# Patient Record
Sex: FEMALE | Race: WHITE | NOT HISPANIC OR LATINO | Employment: OTHER | URBAN - METROPOLITAN AREA
[De-identification: names, ages, dates, MRNs, and addresses within clinical notes are randomized per-mention and may not be internally consistent; named-entity substitution may affect disease eponyms.]

---

## 2017-01-05 ENCOUNTER — LAB VISIT (OUTPATIENT)
Dept: LAB | Facility: HOSPITAL | Age: 52
End: 2017-01-05
Attending: FAMILY MEDICINE
Payer: COMMERCIAL

## 2017-01-05 DIAGNOSIS — D50.9 IRON DEFICIENCY ANEMIA, UNSPECIFIED IRON DEFICIENCY ANEMIA TYPE: ICD-10-CM

## 2017-01-05 DIAGNOSIS — E11.59 HYPERTENSION ASSOCIATED WITH DIABETES: ICD-10-CM

## 2017-01-05 DIAGNOSIS — E78.5 HYPERLIPIDEMIA ASSOCIATED WITH TYPE 2 DIABETES MELLITUS: ICD-10-CM

## 2017-01-05 DIAGNOSIS — E11.69 HYPERLIPIDEMIA ASSOCIATED WITH TYPE 2 DIABETES MELLITUS: ICD-10-CM

## 2017-01-05 DIAGNOSIS — E11.42 TYPE 2 DIABETES MELLITUS WITH DIABETIC POLYNEUROPATHY, WITHOUT LONG-TERM CURRENT USE OF INSULIN: ICD-10-CM

## 2017-01-05 DIAGNOSIS — E55.9 VITAMIN D INSUFFICIENCY: ICD-10-CM

## 2017-01-05 DIAGNOSIS — I15.2 HYPERTENSION ASSOCIATED WITH DIABETES: ICD-10-CM

## 2017-01-05 DIAGNOSIS — Z00.00 ROUTINE GENERAL MEDICAL EXAMINATION AT A HEALTH CARE FACILITY: ICD-10-CM

## 2017-01-05 LAB
25(OH)D3+25(OH)D2 SERPL-MCNC: 16 NG/ML
ALBUMIN SERPL BCP-MCNC: 3.6 G/DL
ALP SERPL-CCNC: 62 U/L
ALT SERPL W/O P-5'-P-CCNC: 12 U/L
ANION GAP SERPL CALC-SCNC: 11 MMOL/L
AST SERPL-CCNC: 15 U/L
BASOPHILS # BLD AUTO: 0.02 K/UL
BASOPHILS NFR BLD: 0.3 %
BILIRUB SERPL-MCNC: 0.3 MG/DL
BUN SERPL-MCNC: 14 MG/DL
CALCIUM SERPL-MCNC: 9.4 MG/DL
CHLORIDE SERPL-SCNC: 107 MMOL/L
CHOLEST/HDLC SERPL: 4.6 {RATIO}
CO2 SERPL-SCNC: 22 MMOL/L
CREAT SERPL-MCNC: 1 MG/DL
DIFFERENTIAL METHOD: ABNORMAL
EOSINOPHIL # BLD AUTO: 0.6 K/UL
EOSINOPHIL NFR BLD: 9.5 %
ERYTHROCYTE [DISTWIDTH] IN BLOOD BY AUTOMATED COUNT: 12.8 %
EST. GFR  (AFRICAN AMERICAN): >60 ML/MIN/1.73 M^2
EST. GFR  (NON AFRICAN AMERICAN): >60 ML/MIN/1.73 M^2
GLUCOSE SERPL-MCNC: 133 MG/DL
HCT VFR BLD AUTO: 34.8 %
HDL/CHOLESTEROL RATIO: 21.8 %
HDLC SERPL-MCNC: 179 MG/DL
HDLC SERPL-MCNC: 39 MG/DL
HGB BLD-MCNC: 11.4 G/DL
LDLC SERPL CALC-MCNC: 107 MG/DL
LYMPHOCYTES # BLD AUTO: 2.5 K/UL
LYMPHOCYTES NFR BLD: 36.6 %
MCH RBC QN AUTO: 27.3 PG
MCHC RBC AUTO-ENTMCNC: 32.8 %
MCV RBC AUTO: 84 FL
MONOCYTES # BLD AUTO: 0.3 K/UL
MONOCYTES NFR BLD: 4.8 %
NEUTROPHILS # BLD AUTO: 3.3 K/UL
NEUTROPHILS NFR BLD: 48.5 %
NONHDLC SERPL-MCNC: 140 MG/DL
PLATELET # BLD AUTO: 270 K/UL
PMV BLD AUTO: 10.6 FL
POTASSIUM SERPL-SCNC: 4 MMOL/L
PROT SERPL-MCNC: 6.9 G/DL
RBC # BLD AUTO: 4.17 M/UL
SODIUM SERPL-SCNC: 140 MMOL/L
TRIGL SERPL-MCNC: 165 MG/DL
TSH SERPL DL<=0.005 MIU/L-ACNC: 2.52 UIU/ML
WBC # BLD AUTO: 6.73 K/UL

## 2017-01-05 PROCEDURE — 80061 LIPID PANEL: CPT

## 2017-01-05 PROCEDURE — 82306 VITAMIN D 25 HYDROXY: CPT

## 2017-01-05 PROCEDURE — 36415 COLL VENOUS BLD VENIPUNCTURE: CPT | Mod: PO

## 2017-01-05 PROCEDURE — 85025 COMPLETE CBC W/AUTO DIFF WBC: CPT

## 2017-01-05 PROCEDURE — 80053 COMPREHEN METABOLIC PANEL: CPT

## 2017-01-05 PROCEDURE — 83036 HEMOGLOBIN GLYCOSYLATED A1C: CPT

## 2017-01-05 PROCEDURE — 84443 ASSAY THYROID STIM HORMONE: CPT

## 2017-01-06 ENCOUNTER — TELEPHONE (OUTPATIENT)
Dept: INTERNAL MEDICINE | Facility: CLINIC | Age: 52
End: 2017-01-06

## 2017-01-06 DIAGNOSIS — E55.9 VITAMIN D DEFICIENCY: ICD-10-CM

## 2017-01-06 LAB
ESTIMATED AVG GLUCOSE: 140 MG/DL
HBA1C MFR BLD HPLC: 6.5 %

## 2017-01-06 RX ORDER — ERGOCALCIFEROL 1.25 MG/1
50000 CAPSULE ORAL
Qty: 10 CAPSULE | Refills: 0 | Status: SHIPPED | OUTPATIENT
Start: 2017-01-06 | End: 2017-04-18

## 2017-01-06 NOTE — TELEPHONE ENCOUNTER
Labs showing significantly low vitamin D levels, refill given on vitamin D by prescription.  Urine showing few white blood cells but negative for infection, drink adequate fluids.  Noted improvement in cholesterol and blood glucose levels, continue current medications and strict lifestyle changes with diet and exercise, limit carbohydrates

## 2017-01-11 ENCOUNTER — OFFICE VISIT (OUTPATIENT)
Dept: OPHTHALMOLOGY | Facility: CLINIC | Age: 52
End: 2017-01-11
Payer: COMMERCIAL

## 2017-01-11 DIAGNOSIS — Z98.890 POST-OPERATIVE STATE: Primary | ICD-10-CM

## 2017-01-11 DIAGNOSIS — H04.123 DRY EYE SYNDROME, BILATERAL: ICD-10-CM

## 2017-01-11 DIAGNOSIS — H40.1133 PRIMARY OPEN ANGLE GLAUCOMA OF BOTH EYES, SEVERE STAGE: ICD-10-CM

## 2017-01-11 PROCEDURE — 99024 POSTOP FOLLOW-UP VISIT: CPT | Mod: S$GLB,,, | Performed by: OPHTHALMOLOGY

## 2017-01-11 PROCEDURE — 99999 PR PBB SHADOW E&M-EST. PATIENT-LVL II: CPT | Mod: PBBFAC,,, | Performed by: OPHTHALMOLOGY

## 2017-01-11 RX ORDER — ATROPINE SULFATE 10 MG/ML
1 SOLUTION/ DROPS OPHTHALMIC 2 TIMES DAILY
Qty: 15 ML | Refills: 0 | Status: SHIPPED | OUTPATIENT
Start: 2017-01-11 | End: 2017-02-15

## 2017-01-11 NOTE — PROGRESS NOTES
HPI     Post-op Evaluation    Additional comments: s/p goniotomy os. states eye is doing well.            Comments   1. COAG (followed by Dr. Scott)  CP OS 8/27/15 - low to moderate flow, TDW 1100, aggressive dilation with   GV  Gonio Puncture OS 9/25/15   SLT OS 6/23/16, 6/23/15, 6-2-14  Goniotomy OS 11-17-16  2. DM  3. RP / VIT A PALMITATE 1500 (discontinued)      Latanoprost QHS OS, Cosopt BID OS, Alphagan BID OS,   inflamase bid os, ilevro once daily os    Per [facility]: prednisolone acetate od qd       Last edited by Kaylie Chery MA on 1/11/2017  9:59 AM. (History)            Assessment /Plan     For exam results, see Encounter Report.      ICD-10-CM ICD-9-CM    1. Post-operative state Z98.890 V45.89 S/P Goniotomy OS  Doing Well   2. Primary open angle glaucoma of both eyes, severe stage H40.1133 365.11 Will Begin Atropine BID OD,due to Mild Pain,cont Pred A. Daily OD     365.73    3. Dry eye syndrome, bilateral H04.123 375.15      Latanoprost QHS OS, Cosopt BID OS, Alphagan BID OS,   inflamase bid os, ilevro once daily os    OD Begin Atropine BID ,cont Pred Acetate Daily    Return to clinic in 1 month

## 2017-01-18 DIAGNOSIS — K21.9 GASTROESOPHAGEAL REFLUX DISEASE WITHOUT ESOPHAGITIS: ICD-10-CM

## 2017-01-18 RX ORDER — LANSOPRAZOLE 30 MG/1
30 CAPSULE, DELAYED RELEASE ORAL 2 TIMES DAILY
Qty: 180 CAPSULE | Refills: 1 | Status: SHIPPED | OUTPATIENT
Start: 2017-01-18 | End: 2017-02-24 | Stop reason: CLARIF

## 2017-02-13 ENCOUNTER — LAB VISIT (OUTPATIENT)
Dept: LAB | Facility: HOSPITAL | Age: 52
End: 2017-02-13
Attending: FAMILY MEDICINE
Payer: COMMERCIAL

## 2017-02-13 ENCOUNTER — OFFICE VISIT (OUTPATIENT)
Dept: INTERNAL MEDICINE | Facility: CLINIC | Age: 52
End: 2017-02-13
Payer: COMMERCIAL

## 2017-02-13 VITALS
OXYGEN SATURATION: 99 % | BODY MASS INDEX: 32.59 KG/M2 | HEART RATE: 94 BPM | TEMPERATURE: 99 F | SYSTOLIC BLOOD PRESSURE: 114 MMHG | WEIGHT: 166 LBS | DIASTOLIC BLOOD PRESSURE: 76 MMHG | HEIGHT: 60 IN

## 2017-02-13 DIAGNOSIS — R30.0 DYSURIA: Primary | ICD-10-CM

## 2017-02-13 DIAGNOSIS — R30.0 DYSURIA: ICD-10-CM

## 2017-02-13 DIAGNOSIS — R11.0 NAUSEA: ICD-10-CM

## 2017-02-13 DIAGNOSIS — M79.672 FOOT PAIN, BILATERAL: ICD-10-CM

## 2017-02-13 DIAGNOSIS — M79.671 FOOT PAIN, BILATERAL: ICD-10-CM

## 2017-02-13 DIAGNOSIS — J01.90 ACUTE SINUSITIS, RECURRENCE NOT SPECIFIED, UNSPECIFIED LOCATION: ICD-10-CM

## 2017-02-13 LAB
BILIRUB UR QL STRIP: NEGATIVE
CLARITY UR: CLEAR
COLOR UR: NORMAL
FLUAV AG SPEC QL IA: NEGATIVE
FLUBV AG SPEC QL IA: NEGATIVE
GLUCOSE UR QL STRIP: NEGATIVE
HGB UR QL STRIP: NEGATIVE
KETONES UR QL STRIP: NEGATIVE
LEUKOCYTE ESTERASE UR QL STRIP: NEGATIVE
NITRITE UR QL STRIP: NEGATIVE
PH UR STRIP: 5 [PH] (ref 5–8)
PROT UR QL STRIP: NEGATIVE
SP GR UR STRIP: 1.02 (ref 1–1.03)
SPECIMEN SOURCE: NORMAL
URN SPEC COLLECT METH UR: NORMAL

## 2017-02-13 PROCEDURE — 87400 INFLUENZA A/B EACH AG IA: CPT | Mod: PO

## 2017-02-13 PROCEDURE — 99213 OFFICE O/P EST LOW 20 MIN: CPT | Mod: S$GLB,,, | Performed by: PHYSICIAN ASSISTANT

## 2017-02-13 PROCEDURE — 81003 URINALYSIS AUTO W/O SCOPE: CPT | Mod: PO

## 2017-02-13 PROCEDURE — 87077 CULTURE AEROBIC IDENTIFY: CPT

## 2017-02-13 PROCEDURE — 87088 URINE BACTERIA CULTURE: CPT

## 2017-02-13 PROCEDURE — 3078F DIAST BP <80 MM HG: CPT | Mod: S$GLB,,, | Performed by: PHYSICIAN ASSISTANT

## 2017-02-13 PROCEDURE — 87086 URINE CULTURE/COLONY COUNT: CPT

## 2017-02-13 PROCEDURE — 87186 SC STD MICRODIL/AGAR DIL: CPT

## 2017-02-13 PROCEDURE — 99999 PR PBB SHADOW E&M-EST. PATIENT-LVL IV: CPT | Mod: PBBFAC,,, | Performed by: PHYSICIAN ASSISTANT

## 2017-02-13 PROCEDURE — 3074F SYST BP LT 130 MM HG: CPT | Mod: S$GLB,,, | Performed by: PHYSICIAN ASSISTANT

## 2017-02-13 RX ORDER — AMOXICILLIN AND CLAVULANATE POTASSIUM 875; 125 MG/1; MG/1
1 TABLET, FILM COATED ORAL 2 TIMES DAILY
Qty: 14 TABLET | Refills: 0 | Status: SHIPPED | OUTPATIENT
Start: 2017-02-13 | End: 2017-02-15 | Stop reason: SINTOL

## 2017-02-13 RX ORDER — ONDANSETRON 8 MG/1
8 TABLET, ORALLY DISINTEGRATING ORAL
Qty: 10 TABLET | Refills: 0 | Status: SHIPPED | OUTPATIENT
Start: 2017-02-13 | End: 2017-02-15 | Stop reason: SINTOL

## 2017-02-13 NOTE — PATIENT INSTRUCTIONS
Understanding Your Cholesterol Numbers  The higher your blood cholesterol, the greater your risk for heart attack (acute myocardial infarction), cardiovascular disease, or stroke. High cholesterol can cause any artery in your body to become narrow. Thats why you need to know your cholesterol level. If its high, you can take steps to bring it down. Eating the right foods and getting enough exercise can help. Some people also need medicine to control their cholesterol. Your healthcare provider can help you get started on a plan to control your cholesterol.        Blood flows easily when arteries are clear.      Less blood flows when cholesterol builds up in artery walls.   Checking your cholesterol  Your cholesterol is checked with a simple blood test. The results tell you how much cholesterol you have in your blood. Get checked as often as your healthcare provider suggests. If you have diabetes or high cholesterol, you may need your blood tested as often as every 3 months. As you work to lower your cholesterol, your numbers will change slowly. But they will change. Be patient and stay on track. Discuss your numbers with your healthcare provider.   Your total cholesterol number  A blood test will give you a number for the total amount of cholesterol in your blood. The higher this number, the more likely it is that cholesterol will build up in your blood vessels. Even if your cholesterol is just slightly high, you are at increased risk for health problems.  My total cholesterol is: ________________  Your lipid numbers  Total cholesterol is just one part of the story. Cholesterol is made up of different kinds of fats (lipids). If your total cholesterol is high, knowing your lipid profile is important. The 2 most important lipids are HDL and LDL. Lipids are checked after you have fasted. This means you dont eat for a certain amount of time before the test is done. Along with cholesterol, triglyceride can also lead  to blocked arteries. Triglycerides are another type of fat. Knowing your HDL, LDL, and triglyceride numbers, as well as your total cholesterol gives you a more complete picture of your cholesterol level:  · HDL is called the good cholesterol. It moves out of the bloodstream and does not block your blood vessels. HDL levels are affected by how much you exercise and what you eat.My HDL cholesterol is: ________________  · LDL is called the bad cholesterol. This is because it can stick to your artery walls and block blood flow. LDL levels are most affected by what you eat and by your genes.My LDL cholesterol is: ________________  · Triglyceride is a type of fat the body uses to store energy. Too much triglyceride can increase your risk for heart disease. Triglyceride levels should be under 150.My triglyceride is:  ________________  Based on your other health issues and risk factors, your healthcare provider may have specific goals for treating your cholesterol. You may need to use different kinds of medicines that work on the different types of cholesterol. Work with your provider to know what your goals of treatment are. Stick with your treatment plan to reach the goals you set.  High-risk groups  Some people may need to take medicines called statins to control their cholesterol. This is in addition to eating a healthy diet and getting regular exercise.  Statins can help you stay healthy. They can also help prevent a heart attack or stroke. You may need to take a statin if you are in one of these groups:  · Adults who have had a heart attack or stroke. Or adults who have had peripheral vascular disease, a ministroke (transient ischemic attack), or stable or unstable angina. This group also includes people who have had a procedure to restore blood flow through a blocked artery. These procedures include percutaneous coronary intervention, angioplasty, stent, and open-heart bypass surgery.  · Adults who have diabetes.  "Or adults who are at higher risk of having a heart attack or stroke and have an LDL cholesterol level of 70 to 189 mg/dL  · Adults who are 21 years old or older and have an LDL cholesterol level of 190 mg/dL or higher.  If you are in a high-risk group, talk with your healthcare provider about your treatment goals. Make sure you understand why these goals are important, based on your own health history and your family history of heart disease or high cholesterol.  Make a plan to have regular cholesterol checks. You may need to fast before getting this test. Also ask your provider about any side effects your medicines may cause. Let your provider know about any side effects you have. You may need to take more than one medicine to reach the cholesterol goals that you and your provider decide on.  Date Last Reviewed: 10/1/2016  © 0022-4335 [employer]. [address] [place] [address]. All rights reserved. This information is not intended as a substitute for professional medical care. Always follow your healthcare professional's instructions.        Triglycerides  Does this test have other names?  Lipid panel, fasting lipoprotein panel  What is this test?  This test measures the amount of triglycerides in your blood.  Triglycerides are one of several types of fats in your blood. Other kinds are LDL ("bad") cholesterol and HDL ("good") cholesterol.  Knowing your triglyceride level is important, especially if you have diabetes, are overweight or a smoker, or are mostly inactive. High triglyceride levels may put you at greater risk for a heart attack or stroke.    This test is part of a group of cholesterol and blood fat tests called a fasting lipoprotein panel, or lipid panel. This panel is recommended for all adults at least once every 5 years, or as recommended by your healthcare provider.  Knowing your triglyceride level helps your healthcare provider suggest healthy changes to your diet or " lifestyle. If you have triglycerides that are high to very high, your provider is more likely to prescribe medicines to lower your triglycerides or your LDL cholesterol.  Why do I need this test?  You may have this test as part of a routine checkup. You may also need this test if you're overweight, drink too much alcohol, rarely exercise, or have other conditions like high blood pressure or diabetes.  If you are on cholesterol-lowering medicines, you may have this test to see how well your treatment is working.  What other tests might I have along with this test?  Your healthcare provider will order screening tests for LDL, HDL, and total cholesterol.  What do my test results mean?  Many things may affect your lab test results. These include the method each lab uses to do the test. Even if your test results are different from the normal value, you may not have a problem. To learn what the results mean for you, talk with your healthcare provider.  Results are given in milligrams per deciliter (mg/dL). Normal levels of triglycerides are less than 150 mg/dL.  Here are how higher numbers are classified:  · 150 to 199 mg/dL: Borderline high  · 200 to 499 mg/dL: High  · 500 mg/dL and above: Very high  If you have a high triglyceride level, you have a greater risk for heart attack and stroke.  A triglyceride level above 150 mg/dL also means that you may have an increased risk for metabolic syndrome. This is a cluster of symptoms including high blood pressure, high blood sugar, and high body fat around the waist. These symptoms have been linked to increased risk for diabetes, heart disease, and stroke.  High triglycerides levels can also be caused by certain diseases or inherited conditions.  If your triglyceride level is above 200 mg/dL, your healthcare provider may recommend that you:  · Lose weight  · Limit high-fat foods containing saturated fats. These are animal fats found in meat, butter, and whole milk.  · Limit  trans fats, which are found in many processed foods like chips and store-bought cookies  · Cut back on drinks with added sugars, such as soda  · Limit your alcohol intake  · Stop smoking  · Control your blood pressure  · Exercise for at least 30 minutes a day, 5 days a week  · Limit the calories from fat in your diet to 25% to 35% of your total intake  If your triglycerides are extremely high--above 500 mg/dL--you may have an added risk for problems with your pancreas. You will likely need medicine to lower your levels along with recommended changes in diet and lifestyle.  How is this test done?  The test requires a blood sample, which is drawn through a needle from a vein in your arm.  Does this test pose any risks?  Taking a blood sample with a needle carries risks that include bleeding, infection, bruising, or feeling dizzy. When the needle pricks your arm, you may feel a slight stinging sensation or pain. Afterward, the site may be slightly sore.  What might affect my test results?  Not fasting for the required length of time before the test can affect your results. Certain medicines can affect your results, as can drinking alcohol.  How do I prepare for the test?  If you have this test as part of a cholesterol screening, you will need to not eat or drink anything but water for 9 to 14 hours before the test. In addition, be sure your healthcare provider knows about all medicines, herbs, vitamins, and supplements you are taking. This includes medicines that don't need a prescription and any illicit drugs you may use.     Date Last Reviewed: 10/9/2015  © 0904-3485 [employer]. [address] [place] [address], [place] [address]. All rights reserved. This information is not intended as a substitute for professional medical care. Always follow your healthcare professional's instructions.        Adult Self-Care for Colds  Colds are caused by viruses. They cant be cured with antibiotics. However, you can relieve  symptoms and support your bodys efforts to heal itself. No matter which symptoms you have, be sure to drink plenty of fluids (water or clear soup); stop smoking and drinking alcohol; and get plenty of rest.   Understand a fever  · Take your temperature several times a day. If your fever is 100.4°F (38.0°C) for more than a day, call your doctor.  · Relax, lie down. Go to bed if you want. Just get off your feet and rest. Also, drink plenty of fluids to avoid dehydration.  · Take acetaminophen or a nonsteroidal anti-inflammatory agent (NSAID), such as ibuprofen.  Treat a troubled nose kindly  · Breathe steam or heated humidified air to open blocked nasal passages.  a hot shower or use a vaporizer. Be careful not to get burned by the steam.  · Saline nasal sprays and decongestant tablets help open a stuffy nose. Antihistamines can also help, but they can cause side effects such as drowsiness and drying of the eyes, nose, and mouth.  Soothe a sore throat and cough  · Gargle every 2 hours with 1/4 teaspoon of salt dissolved in 1/2 cup of warm water. Suck on throat lozenges and cough drops to moisten your throat.  · Cough medicines are available but it is unclear how effective they actually are.  · Take acetaminophen or an NSAID, such as ibuprofen to ease throat pain  Ease digestive problems  · Put fluid back into your body. Take frequent sips of clear liquids such as water or broth. Do not drink beverages with a lot of sugar in them, such as juices and sodas. These can make diarrhea worse. Older children and adults can drink sports drinks.  · As your appetite returns, you can resume your normal diet. Ask your doctor whether there are any foods you should avoid.     When to seek medical care  When you first notice symptoms, ask your health care provider if antiviral medications are appropriate. Antibiotics should not be taken for colds or flu. Also, call your doctor if you have any of the following symptoms or if  you arent feeling better after 7 days:  · Shortness of breath  · Pain or pressure in the chest or abdomen  · Worsening symptoms, especially after a period of improvement  · Fever of 100.4°F  (38.0°C) or higher, or fever that doesnt go down with medication  · Sudden dizziness or confusion  · Severe or continued vomiting  · Signs of dehydration, including extreme thirst, dark urine, infrequent urination, dry mouth  · Spotted, red, or very sore throat   Date Last Reviewed: 6/19/2014  © 8066-7683 [employer]. [address] [place] [id number]. All rights reserved. This information is not intended as a substitute for professional medical care. Always follow your healthcare professional's instructions.        Wearing Proper Shoes                    You walk on your feet every day, forcing them to support the weight of your body. Repeated stress on your feet can cause damage over time. The right shoes can help protect your feet. The wrong shoes can cause more foot problems. Read the information below to help you find a shoe that fits your foot needs.     A good shoe fit will cover your foot outline.       A shoe that doesnt cover the outline is a bad fit.      Whats your foot shape?  To get a good fit, you need to know the shape of your foot. Do this simple test: While standing, place your foot on a piece of paper and trace around it. Is your foot straight or curved? Do you have a foot problem, such as a bunion, that causes your foot outline to show a bulge on the side of your big toe?  Finding your fit  Bring your foot outline to the shoe store to help you find the right shoe. Place a shoe you like on top of the outline to see if it matches the shape. The shoe should cover the outline. (If you have a bunion, the shoe may not cover the bulge on the outline. Look for soft leather shoes to stretch over the bunion.) Once youve found a pair of proper shoes, put them on. Walk around. Be sure the shoes  dont rub or pinch. If the shoes feel good, youve found your fit!  The right shoe for you  A good shoe has features that provide comfort and support. It must also be the right size and shape for your feet. Look for a shoe made of breathable fabric and lining, such as leather or canvas. Be sure that shoes have enough tread to prevent slipping. Go to a good shoe store for help finding the right shoe.  Good shoe features  An ideal shoe has the following:  · Laces for support. If tying laces is a problem for you, try shoes with Velcro fasteners or rolando.  · A front of the shoe (toe box) with ½ inch space in front of your longest toes.  · An arch shape that supports your foot.  · No more than 1½ inches of heel.  · A stiff, snug back of the shoe to keep your foot from sliding around.  · A smooth lining with no rough seams.  Shoe shopping tips  Below are some dos and donts for when you go to the shoe store.  Do:  · Select the shoes that feel right. Wear them around the house. Then bring them to your foot healthcare provider to check for fit. If they dont fit well, return them.  · Shop late in the day, when your feet will be slightly bigger.  · Each time you buy shoes, have both your feet measured while you are standing. Foot size changes with time.  · Pick shoes to suit their purpose. High heels are OK for an occasional night on the town. But for everyday wear, choose a more sensible shoe.  · Try on shoes while wearing any inserts specially made for your feet (orthoses).  · Try on both the right and left shoes. If your feet are different sizes, pick a pair that fits the larger foot.  Dont:  · Dont buy shoes based on shoe size alone. Always try on shoes, as sizes differ from brand to brand and within brands.  · Dont expect shoes to break in. If they dont fit at the store, dont buy them.  · Dont buy a shoe that doesnt match your foot shape.  What about socks?  Always wear socks with shoes. Socks help absorb  sweat and reduce friction and blistering. When shopping for shoes, choose soft, padded socks with seams that dont irritate your feet.  If you have foot problems  Some foot problems cause deformities. This can make it hard to find a good fit. Look for shoes made of soft leather to stretch over the deformity. If you have bunions, buy shoes with a wider toe box. To fit hammertoes, look for shoes with a tall toe box. If you have arch problems, you may need inserts. In some cases, youll need to have custom footwear or orthoses made for your feet.  Suggested footwear  Ask your healthcare provider what kind of footwear you need. He or she may recommend a certain brand or shoe store.  Date Last Reviewed: 8/1/2016 © 2000-2016 [employer]. [address] [place] [address], [place] [address]. All rights reserved. This information is not intended as a substitute for professional medical care. Always follow your healthcare professional's instructions.

## 2017-02-13 NOTE — MR AVS SNAPSHOT
[facility]  [address] [facility] [place] [address]  Phone: [phone]  Fax: [phone]                  Richelle Zaldivar   2017 2:40 PM   Office Visit    Description:  Female : 1965   Provider:  Kacey Robb PA-C   Department:  [facility] - Internal Medicine           Reason for Visit     Foot Pain     Urinary Tract Infection           Diagnoses this Visit        Comments    Dysuria    -  Primary     [place]         Foot pain, bilateral         Nausea                To Do List           Future Appointments        Provider Department Dept Phone    2017 3:40 PM SPECIMEN, [facility] [phone]    2017 2:20 PM Oneyda Bergman MD [facility] Internal Medicine [phone]    2/15/2017 9:15 AM Robert Scott MD [facility] Ophthalmology [phone]    2017 10:40 AM Paty Mclean DPM [facility] Podiatry [phone]    3/7/2017 2:15 PM Aster Marina MD [facility]/ -[phone]      Goals (5 Years of Data)     None      Follow-Up and Disposition     Return if symptoms worsen or fail to improve.       These Medications        Disp Refills Start End    ondansetron (ZOFRAN-ODT) 8 MG TbDL 10 tablet 0 2017     Take 1 tablet (8 mg total) by mouth every 24 hours as needed (nausea). - Oral    Pharmacy: [facility] [address] [place], [place] [address], [place] - [address] [facility] Ph #: [phone]         [facility]     [facility] -  Assistance  Registered nurses in the [facility] provide clinical advisement, health education, appointment booking, and other advisory services.  Call for this free service at [phone].             Medications           Message regarding Medications     Verify the changes and/or additions to your medication regime listed below are the same as discussed with your clinician today.  If any of these changes or additions are incorrect, please notify your healthcare provider.         START taking these NEW medications        Refills    ondansetron (ZOFRAN-ODT) 8 MG TbDL 0    Sig: Take 1 tablet (8 mg total) by mouth every 24 hours as needed (nausea).    Class: Normal    Route: Oral      STOP taking these medications     vitamin A palmitate 15,000 unit Tab Take 1 capsule by mouth once daily.    prednisoLONE sodium phosphate (INFLAMASE FORTE) 1 % Drop Place 1 drop into the left eye 4 (four) times daily.    nepafenac (ILEVRO) 0.3 % DrpS Apply to eye once daily.    omega 3-dha-epa-fish oil 1,000 (120-180) mg Cap Take 1 tablet by mouth 2 (two) times daily.    prednisoLONE acetate (PRED FORTE) 1 % DrpS Place 1 drop into the right eye once daily.           Verify that the below list of medications is an accurate representation of the medications you are currently taking.  If none reported, the list may be blank. If incorrect, please contact your healthcare provider. Carry this list with you in case of emergency.           Current Medications     ACCU-CHEK FASTCLIX Misc 1 Stick by Misc.(Non-Drug; Combo Route) route as directed. Use to check BG 2x daily. Accuchek Fastclix lancets. Medically necessary.    atorvastatin (LIPITOR) 20 MG tablet Take 1 tablet (20 mg total) by mouth once daily.    atropine 1% (ISOPTO ATROPINE) 1 % Drop Place 1 drop into the right eye 2 (two) times daily.    brimonidine 0.15 % OPTH DROP (ALPHAGAN) 0.15 % ophthalmic solution Place 1 drop into the left eye 2 (two) times daily. 15 ML BOTTLE    dorzolamide-timolol 2-0.5% (COSOPT) 22.3-6.8 mg/mL ophthalmic solution Place 1 drop into both eyes every 12 (twelve) hours.    ergocalciferol (VITAMIN D2) 50,000 unit Cap Take 1 capsule (50,000 Units total) by mouth every 7 days.    gabapentin (NEURONTIN) 300 MG capsule Take 1 capsule (300 mg total) by mouth every evening.    glimepiride (AMARYL) 2 MG tablet Take 1 tablet (2 mg total) by mouth 3 (three) times daily.    lansoprazole (PREVACID) 30 MG capsule Take 1 capsule (30 mg total) by  mouth 2 (two) times daily.    latanoprost 0.005 % ophthalmic solution Place 1 drop into both eyes every evening.    lisinopril-hydrochlorothiazide (PRINZIDE,ZESTORETIC) 10-12.5 mg per tablet Take 1 tablet by mouth once daily.    metformin (GLUCOPHAGE) 1000 MG tablet Take 1 tablet (1,000 mg total) by mouth 2 (two) times daily with meals.    ondansetron (ZOFRAN-ODT) 8 MG TbDL Take 1 tablet (8 mg total) by mouth every 24 hours as needed (nausea).    sodium,potassium,mag sulfates (SUPREP BOWEL PREP KIT) 17.5-3.13-1.6 gram SolR Take it as directed           Clinical Reference Information           Your Vitals Were     BP Pulse Temp Height    114/76 (BP Location: Right arm, Patient Position: Sitting, BP Method: Manual) 94 98.8 °F (37.1 °C) (Axillary) 5' (1.524 m)    Weight SpO2 BMI    75.3 kg (166 lb 0.1 oz) 99% 32.42 kg/m2      Blood Pressure          Most Recent Value    BP  114/76      Allergies as of 2/13/2017     No Known Allergies      Immunizations Administered on Date of Encounter - 2/13/2017     None      Orders Placed During Today's Visit      Normal Orders This Visit    Ambulatory referral to Podiatry     Influenza antigen Nasopharyngeal Swab     Future Labs/Procedures Expected by Expires    Urinalysis  2/13/2017 4/14/2018    Urine culture  As directed 2/13/2017      [facility] Sign-Up     Activating your [facility] account is as easy as 1-2-3!     1) Visit [email or web address], select Sign Up Now, enter this activation code and your date of birth, then select Next.  Activation code not generated  Current Patient Portal Status: Account disabled      2) Create a username and password to use when you visit [facility] in the future and select a security question in case you lose your password and select Next.    3) Enter your e-mail address and click Sign Up!    Additional Information  If you have questions, please e-mail [email or web address] or call [phone] to talk to our [facility] staff. Remember, [facility] is NOT  to be used for urgent needs. For medical emergencies, dial 911.         Instructions      Understanding Your Cholesterol Numbers  The higher your blood cholesterol, the greater your risk for heart attack (acute myocardial infarction), cardiovascular disease, or stroke. High cholesterol can cause any artery in your body to become narrow. Thats why you need to know your cholesterol level. If its high, you can take steps to bring it down. Eating the right foods and getting enough exercise can help. Some people also need medicine to control their cholesterol. Your healthcare provider can help you get started on a plan to control your cholesterol.        Blood flows easily when arteries are clear.      Less blood flows when cholesterol builds up in artery walls.   Checking your cholesterol  Your cholesterol is checked with a simple blood test. The results tell you how much cholesterol you have in your blood. Get checked as often as your healthcare provider suggests. If you have diabetes or high cholesterol, you may need your blood tested as often as every 3 months. As you work to lower your cholesterol, your numbers will change slowly. But they will change. Be patient and stay on track. Discuss your numbers with your healthcare provider.   Your total cholesterol number  A blood test will give you a number for the total amount of cholesterol in your blood. The higher this number, the more likely it is that cholesterol will build up in your blood vessels. Even if your cholesterol is just slightly high, you are at increased risk for health problems.  My total cholesterol is: ________________  Your lipid numbers  Total cholesterol is just one part of the story. Cholesterol is made up of different kinds of fats (lipids). If your total cholesterol is high, knowing your lipid profile is important. The 2 most important lipids are HDL and LDL. Lipids are checked after you have fasted. This means you dont eat for a certain  amount of time before the test is done. Along with cholesterol, triglyceride can also lead to blocked arteries. Triglycerides are another type of fat. Knowing your HDL, LDL, and triglyceride numbers, as well as your total cholesterol gives you a more complete picture of your cholesterol level:  · HDL is called the good cholesterol. It moves out of the bloodstream and does not block your blood vessels. HDL levels are affected by how much you exercise and what you eat.My HDL cholesterol is: ________________  · LDL is called the bad cholesterol. This is because it can stick to your artery walls and block blood flow. LDL levels are most affected by what you eat and by your genes.My LDL cholesterol is: ________________  · Triglyceride is a type of fat the body uses to store energy. Too much triglyceride can increase your risk for heart disease. Triglyceride levels should be under 150.My triglyceride is:  ________________  Based on your other health issues and risk factors, your healthcare provider may have specific goals for treating your cholesterol. You may need to use different kinds of medicines that work on the different types of cholesterol. Work with your provider to know what your goals of treatment are. Stick with your treatment plan to reach the goals you set.  High-risk groups  Some people may need to take medicines called statins to control their cholesterol. This is in addition to eating a healthy diet and getting regular exercise.  Statins can help you stay healthy. They can also help prevent a heart attack or stroke. You may need to take a statin if you are in one of these groups:  · Adults who have had a heart attack or stroke. Or adults who have had peripheral vascular disease, a ministroke (transient ischemic attack), or stable or unstable angina. This group also includes people who have had a procedure to restore blood flow through a blocked artery. These procedures include percutaneous coronary  "intervention, angioplasty, stent, and open-heart bypass surgery.  · Adults who have diabetes. Or adults who are at higher risk of having a heart attack or stroke and have an LDL cholesterol level of 70 to 189 mg/dL  · Adults who are 21 years old or older and have an LDL cholesterol level of 190 mg/dL or higher.  If you are in a high-risk group, talk with your healthcare provider about your treatment goals. Make sure you understand why these goals are important, based on your own health history and your family history of heart disease or high cholesterol.  Make a plan to have regular cholesterol checks. You may need to fast before getting this test. Also ask your provider about any side effects your medicines may cause. Let your provider know about any side effects you have. You may need to take more than one medicine to reach the cholesterol goals that you and your provider decide on.  Date Last Reviewed: 10/1/2016  © 3885-4941 [employer]. [address] [place] [address]. All rights reserved. This information is not intended as a substitute for professional medical care. Always follow your healthcare professional's instructions.        Triglycerides  Does this test have other names?  Lipid panel, fasting lipoprotein panel  What is this test?  This test measures the amount of triglycerides in your blood.  Triglycerides are one of several types of fats in your blood. Other kinds are LDL ("bad") cholesterol and HDL ("good") cholesterol.  Knowing your triglyceride level is important, especially if you have diabetes, are overweight or a smoker, or are mostly inactive. High triglyceride levels may put you at greater risk for a heart attack or stroke.    This test is part of a group of cholesterol and blood fat tests called a fasting lipoprotein panel, or lipid panel. This panel is recommended for all adults at least once every 5 years, or as recommended by your healthcare provider.  Knowing your " triglyceride level helps your healthcare provider suggest healthy changes to your diet or lifestyle. If you have triglycerides that are high to very high, your provider is more likely to prescribe medicines to lower your triglycerides or your LDL cholesterol.  Why do I need this test?  You may have this test as part of a routine checkup. You may also need this test if you're overweight, drink too much alcohol, rarely exercise, or have other conditions like high blood pressure or diabetes.  If you are on cholesterol-lowering medicines, you may have this test to see how well your treatment is working.  What other tests might I have along with this test?  Your healthcare provider will order screening tests for LDL, HDL, and total cholesterol.  What do my test results mean?  Many things may affect your lab test results. These include the method each lab uses to do the test. Even if your test results are different from the normal value, you may not have a problem. To learn what the results mean for you, talk with your healthcare provider.  Results are given in milligrams per deciliter (mg/dL). Normal levels of triglycerides are less than 150 mg/dL.  Here are how higher numbers are classified:  · 150 to 199 mg/dL: Borderline high  · 200 to 499 mg/dL: High  · 500 mg/dL and above: Very high  If you have a high triglyceride level, you have a greater risk for heart attack and stroke.  A triglyceride level above 150 mg/dL also means that you may have an increased risk for metabolic syndrome. This is a cluster of symptoms including high blood pressure, high blood sugar, and high body fat around the waist. These symptoms have been linked to increased risk for diabetes, heart disease, and stroke.  High triglycerides levels can also be caused by certain diseases or inherited conditions.  If your triglyceride level is above 200 mg/dL, your healthcare provider may recommend that you:  · Lose weight  · Limit high-fat foods containing  saturated fats. These are animal fats found in meat, butter, and whole milk.  · Limit trans fats, which are found in many processed foods like chips and store-bought cookies  · Cut back on drinks with added sugars, such as soda  · Limit your alcohol intake  · Stop smoking  · Control your blood pressure  · Exercise for at least 30 minutes a day, 5 days a week  · Limit the calories from fat in your diet to 25% to 35% of your total intake  If your triglycerides are extremely high--above 500 mg/dL--you may have an added risk for problems with your pancreas. You will likely need medicine to lower your levels along with recommended changes in diet and lifestyle.  How is this test done?  The test requires a blood sample, which is drawn through a needle from a vein in your arm.  Does this test pose any risks?  Taking a blood sample with a needle carries risks that include bleeding, infection, bruising, or feeling dizzy. When the needle pricks your arm, you may feel a slight stinging sensation or pain. Afterward, the site may be slightly sore.  What might affect my test results?  Not fasting for the required length of time before the test can affect your results. Certain medicines can affect your results, as can drinking alcohol.  How do I prepare for the test?  If you have this test as part of a cholesterol screening, you will need to not eat or drink anything but water for 9 to 14 hours before the test. In addition, be sure your healthcare provider knows about all medicines, herbs, vitamins, and supplements you are taking. This includes medicines that don't need a prescription and any illicit drugs you may use.     Date Last Reviewed: 10/9/2015  © 5779-5579 [employer]. [address] [place] [address], [place] [address]. All rights reserved. This information is not intended as a substitute for professional medical care. Always follow your healthcare professional's instructions.        Adult Self-Care for Colds  Colds  are caused by viruses. They cant be cured with antibiotics. However, you can relieve symptoms and support your bodys efforts to heal itself. No matter which symptoms you have, be sure to drink plenty of fluids (water or clear soup); stop smoking and drinking alcohol; and get plenty of rest.   Understand a fever  · Take your temperature several times a day. If your fever is 100.4°F (38.0°C) for more than a day, call your doctor.  · Relax, lie down. Go to bed if you want. Just get off your feet and rest. Also, drink plenty of fluids to avoid dehydration.  · Take acetaminophen or a nonsteroidal anti-inflammatory agent (NSAID), such as ibuprofen.  Treat a troubled nose kindly  · Breathe steam or heated humidified air to open blocked nasal passages.  a hot shower or use a vaporizer. Be careful not to get burned by the steam.  · Saline nasal sprays and decongestant tablets help open a stuffy nose. Antihistamines can also help, but they can cause side effects such as drowsiness and drying of the eyes, nose, and mouth.  Soothe a sore throat and cough  · Gargle every 2 hours with 1/4 teaspoon of salt dissolved in 1/2 cup of warm water. Suck on throat lozenges and cough drops to moisten your throat.  · Cough medicines are available but it is unclear how effective they actually are.  · Take acetaminophen or an NSAID, such as ibuprofen to ease throat pain  Ease digestive problems  · Put fluid back into your body. Take frequent sips of clear liquids such as water or broth. Do not drink beverages with a lot of sugar in them, such as juices and sodas. These can make diarrhea worse. Older children and adults can drink sports drinks.  · As your appetite returns, you can resume your normal diet. Ask your doctor whether there are any foods you should avoid.     When to seek medical care  When you first notice symptoms, ask your health care provider if antiviral medications are appropriate. Antibiotics should not be taken for  colds or flu. Also, call your doctor if you have any of the following symptoms or if you arent feeling better after 7 days:  · Shortness of breath  · Pain or pressure in the chest or abdomen  · Worsening symptoms, especially after a period of improvement  · Fever of 100.4°F  (38.0°C) or higher, or fever that doesnt go down with medication  · Sudden dizziness or confusion  · Severe or continued vomiting  · Signs of dehydration, including extreme thirst, dark urine, infrequent urination, dry mouth  · Spotted, red, or very sore throat   Date Last Reviewed: 6/19/2014  © 2825-6680 [employer]. [address] [place] [address], [place] [id number]. All rights reserved. This information is not intended as a substitute for professional medical care. Always follow your healthcare professional's instructions.        Wearing Proper Shoes                    You walk on your feet every day, forcing them to support the weight of your body. Repeated stress on your feet can cause damage over time. The right shoes can help protect your feet. The wrong shoes can cause more foot problems. Read the information below to help you find a shoe that fits your foot needs.     A good shoe fit will cover your foot outline.       A shoe that doesnt cover the outline is a bad fit.      Whats your foot shape?  To get a good fit, you need to know the shape of your foot. Do this simple test: While standing, place your foot on a piece of paper and trace around it. Is your foot straight or curved? Do you have a foot problem, such as a bunion, that causes your foot outline to show a bulge on the side of your big toe?  Finding your fit  Bring your foot outline to the shoe store to help you find the right shoe. Place a shoe you like on top of the outline to see if it matches the shape. The shoe should cover the outline. (If you have a bunion, the shoe may not cover the bulge on the outline. Look for soft leather shoes to stretch over the bunion.)  Once youve found a pair of proper shoes, put them on. Walk around. Be sure the shoes dont rub or pinch. If the shoes feel good, youve found your fit!  The right shoe for you  A good shoe has features that provide comfort and support. It must also be the right size and shape for your feet. Look for a shoe made of breathable fabric and lining, such as leather or canvas. Be sure that shoes have enough tread to prevent slipping. Go to a good shoe store for help finding the right shoe.  Good shoe features  An ideal shoe has the following:  · Laces for support. If tying laces is a problem for you, try shoes with Velcro fasteners or rolando.  · A front of the shoe (toe box) with ½ inch space in front of your longest toes.  · An arch shape that supports your foot.  · No more than 1½ inches of heel.  · A stiff, snug back of the shoe to keep your foot from sliding around.  · A smooth lining with no rough seams.  Shoe shopping tips  Below are some dos and donts for when you go to the shoe store.  Do:  · Select the shoes that feel right. Wear them around the house. Then bring them to your foot healthcare provider to check for fit. If they dont fit well, return them.  · Shop late in the day, when your feet will be slightly bigger.  · Each time you buy shoes, have both your feet measured while you are standing. Foot size changes with time.  · Pick shoes to suit their purpose. High heels are OK for an occasional night on the town. But for everyday wear, choose a more sensible shoe.  · Try on shoes while wearing any inserts specially made for your feet (orthoses).  · Try on both the right and left shoes. If your feet are different sizes, pick a pair that fits the larger foot.  Dont:  · Dont buy shoes based on shoe size alone. Always try on shoes, as sizes differ from brand to brand and within brands.  · Dont expect shoes to break in. If they dont fit at the store, dont buy them.  · Dont buy a shoe that doesnt match  your foot shape.  What about socks?  Always wear socks with shoes. Socks help absorb sweat and reduce friction and blistering. When shopping for shoes, choose soft, padded socks with seams that dont irritate your feet.  If you have foot problems  Some foot problems cause deformities. This can make it hard to find a good fit. Look for shoes made of soft leather to stretch over the deformity. If you have bunions, buy shoes with a wider toe box. To fit hammertoes, look for shoes with a tall toe box. If you have arch problems, you may need inserts. In some cases, youll need to have custom footwear or orthoses made for your feet.  Suggested footwear  Ask your healthcare provider what kind of footwear you need. He or she may recommend a certain brand or shoe store.  Date Last Reviewed: 8/1/2016 © 2000-2016 [employer]. [address] [place] [address]. All rights reserved. This information is not intended as a substitute for professional medical care. Always follow your healthcare professional's instructions.             Language Assistance Services     ATTENTION: Language assistance services are available, free of charge. Please call [phone].      ATENCIÓN: Si mirna gtz, tiene a anderson disposición servicios gratuitos de asistencia lingüística. Llame al [phone].     [facility] Ý: N?u b?n nói Ti?ng Vi?t, có các d?ch v? h? tr? ngôn ng? mi?n phí dành cho b?n. G?i s? [phone].         [facility] complies with applicable Federal civil rights laws and does not discriminate on the basis of race, color, national origin, age, disability, or sex.

## 2017-02-13 NOTE — PROGRESS NOTES
Subjective:      Patient ID: Richelle Zaldivar is a 51 y.o. female.    Chief Complaint: Foot Pain (burning sensation (bilateral) ) and Urinary Tract Infection (recently prescribed cipro-not taking d/t dry mouth and nausea)    Dysuria    This is a new problem. The current episode started in the past 7 days. The problem occurs every urination. The problem has been gradually worsening. The quality of the pain is described as aching. Maximum temperature: subjective fever. Associated symptoms include chills, frequency, nausea and sweats. Pertinent negatives include no behavior changes, discharge, flank pain, hematuria, hesitancy, possible pregnancy, urgency, vomiting, weight loss, bubble bath use, constipation, rash or withholding. Treatments tried: cipro. Improvement on treatment: stopped treatment due to nausea. Her past medical history is significant for diabetes mellitus, hypertension and recurrent UTIs. There is no history of catheterization, diabetes insipidus, genitourinary reflux, kidney stones, a single kidney, STD, urinary stasis or a urological procedure.   Fever    This is a new problem. Her temperature was unmeasured prior to arrival. Associated symptoms include congestion, coughing, headaches, muscle aches, nausea and urinary pain. Pertinent negatives include no abdominal pain, chest pain, diarrhea, ear pain, rash, sleepiness, sore throat, vomiting or wheezing. She has tried nothing for the symptoms.   Also reports having burning at the bottom of her feet for 10 days. Did not take any medications for this.     Review of Systems   Constitutional: Positive for chills, fatigue and fever. Negative for activity change, appetite change and weight loss.   HENT: Positive for congestion, postnasal drip, rhinorrhea, sinus pressure and sneezing. Negative for ear pain and sore throat.    Eyes: Negative for visual disturbance.   Respiratory: Positive for cough and chest tightness. Negative for shortness of breath and  wheezing.    Cardiovascular: Negative for chest pain, palpitations and leg swelling.   Gastrointestinal: Positive for nausea. Negative for abdominal distention, abdominal pain, constipation, diarrhea and vomiting.   Genitourinary: Positive for dysuria, frequency and pelvic pain. Negative for flank pain, hematuria, hesitancy and urgency.   Musculoskeletal: Negative for arthralgias, gait problem, myalgias and neck stiffness.   Skin: Negative for color change and rash.   Neurological: Positive for headaches. Negative for dizziness, facial asymmetry, weakness, light-headedness and numbness.   Hematological: Negative for adenopathy. Does not bruise/bleed easily.   Psychiatric/Behavioral: Negative for agitation and confusion.     Objective:     Visit Vitals    /76 (BP Location: Right arm, Patient Position: Sitting, BP Method: Manual)    Pulse 94    Temp 98.8 °F (37.1 °C) (Axillary)    Ht 5' (1.524 m)    Wt 75.3 kg (166 lb 0.1 oz)    SpO2 99%    BMI 32.42 kg/m2       Physical Exam   Constitutional: She is oriented to person, place, and time. She appears well-developed and well-nourished. No distress.   HENT:   Head: Normocephalic and atraumatic.   Right Ear: Hearing, tympanic membrane, external ear and ear canal normal. No tenderness.   Left Ear: Hearing, tympanic membrane, external ear and ear canal normal. No tenderness.   Nose: Mucosal edema and rhinorrhea present. No sinus tenderness. Right sinus exhibits maxillary sinus tenderness and frontal sinus tenderness. Left sinus exhibits maxillary sinus tenderness and frontal sinus tenderness.   Mouth/Throat: Uvula is midline and mucous membranes are normal. No oral lesions. Normal dentition. No dental abscesses, uvula swelling or dental caries. Posterior oropharyngeal erythema present. No oropharyngeal exudate, posterior oropharyngeal edema or tonsillar abscesses. No tonsillar exudate.   Eyes: Conjunctivae and EOM are normal. Pupils are equal, round, and  reactive to light. Right eye exhibits no discharge. Left eye exhibits no discharge.   Neck: Normal range of motion. Neck supple.   Cardiovascular: Normal rate, regular rhythm and normal heart sounds.  Exam reveals no gallop and no friction rub.    No murmur heard.  Pulses:       Dorsalis pedis pulses are 2+ on the right side, and 2+ on the left side.        Posterior tibial pulses are 2+ on the right side, and 2+ on the left side.   Pulmonary/Chest: Effort normal and breath sounds normal. No respiratory distress. She has no wheezes. She has no rales.   Musculoskeletal:        Right foot: There is normal range of motion and no deformity.        Left foot: There is normal range of motion and no deformity.   Feet:   Right Foot:   Skin Integrity: Negative for ulcer, blister, skin breakdown, erythema, warmth, callus or dry skin.   Left Foot:   Skin Integrity: Negative for ulcer, blister, skin breakdown, erythema, warmth, callus or dry skin.   Lymphadenopathy:     She has no cervical adenopathy.   Neurological: She is alert and oriented to person, place, and time.   Skin: Skin is warm. No rash noted. She is not diaphoretic. No erythema. No pallor.   Psychiatric: She has a normal mood and affect. Her behavior is normal. Judgment and thought content normal.   Nursing note and vitals reviewed.    Results for orders placed or performed in visit on 02/13/17   Influenza antigen Nasopharyngeal Swab   Result Value Ref Range    Influenza A Ag, EIA Negative Negative    Influenza B Ag, EIA Negative Negative    Flu A & B Source Nasopharyngeal Swab        Assessment:     1. Dysuria    2. Acute sinusitis, recurrence not specified, unspecified location    3. Foot pain, bilateral    4. Nausea      Plan:   Dysuria  -     Urinalysis; Future; Expected date: 2/13/17  -     Urine culture; Future    Acute sinusitis, recurrence not specified, unspecified location  -     Influenza antigen Nasopharyngeal Swab  -     amoxicillin-clavulanate  875-125mg (AUGMENTIN) 875-125 mg per tablet; Take 1 tablet by mouth 2 (two) times daily.  Dispense: 14 tablet; Refill: 0    Foot pain, bilateral  -     Ambulatory referral to Podiatry  -supportive shoes, aleve as needed for pain    Nausea  -     ondansetron (ZOFRAN-ODT) 8 MG TbDL; Take 1 tablet (8 mg total) by mouth every 24 hours as needed (nausea).  Dispense: 10 tablet; Refill: 0    -take if intolerant to antibiotic      Return if symptoms worsen or fail to improve.

## 2017-02-15 ENCOUNTER — TELEPHONE (OUTPATIENT)
Dept: OPHTHALMOLOGY | Facility: CLINIC | Age: 52
End: 2017-02-15

## 2017-02-15 ENCOUNTER — LAB VISIT (OUTPATIENT)
Dept: LAB | Facility: HOSPITAL | Age: 52
End: 2017-02-15
Attending: INTERNAL MEDICINE
Payer: COMMERCIAL

## 2017-02-15 ENCOUNTER — OFFICE VISIT (OUTPATIENT)
Dept: INTERNAL MEDICINE | Facility: CLINIC | Age: 52
End: 2017-02-15
Payer: COMMERCIAL

## 2017-02-15 ENCOUNTER — TELEPHONE (OUTPATIENT)
Dept: INTERNAL MEDICINE | Facility: CLINIC | Age: 52
End: 2017-02-15

## 2017-02-15 ENCOUNTER — OFFICE VISIT (OUTPATIENT)
Dept: OPHTHALMOLOGY | Facility: CLINIC | Age: 52
End: 2017-02-15
Payer: COMMERCIAL

## 2017-02-15 VITALS
WEIGHT: 164.44 LBS | DIASTOLIC BLOOD PRESSURE: 74 MMHG | TEMPERATURE: 98 F | OXYGEN SATURATION: 99 % | BODY MASS INDEX: 32.28 KG/M2 | HEART RATE: 86 BPM | HEIGHT: 60 IN | SYSTOLIC BLOOD PRESSURE: 126 MMHG

## 2017-02-15 DIAGNOSIS — R11.0 NAUSEA: ICD-10-CM

## 2017-02-15 DIAGNOSIS — N39.0 URINARY TRACT INFECTION WITHOUT HEMATURIA, SITE UNSPECIFIED: Primary | ICD-10-CM

## 2017-02-15 DIAGNOSIS — E11.42 TYPE 2 DIABETES MELLITUS WITH DIABETIC POLYNEUROPATHY, WITHOUT LONG-TERM CURRENT USE OF INSULIN: ICD-10-CM

## 2017-02-15 DIAGNOSIS — N39.0 URINARY TRACT INFECTION WITHOUT HEMATURIA, SITE UNSPECIFIED: ICD-10-CM

## 2017-02-15 DIAGNOSIS — H35.52 RETINITIS PIGMENTOSA: ICD-10-CM

## 2017-02-15 DIAGNOSIS — H40.1133 PRIMARY OPEN ANGLE GLAUCOMA OF BOTH EYES, SEVERE STAGE: Primary | ICD-10-CM

## 2017-02-15 DIAGNOSIS — H20.022 IRITIS, RECURRENT, LEFT: ICD-10-CM

## 2017-02-15 LAB
ALBUMIN SERPL BCP-MCNC: 3.8 G/DL
ALP SERPL-CCNC: 73 U/L
ALT SERPL W/O P-5'-P-CCNC: 13 U/L
ANION GAP SERPL CALC-SCNC: 13 MMOL/L
AST SERPL-CCNC: 13 U/L
BASOPHILS # BLD AUTO: 0.02 K/UL
BASOPHILS NFR BLD: 0.3 %
BILIRUB SERPL-MCNC: 0.3 MG/DL
BUN SERPL-MCNC: 19 MG/DL
CALCIUM SERPL-MCNC: 10 MG/DL
CHLORIDE SERPL-SCNC: 105 MMOL/L
CO2 SERPL-SCNC: 23 MMOL/L
CREAT SERPL-MCNC: 1.4 MG/DL
DIFFERENTIAL METHOD: ABNORMAL
EOSINOPHIL # BLD AUTO: 0.6 K/UL
EOSINOPHIL NFR BLD: 7.1 %
ERYTHROCYTE [DISTWIDTH] IN BLOOD BY AUTOMATED COUNT: 13 %
EST. GFR  (AFRICAN AMERICAN): 49.5 ML/MIN/1.73 M^2
EST. GFR  (NON AFRICAN AMERICAN): 42.9 ML/MIN/1.73 M^2
GLUCOSE SERPL-MCNC: 109 MG/DL
HCT VFR BLD AUTO: 35.9 %
HGB BLD-MCNC: 11.3 G/DL
LYMPHOCYTES # BLD AUTO: 2.4 K/UL
LYMPHOCYTES NFR BLD: 31 %
MCH RBC QN AUTO: 26.5 PG
MCHC RBC AUTO-ENTMCNC: 31.5 %
MCV RBC AUTO: 84 FL
MONOCYTES # BLD AUTO: 0.6 K/UL
MONOCYTES NFR BLD: 7.8 %
NEUTROPHILS # BLD AUTO: 4.2 K/UL
NEUTROPHILS NFR BLD: 53.5 %
PLATELET # BLD AUTO: 359 K/UL
PMV BLD AUTO: 9.9 FL
POTASSIUM SERPL-SCNC: 4.1 MMOL/L
PROT SERPL-MCNC: 8.2 G/DL
RBC # BLD AUTO: 4.26 M/UL
SODIUM SERPL-SCNC: 141 MMOL/L
WBC # BLD AUTO: 7.78 K/UL

## 2017-02-15 PROCEDURE — 99024 POSTOP FOLLOW-UP VISIT: CPT | Mod: S$GLB,,, | Performed by: OPHTHALMOLOGY

## 2017-02-15 PROCEDURE — 3078F DIAST BP <80 MM HG: CPT | Mod: S$GLB,,, | Performed by: OPHTHALMOLOGY

## 2017-02-15 PROCEDURE — 3074F SYST BP LT 130 MM HG: CPT | Mod: S$GLB,,, | Performed by: OPHTHALMOLOGY

## 2017-02-15 PROCEDURE — 85025 COMPLETE CBC W/AUTO DIFF WBC: CPT

## 2017-02-15 PROCEDURE — 3078F DIAST BP <80 MM HG: CPT | Mod: S$GLB,,, | Performed by: INTERNAL MEDICINE

## 2017-02-15 PROCEDURE — 99213 OFFICE O/P EST LOW 20 MIN: CPT | Mod: S$GLB,,, | Performed by: INTERNAL MEDICINE

## 2017-02-15 PROCEDURE — 3074F SYST BP LT 130 MM HG: CPT | Mod: S$GLB,,, | Performed by: INTERNAL MEDICINE

## 2017-02-15 PROCEDURE — 80053 COMPREHEN METABOLIC PANEL: CPT

## 2017-02-15 PROCEDURE — 99999 PR PBB SHADOW E&M-EST. PATIENT-LVL II: CPT | Mod: PBBFAC,,, | Performed by: OPHTHALMOLOGY

## 2017-02-15 PROCEDURE — 36415 COLL VENOUS BLD VENIPUNCTURE: CPT | Mod: PO

## 2017-02-15 PROCEDURE — 99999 PR PBB SHADOW E&M-EST. PATIENT-LVL III: CPT | Mod: PBBFAC,,, | Performed by: INTERNAL MEDICINE

## 2017-02-15 RX ORDER — SULFAMETHOXAZOLE AND TRIMETHOPRIM 800; 160 MG/1; MG/1
1 TABLET ORAL 2 TIMES DAILY
Qty: 6 TABLET | Refills: 0 | Status: SHIPPED | OUTPATIENT
Start: 2017-02-15 | End: 2017-02-15 | Stop reason: SDUPTHER

## 2017-02-15 RX ORDER — LOTEPREDNOL ETABONATE 5 MG/G
1 GEL OPHTHALMIC DAILY
Qty: 5 G | Refills: 1 | Status: SHIPPED | OUTPATIENT
Start: 2017-02-15 | End: 2017-04-18 | Stop reason: SDUPTHER

## 2017-02-15 RX ORDER — SULFAMETHOXAZOLE AND TRIMETHOPRIM 800; 160 MG/1; MG/1
1 TABLET ORAL 2 TIMES DAILY
Qty: 20 TABLET | Refills: 0 | Status: SHIPPED | OUTPATIENT
Start: 2017-02-15 | End: 2017-02-15 | Stop reason: CLARIF

## 2017-02-15 RX ORDER — ONDANSETRON HYDROCHLORIDE 8 MG/1
8 TABLET, FILM COATED ORAL EVERY 8 HOURS PRN
Qty: 15 TABLET | Refills: 0 | Status: SHIPPED | OUTPATIENT
Start: 2017-02-15 | End: 2017-02-24

## 2017-02-15 RX ORDER — SULFAMETHOXAZOLE AND TRIMETHOPRIM 800; 160 MG/1; MG/1
1 TABLET ORAL 2 TIMES DAILY
Qty: 6 TABLET | Refills: 0 | Status: SHIPPED | OUTPATIENT
Start: 2017-02-15 | End: 2017-02-15 | Stop reason: CLARIF

## 2017-02-15 RX ORDER — GLIMEPIRIDE 2 MG/1
TABLET ORAL
Qty: 90 TABLET | Refills: 1 | Status: SHIPPED | OUTPATIENT
Start: 2017-02-15 | End: 2017-06-01 | Stop reason: SDUPTHER

## 2017-02-15 RX ORDER — SULFAMETHOXAZOLE AND TRIMETHOPRIM 800; 160 MG/1; MG/1
1 TABLET ORAL 2 TIMES DAILY
Qty: 10 TABLET | Refills: 0 | Status: SHIPPED | OUTPATIENT
Start: 2017-02-15 | End: 2017-02-20

## 2017-02-15 RX ORDER — DIFLUPREDNATE OPHTHALMIC 0.5 MG/ML
1 EMULSION OPHTHALMIC 2 TIMES DAILY
Qty: 1 BOTTLE | Refills: 1 | Status: SHIPPED | OUTPATIENT
Start: 2017-02-15 | End: 2017-03-17

## 2017-02-15 RX ORDER — SULFAMETHOXAZOLE AND TRIMETHOPRIM 800; 160 MG/1; MG/1
1 TABLET ORAL 2 TIMES DAILY
Qty: 10 TABLET | Refills: 0 | Status: SHIPPED | OUTPATIENT
Start: 2017-02-15 | End: 2017-02-15 | Stop reason: SDUPTHER

## 2017-02-15 NOTE — PROGRESS NOTES
HPI     Glaucoma    Additional comments: Latanoprost QHS OS, Cosopt BID OS, Alphagan BID OS,   inflamase bid os, ilevro once daily os,           Comments       PCP: Dr. Miranda Ackerman    1. COAG (followed by Dr. Scott)  CP OS 8/27/15 - low to moderate flow, TDW 1100, aggressive dilation with   GV  Gonio Puncture OS 9/25/15   SLT OS 6/23/16, 6/23/15, 6-2-14  Goniotomy OS 11-17-16  2. DM  3. RP / VIT A PALMITATE 1500 (discontinued)    OD:Atropine BID OD  OSLatanoprost QHS OS, Cosopt BID OS, Alphagan BID OS,   inflamase bid os, ilevro once daily os,    Per [facility]: prednisolone acetate od qd       Last edited by Calixto Pascual, Patient Care Assistant on 2/15/2017  9:42   AM. (History)            Assessment /Plan     For exam results, see Encounter Report.      ICD-10-CM ICD-9-CM    1. Primary open angle glaucoma of both eyes, severe stage H40.1133 365.11      365.73    2. Iritis, recurrent, left H20.022 364.02    3. Retinitis pigmentosa H35.52 362.74            Stop Pred Acetate   Start Lotemax Gel Once daily Left   Durezol BID OD       OD:Atropine BID OD  OSLatanoprost QHS OS, Cosopt BID OS, Alphagan BID OS,    ilevro once daily os until out     RETURN TO CLINIC 1 months

## 2017-02-15 NOTE — TELEPHONE ENCOUNTER
----- Message from Majo Borjas sent at 2/15/2017  3:14 PM CST -----  Contact: darien  Calling to speak with staff before filling lotemax [facility] call [phone]

## 2017-02-15 NOTE — MR AVS SNAPSHOT
[facility] Internal Medicine  [address] [place] [address]  Phone: [phone]  Fax: [phone]                  Richelle Zaldivar   2/15/2017 10:00 AM   Office Visit    Description:  Female : 1965   Provider:  Blade Lopez MD   Department:  [facility] - Internal Medicine           Reason for Visit     Burning in feet           Diagnoses this Visit        Comments    Urinary tract infection without hematuria, site unspecified    -  Primary            To Do List           Future Appointments        Provider Department Dept Phone    2/15/2017 3:15 PM LAB, SAME DAY [facility] [phone]    2017 10:40 AM Paty Mclean DPM [facility] Podiatry [phone]    3/7/2017 2:15 PM MD Tadeo Davidson - OB/ -[id number]    3/7/2017 2:45 PM MD Tadeo Davidson  OB/ -[phone]    3/15/2017 10:00 AM Robert Scott MD [facility] Ophthalmology [phone]      Goals (5 Years of Data)     None      Follow-Up and Disposition     Return if symptoms worsen or fail to improve.       These Medications        Disp Refills Start End    ondansetron (ZOFRAN) 8 MG tablet 15 tablet 0 2/15/2017     Take 1 tablet (8 mg total) by mouth every 8 (eight) hours as needed for Nausea. - Oral    Pharmacy: [facility] [address] [place] [address] [address] [facility] Ph #: [phone]       sulfamethoxazole-trimethoprim 800-160mg (BACTRIM DS) 800-160 mg Tab 10 tablet 0 2/15/2017 2017    Take 1 tablet by mouth 2 (two) times daily. - Oral    Pharmacy: [facility] [address] [place] [address] [address] [facility] Ph #: [phone]         [facility] On Call     [facility] On Call Nurse Care Line -  Assistance  Registered nurses in the [facility] Center provide clinical advisement, health education, appointment booking, and other advisory services.  Call for this free service at [phone].             Medications           Message regarding Medications      Verify the changes and/or additions to your medication regime listed below are the same as discussed with your clinician today.  If any of these changes or additions are incorrect, please notify your healthcare provider.        START taking these NEW medications        Refills    ondansetron (ZOFRAN) 8 MG tablet 0    Sig: Take 1 tablet (8 mg total) by mouth every 8 (eight) hours as needed for Nausea.    Class: Print    Route: Oral    sulfamethoxazole-trimethoprim 800-160mg (BACTRIM DS) 800-160 mg Tab 0    Sig: Take 1 tablet by mouth 2 (two) times daily.    Class: Print    Route: Oral      STOP taking these medications     atropine 1% (ISOPTO ATROPINE) 1 % Drop Place 1 drop into the right eye 2 (two) times daily.    sodium,potassium,mag sulfates (SUPREP BOWEL PREP KIT) 17.5-3.13-1.6 gram SolR Take it as directed    ondansetron (ZOFRAN-ODT) 8 MG TbDL Take 1 tablet (8 mg total) by mouth every 24 hours as needed (nausea).    amoxicillin-clavulanate 875-125mg (AUGMENTIN) 875-125 mg per tablet Take 1 tablet by mouth 2 (two) times daily.           Verify that the below list of medications is an accurate representation of the medications you are currently taking.  If none reported, the list may be blank. If incorrect, please contact your healthcare provider. Carry this list with you in case of emergency.           Current Medications     ACCU-CHEK FASTCLIX Misc 1 Stick by Misc.(Non-Drug; Combo Route) route as directed. Use to check BG 2x daily. Accuchek Fastclix lancets. Medically necessary.    brimonidine 0.15 % OPTH DROP (ALPHAGAN) 0.15 % ophthalmic solution Place 1 drop into the left eye 2 (two) times daily. 15 ML BOTTLE    dorzolamide-timolol 2-0.5% (COSOPT) 22.3-6.8 mg/mL ophthalmic solution Place 1 drop into both eyes every 12 (twelve) hours.    ergocalciferol (VITAMIN D2) 50,000 unit Cap Take 1 capsule (50,000 Units total) by mouth every 7 days.    gabapentin (NEURONTIN) 300 MG capsule Take 1 capsule (300 mg total)  by mouth every evening.    glimepiride (AMARYL) 2 MG tablet Take 1 tablet (2 mg total) by mouth 3 (three) times daily.    lansoprazole (PREVACID) 30 MG capsule Take 1 capsule (30 mg total) by mouth 2 (two) times daily.    latanoprost 0.005 % ophthalmic solution Place 1 drop into both eyes every evening.    lisinopril-hydrochlorothiazide (PRINZIDE,ZESTORETIC) 10-12.5 mg per tablet Take 1 tablet by mouth once daily.    metformin (GLUCOPHAGE) 1000 MG tablet Take 1 tablet (1,000 mg total) by mouth 2 (two) times daily with meals.    atorvastatin (LIPITOR) 20 MG tablet Take 1 tablet (20 mg total) by mouth once daily.    difluprednate (DUREZOL) 0.05 % Drop ophthalmic solution Place 1 drop into the right eye 2 (two) times daily.    loteprednol etabonate 0.5 % DrpG Place 1 application into the left eye once daily.    ondansetron (ZOFRAN) 8 MG tablet Take 1 tablet (8 mg total) by mouth every 8 (eight) hours as needed for Nausea.    sulfamethoxazole-trimethoprim 800-160mg (BACTRIM DS) 800-160 mg Tab Take 1 tablet by mouth 2 (two) times daily.           Clinical Reference Information           Your Vitals Were     BP Pulse Temp Height Weight SpO2    126/74 (BP Location: Right arm, Patient Position: Sitting) 86 97.8 °F (36.6 °C) (Tympanic) 5' (1.524 m) 74.6 kg (164 lb 7.4 oz) 99%    BMI                32.12 kg/m2          Blood Pressure          Most Recent Value    BP  126/74      Allergies as of 2/15/2017     No Known Allergies      Immunizations Administered on Date of Encounter - 2/15/2017     None      Orders Placed During Today's Visit     Future Labs/Procedures Expected by Expires    CBC auto differential  2/15/2017 4/16/2018    Comprehensive metabolic panel  2/15/2017 4/16/2018      Language Assistance Services     ATTENTION: Language assistance services are available, free of charge. Please call [phone].      ATENCIÓN: Si habla español, tiene a anderson disposición servicios gratuitos de asistencia lingüística. Gerry  al [phone].     LUIS CARLOS Ý: N?u b?n nói Ti?ng Vi?t, có các d?ch v? h? tr? ngôn ng? mi?n phí dành cho b?n. G?i s? [phone].         [facility] complies with applicable Federal civil rights laws and does not discriminate on the basis of race, color, national origin, age, disability, or sex.

## 2017-02-15 NOTE — PROGRESS NOTES
Subjective:      Patient ID: Richelle Zaldivar is a 51 y.o. female.    Chief Complaint: Burning in feet (bottom of both feet, x 2 weeks)    HPI Comments: 50 yo with Patient Active Problem List:     Hypertension associated with diabetes     Diabetes mellitus type 2, uncontrolled, without complications     Obesity (BMI 30-39.9)     Hyperlipidemia associated with type 2 diabetes mellitus     Iron deficiency anemia     Vitamin D insufficiency     GERD (gastroesophageal reflux disease)     Retinitis pigmentosa     Recurrent iritis of left eye     Open-angle glaucoma, severe stage    Here today for f/u on uti. Burning with urination has greatly improved over the past 24 hours. Pt reports nausea after starting cipro after dx of uti. cipro changed to augmentin 2 days ago. Continues with nausea and began lower abdominal cramping after starting augmentin.    Review of Systems   Constitutional: Negative for chills and fever.   Respiratory: Negative for cough.    Cardiovascular: Negative for chest pain and palpitations.   Gastrointestinal: Positive for abdominal pain, constipation (chronic intermittent) and nausea. Negative for abdominal distention, anal bleeding, diarrhea and vomiting.   Genitourinary: Negative for dysuria, flank pain, frequency and hematuria.   Skin: Negative for rash.     Objective:     Visit Vitals    /74 (BP Location: Right arm, Patient Position: Sitting)    Pulse 86    Temp 97.8 °F (36.6 °C) (Tympanic)    Ht 5' (1.524 m)    Wt 74.6 kg (164 lb 7.4 oz)    SpO2 99%    BMI 32.12 kg/m2       Physical Exam   Constitutional: She appears well-developed and well-nourished. No distress.   Cardiovascular: Normal rate.    Pulmonary/Chest: Effort normal and breath sounds normal.   Abdominal: Bowel sounds are normal. She exhibits no distension. There is tenderness (nickolas lower quad). There is no rebound and no guarding.   Neurological: She is alert.   Skin: Skin is warm and dry.   Psychiatric: She has a normal  mood and affect. Her behavior is normal.     Gram neg rods on urine culture    Assessment:     1. Urinary tract infection without hematuria, site unspecified    2. Nausea      Plan:   Urinary tract infection without hematuria, site unspecified  Improving  Poss SEs from abx  Stop augmentin  Try bactrim  otc probiotic  -     CBC auto differential; Future; Expected date: 2/15/17  -     Comprehensive metabolic panel; Future; Expected date: 2/15/17  -     ondansetron (ZOFRAN) 8 MG tablet; Take 1 tablet (8 mg total) by mouth every 8 (eight) hours as needed for Nausea.  Dispense: 15 tablet; Refill: 0  -     sulfamethoxazole-trimethoprim 800-160mg (BACTRIM DS) 800-160 mg Tab; Take 1 tablet by mouth 2 (two) times daily.  Dispense: 10 tablet; Refill: 0    Nausea  Requests change zofran from odt to tablet      Lab Frequency Next Occurrence   CBC auto differential Once 2/15/2017   Comprehensive metabolic panel Once 2/15/2017   Comprehensive metabolic panel     Lipid panel     Hemoglobin A1c           Return if symptoms worsen or fail to improve.

## 2017-02-15 NOTE — TELEPHONE ENCOUNTER
Spoke to pt. Pt states she wants to get scheduled with a dr today regarding her burning feet. Pt declined to see any PA or NP. Pt was scheduled with dr manning for 2/15/17 at 2:00. Pt verbalized understanding

## 2017-02-15 NOTE — TELEPHONE ENCOUNTER
----- Message from Rafiq Lucia sent at 2/15/2017  8:18 AM CST -----  Contact: pt  She's calling in regards to being worked into the dr's schedule today, please advise, [phone] (home)

## 2017-02-16 ENCOUNTER — OFFICE VISIT (OUTPATIENT)
Dept: PODIATRY | Facility: CLINIC | Age: 52
End: 2017-02-16
Payer: COMMERCIAL

## 2017-02-16 ENCOUNTER — TELEPHONE (OUTPATIENT)
Dept: OPHTHALMOLOGY | Facility: CLINIC | Age: 52
End: 2017-02-16

## 2017-02-16 VITALS
DIASTOLIC BLOOD PRESSURE: 72 MMHG | BODY MASS INDEX: 32.28 KG/M2 | HEIGHT: 60 IN | WEIGHT: 164.44 LBS | SYSTOLIC BLOOD PRESSURE: 100 MMHG

## 2017-02-16 DIAGNOSIS — M21.41 PES PLANUS OF BOTH FEET: ICD-10-CM

## 2017-02-16 DIAGNOSIS — M21.42 PES PLANUS OF BOTH FEET: ICD-10-CM

## 2017-02-16 DIAGNOSIS — E11.49 TYPE II DIABETES MELLITUS WITH NEUROLOGICAL MANIFESTATIONS: Primary | ICD-10-CM

## 2017-02-16 LAB — BACTERIA UR CULT: NORMAL

## 2017-02-16 PROCEDURE — 3074F SYST BP LT 130 MM HG: CPT | Mod: S$GLB,,, | Performed by: PODIATRIST

## 2017-02-16 PROCEDURE — 99999 PR PBB SHADOW E&M-EST. PATIENT-LVL III: CPT | Mod: PBBFAC,,, | Performed by: PODIATRIST

## 2017-02-16 PROCEDURE — 3078F DIAST BP <80 MM HG: CPT | Mod: S$GLB,,, | Performed by: PODIATRIST

## 2017-02-16 PROCEDURE — 99204 OFFICE O/P NEW MOD 45 MIN: CPT | Mod: S$GLB,,, | Performed by: PODIATRIST

## 2017-02-16 PROCEDURE — 1160F RVW MEDS BY RX/DR IN RCRD: CPT | Mod: S$GLB,,, | Performed by: PODIATRIST

## 2017-02-16 PROCEDURE — 4010F ACE/ARB THERAPY RXD/TAKEN: CPT | Mod: S$GLB,,, | Performed by: PODIATRIST

## 2017-02-16 PROCEDURE — 3044F HG A1C LEVEL LT 7.0%: CPT | Mod: S$GLB,,, | Performed by: PODIATRIST

## 2017-02-16 NOTE — LETTER
February 27, 2017      Oneyda Bergman MD  [address] [place] [address]           [facility] - Podiatry  [address] [place] [address]  Phone: [phone]  Fax: [phone]          Patient: Richelle Zaldivar   MR Number: [id number]   YOB: 1965   Date of Visit: 2/16/2017       Dear Dr. Oneyda Bergman:    Thank you for referring Richelle Zaldivar to me for evaluation. Attached you will find relevant portions of my assessment and plan of care.    If you have questions, please do not hesitate to call me. I look forward to following Richelle Zaldivar along with you.    Sincerely,    Paty Mclean DPM    Enclosure  CC:  No Recipients    If you would like to receive this communication electronically, please contact [email or web address] or [phone] to request more information on [employer] Link access.    For providers and/or their staff who would like to refer a patient to [facility], please contact us through our one-stop-shop provider referral line, [facility], at [phone].    If you feel you have received this communication in error or would no longer like to receive these types of communications, please e-mail [email or web address]

## 2017-02-16 NOTE — MR AVS SNAPSHOT
[facility] - Podiatry  [address] [facility] [place] [address]  Phone: [phone]  Fax: [phone]                  Richelle Zaldivar   2017 10:40 AM   Office Visit    Description:  Female : 1965   Provider:  Paty Mclean DPM   Department:  [facility] - Podiatry           Reason for Visit     Diabetic Foot Exam     Foot Pain           Diagnoses this Visit        Comments    Type II diabetes mellitus with neurological manifestations    -  Primary     Pes planus of both feet                To Do List           Future Appointments        Provider Department Dept Phone    3/7/2017 2:15 PM MD Tadeo Davidson - OB/ -[phone]    3/7/2017 2:45 PM MD Tadeo Davidson  OB/ -[phone]    3/15/2017 10:00 AM Robert Scott MD [facility] - Ophthalmology [phone]    2017 8:20 AM Oneyda Bergman MD [facility] - Internal Medicine [phone]      Goals (5 Years of Data)     None      [facility]     [facility] -  Assistance  Registered nurses in the [facility] provide clinical advisement, health education, appointment booking, and other advisory services.  Call for this free service at [phone].             Medications           Message regarding Medications     Verify the changes and/or additions to your medication regime listed below are the same as discussed with your clinician today.  If any of these changes or additions are incorrect, please notify your healthcare provider.             Verify that the below list of medications is an accurate representation of the medications you are currently taking.  If none reported, the list may be blank. If incorrect, please contact your healthcare provider. Carry this list with you in case of emergency.           Current Medications     ACCU-CHEK FASTCLIX Misc 1 Stick by Misc.(Non-Drug; Combo Route) route as directed. Use to check BG 2x daily. Accuchek Fastclix lancets. Medically necessary.     atorvastatin (LIPITOR) 20 MG tablet Take 1 tablet (20 mg total) by mouth once daily.    brimonidine 0.15 % OPTH DROP (ALPHAGAN) 0.15 % ophthalmic solution Place 1 drop into the left eye 2 (two) times daily. 15 ML BOTTLE    difluprednate (DUREZOL) 0.05 % Drop ophthalmic solution Place 1 drop into the right eye 2 (two) times daily.    dorzolamide-timolol 2-0.5% (COSOPT) 22.3-6.8 mg/mL ophthalmic solution Place 1 drop into both eyes every 12 (twelve) hours.    ergocalciferol (VITAMIN D2) 50,000 unit Cap Take 1 capsule (50,000 Units total) by mouth every 7 days.    gabapentin (NEURONTIN) 300 MG capsule Take 1 capsule (300 mg total) by mouth every evening.    glimepiride (AMARYL) 2 MG tablet TAKE ONE TABLET BY MOUTH THREE TIMES DAILY    lansoprazole (PREVACID) 30 MG capsule Take 1 capsule (30 mg total) by mouth 2 (two) times daily.    latanoprost 0.005 % ophthalmic solution Place 1 drop into both eyes every evening.    lisinopril-hydrochlorothiazide (PRINZIDE,ZESTORETIC) 10-12.5 mg per tablet Take 1 tablet by mouth once daily.    loteprednol etabonate 0.5 % DrpG Place 1 application into the left eye once daily.    metformin (GLUCOPHAGE) 1000 MG tablet Take 1 tablet (1,000 mg total) by mouth 2 (two) times daily with meals.    ondansetron (ZOFRAN) 8 MG tablet Take 1 tablet (8 mg total) by mouth every 8 (eight) hours as needed for Nausea.    sulfamethoxazole-trimethoprim 800-160mg (BACTRIM DS) 800-160 mg Tab Take 1 tablet by mouth 2 (two) times daily.           Clinical Reference Information           Your Vitals Were     BP Height Weight BMI       100/72 (Patient Position: Sitting, BP Method: Manual) 5' (1.524 m) 74.6 kg (164 lb 7.4 oz) 32.12 kg/m2       Blood Pressure          Most Recent Value    BP  100/72      Allergies as of 2/16/2017     No Known Allergies      Immunizations Administered on Date of Encounter - 2/16/2017     None      Orders Placed During Today's Visit      Normal Orders This Visit    DIABETIC SHOES  FOR HOME USE       Instructions    [facility]    [address] [place] [address]  Phone: [phone]  Fax: [phone]          [facility]         Language Assistance Services     ATTENTION: Language assistance services are available, free of charge. Please call [phone].      ATENCIÓN: Si habla español, tiene a anderson disposición servicios gratuitos de asistencia lingüística. Llame al [phone].     CHÚ Ý: N?u b?n nói Ti?ng Vi?t, có các d?ch v? h? tr? ngôn ng? mi?n phí dành cho b?n. G?i s? [phone].         [facility] - [facility]iat[facility] complies with applicable Federal civil rights laws and does not discriminate on the basis of race, color, national origin, age, disability, or sex.

## 2017-02-16 NOTE — PATIENT INSTRUCTIONS
[facility]    [address] [place] [address]  Phone: [phone]  Fax: [phone]          [facility]

## 2017-02-16 NOTE — TELEPHONE ENCOUNTER
Spoke to patients daughter and will have someone call her tomorrow to get insurance information updated before we can get the Prior Auth. For her drops

## 2017-02-16 NOTE — TELEPHONE ENCOUNTER
----- Message from Majo Borjas sent at 2/16/2017  2:59 PM CST -----  Contact: pt  Would like to speak with nurse staff about getting prior auth for ALPHAGAN) and Lotemax pls call pt back today at [phone] pls call [facility]   at [phone] or fax to [phone] [facility].pls contact pt today

## 2017-02-17 ENCOUNTER — TELEPHONE (OUTPATIENT)
Dept: OPHTHALMOLOGY | Facility: CLINIC | Age: 52
End: 2017-02-17

## 2017-02-17 NOTE — TELEPHONE ENCOUNTER
Spoke to Katie at [facility] [place] [phone] has been submitted for Alphagan and Lotemax has been approved [id number]  Notified patient of above

## 2017-02-23 ENCOUNTER — HOSPITAL ENCOUNTER (EMERGENCY)
Facility: HOSPITAL | Age: 52
Discharge: HOME | End: 2017-02-23
Attending: EMERGENCY MEDICINE
Payer: COMMERCIAL

## 2017-02-23 ENCOUNTER — TELEPHONE (OUTPATIENT)
Dept: INTERNAL MEDICINE | Facility: CLINIC | Age: 52
End: 2017-02-23

## 2017-02-23 VITALS
RESPIRATION RATE: 20 BRPM | WEIGHT: 164 LBS | SYSTOLIC BLOOD PRESSURE: 112 MMHG | HEIGHT: 59 IN | HEART RATE: 73 BPM | BODY MASS INDEX: 33.06 KG/M2 | TEMPERATURE: 98 F | DIASTOLIC BLOOD PRESSURE: 84 MMHG | OXYGEN SATURATION: 100 %

## 2017-02-23 DIAGNOSIS — R52 PAIN: ICD-10-CM

## 2017-02-23 DIAGNOSIS — R10.84 GENERALIZED ABDOMINAL PAIN: Primary | ICD-10-CM

## 2017-02-23 LAB
ALBUMIN SERPL BCP-MCNC: 3.7 G/DL
ALP SERPL-CCNC: 69 U/L
ALT SERPL W/O P-5'-P-CCNC: 15 U/L
ANION GAP SERPL CALC-SCNC: 10 MMOL/L
AST SERPL-CCNC: 14 U/L
BASOPHILS # BLD AUTO: 0.02 K/UL
BASOPHILS NFR BLD: 0.3 %
BILIRUB SERPL-MCNC: 0.2 MG/DL
BILIRUB UR QL STRIP: NEGATIVE
BUN SERPL-MCNC: 19 MG/DL
CALCIUM SERPL-MCNC: 10 MG/DL
CHLORIDE SERPL-SCNC: 107 MMOL/L
CLARITY UR: CLEAR
CO2 SERPL-SCNC: 22 MMOL/L
COLOR UR: YELLOW
CREAT SERPL-MCNC: 1.3 MG/DL
DIFFERENTIAL METHOD: ABNORMAL
EOSINOPHIL # BLD AUTO: 0.6 K/UL
EOSINOPHIL NFR BLD: 7.3 %
ERYTHROCYTE [DISTWIDTH] IN BLOOD BY AUTOMATED COUNT: 13.1 %
EST. GFR  (AFRICAN AMERICAN): 54 ML/MIN/1.73 M^2
EST. GFR  (NON AFRICAN AMERICAN): 47 ML/MIN/1.73 M^2
GLUCOSE SERPL-MCNC: 165 MG/DL
GLUCOSE UR QL STRIP: NEGATIVE
HCT VFR BLD AUTO: 35.1 %
HGB BLD-MCNC: 11.4 G/DL
HGB UR QL STRIP: NEGATIVE
KETONES UR QL STRIP: NEGATIVE
LEUKOCYTE ESTERASE UR QL STRIP: NEGATIVE
LIPASE SERPL-CCNC: 57 U/L
LYMPHOCYTES # BLD AUTO: 2.6 K/UL
LYMPHOCYTES NFR BLD: 34.3 %
MCH RBC QN AUTO: 26.7 PG
MCHC RBC AUTO-ENTMCNC: 32.5 %
MCV RBC AUTO: 82 FL
MONOCYTES # BLD AUTO: 0.6 K/UL
MONOCYTES NFR BLD: 7.7 %
NEUTROPHILS # BLD AUTO: 3.9 K/UL
NEUTROPHILS NFR BLD: 50.4 %
NITRITE UR QL STRIP: NEGATIVE
PH UR STRIP: 6 [PH] (ref 5–8)
PLATELET # BLD AUTO: 306 K/UL
PMV BLD AUTO: 9.8 FL
POTASSIUM SERPL-SCNC: 4.3 MMOL/L
PROT SERPL-MCNC: 7.9 G/DL
PROT UR QL STRIP: NEGATIVE
RBC # BLD AUTO: 4.27 M/UL
SODIUM SERPL-SCNC: 139 MMOL/L
SP GR UR STRIP: 1.01 (ref 1–1.03)
URN SPEC COLLECT METH UR: NORMAL
UROBILINOGEN UR STRIP-ACNC: NEGATIVE EU/DL
WBC # BLD AUTO: 7.66 K/UL

## 2017-02-23 PROCEDURE — 80053 COMPREHEN METABOLIC PANEL: CPT

## 2017-02-23 PROCEDURE — 81003 URINALYSIS AUTO W/O SCOPE: CPT

## 2017-02-23 PROCEDURE — 85025 COMPLETE CBC W/AUTO DIFF WBC: CPT

## 2017-02-23 PROCEDURE — 99284 EMERGENCY DEPT VISIT MOD MDM: CPT

## 2017-02-23 PROCEDURE — 83690 ASSAY OF LIPASE: CPT

## 2017-02-23 NOTE — TELEPHONE ENCOUNTER
Pt daughter states that pt is having burning of the her feet. Pt daughter was advised that we don't have any available spots today but we she could keep her appointment for tomorrow morning.

## 2017-02-23 NOTE — DISCHARGE INSTRUCTIONS
Abdominal Pain    Abdominal pain is pain in the stomach or belly area. Everyone has this pain from time to time. In many cases it goes away on its own. But abdominal pain can sometimes be due to a serious problem, such as appendicitis. So its important to know when to seek help.  Causes of abdominal pain  There are many possible causes of abdominal pain. Common causes in adults include:  · Constipation, diarrhea, or gas  · Stomach acid flowing back up into the esophagus (acid reflux or heartburn)  · Severe acid reflux, called GERD (gastroesophageal reflux disease)  · A sore in the lining of the stomach or small intestine (peptic ulcer)  · Inflammation of the gallbladder, liver, or pancreas  · Gallstones or kidney stones  · Appendicitis   · Intestinal blockage   · An internal organ pushing through a muscle or other tissue (hernia)  · Urinary tract infections  · In women, menstrual cramps, fibroids, or endometriosis  · Inflammation or infection of the intestines  Diagnosing the cause of abdominal pain  Your healthcare provider will do a physical exam help find the cause of your pain. If needed, tests will be ordered. Belly pain has many possible causes. So it can be hard to find the reason for your pain. Giving details about your pain can help. Tell your provider where and when you feel the pain, and what makes it better or worse. Also let your provider know if you have other symptoms such as:  · Fever  · Tiredness  · Upset stomach (nausea)  · Vomiting  · Changes in bathroom habits  Treating abdominal pain  Some causes of pain need emergency medical treatment right away. These include appendicitis or a bowel blockage. Other problems can be treated with rest, fluids, or medicines. Your healthcare provider can give you specific instructions for treatment or self-care based on what is causing your pain.  If you have vomiting or diarrhea, sip water or other clear fluids. When you are ready to eat solid foods again,  start with small amounts of easy-to-digest, low-fat foods. These include apple sauce, toast, or crackers.   When to seek medical care  Call 911 or go to the hospital right away if you:  · Cant pass stool and are vomiting  · Are vomiting blood or have bloody diarrhea or black, tarry diarrhea  · Have chest, neck, or shoulder pain  · Feel like you might pass out  · Have pain in your shoulder blades with nausea  · Have sudden, severe belly pain  · Have new, severe pain unlike any you have felt before  · Have a belly that is rigid, hard, and tender to touch  Call your healthcare provider if you have:  · Pain for more than 5 days  · Bloating for more than 2 days  · Diarrhea for more than 5 days  · A fever of 100.4°F (38.0°C) or higher, or as directed by your provider  · Pain that gets worse  · Weight loss for no reason  · Continued lack of appetite  · Blood in your stool  How to prevent abdominal pain  Here are some tips to help prevent abdominal pain:  · Eat smaller amounts of food at one time.  · Avoid greasy, fried, or other high-fat foods.  · Avoid foods that give you gas.  · Exercise regularly.  · Drink plenty of fluids.  To help prevent GERD symptoms:  · Quit smoking.  · Reduce alcohol and certain foods that increase stomach acid.  · Avoid aspirin and over-the-counter pain and fever medicines (NSAIDS or nonsteroidal anti-inflammatory drugs), if possible  · Lose extra weight.  · Finish eating at least 2 hours before you go to bed or lie down.  · Raise the head of your bed.  Date Last Reviewed: 7/1/2016  © 1635-2160 [employer]. [address] [place] [address], [place] [address]. All rights reserved. This information is not intended as a substitute for professional medical care. Always follow your healthcare professional's instructions.

## 2017-02-23 NOTE — TELEPHONE ENCOUNTER
----- Message from Ally Crawford sent at 2/23/2017  8:54 AM CST -----  Contact: daughter/Josef Zaldivar   States she would like to be seen today, her feet are burning. Please call Angelica Zaldivar at [phone]. Thank you

## 2017-02-23 NOTE — ED AVS SNAPSHOT
[facility]  [address] [facility] [place] [address]               Richelle Zaldivar   2017 11:05 AM   ED    Description:  Female : 1965   Department:  [facility]           Your Care was Coordinated By:     Provider Role From To    Mario Abebe Jr., MD Attending Provider 17 1111 --      Reason for Visit     Abdominal Pain           Diagnoses this Visit        Comments    Generalized abdominal pain    -  Primary     Pain           ED Disposition     ED Disposition Condition Comment    Discharge             To Do List           Follow-up Information     Follow up with Oneyda Bergman MD. Call in 2 days.    Specialty:  Family Medicine    Contact information:    [address]  [facility] [address] [phone]        [facility]     [facility] -  Assistance  Registered nurses in the [facility] provide clinical advisement, health education, appointment booking, and other advisory services.  Call for this free service at [phone].             Medications           Message regarding Medications     Verify the changes and/or additions to your medication regime listed below are the same as discussed with your clinician today.  If any of these changes or additions are incorrect, please notify your healthcare provider.             Verify that the below list of medications is an accurate representation of the medications you are currently taking.  If none reported, the list may be blank. If incorrect, please contact your healthcare provider. Carry this list with you in case of emergency.           Current Medications     ACCU-CHEK FASTCLIX Misc 1 Stick by Misc.(Non-Drug; Combo Route) route as directed. Use to check BG 2x daily. Accuchek Fastclix lancets. Medically necessary.    atorvastatin (LIPITOR) 20 MG tablet Take 1 tablet (20 mg total) by mouth once daily.    brimonidine 0.15 % OPTH DROP  "(ALPHAGAN) 0.15 % ophthalmic solution Place 1 drop into the left eye 2 (two) times daily. 15 ML BOTTLE    difluprednate (DUREZOL) 0.05 % Drop ophthalmic solution Place 1 drop into the right eye 2 (two) times daily.    dorzolamide-timolol 2-0.5% (COSOPT) 22.3-6.8 mg/mL ophthalmic solution Place 1 drop into both eyes every 12 (twelve) hours.    ergocalciferol (VITAMIN D2) 50,000 unit Cap Take 1 capsule (50,000 Units total) by mouth every 7 days.    gabapentin (NEURONTIN) 300 MG capsule Take 1 capsule (300 mg total) by mouth every evening.    glimepiride (AMARYL) 2 MG tablet TAKE ONE TABLET BY MOUTH THREE TIMES DAILY    lansoprazole (PREVACID) 30 MG capsule Take 1 capsule (30 mg total) by mouth 2 (two) times daily.    latanoprost 0.005 % ophthalmic solution Place 1 drop into both eyes every evening.    lisinopril-hydrochlorothiazide (PRINZIDE,ZESTORETIC) 10-12.5 mg per tablet Take 1 tablet by mouth once daily.    loteprednol etabonate 0.5 % DrpG Place 1 application into the left eye once daily.    metformin (GLUCOPHAGE) 1000 MG tablet Take 1 tablet (1,000 mg total) by mouth 2 (two) times daily with meals.    ondansetron (ZOFRAN) 8 MG tablet Take 1 tablet (8 mg total) by mouth every 8 (eight) hours as needed for Nausea.           Clinical Reference Information           Your Vitals Were     BP Pulse Temp Resp Height Weight    116/81 80 97.5 °F (36.4 °C) (Oral) 20 4' 11" (1.499 m) 74.4 kg (164 lb)    SpO2 BMI             100% 33.12 kg/m2         Allergies as of 2/23/2017     No Known Allergies      Immunizations Administered on Date of Encounter - 2/23/2017     None      ED Micro, Lab, POCT     Start Ordered       Status Ordering Provider    02/23/17 1132 02/23/17 1132  CBC W/ AUTO DIFFERENTIAL  STAT      Final result     02/23/17 1132 02/23/17 1132  Comp. Metabolic Panel  STAT      Final result     02/23/17 1132 02/23/17 1132  Lipase  Once      Final result     02/23/17 1132 02/23/17 1132    STAT,   Status:  Canceled   "    Canceled     02/23/17 1128 02/23/17 1127  Urinalysis Clean Catch  STAT      Final result       ED Imaging Orders     Start Ordered       Status Ordering Provider    02/23/17 1335 02/23/17 1335  X-Ray Abdomen Flat And Erect  1 time imaging      Final result     02/23/17 1132 02/23/17 1132  X-ray chest PA and lateral  1 time imaging     Comments:  Abdominal pain    Final result     02/23/17 1132 02/23/17 1132  US Abdomen Limited  1 time imaging      Final result         Discharge Instructions         Abdominal Pain    Abdominal pain is pain in the stomach or belly area. Everyone has this pain from time to time. In many cases it goes away on its own. But abdominal pain can sometimes be due to a serious problem, such as appendicitis. So its important to know when to seek help.  Causes of abdominal pain  There are many possible causes of abdominal pain. Common causes in adults include:  · Constipation, diarrhea, or gas  · Stomach acid flowing back up into the esophagus (acid reflux or heartburn)  · Severe acid reflux, called GERD (gastroesophageal reflux disease)  · A sore in the lining of the stomach or small intestine (peptic ulcer)  · Inflammation of the gallbladder, liver, or pancreas  · Gallstones or kidney stones  · Appendicitis   · Intestinal blockage   · An internal organ pushing through a muscle or other tissue (hernia)  · Urinary tract infections  · In women, menstrual cramps, fibroids, or endometriosis  · Inflammation or infection of the intestines  Diagnosing the cause of abdominal pain  Your healthcare provider will do a physical exam help find the cause of your pain. If needed, tests will be ordered. Belly pain has many possible causes. So it can be hard to find the reason for your pain. Giving details about your pain can help. Tell your provider where and when you feel the pain, and what makes it better or worse. Also let your provider know if you have other symptoms such  as:  · Fever  · Tiredness  · Upset stomach (nausea)  · Vomiting  · Changes in bathroom habits  Treating abdominal pain  Some causes of pain need emergency medical treatment right away. These include appendicitis or a bowel blockage. Other problems can be treated with rest, fluids, or medicines. Your healthcare provider can give you specific instructions for treatment or self-care based on what is causing your pain.  If you have vomiting or diarrhea, sip water or other clear fluids. When you are ready to eat solid foods again, start with small amounts of easy-to-digest, low-fat foods. These include apple sauce, toast, or crackers.   When to seek medical care  Call 911 or go to the hospital right away if you:  · Cant pass stool and are vomiting  · Are vomiting blood or have bloody diarrhea or black, tarry diarrhea  · Have chest, neck, or shoulder pain  · Feel like you might pass out  · Have pain in your shoulder blades with nausea  · Have sudden, severe belly pain  · Have new, severe pain unlike any you have felt before  · Have a belly that is rigid, hard, and tender to touch  Call your healthcare provider if you have:  · Pain for more than 5 days  · Bloating for more than 2 days  · Diarrhea for more than 5 days  · A fever of 100.4°F (38.0°C) or higher, or as directed by your provider  · Pain that gets worse  · Weight loss for no reason  · Continued lack of appetite  · Blood in your stool  How to prevent abdominal pain  Here are some tips to help prevent abdominal pain:  · Eat smaller amounts of food at one time.  · Avoid greasy, fried, or other high-fat foods.  · Avoid foods that give you gas.  · Exercise regularly.  · Drink plenty of fluids.  To help prevent GERD symptoms:  · Quit smoking.  · Reduce alcohol and certain foods that increase stomach acid.  · Avoid aspirin and over-the-counter pain and fever medicines (NSAIDS or nonsteroidal anti-inflammatory drugs), if possible  · Lose extra weight.  · Finish eating  at least 2 hours before you go to bed or lie down.  · Raise the head of your bed.  Date Last Reviewed: 7/1/2016  © 4739-2674 [employer]. [address] [place] [address], [place] [address]. All rights reserved. This information is not intended as a substitute for professional medical care. Always follow your healthcare professional's instructions.          Your Scheduled Appointments     Feb 24, 2017  8:00 AM CST   Established Patient Visit with Oneyda Bergman MD   [facility] - Internal Medicine ([facility])    [address] [facility] [address]   [phone]            Mar 07, 2017  2:15 PM CST   New Gynecological with Aster Marina MD   [facility] ([facility])    [address] [place] [address]   [phone]            Mar 07, 2017  2:45 PM CST   New Gynecological with MD ANITA [facility] ([facility] [address]   [phone]            Mar 15, 2017 10:00 AM CDT   Established Patient Visit with Robert Scott MD   [facility] - Ophthalmology ([facility])    [address] [facility] [address]   [phone]            Apr 18, 2017  8:20 AM CDT   Established Patient Visit with Oneyda Bergman MD   [facility] - Internal Medicine ([facility])    [address] [place] [address]   [phone]               [facility]  complies with applicable Federal civil rights laws and does not discriminate on the basis of race, color, national origin, age, disability, or sex.        Language Assistance Services     ATTENTION: Language assistance services are available, free of charge. Please call [phone].      ATENCIÓN: Si felipela tresa, tiene a anderson disposición servicios gratuitos de asistencia lingüística. Llame al [phone].     CHÚ Ý: N?u b?n nói Ti?ng Vi?t, có các d?ch v? h? tr? ngôn ng? mi?n phí dành cho b?n. G?i s? [phone].

## 2017-02-23 NOTE — ED PROVIDER NOTES
"SCRIBE #1 NOTE: I, Kenrick Quinteros, am scribing for, and in the presence of, Mario Abebe Jr., MD. I have scribed the entire note.      History      Chief Complaint   Patient presents with    Abdominal Pain     pt c/o upper and lower abdominal pain with bloating and "burning" on and off x 3 wks, + nausea. pt seen at [facility] several times for same but denies improvement. pt denies chest pain or shortness of breath.       Review of patient's allergies indicates:  No Known Allergies     HPI   HPI    2/23/2017, 11:27 AM   History obtained from the daughter and patient      History of Present Illness limited due to foreign language barrier, daughter translating in room: Richelle Zaldivar is a 51 y.o. female patient with a PMHx of DM, GERD, who presents to the Emergency Department for abd pain which onset gradually 3 weeks ago. Sxs are intermittent and moderate in severity. There are no mitigating or exacerbating factors noted. Associated sxs include nausea.  Pt denies any fever, V/D, HA, SOB, CP, numbness, dysuria, hematuria, and all other sxs at this time. No further complaints or concerns at this time.         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       Past Surgical History:  Past Surgical History   Procedure Laterality Date    Partial hysterectomy      Tonsillectomy      [place] tooth extraction      Cataract extraction      Hemorrhoid surgery  4/2015     Done in [place]    Goniotomy Left 11/17/2016         Family History:  Family History   Problem Relation Age of Onset    Diabetes Mother     Hypertension Mother     Hyperlipidemia Mother     Stroke Mother     Amblyopia Mother     Diabetes Sister     Hypertension Sister     Hyperlipidemia Sister     Arthritis " Sister     Amblyopia Brother     Amblyopia Maternal Uncle     Blindness Neg Hx     Cancer Neg Hx     Cataracts Neg Hx     Glaucoma Neg Hx     Macular degeneration Neg Hx     Retinal detachment Neg Hx     Strabismus Neg Hx     Thyroid disease Neg Hx        Social History:  Social History     Social History Main Topics    Smoking status: Never Smoker    Smokeless tobacco: Unknown    Alcohol use No    Drug use: No    Sexual activity: Yes     Partners: Male       ROS   Review of Systems   Constitutional: Negative for fever.   HENT: Negative for sore throat.    Respiratory: Negative for shortness of breath.    Cardiovascular: Negative for chest pain.   Gastrointestinal: Positive for abdominal pain and nausea. Negative for blood in stool, constipation, diarrhea and vomiting.   Genitourinary: Negative for dysuria, hematuria and vaginal pain.   Musculoskeletal: Negative for back pain.   Skin: Negative for rash.   Neurological: Negative for dizziness, syncope, weakness, numbness and headaches.   Hematological: Does not bruise/bleed easily.     Physical Exam    Initial Vitals   BP Pulse Resp Temp SpO2   02/23/17 1041 02/23/17 1041 02/23/17 1041 02/23/17 1041 02/23/17 1041   108/81 84 20 97.5 °F (36.4 °C) 98 %      Physical Exam  Nursing Notes and Vital Signs Reviewed.  Constitutional: Patient is in no acute distress. Awake and alert. Well-developed and well-nourished.  Head: Atraumatic. Normocephalic.  Eyes: PERRL. EOM intact. Conjunctivae are not pale. No scleral icterus.  ENT: Mucous membranes are moist. Oropharynx is clear and symmetric.    Neck: Supple. Full ROM. No lymphadenopathy.  Cardiovascular: Regular rate. Regular rhythm. No murmurs, rubs, or gallops. Distal pulses are 2+ and symmetric.  Pulmonary/Chest: No respiratory distress. Clear to auscultation bilaterally. No wheezing, rales, or rhonchi.  Abdominal: Soft and non-distended.  There is no tenderness.  No rebound, guarding, or rigidity. Good bowel  "sounds.  Genitourinary: No CVA tenderness  Musculoskeletal: Moves all extremities. No obvious deformities. No edema. No calf tenderness.  Skin: Warm and dry.  Neurological:  Alert, awake, and appropriate.  Normal speech.  No acute focal neurological deficits are appreciated.  Psychiatric: Normal affect. Good eye contact. Appropriate in content.    ED Course    Procedures  ED Vital Signs:  Vitals:    02/23/17 1041 02/23/17 1221 02/23/17 1253 02/23/17 1323   BP: 108/81 104/80 118/78 116/81   Pulse: 84 82 80 80   Resp: 20 20 18 20   Temp: 97.5 °F (36.4 °C)      TempSrc: Oral      SpO2: 98% 100% 100% 100%   Weight: 74.4 kg (164 lb)      Height: 4' 11" (1.499 m)       02/23/17 1427   BP: 112/84   Pulse: 73   Resp: 20   Temp:    TempSrc:    SpO2: 100%   Weight:    Height:        Abnormal Lab Results:  Labs Reviewed   CBC W/ AUTO DIFFERENTIAL - Abnormal; Notable for the following:        Result Value    Hemoglobin 11.4 (*)     Hematocrit 35.1 (*)     MCH 26.7 (*)     Eos # 0.6 (*)     All other components within normal limits   COMPREHENSIVE METABOLIC PANEL - Abnormal; Notable for the following:     CO2 22 (*)     Glucose 165 (*)     eGFR if  54 (*)     eGFR if non  47 (*)     All other components within normal limits   URINALYSIS   LIPASE        All Lab Results:  Results for orders placed or performed during the hospital encounter of 02/23/17   Urinalysis Clean Catch   Result Value Ref Range    Specimen UA Urine, Clean Catch     Color, UA Yellow Yellow, Straw, Clarissa    Appearance, UA Clear Clear    pH, UA 6.0 5.0 - 8.0    Specific Gravity, UA 1.015 1.005 - 1.030    Protein, UA Negative Negative    Glucose, UA Negative Negative    Ketones, UA Negative Negative    Bilirubin (UA) Negative Negative    Occult Blood UA Negative Negative    Nitrite, UA Negative Negative    Urobilinogen, UA Negative <2.0 EU/dL    Leukocytes, UA Negative Negative   CBC W/ AUTO DIFFERENTIAL   Result Value Ref Range "    WBC 7.66 3.90 - 12.70 K/uL    RBC 4.27 4.00 - 5.40 M/uL    Hemoglobin 11.4 (L) 12.0 - 16.0 g/dL    Hematocrit 35.1 (L) 37.0 - 48.5 %    MCV 82 82 - 98 fL    MCH 26.7 (L) 27.0 - 31.0 pg    MCHC 32.5 32.0 - 36.0 %    RDW 13.1 11.5 - 14.5 %    Platelets 306 150 - 350 K/uL    MPV 9.8 9.2 - 12.9 fL    Gran # 3.9 1.8 - 7.7 K/uL    Lymph # 2.6 1.0 - 4.8 K/uL    Mono # 0.6 0.3 - 1.0 K/uL    Eos # 0.6 (H) 0.0 - 0.5 K/uL    Baso # 0.02 0.00 - 0.20 K/uL    Gran% 50.4 38.0 - 73.0 %    Lymph% 34.3 18.0 - 48.0 %    Mono% 7.7 4.0 - 15.0 %    Eosinophil% 7.3 0.0 - 8.0 %    Basophil% 0.3 0.0 - 1.9 %    Differential Method Automated    Comp. Metabolic Panel   Result Value Ref Range    Sodium 139 136 - 145 mmol/L    Potassium 4.3 3.5 - 5.1 mmol/L    Chloride 107 95 - 110 mmol/L    CO2 22 (L) 23 - 29 mmol/L    Glucose 165 (H) 70 - 110 mg/dL    BUN, Bld 19 6 - 20 mg/dL    Creatinine 1.3 0.5 - 1.4 mg/dL    Calcium 10.0 8.7 - 10.5 mg/dL    Total Protein 7.9 6.0 - 8.4 g/dL    Albumin 3.7 3.5 - 5.2 g/dL    Total Bilirubin 0.2 0.1 - 1.0 mg/dL    Alkaline Phosphatase 69 55 - 135 U/L    AST 14 10 - 40 U/L    ALT 15 10 - 44 U/L    Anion Gap 10 8 - 16 mmol/L    eGFR if African American 54 (A) >60 mL/min/1.73 m^2    eGFR if non African American 47 (A) >60 mL/min/1.73 m^2   Lipase   Result Value Ref Range    Lipase 57 4 - 60 U/L         Imaging Results:  Imaging Results         X-Ray Abdomen Flat And Erect (Final result) Result time:  02/23/17 13:56:41    Final result by Prabhakar Ruth III, MD (02/23/17 13:56:41)    Impression:     As above. No acute abnormality suggested.      Electronically signed by: PRABHAKAR RUTH MD  Date:     02/23/17  Time:    13:56     Narrative:    Abdomen series, 2 views.    Clinical indication: Abdominal pain.    Nonspecific gas pattern without mass or obstruction. No free air. Degenerative changes noted in the spine.            US Abdomen Limited (Final result) Result time:  02/23/17 12:26:17    Final  result by Prabhakar Ruth III, MD (02/23/17 12:26:17)    Impression:        1. Probable fatty change of the liver.     2. No other significant findings      Electronically signed by: PRABHAKAR RUTH MD  Date:     02/23/17  Time:    12:26     Narrative:    Ultrasound of the abdomen, limited to the right upper quadrant.    Clinical indication: Abdominal pain.    The liver is of increased echogenicity consistent with fatty change. No focal abnormality or gross enlargement. Right kidney is normal. Portal vein is patent. Doppler analysis shows hepatopedal flow.    Gallbladder and common bile duct are normal. Pancreas was partially obscured by bowel gas. Visualized segments are normal.            X-ray chest PA and lateral (Final result) Result time:  02/23/17 11:54:45    Final result by Prabhakar Ruth III, MD (02/23/17 11:54:45)    Impression:         Negative two-view chest x-ray.      Electronically signed by: PRABHAKAR RUTH MD  Date:     02/23/17  Time:    11:54     Narrative:    The Two-view chest x-ray.    Clinical indication: Abdominal pain and chest pain.    Heart size is normal. The lung fields are clear. No acute pulmonary infiltrate.                      The Emergency Provider reviewed the vital signs and test results, which are outlined above.    ED Discussion     2:08 PM: Reassessed pt. Pt states their condition has improved at this time.  Discussed with pt all pertinent ED information and results. Discussed plan of treatment with pt. Gave pt all f/u and return to the ED instructions. All questions and concerns were addressed at this time. Pt understands and agrees to plan as discussed. Pt is stable for discharge.     I discussed with patient and/or family/caretaker that evaluation in the ED does not suggest any emergent or life threatening medical conditions requiring immediate intervention beyond what was provided in the ED, and I believe patient is safe for discharge.  Regardless, an  unremarkable evaluation in the ED does not preclude the development or presence of a serious of life threatening condition. As such, patient was instructed to return immediately for any worsening or change in current symptoms.    ED Medication(s):  Medications - No data to display    Discharge Medication List as of 2/23/2017  2:10 PM          Follow-up Information     Follow up with Oneyda Bergman MD. Call in 2 days.    Specialty:  Family Medicine    Contact information:    [address] [place] [address] [phone]              Medical Decision Making    Medical Decision Making:   Clinical Tests:   Lab Tests: Reviewed and Ordered  Radiological Study: Reviewed and Ordered           Scribe Attestation:   Scribe #1: I performed the above scribed service and the documentation accurately describes the services I performed. I attest to the accuracy of the note.    Attending:   Physician Attestation Statement for Scribe #1: I, Mario Abebe Jr., MD, personally performed the services described in this documentation, as scribed by Kenrick Quinteros, in my presence, and it is both accurate and complete.          Clinical Impression       ICD-10-CM ICD-9-CM   1. Generalized abdominal pain R10.84 789.07   2. Pain R52 780.96       Disposition:   Disposition: Discharged  Condition: Stable         Mario Abebe Jr., MD  02/23/17 1602

## 2017-02-24 ENCOUNTER — LAB VISIT (OUTPATIENT)
Dept: LAB | Facility: HOSPITAL | Age: 52
End: 2017-02-24
Attending: INTERNAL MEDICINE
Payer: COMMERCIAL

## 2017-02-24 ENCOUNTER — OFFICE VISIT (OUTPATIENT)
Dept: INTERNAL MEDICINE | Facility: CLINIC | Age: 52
End: 2017-02-24
Payer: COMMERCIAL

## 2017-02-24 VITALS
HEART RATE: 76 BPM | OXYGEN SATURATION: 99 % | SYSTOLIC BLOOD PRESSURE: 122 MMHG | BODY MASS INDEX: 32.76 KG/M2 | WEIGHT: 162.5 LBS | TEMPERATURE: 98 F | DIASTOLIC BLOOD PRESSURE: 80 MMHG | HEIGHT: 59 IN

## 2017-02-24 DIAGNOSIS — I10 ESSENTIAL HYPERTENSION: ICD-10-CM

## 2017-02-24 DIAGNOSIS — E78.5 HYPERLIPIDEMIA WITH TARGET LDL LESS THAN 100: ICD-10-CM

## 2017-02-24 DIAGNOSIS — E55.9 VITAMIN D INSUFFICIENCY: ICD-10-CM

## 2017-02-24 DIAGNOSIS — K21.9 GASTROESOPHAGEAL REFLUX DISEASE, ESOPHAGITIS PRESENCE NOT SPECIFIED: ICD-10-CM

## 2017-02-24 DIAGNOSIS — R10.9 ABDOMINAL PAIN, ACUTE: ICD-10-CM

## 2017-02-24 DIAGNOSIS — I10 HYPERTENSION GOAL BP (BLOOD PRESSURE) < 130/80: ICD-10-CM

## 2017-02-24 DIAGNOSIS — R10.9 ABDOMINAL PAIN, ACUTE: Primary | ICD-10-CM

## 2017-02-24 DIAGNOSIS — E11.42 TYPE 2 DIABETES MELLITUS WITH DIABETIC POLYNEUROPATHY, WITHOUT LONG-TERM CURRENT USE OF INSULIN: ICD-10-CM

## 2017-02-24 DIAGNOSIS — E66.9 OBESITY (BMI 30-39.9): ICD-10-CM

## 2017-02-24 DIAGNOSIS — E11.69 HYPERLIPIDEMIA ASSOCIATED WITH TYPE 2 DIABETES MELLITUS: ICD-10-CM

## 2017-02-24 DIAGNOSIS — E53.8 B12 DEFICIENCY: ICD-10-CM

## 2017-02-24 DIAGNOSIS — E78.5 HYPERLIPIDEMIA ASSOCIATED WITH TYPE 2 DIABETES MELLITUS: ICD-10-CM

## 2017-02-24 DIAGNOSIS — B37.2 CUTANEOUS CANDIDIASIS: ICD-10-CM

## 2017-02-24 LAB
ALBUMIN SERPL BCP-MCNC: 3.6 G/DL
ALP SERPL-CCNC: 80 U/L
ALT SERPL W/O P-5'-P-CCNC: 15 U/L
AMYLASE SERPL-CCNC: 75 U/L
ANION GAP SERPL CALC-SCNC: 11 MMOL/L
AST SERPL-CCNC: 12 U/L
BILIRUB SERPL-MCNC: 0.3 MG/DL
BUN SERPL-MCNC: 22 MG/DL
CALCIUM SERPL-MCNC: 10 MG/DL
CHLORIDE SERPL-SCNC: 105 MMOL/L
CHOLEST/HDLC SERPL: 7.1 {RATIO}
CO2 SERPL-SCNC: 22 MMOL/L
CREAT SERPL-MCNC: 1.4 MG/DL
EST. GFR  (AFRICAN AMERICAN): 49.5 ML/MIN/1.73 M^2
EST. GFR  (NON AFRICAN AMERICAN): 42.9 ML/MIN/1.73 M^2
GLUCOSE SERPL-MCNC: 140 MG/DL
HDL/CHOLESTEROL RATIO: 14 %
HDLC SERPL-MCNC: 250 MG/DL
HDLC SERPL-MCNC: 35 MG/DL
LDLC SERPL CALC-MCNC: 168.2 MG/DL
NONHDLC SERPL-MCNC: 215 MG/DL
POTASSIUM SERPL-SCNC: 4.4 MMOL/L
PROT SERPL-MCNC: 8.1 G/DL
SODIUM SERPL-SCNC: 138 MMOL/L
TRIGL SERPL-MCNC: 234 MG/DL
VIT B12 SERPL-MCNC: 194 PG/ML

## 2017-02-24 PROCEDURE — 99999 PR PBB SHADOW E&M-EST. PATIENT-LVL III: CPT | Mod: PBBFAC,,, | Performed by: FAMILY MEDICINE

## 2017-02-24 PROCEDURE — 3074F SYST BP LT 130 MM HG: CPT | Mod: S$GLB,,, | Performed by: FAMILY MEDICINE

## 2017-02-24 PROCEDURE — 86677 HELICOBACTER PYLORI ANTIBODY: CPT

## 2017-02-24 PROCEDURE — 3079F DIAST BP 80-89 MM HG: CPT | Mod: S$GLB,,, | Performed by: FAMILY MEDICINE

## 2017-02-24 PROCEDURE — 4010F ACE/ARB THERAPY RXD/TAKEN: CPT | Mod: S$GLB,,, | Performed by: FAMILY MEDICINE

## 2017-02-24 PROCEDURE — 80061 LIPID PANEL: CPT

## 2017-02-24 PROCEDURE — 83036 HEMOGLOBIN GLYCOSYLATED A1C: CPT

## 2017-02-24 PROCEDURE — 80053 COMPREHEN METABOLIC PANEL: CPT

## 2017-02-24 PROCEDURE — 36415 COLL VENOUS BLD VENIPUNCTURE: CPT | Mod: PO

## 2017-02-24 PROCEDURE — 1160F RVW MEDS BY RX/DR IN RCRD: CPT | Mod: S$GLB,,, | Performed by: FAMILY MEDICINE

## 2017-02-24 PROCEDURE — 82150 ASSAY OF AMYLASE: CPT

## 2017-02-24 PROCEDURE — 82607 VITAMIN B-12: CPT

## 2017-02-24 PROCEDURE — 99214 OFFICE O/P EST MOD 30 MIN: CPT | Mod: S$GLB,,, | Performed by: FAMILY MEDICINE

## 2017-02-24 PROCEDURE — 3044F HG A1C LEVEL LT 7.0%: CPT | Mod: S$GLB,,, | Performed by: FAMILY MEDICINE

## 2017-02-24 RX ORDER — GABAPENTIN 300 MG/1
300 CAPSULE ORAL 2 TIMES DAILY
Qty: 60 CAPSULE | Refills: 3 | Status: SHIPPED | OUTPATIENT
Start: 2017-02-24 | End: 2017-08-04 | Stop reason: SDUPTHER

## 2017-02-24 RX ORDER — ESOMEPRAZOLE MAGNESIUM 40 MG/1
40 CAPSULE, DELAYED RELEASE ORAL
Qty: 30 CAPSULE | Refills: 0 | Status: SHIPPED | OUTPATIENT
Start: 2017-02-24 | End: 2017-03-21 | Stop reason: SDUPTHER

## 2017-02-24 RX ORDER — NYSTATIN 100000 U/G
CREAM TOPICAL 2 TIMES DAILY
Qty: 30 G | Refills: 0 | Status: SHIPPED | OUTPATIENT
Start: 2017-02-24 | End: 2017-03-13 | Stop reason: SDUPTHER

## 2017-02-24 RX ORDER — LISINOPRIL AND HYDROCHLOROTHIAZIDE 20; 25 MG/1; MG/1
1 TABLET ORAL DAILY
Qty: 90 TABLET | Refills: 3 | Status: SHIPPED | OUTPATIENT
Start: 2017-02-24 | End: 2017-06-13 | Stop reason: SDUPTHER

## 2017-02-24 NOTE — MR AVS SNAPSHOT
[facility] Internal Medicine  [address] [facility] [place] [address]  Phone: [phone]  Fax: [phone]                  Richelle Zaldivar   2017 8:00 AM   Office Visit    Description:  Female : 1965   Provider:  Oneyda Bergman MD   Department:  [facility] - Internal Medicine           Reason for Visit     Feet Burning           Diagnoses this Visit        Comments    Abdominal pain, acute    -  Primary     Type 2 diabetes mellitus with diabetic polyneuropathy, without long-term current use of insulin         Essential hypertension         Hyperlipidemia associated with type 2 diabetes mellitus         Gastroesophageal reflux disease, esophagitis presence not specified         Obesity (BMI 30-39.9)         Vitamin D insufficiency         Cutaneous candidiasis                To Do List           Future Appointments        Provider Department Dept Phone    2017 9:10 AM LABORATORY, [facility] [phone]    2017 10:20 AM Minh Manley MD [facility] Gastroenterology [phone]    3/7/2017 2:15 PM MD Margie Davidsonal - OB/ -[phone]    3/7/2017 2:45 PM MD Tadeo Davidson  OB/ -[phone]    3/15/2017 10:00 AM Robert Scott MD [facility] Ophthalmology [phone]      Goals (5 Years of Data)     None      Follow-Up and Disposition     Return in about 4 months (around 2017), or if symptoms worsen or fail to improve.       These Medications        Disp Refills Start End    gabapentin (NEURONTIN) 300 MG capsule 60 capsule 3 2017    Take 1 capsule (300 mg total) by mouth 2 (two) times daily. - Oral    Pharmacy: [facility] [address] [place], [place] [address] - [address] [facility] Ph #: [phone]       lisinopril-hydrochlorothiazide (PRINZIDE,ZESTORETIC) 20-25 mg Tab 90 tablet 3 2017    Take 1 tablet by mouth once daily. - Oral    Pharmacy: [facility] [address] [place], [place] [address] - [address] [facility] Ph #:  [phone]       esomeprazole (NEXIUM) 40 MG capsule 30 capsule 0 2/24/2017 2/24/2018    Take 1 capsule (40 mg total) by mouth before breakfast. - Oral    Pharmacy: [address] [facility] #: [phone]       nystatin (MYCOSTATIN) cream 30 g 0 2/24/2017     Apply topically 2 (two) times daily. - Topical (Top)    Pharmacy: [address] [facility] #: [phone]         [facility] On Call     [facility] On Call Nurse Care Line - 24/7 Assistance  Registered nurses in the [facility] Center provide clinical advisement, health education, appointment booking, and other advisory services.  Call for this free service at [phone].             Medications           Message regarding Medications     Verify the changes and/or additions to your medication regime listed below are the same as discussed with your clinician today.  If any of these changes or additions are incorrect, please notify your healthcare provider.        START taking these NEW medications        Refills    lisinopril-hydrochlorothiazide (PRINZIDE,ZESTORETIC) 20-25 mg Tab 3    Sig: Take 1 tablet by mouth once daily.    Class: Normal    Route: Oral    esomeprazole (NEXIUM) 40 MG capsule 0    Sig: Take 1 capsule (40 mg total) by mouth before breakfast.    Class: Normal    Route: Oral    nystatin (MYCOSTATIN) cream 0    Sig: Apply topically 2 (two) times daily.    Class: Normal    Route: Topical (Top)      CHANGE how you are taking these medications     Start Taking Instead of    gabapentin (NEURONTIN) 300 MG capsule gabapentin (NEURONTIN) 300 MG capsule    Dosage:  Take 1 capsule (300 mg total) by mouth 2 (two) times daily. Dosage:  Take 1 capsule (300 mg total) by mouth every evening.    Reason for Change:  Reorder       STOP taking these medications     ondansetron (ZOFRAN) 8 MG tablet Take 1 tablet (8 mg total) by mouth every 8 (eight) hours as needed for Nausea.     lisinopril-hydrochlorothiazide (PRINZIDE,ZESTORETIC) 10-12.5 mg per tablet Take 1 tablet by mouth once daily.    lansoprazole (PREVACID) 30 MG capsule Take 1 capsule (30 mg total) by mouth 2 (two) times daily.           Verify that the below list of medications is an accurate representation of the medications you are currently taking.  If none reported, the list may be blank. If incorrect, please contact your healthcare provider. Carry this list with you in case of emergency.           Current Medications     ACCU-CHEK FASTCLIX Misc 1 Stick by Misc.(Non-Drug; Combo Route) route as directed. Use to check BG 2x daily. Accuchek Fastclix lancets. Medically necessary.    atorvastatin (LIPITOR) 20 MG tablet Take 1 tablet (20 mg total) by mouth once daily.    brimonidine 0.15 % OPTH DROP (ALPHAGAN) 0.15 % ophthalmic solution Place 1 drop into the left eye 2 (two) times daily. 15 ML BOTTLE    difluprednate (DUREZOL) 0.05 % Drop ophthalmic solution Place 1 drop into the right eye 2 (two) times daily.    dorzolamide-timolol 2-0.5% (COSOPT) 22.3-6.8 mg/mL ophthalmic solution Place 1 drop into both eyes every 12 (twelve) hours.    ergocalciferol (VITAMIN D2) 50,000 unit Cap Take 1 capsule (50,000 Units total) by mouth every 7 days.    gabapentin (NEURONTIN) 300 MG capsule Take 1 capsule (300 mg total) by mouth 2 (two) times daily.    glimepiride (AMARYL) 2 MG tablet TAKE ONE TABLET BY MOUTH THREE TIMES DAILY    latanoprost 0.005 % ophthalmic solution Place 1 drop into both eyes every evening.    loteprednol etabonate 0.5 % DrpG Place 1 application into the left eye once daily.    metformin (GLUCOPHAGE) 1000 MG tablet Take 1 tablet (1,000 mg total) by mouth 2 (two) times daily with meals.    esomeprazole (NEXIUM) 40 MG capsule Take 1 capsule (40 mg total) by mouth before breakfast.    lisinopril-hydrochlorothiazide (PRINZIDE,ZESTORETIC) 20-25 mg Tab Take 1 tablet by mouth once daily.    nystatin (MYCOSTATIN) cream Apply  topically 2 (two) times daily.           Clinical Reference Information           Your Vitals Were     BP                   122/80           Blood Pressure          Most Recent Value    BP  122/80      Allergies as of 2/24/2017     No Known Allergies      Immunizations Administered on Date of Encounter - 2/24/2017     None      Orders Placed During Today's Visit      Normal Orders This Visit    Ambulatory referral to Gastroenterology     Future Labs/Procedures Expected by Expires    Amylase  2/24/2017 4/25/2018    H. PYLORI ANTIBODY, IGG  2/24/2017 4/25/2018    Vitamin B12  6/24/2017 4/25/2018      Language Assistance Services     ATTENTION: Language assistance services are available, free of charge. Please call [phone].      ATENCIÓN: Si habla tresa, tiene a anderson disposición servicios gratuitos de asistencia lingüística. Llame al [phone].     CHÚ Ý: N?u b?n nói Ti?ng Vi?t, có các d?ch v? h? tr? ngôn ng? mi?n phí dành cho b?n. G?i s? [phone].         [facility] - Internal Medicine complies with applicable Federal civil rights laws and does not discriminate on the basis of race, color, national origin, age, disability, or sex.

## 2017-02-24 NOTE — PROGRESS NOTES
Subjective:       Patient ID: Richelle Zaldivar is a 51 y.o. female.    Chief Complaint: Feet Burning    HPI Comments: 51-year-old  female patient accompanied by her daughter, here with Patient Active Problem List:     Hypertension associated with diabetes     Diabetes mellitus type 2, uncontrolled, without complications     Obesity (BMI 30-39.9)     Hyperlipidemia associated with type 2 diabetes mellitus     Iron deficiency anemia     Vitamin D insufficiency     GERD (gastroesophageal reflux disease)     Retinitis pigmentosa     Recurrent iritis of left eye     Open-angle glaucoma, severe stage  Complaint of abdominal pain, more in the umbilical area, and in bilateral lower quadrants off and on lately for which she has been to urgent care and emergency room.  Patient was at emergency room yesterday and had complete evaluation, which looked normal.  Reports abdominal pain as 5/10, associated with nausea and decreased appetite, patient has not been eating a lot but taking her medications, reports that her blood glucose levels has been running in the range of 200s Patient continues to have burning sensation to her feet, has been taking gabapentin 300 mg at bedtime.  Patient has been to urgent care and with Dr. Lopez and was prescribed Augmentin and Bactrim, and secondary to persistent nausea patient did not finish any of the antibiotics.   Reports nausea,  for which she has Phenergan.   Patient also complains of burning sensation, in her private area, associated with itching, denies of any dysuria or increased urinary frequency or urgency, had repeat urinalysis yesterday which was normal.       Review of Systems   Constitutional: Negative for chills, fatigue and fever.   Eyes: Negative for visual disturbance.   Respiratory: Negative for shortness of breath.    Cardiovascular: Negative for chest pain and leg swelling.   Gastrointestinal: Positive for abdominal pain and nausea. Negative for constipation and  "vomiting.   Endocrine: Negative for polydipsia, polyphagia and polyuria.   Genitourinary: Negative for dysuria, frequency, urgency and vaginal discharge.   Musculoskeletal: Negative for myalgias.   Skin: Positive for rash.   Neurological: Positive for numbness. Negative for weakness, light-headedness and headaches.   Psychiatric/Behavioral: Negative for sleep disturbance.         /80  Pulse 76  Temp 97.9 °F (36.6 °C) (Tympanic)   Ht 4' 11" (1.499 m)  Wt 73.7 kg (162 lb 7.7 oz)  SpO2 99%  BMI 32.82 kg/m2  Objective:      Physical Exam   Constitutional: She is oriented to person, place, and time. She appears well-developed and well-nourished.   HENT:   Head: Normocephalic and atraumatic.   Mouth/Throat: Oropharynx is clear and moist.   Cardiovascular: Normal rate, regular rhythm and normal heart sounds.    No murmur heard.  Pulmonary/Chest: Effort normal and breath sounds normal.   Abdominal: Soft. Bowel sounds are normal. There is no tenderness.   Musculoskeletal: She exhibits tenderness. She exhibits no edema.   Positive for generalized abdominal tenderness in the lower abdomen   Neurological: She is alert and oriented to person, place, and time. No cranial nerve deficit.   Skin: Skin is warm and dry. No rash noted.   Positive for minimal erythematous rash to the vaginal area consistent with cutaneous candidiasis   Psychiatric: She has a normal mood and affect.         Assessment:       1. Abdominal pain, acute    2. Type 2 diabetes mellitus with diabetic polyneuropathy, without long-term current use of insulin    3. Essential hypertension    4. Hyperlipidemia associated with type 2 diabetes mellitus    5. Gastroesophageal reflux disease, esophagitis presence not specified    6. Obesity (BMI 30-39.9)    7. Vitamin D insufficiency    8. Cutaneous candidiasis        Plan:   Abdominal pain, acute  -     Amylase; Future; Expected date: 2/24/17  -     H. PYLORI ANTIBODY, IGG; Future; Expected date: 2/24/17  - "     Ambulatory referral to Gastroenterology  Reviewed ER records in detail including ultrasound which looked stable.  Will check further labs and will refer to gastroenterology, likely abdominal pain could be from worsening acid reflex  Encouraged to drink adequate fluids      Type 2 diabetes mellitus with diabetic polyneuropathy, without long-term current use of insulin  -     gabapentin (NEURONTIN) 300 MG capsule; Take 1 capsule (300 mg total) by mouth 2 (two) times daily.  Dispense: 60 capsule; Refill: 3  -     Vitamin B12; Future; Expected date: 6/24/17  Currently taking metformin thousand milligrams twice daily and Amaryl 2 mg 3 times daily  Secondary to neuropathy will increase gabapentin from 300 mg once daily to twice a day.   Continue monitoring blood glucose levels and strict lifestyle changes with 1800 ADA low-fat and low-cholesterol diet    Essential hypertension  -     lisinopril-hydrochlorothiazide (PRINZIDE,ZESTORETIC) 20-25 mg Tab; Take 1 tablet by mouth once daily.  Dispense: 90 tablet; Refill: 3  Blood pressure stable today, was taking lisinopril hydrochlorothiazide 10/12.5 mg 2 tablets daily, will change the prescription to lisinopril hydrochlorothiazide 20/25 mg daily    Hyperlipidemia associated with type 2 diabetes mellitus-currently on Lipitor 20 mg daily    Gastroesophageal reflux disease, esophagitis presence not specified  -     esomeprazole (NEXIUM) 40 MG capsule; Take 1 capsule (40 mg total) by mouth before breakfast.  Dispense: 30 capsule; Refill: 0  -     H. PYLORI ANTIBODY, IGG; Future; Expected date: 2/24/17  -     Ambulatory referral to Gastroenterology  Has been taking Prevacid 30 mg twice daily, will discontinue Prevacid and will place on Nexium 40 mg daily.   Encouraged to eat small frequent meals and follow-up with gastroenterology, if ongoing symptoms may need endoscopy for further evaluation for nausea and abdominal pain     Obesity (BMI 30-39.9)- lifestyle modifications  recommended     Vitamin D insufficiency-currently taking prescription vitamin D weekly     Cutaneous candidiasis  -     nystatin (MYCOSTATIN) cream; Apply topically 2 (two) times daily.  Dispense: 30 g; Refill: 0  Nystatin cream prescribed today

## 2017-02-24 NOTE — MEDICAL/APP STUDENT
"Subjective:       Patient ID: Daxaben ARLETH Zaldivar is a 51 y.o. female.    Chief Complaint: Feet Burning    HPI   Ms Zaldivar presents to clinic for abdominal pain, dysuria, burning sensation in feet. She reports having dysuria 3 weeks ago and went to Urgent Care where she was prescribed antibiotics. Upon starting the antibiotics, she began having abdominal pain, nausea, and burning sensation in feet. Her antibiotics regiment was then changed but she reports this did not alleviate the nausea, abdominal pain and burning sensation. She stopped taking her antibiotics 2 weeks ago but still this did not improve her symptoms. Her nausea and abdominal pain worsened yesterday and went to [facility] ER where she had labs done, US, x-ray.    Review of Systems   Constitutional: Negative for diaphoresis, fatigue and fever.   Respiratory: Negative for cough, shortness of breath and wheezing.    Cardiovascular: Negative for chest pain, palpitations and leg swelling.   Gastrointestinal: Positive for abdominal pain and nausea. Negative for blood in stool, constipation, diarrhea and vomiting.   Genitourinary: Positive for dysuria. Negative for hematuria.       Objective:       Vitals:    02/24/17 0806   BP: 122/80   Pulse: 76   Temp: 97.9 °F (36.6 °C)   TempSrc: Tympanic   SpO2: 99%   Weight: 73.7 kg (162 lb 7.7 oz)   Height: 4' 11" (1.499 m)       Physical Exam   Cardiovascular: Normal rate, regular rhythm and normal heart sounds.    Pulmonary/Chest: Effort normal and breath sounds normal.   Abdominal: Bowel sounds are normal. There is tenderness in the periumbilical area and suprapubic area.       Assessment:         Ms Zaldivar is a 50 yo female who presents to clinic for abdominal pain, dysuria, burning sensation in feet.     Plan:       1. Abdominal pain  GERD vs Gastritis vs Peptic ulcer vs Hiatal hernia  - CBC, CMP have been done and within normal limits  - H. Pylori screen is ordered  - Patient is tried on esomeprazole 40 mg, previously " of lansoprazole 30 mg  - Patient will be referred to GI physician     2. Dysuria  UTI vs Candidiasis  - Urinalysis shows infection cleared  - Patient also reports pruritus vulvae. Nystatin cream is prescribed    3. Burning sensation in feet  - Diabetic neuropathy is likely diagnosis  - Prescription of gabapentin 300 mg is increased to twice a day    4. DMT2  - Patient reports poor glycemic control but is motivated to improve diet  - Pateint reports compliance of metformin 1000 mg, glimepiride 2 mg    5. HTN  - BP was 122/80 today  - lisinopril-hydrchlorothiazide 20-25 mg    6. Hyperlipidemia  - Pateint reports compliance of atorvastatin 20 mg

## 2017-02-25 LAB
ESTIMATED AVG GLUCOSE: 148 MG/DL
HBA1C MFR BLD HPLC: 6.8 %

## 2017-02-27 LAB — H PYLORI IGG SERPL QL IA: NEGATIVE

## 2017-02-27 NOTE — PROGRESS NOTES
Subjective:     Patient ID: Richelle Zaldivar is a 51 y.o. female.    Chief Complaint: Diabetic Foot Exam (PCP: HERMAN Bergman 12/16/2016, pt. is accompanied by her daughter ) and Foot Pain (bilateral, burning pain )    Richelle is a 51 y.o. female who presents to the clinic for evaluation and treatment of high risk feet. Richelle has a past medical history of Acid reflux; Cataract; Diabetes mellitus, type 2; Glaucoma; HTN (hypertension); Hyperlipidemia; and Recurrent iritis of left eye (4/13/2016). The patient's chief complaint is long, thick toenails. This patient has documented high risk feet requiring routine maintenance secondary to diabetes mellitis and those secondary complications of diabetes, as mentioned..    PCP: Oneyda Bergman MD    Date Last Seen by PCP: 12/16/16    Current shoe gear:  Affected Foot: Slip-on shoes     Unaffected Foot: Slip-on shoes    Hemoglobin A1C   Date Value Ref Range Status   02/24/2017 6.8 (H) 4.5 - 6.2 % Final     Comment:     According to ADA guidelines, hemoglobin A1C <7.0% represents  optimal control in non-pregnant diabetic patients.  Different  metrics may apply to specific populations.   Standards of Medical Care in Diabetes - 2016.  For the purpose of screening for the presence of diabetes:  <5.7%     Consistent with the absence of diabetes  5.7-6.4%  Consistent with increasing risk for diabetes   (prediabetes)  >or=6.5%  Consistent with diabetes  Currently no consensus exists for use of hemoglobin A1C  for diagnosis of diabetes for children.     01/05/2017 6.5 (H) 4.5 - 6.2 % Final     Comment:     According to ADA guidelines, hemoglobin A1C <7.0% represents  optimal control in non-pregnant diabetic patients.  Different  metrics may apply to specific populations.   Standards of Medical Care in Diabetes - 2016.  For the purpose of screening for the presence of diabetes:  <5.7%     Consistent with the absence of diabetes  5.7-6.4%  Consistent with increasing risk for diabetes    (prediabetes)  >or=6.5%  Consistent with diabetes  Currently no consensus exists for use of hemoglobin A1C  for diagnosis of diabetes for children.     06/27/2016 7.1 (H) 4.5 - 6.2 % Final       Patient Active Problem List   Diagnosis    Hypertension associated with diabetes    Diabetes mellitus type 2, uncontrolled, without complications    Obesity (BMI 30-39.9)    Hyperlipidemia associated with type 2 diabetes mellitus    Iron deficiency anemia    Vitamin D insufficiency    GERD (gastroesophageal reflux disease)    Retinitis pigmentosa    Recurrent iritis of left eye    Open-angle glaucoma, severe stage       Medication List with Changes/Refills   New Medications    ESOMEPRAZOLE (NEXIUM) 40 MG CAPSULE    Take 1 capsule (40 mg total) by mouth before breakfast.    LISINOPRIL-HYDROCHLOROTHIAZIDE (PRINZIDE,ZESTORETIC) 20-25 MG TAB    Take 1 tablet by mouth once daily.    NYSTATIN (MYCOSTATIN) CREAM    Apply topically 2 (two) times daily.   Current Medications    ACCU-CHEK FASTCLIX MISC    1 Stick by Misc.(Non-Drug; Combo Route) route as directed. Use to check BG 2x daily. Accuchek Fastclix lancets. Medically necessary.    ATORVASTATIN (LIPITOR) 20 MG TABLET    Take 1 tablet (20 mg total) by mouth once daily.    BRIMONIDINE 0.15 % OPTH DROP (ALPHAGAN) 0.15 % OPHTHALMIC SOLUTION    Place 1 drop into the left eye 2 (two) times daily. 15 ML BOTTLE    DIFLUPREDNATE (DUREZOL) 0.05 % DROP OPHTHALMIC SOLUTION    Place 1 drop into the right eye 2 (two) times daily.    DORZOLAMIDE-TIMOLOL 2-0.5% (COSOPT) 22.3-6.8 MG/ML OPHTHALMIC SOLUTION    Place 1 drop into both eyes every 12 (twelve) hours.    ERGOCALCIFEROL (VITAMIN D2) 50,000 UNIT CAP    Take 1 capsule (50,000 Units total) by mouth every 7 days.    GLIMEPIRIDE (AMARYL) 2 MG TABLET    TAKE ONE TABLET BY MOUTH THREE TIMES DAILY    LATANOPROST 0.005 % OPHTHALMIC SOLUTION    Place 1 drop into both eyes every evening.    LOTEPREDNOL ETABONATE 0.5 % DRPG    Place 1  application into the left eye once daily.    METFORMIN (GLUCOPHAGE) 1000 MG TABLET    Take 1 tablet (1,000 mg total) by mouth 2 (two) times daily with meals.   Changed and/or Refilled Medications    Modified Medication Previous Medication    GABAPENTIN (NEURONTIN) 300 MG CAPSULE gabapentin (NEURONTIN) 300 MG capsule       Take 1 capsule (300 mg total) by mouth 2 (two) times daily.    Take 1 capsule (300 mg total) by mouth every evening.   Discontinued Medications    LANSOPRAZOLE (PREVACID) 30 MG CAPSULE    Take 1 capsule (30 mg total) by mouth 2 (two) times daily.    LISINOPRIL-HYDROCHLOROTHIAZIDE (PRINZIDE,ZESTORETIC) 10-12.5 MG PER TABLET    Take 1 tablet by mouth once daily.    ONDANSETRON (ZOFRAN) 8 MG TABLET    Take 1 tablet (8 mg total) by mouth every 8 (eight) hours as needed for Nausea.       Review of patient's allergies indicates:  No Known Allergies    Past Surgical History:   Procedure Laterality Date    CATARACT EXTRACTION      GONIOTOMY Left 11/17/2016    HEMORRHOID SURGERY  4/2015    [facility]    PARTIAL HYSTERECTOMY      TONSILLECTOMY      WISDOM TOOTH EXTRACTION         Family History   Problem Relation Age of Onset    Diabetes Mother     Hypertension Mother     Hyperlipidemia Mother     Stroke Mother     Amblyopia Mother     Diabetes Sister     Hypertension Sister     Hyperlipidemia Sister     Arthritis Sister     Amblyopia Brother     Amblyopia Maternal Uncle     Blindness Neg Hx     Cancer Neg Hx     Cataracts Neg Hx     Glaucoma Neg Hx     Macular degeneration Neg Hx     Retinal detachment Neg Hx     Strabismus Neg Hx     Thyroid disease Neg Hx        Social History     Social History    Marital status:      Spouse name: N/A    Number of children: 3    Years of education: N/A     Occupational History    Not on file.     Social History Main Topics    Smoking status: Never Smoker    Smokeless tobacco: Not on file    Alcohol use No    Drug use: No     Sexual activity: Yes     Partners: Male     Other Topics Concern    Not on file     Social History Narrative    , 3 kids.       Vitals:    02/16/17 1038   BP: 100/72   Weight: 74.6 kg (164 lb 7.4 oz)   Height: 5' (1.524 m)   PainSc:   6       Hemoglobin A1C   Date Value Ref Range Status   02/24/2017 6.8 (H) 4.5 - 6.2 % Final     Comment:     According to ADA guidelines, hemoglobin A1C <7.0% represents  optimal control in non-pregnant diabetic patients.  Different  metrics may apply to specific populations.   Standards of Medical Care in Diabetes - 2016.  For the purpose of screening for the presence of diabetes:  <5.7%     Consistent with the absence of diabetes  5.7-6.4%  Consistent with increasing risk for diabetes   (prediabetes)  >or=6.5%  Consistent with diabetes  Currently no consensus exists for use of hemoglobin A1C  for diagnosis of diabetes for children.     01/05/2017 6.5 (H) 4.5 - 6.2 % Final     Comment:     According to ADA guidelines, hemoglobin A1C <7.0% represents  optimal control in non-pregnant diabetic patients.  Different  metrics may apply to specific populations.   Standards of Medical Care in Diabetes - 2016.  For the purpose of screening for the presence of diabetes:  <5.7%     Consistent with the absence of diabetes  5.7-6.4%  Consistent with increasing risk for diabetes   (prediabetes)  >or=6.5%  Consistent with diabetes  Currently no consensus exists for use of hemoglobin A1C  for diagnosis of diabetes for children.     06/27/2016 7.1 (H) 4.5 - 6.2 % Final       Review of Systems   Constitutional: Negative for chills and fever.   Respiratory: Negative for shortness of breath.    Cardiovascular: Negative for chest pain, palpitations, orthopnea, claudication and leg swelling.   Gastrointestinal: Negative for diarrhea, nausea and vomiting.   Musculoskeletal: Negative for joint pain.   Skin: Negative for rash.   Neurological: Positive for tingling and sensory change. Negative for  dizziness, focal weakness and weakness.   Psychiatric/Behavioral: Negative.          Objective:      PHYSICAL EXAM: Apperance: Alert and orient in no distress,well developed, and with good attention to grooming and body habits  Patient presents ambulating in sandals.  LOWER EXTREMITY EXAM:  VASCULAR: Dorsalis pedis pulses 1/4 bilateral and Posterior Tibial pulses 2/4 bilateral. Capillary fill time <4 seconds bilateral. Mild edema observed bilateral. Varicosities absent bilateral. Skin temperature of the lower extremities is warm to warm, proximal to distal. Hair growth dim bilateral.  DERMATOLOGICAL: No skin rashes, subcutaneous nodules, lesions, or ulcers observed bilateral. Nails 1-5 bilateral elongated. Webspaces 1-4 clean, dry and without evidence of break in skin integrity bilateral.   NEUROLOGICAL: Light touch, sharp-dull, proprioception all present and equal bilaterally.  Vibratory sensation absent at bilateral hallux and diminished at bilateral navicular. Protective sensation absent at 4/10 sites as tested with a Hoxie-Octavio 5.07 monofilament.   MUSCULOSKELETAL: Muscle strength is 5/5 for foot inverters, everters, plantarflexors, and dorsiflexors. Muscle tone is normal. Early pes planus noted bilateral.         Assessment:       Encounter Diagnoses   Name Primary?    Type II diabetes mellitus with neurological manifestations Yes    Pes planus of both feet          Plan:   Type II diabetes mellitus with neurological manifestations  -     DIABETIC SHOES FOR HOME USE    Pes planus of both feet  -     DIABETIC SHOES FOR HOME USE      I counseled the patient on her conditions, their implications and medical management.  1. The patient was counseled regarding findings of my examination, my impressions, and usual treatment plan.   2.Greater than 50% of this visit spent on counseling and coordination of care.  Greater than 20 minutes spent on education about the diabetic foot, neuropathy, and prevention of  limb loss.  3. Shoe inspection. Diabetic Foot Education. Patient reminded of the importance of good nutrition and blood sugar control to help prevent podiatric complications of diabetes. Patient instructed on proper foot hygeine. We discussed wearing proper shoe gear, daily foot inspections, never walking without protective shoe gear, never putting sharp instruments to feet.    4. Prescribed topical compound (green lable) consisting of flurbiprofen, cyclobenzaprine, gabapentin, lidocaine, and prilocaine. Patient instructed to use the cream up to 5 times daily. Discussed with patient that there may be out of pocket cost of cream pending insurance, and that they may refuse medication if cost is an issue. Patient states they understand.   5. Prescription written for Diabetic shoes and inserts.   6. Patient  will continue to monitor the areas daily, inspect feet, wear protective shoe gear when ambulatory, moisturizer to maintain skin integrity. Patient reminded of the importance of good nutrition and blood sugar control to help prevent podiatric complications of diabetes.  7. Patient to return in this office in approximately 12 months, or sooner if needed.                   Paty Mclean DPM  [facility]

## 2017-02-28 ENCOUNTER — LAB VISIT (OUTPATIENT)
Dept: LAB | Facility: HOSPITAL | Age: 52
End: 2017-02-28
Attending: FAMILY MEDICINE
Payer: COMMERCIAL

## 2017-02-28 ENCOUNTER — INITIAL CONSULT (OUTPATIENT)
Dept: GASTROENTEROLOGY | Facility: CLINIC | Age: 52
End: 2017-02-28
Payer: COMMERCIAL

## 2017-02-28 VITALS
WEIGHT: 163.38 LBS | BODY MASS INDEX: 32.08 KG/M2 | DIASTOLIC BLOOD PRESSURE: 62 MMHG | HEIGHT: 60 IN | SYSTOLIC BLOOD PRESSURE: 112 MMHG | HEART RATE: 52 BPM

## 2017-02-28 DIAGNOSIS — K21.9 GASTROESOPHAGEAL REFLUX DISEASE, ESOPHAGITIS PRESENCE NOT SPECIFIED: ICD-10-CM

## 2017-02-28 DIAGNOSIS — K76.0 FATTY LIVER: ICD-10-CM

## 2017-02-28 DIAGNOSIS — E53.8 B12 DEFICIENCY: ICD-10-CM

## 2017-02-28 DIAGNOSIS — R10.84 GENERALIZED ABDOMINAL PAIN: Primary | ICD-10-CM

## 2017-02-28 DIAGNOSIS — R11.0 NAUSEA: ICD-10-CM

## 2017-02-28 PROCEDURE — 3074F SYST BP LT 130 MM HG: CPT | Mod: S$GLB,,, | Performed by: NURSE PRACTITIONER

## 2017-02-28 PROCEDURE — 3078F DIAST BP <80 MM HG: CPT | Mod: S$GLB,,, | Performed by: NURSE PRACTITIONER

## 2017-02-28 PROCEDURE — 99244 OFF/OP CNSLTJ NEW/EST MOD 40: CPT | Mod: S$GLB,,, | Performed by: NURSE PRACTITIONER

## 2017-02-28 PROCEDURE — 36415 COLL VENOUS BLD VENIPUNCTURE: CPT | Mod: PO

## 2017-02-28 PROCEDURE — 99999 PR PBB SHADOW E&M-EST. PATIENT-LVL III: CPT | Mod: PBBFAC,,, | Performed by: NURSE PRACTITIONER

## 2017-02-28 PROCEDURE — 1160F RVW MEDS BY RX/DR IN RCRD: CPT | Mod: S$GLB,,, | Performed by: NURSE PRACTITIONER

## 2017-02-28 PROCEDURE — 86340 INTRINSIC FACTOR ANTIBODY: CPT

## 2017-02-28 PROCEDURE — 86038 ANTINUCLEAR ANTIBODIES: CPT

## 2017-02-28 RX ORDER — DICYCLOMINE HYDROCHLORIDE 20 MG/1
20 TABLET ORAL 3 TIMES DAILY PRN
Qty: 20 TABLET | Refills: 0 | Status: SHIPPED | OUTPATIENT
Start: 2017-02-28 | End: 2017-03-30

## 2017-02-28 NOTE — MR AVS SNAPSHOT
[facility]  [address] [place] [address]  Phone: [phone]  Fax: [phone]                  Richelle Zaldivar   2017 7:20 AM   Initial consult    Description:  Female : 1965   Provider:  Clare Chandler NP   Department:  [facility] - Gastroenter[facility]           Reason for Visit     Gastroesophageal Reflux     Heartburn     Abdominal Pain           Diagnoses this Visit        Comments    Generalized abdominal pain    -  Primary     Nausea                To Do List           Future Appointments        Provider Department Dept Phone    3/2/2017 11:00 AM LABORATORY, [facility] [phone]    3/7/2017 2:15 PM MD Tadeo Davidson  OB/ -[id number]    3/7/2017 2:45 PM MD Tadeo Davidson  OB/ -[id number]    3/14/2017 10:40 AM Clare Chandler NP [facility] [phone]    3/15/2017 10:00 AM Robert Scott MD [facility] [phone]      Goals (5 Years of Data)     None      Follow-Up and Disposition     Return in about 2 weeks (around 3/14/2017).       These Medications        Disp Refills Start End    dicyclomine (BENTYL) 20 mg tablet 20 tablet 0 2017 3/30/2017    Take 1 tablet (20 mg total) by mouth 3 (three) times daily as needed (colon spasms). PRN - Oral    Pharmacy: [facility] [address] [place], [place] [address], [place] - [address] [facility] Ph #: [phone]         [facility]     [facility] On [facility] Nurse Care Line -  Assistance  Registered nurses in the [facility] Center provide clinical advisement, health education, appointment booking, and other advisory services.  Call for this free service at [phone].             Medications           Message regarding Medications     Verify the changes and/or additions to your medication regime listed below are the same as discussed with your clinician today.  If any of these changes or additions are incorrect, please notify your  healthcare provider.        START taking these NEW medications        Refills    dicyclomine (BENTYL) 20 mg tablet 0    Sig: Take 1 tablet (20 mg total) by mouth 3 (three) times daily as needed (colon spasms). PRN    Class: Normal    Route: Oral           Verify that the below list of medications is an accurate representation of the medications you are currently taking.  If none reported, the list may be blank. If incorrect, please contact your healthcare provider. Carry this list with you in case of emergency.           Current Medications     ACCU-CHEK FASTCLIX Misc 1 Stick by Misc.(Non-Drug; Combo Route) route as directed. Use to check BG 2x daily. Accuchek Fastclix lancets. Medically necessary.    atorvastatin (LIPITOR) 20 MG tablet Take 1 tablet (20 mg total) by mouth once daily.    brimonidine 0.15 % OPTH DROP (ALPHAGAN) 0.15 % ophthalmic solution Place 1 drop into the left eye 2 (two) times daily. 15 ML BOTTLE    difluprednate (DUREZOL) 0.05 % Drop ophthalmic solution Place 1 drop into the right eye 2 (two) times daily.    dorzolamide-timolol 2-0.5% (COSOPT) 22.3-6.8 mg/mL ophthalmic solution Place 1 drop into both eyes every 12 (twelve) hours.    ergocalciferol (VITAMIN D2) 50,000 unit Cap Take 1 capsule (50,000 Units total) by mouth every 7 days.    esomeprazole (NEXIUM) 40 MG capsule Take 1 capsule (40 mg total) by mouth before breakfast.    gabapentin (NEURONTIN) 300 MG capsule Take 1 capsule (300 mg total) by mouth 2 (two) times daily.    glimepiride (AMARYL) 2 MG tablet TAKE ONE TABLET BY MOUTH THREE TIMES DAILY    latanoprost 0.005 % ophthalmic solution Place 1 drop into both eyes every evening.    lisinopril-hydrochlorothiazide (PRINZIDE,ZESTORETIC) 20-25 mg Tab Take 1 tablet by mouth once daily.    loteprednol etabonate 0.5 % DrpG Place 1 application into the left eye once daily.    metformin (GLUCOPHAGE) 1000 MG tablet Take 1 tablet (1,000 mg total) by mouth 2 (two) times daily with meals.     nystatin (MYCOSTATIN) cream Apply topically 2 (two) times daily.    dicyclomine (BENTYL) 20 mg tablet Take 1 tablet (20 mg total) by mouth 3 (three) times daily as needed (colon spasms). PRN           Clinical Reference Information           Your Vitals Were     BP                   112/62           Blood Pressure          Most Recent Value    BP  112/62      Allergies as of 2/28/2017     No Known Allergies      Immunizations Administered on Date of Encounter - 2/28/2017     None      Instructions    Take Culturelle as directed.       Language Assistance Services     ATTENTION: Language assistance services are available, free of charge. Please call [phone].      ATENCIÓN: Si habla tresa, tiene a anderson disposición servicios gratuitos de asistencia lingüística. Llame al [phone].     LUIS CARLOS Ý: N?u b?n nói Ti?ng Vi?t, có các d?ch v? h? tr? ngôn ng? mi?n phí dành cho b?n. G?i s? [phone].         [facility] complies with applicable Federal civil rights laws and does not discriminate on the basis of race, color, national origin, age, disability, or sex.

## 2017-02-28 NOTE — LETTER
February 28, 2017      Oneyda Bergman MD  [address] [place] [address]           [facility]  [address] [place] [address]  Phone: [phone]  Fax: [phone]          Patient: Richelle Zaldivar   MR Number: [id number]   YOB: 1965   Date of Visit: 2/28/2017       Dear Dr. Oneyda Bergman:    Thank you for referring Richelle Zaldivar to me for evaluation. Attached you will find relevant portions of my assessment and plan of care.    If you have questions, please do not hesitate to call me. I look forward to following Richelle Zaldivar along with you.    Sincerely,    Clare Chandler, NP    Enclosure  CC:  No Recipients    If you would like to receive this communication electronically, please contact [email or web address] or [phone] to request more information on [employer] access.    For providers and/or their staff who would like to refer a patient to [facility], please contact us through our one-stop-shop provider referral line, [facility], at [phone].    If you feel you have received this communication in error or would no longer like to receive these types of communications, please e-mail [email or web address]

## 2017-02-28 NOTE — PROGRESS NOTES
Clinic Consult:  [facility] Consultation Note    Reason for Consult:  The primary encounter diagnosis was Generalized abdominal pain. Diagnoses of Nausea, Gastroesophageal reflux disease, esophagitis presence not specified, and Fatty liver were also pertinent to this visit.    PCP: Oneyda Bergman   [address] [place] [address]    HPI:  This is a 51 y.o. female here for evaluation of the above.  Patient's english is limited but daughter is present in room and interpretating for her. She has a history of GERD and well controlled on Lansoprazole. 4 weeks ago, she was started on an antibiotic for a UTI. He was started on Cipro which caused her nausea so she was switched to Augmentin and developed abdominal pain a couple of days after starting it. She even went to the ER last week for increased abdominal pian. She describes the abdominal pan as intermittent, generalized, and  crampy in nature. Ultrasound and xray of the abdomen done at the ER. Both were unremarkable except for some fatty changes to the liver. She had a follow up visit with Dr. Bergman on Friday and was switched from Lansoprazole to Nexium and referred to me. She has only been off the antibiotics for 4 days now and patinet states that she has been having some mild improvement in all symptoms since then. She denies any diarrhea or change in bowel pattern. She does report occasional hard stools. She has 1-2 bowel movements per day. Denies any hematochezia, melena, weight loss, or vomiting.  She has never had a prior screening colonoscopy.    Review of Systems   Constitutional: Negative for fever, malaise/fatigue and weight loss.   HENT: Negative for sore throat.    Respiratory: Negative for cough and wheezing.    Cardiovascular: Negative for chest pain and palpitations.   Gastrointestinal: Positive for abdominal pain (Generalized), heartburn and nausea. Negative for blood in stool, constipation, diarrhea, melena and vomiting.    Musculoskeletal: Negative for back pain, joint pain, myalgias and neck pain.   Skin: Negative for itching and rash.   Neurological: Negative for dizziness, speech change, seizures, loss of consciousness and headaches.   Psychiatric/Behavioral: Negative for depression and substance abuse. The patient is not nervous/anxious.        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    Surgical History:  has a past surgical history that includes Partial hysterectomy; Tonsillectomy; [place] tooth extraction; Cataract extraction; Hemorrhoid surgery (4/2015); and Goniotomy (Left, 11/17/2016).    Family History: family history includes Amblyopia in her brother, maternal uncle, and mother; Arthritis in her sister; Diabetes in her mother and sister; Hyperlipidemia in her mother and sister; Hypertension in her mother and sister; Stroke in her mother. There is no history of Blindness, Cancer, Cataracts, Glaucoma, Macular degeneration, Retinal detachment, Strabismus, or Thyroid disease..     Social History:  reports that she has never smoked. She does not have any smokeless tobacco history on file. She reports that she does not drink alcohol or use illicit drugs.    Allergies: Reviewed    Home Medications:   Current Outpatient Prescriptions on File Prior to Visit   Medication Sig Dispense Refill    ACCU-CHEK FASTCLIX Misc 1 Stick by Misc.(Non-Drug; Combo Route) route as directed. Use to check BG 2x daily. Accuchek Fastclix lancets. Medically necessary. 100 each 11    atorvastatin (LIPITOR) 20 MG tablet Take 1 tablet (20 mg total) by mouth once daily. 90 tablet 3    brimonidine 0.15 % OPTH DROP (ALPHAGAN) 0.15 % ophthalmic solution Place 1 drop into the left eye 2 (two) times daily. 15 ML BOTTLE 15 mL 4    difluprednate (DUREZOL) 0.05 % Drop ophthalmic solution Place 1 drop into the  right eye 2 (two) times daily. 1 Bottle 1    dorzolamide-timolol 2-0.5% (COSOPT) 22.3-6.8 mg/mL ophthalmic solution Place 1 drop into both eyes every 12 (twelve) hours. 1 Bottle 6    ergocalciferol (VITAMIN D2) 50,000 unit Cap Take 1 capsule (50,000 Units total) by mouth every 7 days. 10 capsule 0    esomeprazole (NEXIUM) 40 MG capsule Take 1 capsule (40 mg total) by mouth before breakfast. 30 capsule 0    gabapentin (NEURONTIN) 300 MG capsule Take 1 capsule (300 mg total) by mouth 2 (two) times daily. 60 capsule 3    glimepiride (AMARYL) 2 MG tablet TAKE ONE TABLET BY MOUTH THREE TIMES DAILY 90 tablet 1    latanoprost 0.005 % ophthalmic solution Place 1 drop into both eyes every evening. 1 Bottle 6    lisinopril-hydrochlorothiazide (PRINZIDE,ZESTORETIC) 20-25 mg Tab Take 1 tablet by mouth once daily. 90 tablet 3    loteprednol etabonate 0.5 % DrpG Place 1 application into the left eye once daily. 5 g 1    metformin (GLUCOPHAGE) 1000 MG tablet Take 1 tablet (1,000 mg total) by mouth 2 (two) times daily with meals. 180 tablet 3    nystatin (MYCOSTATIN) cream Apply topically 2 (two) times daily. 30 g 0     No current facility-administered medications on file prior to visit.        Physical Exam:  Vital Signs:  /62  Pulse (!) 52  Ht 5' (1.524 m)  Wt 74.1 kg (163 lb 5.8 oz)  BMI 31.9 kg/m2  Body mass index is 31.9 kg/(m^2).  Physical Exam   Constitutional: She is oriented to person, place, and time and well-developed, well-nourished, and in no distress. No distress.   HENT:   Head: Normocephalic.   Eyes: Conjunctivae are normal. Pupils are equal, round, and reactive to light.   Cardiovascular: Normal rate, regular rhythm and normal heart sounds.    Pulmonary/Chest: Effort normal and breath sounds normal. No respiratory distress.   Abdominal: Soft. Bowel sounds are normal. She exhibits no mass. There is tenderness (mild generalized). There is no rebound and no guarding.   Neurological: She is alert  and oriented to person, place, and time. No cranial nerve deficit.   Skin: Skin is warm and dry. No rash noted.   Psychiatric: Mood and affect normal.       Labs: Pertinent labs reviewed.    Endoscopy: None prior    CRC Screening: None prior    Assessment:  1. Generalized abdominal pain    2. Nausea    3. Gastroesophageal reflux disease, esophagitis presence not specified    4. Fatty liver           Recommendations:  Generalized abdominal pain  Nausea  Gastroesophageal reflux disease, esophagitis presence not specified  - Due to no prior symptoms before start of antibiotics and mild improvement after antibiotics discontinued, abdominal pain likely secondary to antibiotic use.   - Continue Nexium and start Culturelle probiotics as directed.  - Take Bently as needed for colon spasms  - Will follow up with pt in 2 weeks to determine comple resolution of symptoms. If still symptomatic, may need an EGD  - She is due for her age releated colon screening. We will schedule colonoscopy at next visit.  -     dicyclomine (BENTYL) 20 mg tablet; Take 1 tablet (20 mg total) by mouth 3 (three) times daily as needed (colon spasms). PRN  Dispense: 20 tablet; Refill: 0    Fatty liver  - Fatty liver noted on ultrasound  - She has normal labs so no further testing is needed at this point.  Will  monitor with repeat abdominal ultrasound in 1 year.   - Will discuss with patient and daughter at next visit.     Return in about 2 weeks (around 3/14/2017).      Thank you so much for allowing me to participate in the care of MAMADOU Curtis

## 2017-03-01 LAB — ANA SER QL IF: NORMAL

## 2017-03-02 LAB
ANNOTATION COMMENT IMP: NORMAL
IF BLOCK AB SER QL: NEGATIVE

## 2017-03-07 ENCOUNTER — OFFICE VISIT (OUTPATIENT)
Dept: OBSTETRICS AND GYNECOLOGY | Facility: CLINIC | Age: 52
End: 2017-03-07
Payer: COMMERCIAL

## 2017-03-07 VITALS
SYSTOLIC BLOOD PRESSURE: 128 MMHG | BODY MASS INDEX: 32.11 KG/M2 | HEIGHT: 60 IN | WEIGHT: 163.56 LBS | DIASTOLIC BLOOD PRESSURE: 66 MMHG

## 2017-03-07 DIAGNOSIS — R30.0 DYSURIA: ICD-10-CM

## 2017-03-07 DIAGNOSIS — Z00.00 PREVENTATIVE HEALTH CARE: ICD-10-CM

## 2017-03-07 DIAGNOSIS — Z01.411 ENCOUNTER FOR GYNECOLOGICAL EXAMINATION WITH ABNORMAL FINDING: Primary | ICD-10-CM

## 2017-03-07 PROCEDURE — 99999 PR PBB SHADOW E&M-EST. PATIENT-LVL III: CPT | Mod: PBBFAC,,, | Performed by: NURSE PRACTITIONER

## 2017-03-07 PROCEDURE — 88175 CYTOPATH C/V AUTO FLUID REDO: CPT

## 2017-03-07 PROCEDURE — 99396 PREV VISIT EST AGE 40-64: CPT | Mod: S$GLB,,, | Performed by: NURSE PRACTITIONER

## 2017-03-07 PROCEDURE — 87086 URINE CULTURE/COLONY COUNT: CPT

## 2017-03-07 PROCEDURE — 3074F SYST BP LT 130 MM HG: CPT | Mod: S$GLB,,, | Performed by: NURSE PRACTITIONER

## 2017-03-07 PROCEDURE — 3078F DIAST BP <80 MM HG: CPT | Mod: S$GLB,,, | Performed by: NURSE PRACTITIONER

## 2017-03-07 PROCEDURE — 1160F RVW MEDS BY RX/DR IN RCRD: CPT | Mod: S$GLB,,, | Performed by: NURSE PRACTITIONER

## 2017-03-07 RX ORDER — SULFAMETHOXAZOLE AND TRIMETHOPRIM 800; 160 MG/1; MG/1
1 TABLET ORAL 2 TIMES DAILY
Qty: 10 TABLET | Refills: 0 | Status: SHIPPED | OUTPATIENT
Start: 2017-03-07 | End: 2017-03-12

## 2017-03-07 NOTE — PROGRESS NOTES
CC: Well woman exam    Richelle Zaldivar is a 51 y.o. female  presents for well woman exam.  No LMP recorded. Patient has had a hysterectomy.. Patient has a h/o uterine fibroids. Can not recall last pap exam. Last mammogram normal, 2016. C/O slight pelvic pain and dysuria. No flank pain or hematuria. Urine in clinic positive protein and leucocytes.     Past Medical History:   Diagnosis Date    Acid reflux     Cataract     Diabetes mellitus, type 2     Glaucoma     HTN (hypertension)     Hyperlipidemia     Recurrent iritis of left eye 2016     Past Surgical History:   Procedure Laterality Date    CATARACT EXTRACTION      GONIOTOMY Left 2016    HEMORRHOID SURGERY  2015    [facility]    PARTIAL HYSTERECTOMY      TONSILLECTOMY      WISDOM TOOTH EXTRACTION       Social History     Social History    Marital status:      Spouse name: N/A    Number of children: 3    Years of education: N/A     Occupational History    Not on file.     Social History Main Topics    Smoking status: Never Smoker    Smokeless tobacco: Not on file    Alcohol use No    Drug use: No    Sexual activity: Yes     Partners: Male     Birth control/ protection: None     Other Topics Concern    Not on file     Social History Narrative    , 3 kids.     Family History   Problem Relation Age of Onset    Diabetes Mother     Hypertension Mother     Hyperlipidemia Mother     Stroke Mother     Amblyopia Mother     Diabetes Sister     Hypertension Sister     Hyperlipidemia Sister     Arthritis Sister     Amblyopia Brother     Amblyopia Maternal Uncle     Blindness Neg Hx     Cancer Neg Hx     Cataracts Neg Hx     Glaucoma Neg Hx     Macular degeneration Neg Hx     Retinal detachment Neg Hx     Strabismus Neg Hx     Thyroid disease Neg Hx      OB History      Para Term  AB TAB SAB Ectopic Multiple Living    8 3 3 0 5  5   3          /66  Ht 5' (1.524 m)  Wt 74.2  kg (163 lb 9.3 oz)  BMI 31.95 kg/m2      ROS:  GENERAL: Denies weight gain or weight loss. Feeling well overall.   SKIN: Denies rash or lesions.   HEAD: Denies head injury or headache.   NODES: Denies enlarged lymph nodes.   CHEST: Denies chest pain or shortness of breath.   CARDIOVASCULAR: Denies palpitations or left sided chest pain.   ABDOMEN: No abdominal pain, constipation, diarrhea, nausea, vomiting or rectal bleeding.   URINARY: HPI  REPRODUCTIVE: See HPI.   BREASTS: The patient performs breast self-examination and denies pain, lumps, or nipple discharge.   HEMATOLOGIC: No easy bruisability or excessive bleeding.   MUSCULOSKELETAL: Denies joint pain or swelling.   NEUROLOGIC: Denies syncope or weakness.   PSYCHIATRIC: Denies depression, anxiety or mood swings.    PHYSICAL EXAM:  APPEARANCE: Obese female in no acute distress.  AFFECT: WNL, alert and oriented x 3  SKIN: No acne or hirsutism  NECK: Neck symmetric without masses or thyromegaly  NODES: No inguinal, cervical, axillary, or femoral lymph node enlargement  CHEST: Good respiratory effect  ABDOMEN: Soft.  No tenderness or masses.  No hepatosplenomegaly.  No hernias.  BREASTS: Symmetrical, no skin changes or visible lesions.  No palpable masses, nipple discharge bilaterally.  PELVIC: Normal external genitalia without lesions.  Normal hair distribution.  Adequate perineal body, normal urethral meatus.  Vagina moist and well rugated without lesions or discharge.  No significant cystocele or rectocele.      1. Encounter for gynecological examination with abnormal finding  Liquid-based pap smear, screening   2. Preventative health care  Liquid-based pap smear, screening   3. Dysuria  Urine culture    PLAN:  1. Urine sent for cx  2. Pap exam  3. Bactrim for 5 days  Patient was counseled today on A.C.S. Pap guidelines and recommendations for yearly pelvic exams, mammograms and monthly self breast exams; to see her PCP for other health maintenance.

## 2017-03-09 LAB
BACTERIA UR CULT: NORMAL
BACTERIA UR CULT: NORMAL

## 2017-03-13 DIAGNOSIS — B37.2 CUTANEOUS CANDIDIASIS: ICD-10-CM

## 2017-03-13 RX ORDER — NYSTATIN 100000 U/G
CREAM TOPICAL
Qty: 30 G | Refills: 0 | Status: SHIPPED | OUTPATIENT
Start: 2017-03-13 | End: 2017-04-18

## 2017-03-15 ENCOUNTER — OFFICE VISIT (OUTPATIENT)
Dept: OPHTHALMOLOGY | Facility: CLINIC | Age: 52
End: 2017-03-15
Payer: COMMERCIAL

## 2017-03-15 ENCOUNTER — TELEPHONE (OUTPATIENT)
Dept: OPHTHALMOLOGY | Facility: CLINIC | Age: 52
End: 2017-03-15

## 2017-03-15 DIAGNOSIS — H40.9 SEVERE STAGE GLAUCOMA: ICD-10-CM

## 2017-03-15 DIAGNOSIS — H40.1133 PRIMARY OPEN ANGLE GLAUCOMA OF BOTH EYES, SEVERE STAGE: Primary | ICD-10-CM

## 2017-03-15 PROCEDURE — 92012 INTRM OPH EXAM EST PATIENT: CPT | Mod: S$GLB,,, | Performed by: OPHTHALMOLOGY

## 2017-03-15 PROCEDURE — 99999 PR PBB SHADOW E&M-EST. PATIENT-LVL II: CPT | Mod: PBBFAC,,, | Performed by: OPHTHALMOLOGY

## 2017-03-15 RX ORDER — BRIMONIDINE TARTRATE 1 MG/ML
1 SOLUTION/ DROPS OPHTHALMIC 2 TIMES DAILY
Qty: 15 ML | Refills: 6 | OUTPATIENT
Start: 2017-03-15 | End: 2017-03-15 | Stop reason: SDUPTHER

## 2017-03-15 RX ORDER — BRIMONIDINE TARTRATE 1 MG/ML
1 SOLUTION/ DROPS OPHTHALMIC 2 TIMES DAILY
Qty: 15 ML | Refills: 6 | Status: SHIPPED | OUTPATIENT
Start: 2017-03-15 | End: 2017-04-14

## 2017-03-15 NOTE — TELEPHONE ENCOUNTER
----- Message from See Caseyes sent at 3/15/2017 12:14 PM CDT -----  Contact: [facility] Sqqdikkr-[phone]   Would like to verify Rx, Rx is different and not the same as original Rx its a little bit different.  Please call back to consult @ [phone].  Thanks-[facility]

## 2017-03-15 NOTE — PROGRESS NOTES
HPI       Patient is here for iop check, it is uncertain if patient is taking the   inflamase forte your last clinic note stated durezol bid od , patient is   using in the os, patient is using alpha tid in os as opposed to clinic   note recommending bid os       PCP: Dr. Miranda Ackerman    1. COAG (followed by Dr. Scott)  CP OS 8/27/15 - low to moderate flow, TDW 1100, aggressive dilation with   GV  Gonio Puncture OS 9/25/15   SLT OS 6/23/16, 6/23/15, 6-2-14  Goniotomy OS 11-17-16  2. DM  3. RP / VIT A PALMITATE 1500 (discontinued)    OD:Atropine BID OD  OSLatanoprost QHS OS, Cosopt BID OS, Alphagan BID OS,   inflamase bid os,? ilevro once daily os,       Last edited by JOSE Thapa on 3/15/2017 10:53 AM.         Assessment /Plan     For exam results, see Encounter Report.      ICD-10-CM ICD-9-CM    1. Primary open angle glaucoma of both eyes, severe stage H40.1133 365.11 IOP Elevated OS due to patient running out of Alphagan will Resume Med and recheck IOP     365.73        Return to clinic 1 month IOP

## 2017-03-21 DIAGNOSIS — K21.9 GASTROESOPHAGEAL REFLUX DISEASE, ESOPHAGITIS PRESENCE NOT SPECIFIED: ICD-10-CM

## 2017-03-21 RX ORDER — ESOMEPRAZOLE MAGNESIUM 40 MG/1
CAPSULE, DELAYED RELEASE ORAL
Qty: 30 CAPSULE | Refills: 0 | Status: SHIPPED | OUTPATIENT
Start: 2017-03-21 | End: 2017-04-21 | Stop reason: SDUPTHER

## 2017-03-28 ENCOUNTER — OFFICE VISIT (OUTPATIENT)
Dept: OBSTETRICS AND GYNECOLOGY | Facility: CLINIC | Age: 52
End: 2017-03-28
Payer: COMMERCIAL

## 2017-03-28 VITALS — WEIGHT: 162.06 LBS | HEIGHT: 60 IN | BODY MASS INDEX: 31.82 KG/M2

## 2017-03-28 DIAGNOSIS — N94.9 VAGINAL BURNING: ICD-10-CM

## 2017-03-28 DIAGNOSIS — N95.2 VAGINAL ATROPHY: Primary | ICD-10-CM

## 2017-03-28 PROCEDURE — 99999 PR PBB SHADOW E&M-EST. PATIENT-LVL I: CPT | Mod: PBBFAC,,, | Performed by: OBSTETRICS & GYNECOLOGY

## 2017-03-28 PROCEDURE — 99213 OFFICE O/P EST LOW 20 MIN: CPT | Mod: S$GLB,,, | Performed by: OBSTETRICS & GYNECOLOGY

## 2017-03-28 RX ORDER — ESTRADIOL 0.1 MG/G
2 CREAM VAGINAL DAILY
Qty: 42.5 G | Refills: 1 | Status: SHIPPED | OUTPATIENT
Start: 2017-03-28 | End: 2018-07-17

## 2017-03-30 NOTE — PROGRESS NOTES
Subjective:       Patient ID: Richelle Zaldivar is a 51 y.o. female.    Chief Complaint:  Vaginal burning/atrophy    History of Present Illness  HPI  presents with daughter c/o burning before & after urination. seen by Stephani 3/7/17. had UTI dx in Urgent Care 1 month ago & referred to urologist at [facility] (had normal exam). symptoms did not resolve. tx with bactrim x 5d 3/7. Pt concerned about obstetric laceration repair from 30+years ago    GYN & OB History  No LMP recorded. Patient has had a hysterectomy.   Date of Last Pap: 3/10/2017    OB History    Para Term  AB SAB TAB Ectopic Multiple Living   8 3 3 0 5 5    3      # Outcome Date GA Lbr Pascual/2nd Weight Sex Delivery Anes PTL Lv   8 Term            7 Term            6 Term            5 SAB  25w0d          4 SAB            3 SAB            2 SAB            1 SAB                   Review of Systems  Review of Systems   All other systems reviewed and are negative.          Objective:    Physical Exam:   Constitutional: She is oriented to person, place, and time. She appears well-developed and well-nourished.        Pulmonary/Chest: Effort normal.          Genitourinary:   Genitourinary Comments: Normal external genitalia/atrophic vaginal mucosa with scattered petechiae, introitus & perineum nontender           Musculoskeletal: Normal range of motion.       Neurological: She is alert and oriented to person, place, and time.    Skin: Skin is warm and dry.    Psychiatric: She has a normal mood and affect. Her behavior is normal.       Urine dip (-)     Assessment:        1. Vaginal atrophy    2. Vaginal burning      Plan:      1. Discussed vaginal atrophy as source of burning & discomfort, consider vaginal estrogen if desires

## 2017-04-18 ENCOUNTER — OFFICE VISIT (OUTPATIENT)
Dept: OPHTHALMOLOGY | Facility: CLINIC | Age: 52
End: 2017-04-18
Payer: COMMERCIAL

## 2017-04-18 ENCOUNTER — OFFICE VISIT (OUTPATIENT)
Dept: INTERNAL MEDICINE | Facility: CLINIC | Age: 52
End: 2017-04-18
Payer: COMMERCIAL

## 2017-04-18 VITALS
SYSTOLIC BLOOD PRESSURE: 102 MMHG | BODY MASS INDEX: 31.51 KG/M2 | WEIGHT: 160.5 LBS | TEMPERATURE: 97 F | OXYGEN SATURATION: 99 % | HEIGHT: 60 IN | HEART RATE: 84 BPM | DIASTOLIC BLOOD PRESSURE: 60 MMHG

## 2017-04-18 DIAGNOSIS — E11.59 HYPERTENSION ASSOCIATED WITH DIABETES: ICD-10-CM

## 2017-04-18 DIAGNOSIS — K21.9 GASTROESOPHAGEAL REFLUX DISEASE, ESOPHAGITIS PRESENCE NOT SPECIFIED: ICD-10-CM

## 2017-04-18 DIAGNOSIS — E11.9 TYPE 2 DIABETES MELLITUS WITHOUT COMPLICATION, WITHOUT LONG-TERM CURRENT USE OF INSULIN: Primary | ICD-10-CM

## 2017-04-18 DIAGNOSIS — G89.29 CHRONIC PAIN OF BOTH KNEES: ICD-10-CM

## 2017-04-18 DIAGNOSIS — E66.9 OBESITY (BMI 30-39.9): ICD-10-CM

## 2017-04-18 DIAGNOSIS — Z98.42 CATARACT EXTRACTION STATUS, LEFT: ICD-10-CM

## 2017-04-18 DIAGNOSIS — H40.1133 PRIMARY OPEN ANGLE GLAUCOMA OF BOTH EYES, SEVERE STAGE: Primary | ICD-10-CM

## 2017-04-18 DIAGNOSIS — E78.5 HYPERLIPIDEMIA ASSOCIATED WITH TYPE 2 DIABETES MELLITUS: ICD-10-CM

## 2017-04-18 DIAGNOSIS — E55.9 VITAMIN D INSUFFICIENCY: ICD-10-CM

## 2017-04-18 DIAGNOSIS — R29.898 WEAKNESS OF BOTH LEGS: ICD-10-CM

## 2017-04-18 DIAGNOSIS — H20.9 IRITIS OF LEFT EYE: ICD-10-CM

## 2017-04-18 DIAGNOSIS — D51.9 ANEMIA DUE TO VITAMIN B12 DEFICIENCY, UNSPECIFIED B12 DEFICIENCY TYPE: ICD-10-CM

## 2017-04-18 DIAGNOSIS — M25.561 CHRONIC PAIN OF BOTH KNEES: ICD-10-CM

## 2017-04-18 DIAGNOSIS — M25.562 CHRONIC PAIN OF BOTH KNEES: ICD-10-CM

## 2017-04-18 DIAGNOSIS — E11.69 HYPERLIPIDEMIA ASSOCIATED WITH TYPE 2 DIABETES MELLITUS: ICD-10-CM

## 2017-04-18 DIAGNOSIS — I15.2 HYPERTENSION ASSOCIATED WITH DIABETES: ICD-10-CM

## 2017-04-18 DIAGNOSIS — H35.52 RETINITIS PIGMENTOSA OF BOTH EYES: ICD-10-CM

## 2017-04-18 PROCEDURE — 3074F SYST BP LT 130 MM HG: CPT | Mod: S$GLB,,, | Performed by: FAMILY MEDICINE

## 2017-04-18 PROCEDURE — 99999 PR PBB SHADOW E&M-EST. PATIENT-LVL II: CPT | Mod: PBBFAC,,, | Performed by: OPHTHALMOLOGY

## 2017-04-18 PROCEDURE — 3078F DIAST BP <80 MM HG: CPT | Mod: S$GLB,,, | Performed by: FAMILY MEDICINE

## 2017-04-18 PROCEDURE — 99999 PR PBB SHADOW E&M-EST. PATIENT-LVL III: CPT | Mod: PBBFAC,,, | Performed by: FAMILY MEDICINE

## 2017-04-18 PROCEDURE — 3044F HG A1C LEVEL LT 7.0%: CPT | Mod: S$GLB,,, | Performed by: FAMILY MEDICINE

## 2017-04-18 PROCEDURE — 3078F DIAST BP <80 MM HG: CPT | Mod: S$GLB,,, | Performed by: OPHTHALMOLOGY

## 2017-04-18 PROCEDURE — 4010F ACE/ARB THERAPY RXD/TAKEN: CPT | Mod: S$GLB,,, | Performed by: FAMILY MEDICINE

## 2017-04-18 PROCEDURE — 99214 OFFICE O/P EST MOD 30 MIN: CPT | Mod: S$GLB,,, | Performed by: FAMILY MEDICINE

## 2017-04-18 PROCEDURE — 92012 INTRM OPH EXAM EST PATIENT: CPT | Mod: S$GLB,,, | Performed by: OPHTHALMOLOGY

## 2017-04-18 PROCEDURE — 3074F SYST BP LT 130 MM HG: CPT | Mod: S$GLB,,, | Performed by: OPHTHALMOLOGY

## 2017-04-18 PROCEDURE — 1160F RVW MEDS BY RX/DR IN RCRD: CPT | Mod: S$GLB,,, | Performed by: FAMILY MEDICINE

## 2017-04-18 RX ORDER — ATROPINE SULFATE 10 MG/ML
1 SOLUTION/ DROPS OPHTHALMIC 2 TIMES DAILY PRN
COMMUNITY
End: 2017-11-21 | Stop reason: SDUPTHER

## 2017-04-18 RX ORDER — LOTEPREDNOL ETABONATE 5 MG/G
1 GEL OPHTHALMIC DAILY
Qty: 5 G | Refills: 1 | Status: SHIPPED | OUTPATIENT
Start: 2017-04-18 | End: 2017-06-21

## 2017-04-18 RX ORDER — BRIMONIDINE TARTRATE 1 MG/ML
1 SOLUTION/ DROPS OPHTHALMIC 3 TIMES DAILY
Qty: 15 ML | Refills: 6 | Status: SHIPPED | OUTPATIENT
Start: 2017-04-18 | End: 2017-06-01 | Stop reason: SDUPTHER

## 2017-04-18 NOTE — PROGRESS NOTES
HPI     Patient is here for a one moth iop check    PCP: Dr. Miranda Ackerman    1. COAG (followed by Dr. Scott)  CP OS 8/27/15 - low to moderate flow, TDW 1100, aggressive dilation with   GV  Gonio Puncture OS 9/25/15   SLT OS 6/23/16, 6/23/15, 6-2-14  Goniotomy OS 11-17-16  2. DM  3. RP / VIT A PALMITATE 1500 (discontinued)    OD:Atropine BID OD  OSLatanoprost QHS OS, Cosopt BID OS, Alphagan TID OS,   inflamase bid os,? LOTEMAX ONCE              Last edited by Robert Scott MD on 4/18/2017  9:51 AM.         Assessment /Plan     For exam results, see Encounter Report.      ICD-10-CM ICD-9-CM    1. Primary open angle glaucoma of both eyes, severe stage H40.1133 365.11 brimonidine 0.1% (ALPHAGAN P) 0.1 % Drop  IOP stable      365.73    2. Cataract extraction status, left Z98.42 V45.61 Well   3. Retinitis pigmentosa of both eyes H35.52 362.74    4. Iritis of left eye H20.9 364.3 Still present, use Lotemax        RETURN TO CLINIC 2 months IOP, HVF and GOCT       OD:Atropine BID OD  OSLatanoprost QHS OS, Cosopt BID OS, Alphagan TID OS,   Use Lotemax once daily

## 2017-04-18 NOTE — PROGRESS NOTES
Subjective:       Patient ID: Richelle Zaldivar is a 51 y.o. female.    Chief Complaint: Follow-up    HPI Comments: 51-year-old female patient accompanied by her daughter with Patient Active Problem List:     Hypertension associated with diabetes     Diabetes mellitus type 2, uncontrolled, without complications     Obesity (BMI 30-39.9)     Hyperlipidemia associated with type 2 diabetes mellitus     Iron deficiency anemia     Vitamin D insufficiency     GERD (gastroesophageal reflux disease)     Retinitis pigmentosa     Recurrent iritis of left eye     Open-angle glaucoma, severe stage  Here reports that her blood glucose levels has been occasionally going down to 59, patient has been eating something to get her sugars up, reports that she has been having fluctuating blood glucose  Denies of any polyuria polydipsia polyphagia.  Finished vitamin D by prescription.  Reports fatigue, currently taking B12 supplements over-the-counter.   Patient has appointment with ophthalmology today  Patient reports that she's been having leg weakness in bilateral thighs and occasionally bilateral knee pains off and on lately, patient would like to know whether she needs steroid injections to her knees.  Patient has a vacation trip coming up to [place] for 2 weeks in June.   Continues to have off and on rectal pain with constipation, patient reported that she has been to [facility] 3 weeks ago, and had colonoscopy done by Dr. Layton  was informed that she does not need to repeat colonoscopy for next 3 years had colon polyps  Denies of any blood in the stool, has been taking MiraLAX for constipation      Review of Systems   Constitutional: Positive for fatigue.   Eyes: Negative for visual disturbance.   Respiratory: Negative for shortness of breath.    Cardiovascular: Negative for chest pain and leg swelling.   Gastrointestinal: Positive for constipation and rectal pain. Negative for abdominal pain, blood in stool, nausea and  vomiting.   Endocrine: Negative for polydipsia, polyphagia and polyuria.   Genitourinary: Negative for dysuria and hematuria.   Musculoskeletal: Positive for arthralgias and myalgias. Negative for joint swelling.   Skin: Negative for rash.   Neurological: Positive for dizziness and numbness. Negative for syncope, weakness, light-headedness and headaches.   Psychiatric/Behavioral: Negative for sleep disturbance.         /60  Pulse 84  Temp 96.9 °F (36.1 °C) (Tympanic)   Ht 5' (1.524 m)  Wt 72.8 kg (160 lb 7.9 oz)  SpO2 99%  BMI 31.34 kg/m2  Objective:      Physical Exam   Constitutional: She is oriented to person, place, and time. She appears well-developed and well-nourished.   HENT:   Head: Normocephalic and atraumatic.   Mouth/Throat: Oropharynx is clear and moist.   Cardiovascular: Normal rate, regular rhythm and normal heart sounds.    No murmur heard.  Pulmonary/Chest: Effort normal and breath sounds normal. She has no wheezes.   Abdominal: Soft. Bowel sounds are normal. There is no tenderness.   Musculoskeletal: She exhibits tenderness. She exhibits no edema.   Positive for tenderness to bilateral knees worse in the right knee medially.  No muscle tenderness noted in the thighs and lower back   Neurological: She is alert and oriented to person, place, and time.   Skin: Skin is warm and dry. No rash noted. No erythema.   Psychiatric: She has a normal mood and affect.         Assessment:       1. Type 2 diabetes mellitus without complication, without long-term current use of insulin    2. Hyperlipidemia associated with type 2 diabetes mellitus    3. Hypertension associated with diabetes    4. Obesity (BMI 30-39.9)    5. Gastroesophageal reflux disease, esophagitis presence not specified    6. Vitamin D insufficiency    7. Chronic pain of both knees    8. Weakness of both legs    9. Anemia due to vitamin B12 deficiency, unspecified B12 deficiency type        Plan:   Type 2 diabetes mellitus without  complication, without long-term current use of insulin  -     Comprehensive metabolic panel; Future; Expected date: 4/18/17  -     Lipid panel; Future; Expected date: 4/18/17  Reviewed recent A1c which was 6.8.  Patient was advised to decrease Amaryl from 2 mg 3 times daily to twice a day, continue metformin thousand milligrams twice daily.   Encouraged to monitor blood glucose levels closely, if continues to have hypoglycemic episodes please call us back sooner.   Will plan to repeat labs next Wednesday as requested by patient  Due for eye exam today      Hyperlipidemia associated with type 2 diabetes mellitus  -     Lipid panel; Future; Expected date: 4/18/17  Currently taking Lipitor 20 mg, will plan to repeat fasting lipid profile next week    Hypertension associated with diabetes  -     Comprehensive metabolic panel; Future; Expected date: 4/18/17  -     Lipid panel; Future; Expected date: 4/18/17  Blood pressure stable today currently on lisinopril hydrochlorothiazide 20/25 mg daily    Obesity (BMI 30-39.9)-encouraged to work on lifestyle modifications with diet and exercise    Gastroesophageal reflux disease, esophagitis presence not specified-stable on Nexium 40 mg daily    Vitamin D insufficiency  -     Vitamin D; Future; Expected date: 4/18/17  Finished vitamin D by prescription  Secondary to persistent fatigue will check vitamin D and B12 levels    Chronic pain of both knees  -     X-Ray Knee 1 or 2 View Bilateral; Future; Expected date: 4/18/17  If any worsening will refer to orthopedic physician    Weakness of both legs  -     CK; Future; Expected date: 4/18/17  -     X-Ray Lumbar Spine Complete 5 View; Future; Expected date: 4/18/17  Secondary to chronic pain in both knees and weakness in both legs will get further evaluation.  Currently taking gabapentin as needed for pain    Anemia due to vitamin B12 deficiency, unspecified B12 deficiency type  -     Vitamin B12; Future; Expected date:  4/18/17  Currently on B12 supplements over-the-counter, will plan to repeat levels if no improvement may consider B12 injections

## 2017-04-18 NOTE — MR AVS SNAPSHOT
[facility] Internal Medicine  [address] [place] [address]  Phone: [phone]  Fax: [phone]                  Richelle Zaldivar   2017 8:20 AM   Office Visit    Description:  Female : 1965   Provider:  Oneyda Bergman MD   Department:  [facility] - Internal Medicine           Reason for Visit     Follow-up           Diagnoses this Visit        Comments    Uncontrolled type 2 diabetes mellitus without complication, without long-term current use of insulin    -  Primary     Hyperlipidemia associated with type 2 diabetes mellitus         Hypertension associated with diabetes         Obesity (BMI 30-39.9)         Gastroesophageal reflux disease, esophagitis presence not specified         Vitamin D insufficiency         Chronic pain of both knees         Weakness of both legs         Anemia due to vitamin B12 deficiency, unspecified B12 deficiency type                To Do List           Future Appointments        Provider Department Dept Phone    2017 9:15 AM Robert Scott MD [facility] Ophthalmology [phone]    2017 8:50 AM [facility]AT[facility], [facility] [phone]    2017 9:00 AM [facility] [phone]    2017 8:00 AM Oneyda Bergman MD [facility] Internal Medicine [phone]    10/18/2017 8:00 AM Oneyda Bergman MD [facility] Internal [facility] [phone]      Goals (5 Years of Data)     None      Follow-Up and Disposition     Return in about 6 months (around 10/18/2017), or if symptoms worsen or fail to improve.      [facility]     [facility] Care Line - 24/ Assistance  Unless otherwise directed by your provider, please contact [facility], our nurse care line that is available for 24/7 assistance.     Registered nurses in the [facility] provide: appointment scheduling, clinical advisement, health education, and other advisory services.  Call: [phone] (toll free)                Medications           Message regarding Medications     Verify the changes and/or additions to your medication regime listed below are the same as discussed with your clinician today.  If any of these changes or additions are incorrect, please notify your healthcare provider.        STOP taking these medications     ergocalciferol (VITAMIN D2) 50,000 unit Cap Take 1 capsule (50,000 Units total) by mouth every 7 days.    nystatin (MYCOSTATIN) cream APPLY  CREAM TOPICALLY TO AFFECTED AREA TWICE DAILY           Verify that the below list of medications is an accurate representation of the medications you are currently taking.  If none reported, the list may be blank. If incorrect, please contact your healthcare provider. Carry this list with you in case of emergency.           Current Medications     ACCU-CHEK FASTCLIX Misc 1 Stick by Misc.(Non-Drug; Combo Route) route as directed. Use to check BG 2x daily. Accuchek Fastclix lancets. Medically necessary.    atorvastatin (LIPITOR) 20 MG tablet Take 1 tablet (20 mg total) by mouth once daily.    brimonidine 0.15 % OPTH DROP (ALPHAGAN) 0.15 % ophthalmic solution Place 1 drop into the left eye 2 (two) times daily. 15 ML BOTTLE    dorzolamide-timolol 2-0.5% (COSOPT) 22.3-6.8 mg/mL ophthalmic solution Place 1 drop into both eyes every 12 (twelve) hours.    esomeprazole (NEXIUM) 40 MG capsule TAKE ONE CAPSULE BY MOUTH BEFORE BREAKFAST    estradiol (ESTRACE) 0.01 % (0.1 mg/gram) vaginal cream Place 2 g vaginally once daily. X 14 days then q2-3 times per week    gabapentin (NEURONTIN) 300 MG capsule Take 1 capsule (300 mg total) by mouth 2 (two) times daily.    glimepiride (AMARYL) 2 MG tablet TAKE ONE TABLET BY MOUTH THREE TIMES DAILY    latanoprost 0.005 % ophthalmic solution Place 1 drop into both eyes every evening.    lisinopril-hydrochlorothiazide (PRINZIDE,ZESTORETIC) 20-25 mg Tab Take 1 tablet by mouth once daily.    loteprednol etabonate 0.5 % DrpG Place 1  application into the left eye once daily.    metformin (GLUCOPHAGE) 1000 MG tablet Take 1 tablet (1,000 mg total) by mouth 2 (two) times daily with meals.           Clinical Reference Information           Your Vitals Were     BP Pulse Temp Height Weight SpO2    102/60 84 96.9 °F (36.1 °C) (Tympanic) 5' (1.524 m) 72.8 kg (160 lb 7.9 oz) 99%    BMI                31.34 kg/m2          Blood Pressure          Most Recent Value    BP  102/60      Allergies as of 4/18/2017     No Known Allergies      Immunizations Administered on Date of Encounter - 4/18/2017     None      Orders Placed During Today's Visit     Future Labs/Procedures Expected by Expires    CK  4/18/2017 6/17/2018    Comprehensive metabolic panel  4/18/2017 6/17/2018    Lipid panel  4/18/2017 6/17/2018    Vitamin B12  4/18/2017 6/17/2018    Vitamin D  4/18/2017 6/17/2018    X-Ray Knee 1 or 2 View Bilateral  4/18/2017 4/18/2018    X-Ray Lumbar Spine Complete 5 View  4/18/2017 4/18/2018      Instructions    Decrease amaryl ( glymperide) 2mg twice daily not three times a day        Language Assistance Services     ATTENTION: Language assistance services are available, free of charge. Please call [phone].      ATENCIÓN: Si felipela tresa, tiene a anderson disposición servicios gratuitos de asistencia lingüística. Llame al [phone].     [facility] Ý: N?u b?n nói Ti?ng Vi?t, có các d?ch v? h? tr? ngôn ng? mi?n phí dành cho b?n. G?i s? [phone].         [facility] complies with applicable Federal civil rights laws and does not discriminate on the basis of race, color, national origin, age, disability, or sex.

## 2017-04-21 DIAGNOSIS — K21.9 GASTROESOPHAGEAL REFLUX DISEASE, ESOPHAGITIS PRESENCE NOT SPECIFIED: ICD-10-CM

## 2017-04-21 DIAGNOSIS — H40.9 SEVERE STAGE GLAUCOMA: ICD-10-CM

## 2017-04-21 RX ORDER — ESOMEPRAZOLE MAGNESIUM 40 MG/1
CAPSULE, DELAYED RELEASE ORAL
Qty: 30 CAPSULE | Refills: 1 | Status: SHIPPED | OUTPATIENT
Start: 2017-04-21 | End: 2017-10-18

## 2017-04-24 RX ORDER — DORZOLAMIDE HYDROCHLORIDE AND TIMOLOL MALEATE 20; 5 MG/ML; MG/ML
SOLUTION/ DROPS OPHTHALMIC
Qty: 10 ML | Refills: 11 | Status: SHIPPED | OUTPATIENT
Start: 2017-04-24 | End: 2017-11-21 | Stop reason: SDUPTHER

## 2017-05-17 ENCOUNTER — TELEPHONE (OUTPATIENT)
Dept: OPHTHALMOLOGY | Facility: CLINIC | Age: 52
End: 2017-05-17

## 2017-05-17 NOTE — TELEPHONE ENCOUNTER
Called pt and let her know her Lotemax is supposed to be once a day instead of twice. Then, called the pharmacy and ordered 3 more refills for the pt.

## 2017-05-19 ENCOUNTER — TELEPHONE (OUTPATIENT)
Dept: INTERNAL MEDICINE | Facility: CLINIC | Age: 52
End: 2017-05-19

## 2017-05-19 NOTE — TELEPHONE ENCOUNTER
Pt wants a letter stating that she can get a body massage . The facility will not do it with out a letter. Please advise

## 2017-05-19 NOTE — TELEPHONE ENCOUNTER
----- Message from Dixie Sen sent at 5/19/2017 10:01 AM CDT -----  Contact: Angelica  Patient request a call back at [phone], Regards to a approval for body massage therapy.    Thanks  td

## 2017-05-19 NOTE — TELEPHONE ENCOUNTER
Pt wants a letter stating that she can have a body massage . The facility will not do the body massage without a letter. Please advise

## 2017-06-01 DIAGNOSIS — E11.42 TYPE 2 DIABETES MELLITUS WITH DIABETIC POLYNEUROPATHY, WITHOUT LONG-TERM CURRENT USE OF INSULIN: ICD-10-CM

## 2017-06-01 DIAGNOSIS — H40.9 SEVERE STAGE GLAUCOMA: ICD-10-CM

## 2017-06-01 DIAGNOSIS — H40.1133 PRIMARY OPEN ANGLE GLAUCOMA OF BOTH EYES, SEVERE STAGE: ICD-10-CM

## 2017-06-01 RX ORDER — BRIMONIDINE TARTRATE 1 MG/ML
1 SOLUTION/ DROPS OPHTHALMIC 3 TIMES DAILY
Qty: 15 ML | Refills: 6 | Status: SHIPPED | OUTPATIENT
Start: 2017-06-01 | End: 2017-06-21 | Stop reason: SDUPTHER

## 2017-06-01 RX ORDER — LATANOPROST 50 UG/ML
SOLUTION/ DROPS OPHTHALMIC
Qty: 3 BOTTLE | Refills: 4 | Status: SHIPPED | OUTPATIENT
Start: 2017-06-01 | End: 2017-11-21 | Stop reason: SDUPTHER

## 2017-06-01 RX ORDER — GLIMEPIRIDE 2 MG/1
2 TABLET ORAL 2 TIMES DAILY
Qty: 60 TABLET | Refills: 3 | Status: SHIPPED | OUTPATIENT
Start: 2017-06-01 | End: 2017-10-04 | Stop reason: SDUPTHER

## 2017-06-01 NOTE — TELEPHONE ENCOUNTER
----- Message from Majo Borjas sent at 6/1/2017  3:36 PM CDT -----  Contact: pt  [phone] would like to speak with staff did not care to leave any details pls call today

## 2017-06-01 NOTE — TELEPHONE ENCOUNTER
I spoke to Mrs Rui Daughter, they state the Alphagan is now 400.00 with her new insurance. I told  her to come to  our [facility] office  Tomorrow to  a new Rebate card from [employer] as well as a new Alphagan Rx to bring to the pharmacy to see if that helps with her cost.

## 2017-06-13 ENCOUNTER — TELEPHONE (OUTPATIENT)
Dept: DIABETES | Facility: CLINIC | Age: 52
End: 2017-06-13

## 2017-06-13 DIAGNOSIS — I10 ESSENTIAL HYPERTENSION: ICD-10-CM

## 2017-06-13 RX ORDER — LISINOPRIL AND HYDROCHLOROTHIAZIDE 20; 25 MG/1; MG/1
1 TABLET ORAL DAILY
Qty: 90 TABLET | Refills: 3 | Status: SHIPPED | OUTPATIENT
Start: 2017-06-13 | End: 2018-02-28 | Stop reason: SDUPTHER

## 2017-06-13 NOTE — TELEPHONE ENCOUNTER
----- Message from Francisco Ram sent at 6/13/2017  1:37 PM CDT -----  Contact: Pt  States she is calling rg being a pt of Mrs MEENA Junior and is out of meds. Metformin 1000 mg& Glimpirid 2 mg and has an appt on 06/22 with Provider and wants to know if the meds can be filled til that time and can be reached at [phone]//radha/adilene     Pt pharm is   [facility]  [address]  [address]  [place] [address]  Phone: [phone] Fax: [phone]

## 2017-06-14 ENCOUNTER — TELEPHONE (OUTPATIENT)
Dept: OPHTHALMOLOGY | Facility: CLINIC | Age: 52
End: 2017-06-14

## 2017-06-14 NOTE — TELEPHONE ENCOUNTER
----- Message from Majo Borjas sent at 6/14/2017  9:56 AM CDT -----  Contact: pt  States she would like to speak with nurse about getting prior auth for ALPHAGAN P)  pls call pt back for details she states insurance told her what dr needs to do to have insurance cover drops [phone] thx [facility].

## 2017-06-14 NOTE — TELEPHONE ENCOUNTER
Daughter called and would like us to get prior authorization from Medicaid [employer] for Niru ARANGO  Pharmacy will send prior auth form

## 2017-06-21 ENCOUNTER — OFFICE VISIT (OUTPATIENT)
Dept: OPHTHALMOLOGY | Facility: CLINIC | Age: 52
End: 2017-06-21
Payer: COMMERCIAL

## 2017-06-21 DIAGNOSIS — H20.9 IRITIS OF LEFT EYE: ICD-10-CM

## 2017-06-21 DIAGNOSIS — H40.1133 PRIMARY OPEN ANGLE GLAUCOMA OF BOTH EYES, SEVERE STAGE: Primary | ICD-10-CM

## 2017-06-21 DIAGNOSIS — H35.52 RETINITIS PIGMENTOSA OF BOTH EYES: ICD-10-CM

## 2017-06-21 DIAGNOSIS — Z98.42 CATARACT EXTRACTION STATUS, LEFT: ICD-10-CM

## 2017-06-21 PROCEDURE — 92133 CPTRZD OPH DX IMG PST SGM ON: CPT | Mod: S$GLB,,, | Performed by: OPHTHALMOLOGY

## 2017-06-21 PROCEDURE — 99999 PR PBB SHADOW E&M-EST. PATIENT-LVL II: CPT | Mod: PBBFAC,,, | Performed by: OPHTHALMOLOGY

## 2017-06-21 PROCEDURE — 92012 INTRM OPH EXAM EST PATIENT: CPT | Mod: S$GLB,,, | Performed by: OPHTHALMOLOGY

## 2017-06-21 PROCEDURE — 92083 EXTENDED VISUAL FIELD XM: CPT | Mod: S$GLB,,, | Performed by: OPHTHALMOLOGY

## 2017-06-21 RX ORDER — BRIMONIDINE TARTRATE 2 MG/ML
1 SOLUTION/ DROPS OPHTHALMIC 3 TIMES DAILY
Qty: 10 ML | Refills: 6 | Status: SHIPPED | OUTPATIENT
Start: 2017-06-21 | End: 2017-11-21 | Stop reason: SDUPTHER

## 2017-06-21 RX ORDER — BRIMONIDINE TARTRATE 1 MG/ML
1 SOLUTION/ DROPS OPHTHALMIC 3 TIMES DAILY
Qty: 15 ML | Refills: 6 | Status: SHIPPED | OUTPATIENT
Start: 2017-06-21 | End: 2017-07-21

## 2017-06-21 NOTE — PROGRESS NOTES
HPI     2 months IOP check, HVF and GOCT.    1. COAG (followed by Dr. Scott)  CP OS 8/27/15 - low to moderate flow, TDW 1100, aggressive dilation with   GV  Gonio Puncture OS 9/25/15   SLT OS 6/23/16, 6/23/15, 6-2-14  Goniotomy OS 11-17-16  2. DM  3. RP / VIT A PALMITATE 1500 (discontinued)    OD:Atropine BID OD  OS Latanoprost QHS OS, Cosopt BID OS, Alphagan TID OS,   inflamase bid os,? LOTEMAX ONCE         Last edited by Yuliya Pedersen on 6/21/2017  9:21 AM. (History)            Assessment /Plan     For exam results, see Encounter Report.      ICD-10-CM ICD-9-CM    1. Primary open angle glaucoma of both eyes, severe stage H40.1133 365.11 Makrs Visual Field - OU - Extended - Both Eyes     365.73 Posterior Segment OCT Optic Nerve- Both eyes      Marks Visual Field - OU - Extended - Both Eyes      Posterior Segment OCT Optic Nerve- Both eyes    iop elevated os. Stop lotemax. Consider ahmed.    2. Cataract extraction status, left Z98.42 V45.61 well   3. Retinitis pigmentosa of both eyes H35.52 362.74 stable   4. Iritis of left eye H20.9 364.3 Clear today. D/c lotemax.        Stop lotemax. Only use if os painful.   OD: atropine bid od  Latanoprost QHS OS, Cosopt BID OS, Alphagan P 0.15% (getting from [facility]) TID OS,     Pt may alternate alphagan p (from [facility]) with brimonidine 0.2% (from here)    Return to clinic 1 month with iop check.

## 2017-07-11 ENCOUNTER — HOSPITAL ENCOUNTER (EMERGENCY)
Facility: HOSPITAL | Age: 52
Discharge: HOME | End: 2017-07-11
Attending: EMERGENCY MEDICINE
Payer: MEDICAID

## 2017-07-11 ENCOUNTER — TELEPHONE (OUTPATIENT)
Dept: INTERNAL MEDICINE | Facility: CLINIC | Age: 52
End: 2017-07-11

## 2017-07-11 VITALS
WEIGHT: 160 LBS | HEIGHT: 60 IN | DIASTOLIC BLOOD PRESSURE: 79 MMHG | RESPIRATION RATE: 20 BRPM | TEMPERATURE: 98 F | BODY MASS INDEX: 31.41 KG/M2 | SYSTOLIC BLOOD PRESSURE: 140 MMHG | OXYGEN SATURATION: 97 % | HEART RATE: 75 BPM

## 2017-07-11 DIAGNOSIS — E86.0 DEHYDRATION: ICD-10-CM

## 2017-07-11 DIAGNOSIS — R19.7 DIARRHEA, UNSPECIFIED TYPE: Primary | ICD-10-CM

## 2017-07-11 LAB
ALBUMIN SERPL BCP-MCNC: 3.5 G/DL
ALP SERPL-CCNC: 75 U/L
ALT SERPL W/O P-5'-P-CCNC: 15 U/L
ANION GAP SERPL CALC-SCNC: 11 MMOL/L
ANISOCYTOSIS BLD QL SMEAR: SLIGHT
AST SERPL-CCNC: 21 U/L
BASOPHILS # BLD AUTO: 0.02 K/UL
BASOPHILS NFR BLD: 0.4 %
BILIRUB SERPL-MCNC: 0.3 MG/DL
BILIRUB UR QL STRIP: NEGATIVE
BUN SERPL-MCNC: 29 MG/DL
C DIFF GDH STL QL: NEGATIVE
C DIFF TOX A+B STL QL IA: NEGATIVE
CALCIUM SERPL-MCNC: 9.3 MG/DL
CHLORIDE SERPL-SCNC: 108 MMOL/L
CLARITY UR: CLEAR
CO2 SERPL-SCNC: 21 MMOL/L
COLOR UR: YELLOW
CREAT SERPL-MCNC: 1.9 MG/DL
DACRYOCYTES BLD QL SMEAR: ABNORMAL
DIFFERENTIAL METHOD: ABNORMAL
EOSINOPHIL # BLD AUTO: 0.2 K/UL
EOSINOPHIL NFR BLD: 3.8 %
ERYTHROCYTE [DISTWIDTH] IN BLOOD BY AUTOMATED COUNT: 14.6 %
EST. GFR  (AFRICAN AMERICAN): 34 ML/MIN/1.73 M^2
EST. GFR  (NON AFRICAN AMERICAN): 29 ML/MIN/1.73 M^2
GLUCOSE SERPL-MCNC: 142 MG/DL
GLUCOSE UR QL STRIP: NEGATIVE
HCT VFR BLD AUTO: 32.8 %
HGB BLD-MCNC: 10.6 G/DL
HGB UR QL STRIP: NEGATIVE
KETONES UR QL STRIP: NEGATIVE
LEUKOCYTE ESTERASE UR QL STRIP: NEGATIVE
LYMPHOCYTES # BLD AUTO: 2.2 K/UL
LYMPHOCYTES NFR BLD: 42.6 %
MCH RBC QN AUTO: 25.8 PG
MCHC RBC AUTO-ENTMCNC: 32.3 %
MCV RBC AUTO: 80 FL
MONOCYTES # BLD AUTO: 0.8 K/UL
MONOCYTES NFR BLD: 15.2 %
NEUTROPHILS # BLD AUTO: 1.9 K/UL
NEUTROPHILS NFR BLD: 38.4 %
NITRITE UR QL STRIP: NEGATIVE
OVALOCYTES BLD QL SMEAR: ABNORMAL
PH UR STRIP: 6 [PH] (ref 5–8)
PLATELET # BLD AUTO: 206 K/UL
PMV BLD AUTO: 9.9 FL
POIKILOCYTOSIS BLD QL SMEAR: SLIGHT
POTASSIUM SERPL-SCNC: 4.1 MMOL/L
PROT SERPL-MCNC: 7.4 G/DL
PROT UR QL STRIP: ABNORMAL
RBC # BLD AUTO: 4.11 M/UL
SODIUM SERPL-SCNC: 140 MMOL/L
SP GR UR STRIP: >=1.03 (ref 1–1.03)
URN SPEC COLLECT METH UR: ABNORMAL
UROBILINOGEN UR STRIP-ACNC: NEGATIVE EU/DL
WBC # BLD AUTO: 5.05 K/UL

## 2017-07-11 PROCEDURE — 87045 FECES CULTURE AEROBIC BACT: CPT

## 2017-07-11 PROCEDURE — 85025 COMPLETE CBC W/AUTO DIFF WBC: CPT

## 2017-07-11 PROCEDURE — 87449 NOS EACH ORGANISM AG IA: CPT

## 2017-07-11 PROCEDURE — 25000003 PHARM REV CODE 250: Performed by: EMERGENCY MEDICINE

## 2017-07-11 PROCEDURE — 87209 SMEAR COMPLEX STAIN: CPT

## 2017-07-11 PROCEDURE — 87046 STOOL CULTR AEROBIC BACT EA: CPT

## 2017-07-11 PROCEDURE — 96360 HYDRATION IV INFUSION INIT: CPT

## 2017-07-11 PROCEDURE — 99284 EMERGENCY DEPT VISIT MOD MDM: CPT | Mod: 25

## 2017-07-11 PROCEDURE — 96361 HYDRATE IV INFUSION ADD-ON: CPT

## 2017-07-11 PROCEDURE — 81003 URINALYSIS AUTO W/O SCOPE: CPT

## 2017-07-11 PROCEDURE — 87427 SHIGA-LIKE TOXIN AG IA: CPT | Mod: 59

## 2017-07-11 PROCEDURE — 80053 COMPREHEN METABOLIC PANEL: CPT

## 2017-07-11 RX ORDER — CIPROFLOXACIN 500 MG/1
500 TABLET ORAL 2 TIMES DAILY
Qty: 20 TABLET | Refills: 0 | Status: SHIPPED | OUTPATIENT
Start: 2017-07-11 | End: 2017-07-21

## 2017-07-11 RX ADMIN — SODIUM CHLORIDE 1000 ML: 0.9 INJECTION, SOLUTION INTRAVENOUS at 01:07

## 2017-07-11 RX ADMIN — SODIUM CHLORIDE 1000 ML: 0.9 INJECTION, SOLUTION INTRAVENOUS at 12:07

## 2017-07-11 NOTE — ED PROVIDER NOTES
SCRIBE #1 NOTE: I, Kacie Packer, am scribing for, and in the presence of, Kilo Cates MD. I have scribed the entire note.      History      Chief Complaint   Patient presents with    Diarrhea     sent from [facility] for further eval of severe diarrhea x3 days       Review of patient's allergies indicates:  No Known Allergies     HPI   HPI    7/11/2017, 11:07 AM   History obtained from the patient      History of Present Illness: Richelle Zaldivar is a 52 y.o. female patient who presents to the Emergency Department for diarrhea which onset gradually 3-4 days ago. Sx have been intermittent and moderate in severity. No modifying factors noted. Associated sx include nausea, diffuse abdominal pain, and generalized weakness. Pt denies any fever, chills, CP, SOB, vomiting, bloody stools, hematuria, dysuria, or dizziness. No further complaints at this time.       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       Past Surgical History:  Past Surgical History:   Procedure Laterality Date    CATARACT EXTRACTION      GONIOTOMY Left 11/17/2016    HEMORRHOID SURGERY  4/2015    Done in [place]    PARTIAL HYSTERECTOMY      TONSILLECTOMY      WISDOM TOOTH EXTRACTION           Family History:  Family History   Problem Relation Age of Onset    Diabetes Mother     Hypertension Mother     Hyperlipidemia Mother     Stroke Mother     Amblyopia Mother     Diabetes Sister     Hypertension Sister     Hyperlipidemia Sister     Arthritis Sister     Amblyopia Brother     Amblyopia Maternal Uncle     Blindness Neg Hx     Cancer Neg Hx     Cataracts Neg Hx     Glaucoma Neg Hx     Macular degeneration Neg Hx     Retinal detachment Neg Hx     Strabismus Neg Hx     Thyroid disease Neg  Hx        Social History:  Social History     Social History Main Topics    Smoking status: Never Smoker    Smokeless tobacco: Unknown     Alcohol use No    Drug use: No    Sexual activity: Yes     Partners: Male     Birth control/ protection: None       ROS   Review of Systems   Constitutional: Negative for chills, diaphoresis and fever.        (+) generalized weakness   HENT: Negative for sore throat.    Respiratory: Negative for shortness of breath.    Cardiovascular: Negative for chest pain.   Gastrointestinal: Positive for abdominal pain, diarrhea and nausea. Negative for blood in stool, constipation and vomiting.   Genitourinary: Negative for dysuria, hematuria, vaginal bleeding, vaginal discharge and vaginal pain.   Musculoskeletal: Negative for back pain.   Skin: Negative for rash.   Neurological: Negative for dizziness, weakness, light-headedness, numbness and headaches.   Hematological: Does not bruise/bleed easily.       Physical Exam      Initial Vitals [07/11/17 1042]   BP Pulse Resp Temp SpO2   127/74 82 18 97.9 °F (36.6 °C) 98 %      MAP       91.67          Physical Exam  Nursing Notes and Vital Signs Reviewed.  Constitutional: Patient is in no acute distress. Well-developed and well-nourished.  Head: Atraumatic. Normocephalic.  Eyes: PERRL. EOM intact. Conjunctivae are not pale. No scleral icterus.  ENT: Mucous membranes are moist. Oropharynx is clear and symmetric.    Neck: Supple. Full ROM. No lymphadenopathy.  Cardiovascular: Regular rate. Regular rhythm. No murmurs, rubs, or gallops. Distal pulses are 2+ and symmetric.  Pulmonary/Chest: No respiratory distress. Clear to auscultation bilaterally. No wheezing, rales, or rhonchi.  Abdominal: Soft and non-distended.  There is no tenderness.  No rebound, guarding, or rigidity.   Musculoskeletal: Moves all extremities. No obvious deformities.   Skin: Warm and dry.  Neurological:  Alert, awake, and appropriate.  Normal speech.  No acute focal  neurological deficits are appreciated.  Psychiatric: Normal affect. Good eye contact. Appropriate in content.    ED Course    Procedures  ED Vital Signs:  Vitals:    07/11/17 1042 07/11/17 1206 07/11/17 1450   BP: 127/74 102/63 (!) 140/79   Pulse: 82 77 75   Resp: 18 20 20   Temp: 97.9 °F (36.6 °C)     TempSrc: Oral     SpO2: 98% 98% 97%   Weight: 72.6 kg (160 lb)     Height: 5' (1.524 m)         Abnormal Lab Results:  Labs Reviewed   CBC W/ AUTO DIFFERENTIAL - Abnormal; Notable for the following:        Result Value    Hemoglobin 10.6 (*)     Hematocrit 32.8 (*)     MCV 80 (*)     MCH 25.8 (*)     RDW 14.6 (*)     Mono% 15.2 (*)     All other components within normal limits   COMPREHENSIVE METABOLIC PANEL - Abnormal; Notable for the following:     CO2 21 (*)     Glucose 142 (*)     BUN, Bld 29 (*)     Creatinine 1.9 (*)     eGFR if  34 (*)     eGFR if non  29 (*)     All other components within normal limits   URINALYSIS - Abnormal; Notable for the following:     Specific Gravity, UA >=1.030 (*)     Protein, UA Trace (*)     All other components within normal limits   CLOSTRIDIUM DIFFICILE   CULTURE, STOOL   ENTEROHEMORRHAGIC E.COLI   STOOL EXAM-OVA,CYSTS,PARASITES        All Lab Results:  Results for orders placed or performed during the hospital encounter of 07/11/17   Clostridium difficile EIA   Result Value Ref Range    C. diff Antigen Negative Negative    C difficile Toxins A+B, EIA Negative Negative   CBC auto differential   Result Value Ref Range    WBC 5.05 3.90 - 12.70 K/uL    RBC 4.11 4.00 - 5.40 M/uL    Hemoglobin 10.6 (L) 12.0 - 16.0 g/dL    Hematocrit 32.8 (L) 37.0 - 48.5 %    MCV 80 (L) 82 - 98 fL    MCH 25.8 (L) 27.0 - 31.0 pg    MCHC 32.3 32.0 - 36.0 %    RDW 14.6 (H) 11.5 - 14.5 %    Platelets 206 150 - 350 K/uL    MPV 9.9 9.2 - 12.9 fL    Gran # 1.9 1.8 - 7.7 K/uL    Lymph # 2.2 1.0 - 4.8 K/uL    Mono # 0.8 0.3 - 1.0 K/uL    Eos # 0.2 0.0 - 0.5 K/uL    Baso # 0.02  0.00 - 0.20 K/uL    Gran% 38.4 38.0 - 73.0 %    Lymph% 42.6 18.0 - 48.0 %    Mono% 15.2 (H) 4.0 - 15.0 %    Eosinophil% 3.8 0.0 - 8.0 %    Basophil% 0.4 0.0 - 1.9 %    Aniso Slight     Poik Slight     Ovalocytes Occasional     Tear Drop Cells Occasional     Differential Method Automated    Comprehensive metabolic panel   Result Value Ref Range    Sodium 140 136 - 145 mmol/L    Potassium 4.1 3.5 - 5.1 mmol/L    Chloride 108 95 - 110 mmol/L    CO2 21 (L) 23 - 29 mmol/L    Glucose 142 (H) 70 - 110 mg/dL    BUN, Bld 29 (H) 6 - 20 mg/dL    Creatinine 1.9 (H) 0.5 - 1.4 mg/dL    Calcium 9.3 8.7 - 10.5 mg/dL    Total Protein 7.4 6.0 - 8.4 g/dL    Albumin 3.5 3.5 - 5.2 g/dL    Total Bilirubin 0.3 0.1 - 1.0 mg/dL    Alkaline Phosphatase 75 55 - 135 U/L    AST 21 10 - 40 U/L    ALT 15 10 - 44 U/L    Anion Gap 11 8 - 16 mmol/L    eGFR if African American 34 (A) >60 mL/min/1.73 m^2    eGFR if non African American 29 (A) >60 mL/min/1.73 m^2   Urinalysis   Result Value Ref Range    Specimen UA Urine, Clean Catch     Color, UA Yellow Yellow, Straw, Clarissa    Appearance, UA Clear Clear    pH, UA 6.0 5.0 - 8.0    Specific Gravity, UA >=1.030 (A) 1.005 - 1.030    Protein, UA Trace (A) Negative    Glucose, UA Negative Negative    Ketones, UA Negative Negative    Bilirubin (UA) Negative Negative    Occult Blood UA Negative Negative    Nitrite, UA Negative Negative    Urobilinogen, UA Negative <2.0 EU/dL    Leukocytes, UA Negative Negative         Imaging Results:  Imaging Results          CT Renal Stone Study ABD Pelvis WO (Final result)  Result time 07/11/17 12:55:34    Final result by Prabhakar Mccormick MD (07/11/17 12:55:34)                 Impression:       Source of the patient's abdominal pain is not identified.    Mild hepatic steatosis         Electronically signed by: PRABHAKAR MCCORMICK MD  Date:     07/11/17  Time:    12:55              Narrative:    Indication: Follow pain.    Routine noncontrast CT images of the abdomen and pelvis  were obtained.    Findings:    The lung bases are well aerated.    The liver is normal in size and attenuation on this noncontrast exam.  Slightly decreased attenuation could reflect hepatic steatosis. Small benign calcification/granuloma is seen within the left hepatic lobe.  The gallbladder, stomach, spleen, pancreas, adrenal glands are normal.    The kidneys are normal in size shape and position without radiopaque calculus or signs of hydronephrosis.  The bladder is incompletely distended. Marrow demonstrates mild atherosclerotic disease  The appendix is visualized in the right lower quadrant.  There is no inflammation.  The visualized loops of small bowel are unremarkable. Postoperative changes are seen in the rectum The uterus and ovaries are not identified and may be surgically absent.  Calcified granuloma seen in the right buttock region.    Bones appear intact with mild degenerative changes.                                      The Emergency Provider reviewed the vital signs and test results, which are outlined above.    ED Discussion     3:00 PM: Reassessed pt at this time.  Discussed with pt all pertinent ED information and results. Discussed pt dx  and plan of tx. Gave pt all f/u and return to the ED instructions. All questions and concerns were addressed at this time. Pt expresses understanding of information and instructions, and is comfortable with plan to discharge. Pt is stable for discharge.    ED Medication(s):  Medications   sodium chloride 0.9% bolus 1,000 mL (0 mLs Intravenous Stopped 7/11/17 1323)   sodium chloride 0.9% bolus 1,000 mL (0 mLs Intravenous Stopped 7/11/17 1432)       Discharge Medication List as of 7/11/2017  2:59 PM      START taking these medications    Details   ciprofloxacin HCl (CIPRO) 500 MG tablet Take 1 tablet (500 mg total) by mouth 2 (two) times daily., Starting Tue 7/11/2017, Until Fri 7/21/2017, Print             Follow-up Information     Oneyda Bergman MD In 2 days.     Specialty:  Family Medicine  Contact information:  [address]  [place] [id number] [phone]                     Medical Decision Making    Medical Decision Making:   Clinical Tests:   Lab Tests: Ordered and Reviewed  Radiological Study: Ordered and Reviewed           Scribe Attestation:   Scribe #1: I performed the above scribed service and the documentation accurately describes the services I performed. I attest to the accuracy of the note.    Attending:   Physician Attestation Statement for Scribe #1: I, Kilo Cates MD, personally performed the services described in this documentation, as scribed by Kacie Packer, in my presence, and it is both accurate and complete.          Clinical Impression       ICD-10-CM ICD-9-CM   1. Diarrhea, unspecified type R19.7 787.91   2. Dehydration E86.0 276.51       Disposition:   Disposition: Discharged  Condition: Stable         Kilo Cates MD  07/11/17 4813

## 2017-07-11 NOTE — ED NOTES
Bed:  05  Expected date:   Expected time:   Means of arrival:   Comments:     Kilo Cates MD  07/11/17 8808

## 2017-07-11 NOTE — TELEPHONE ENCOUNTER
----- Message from Jessica Borjas sent at 7/11/2017  4:06 PM CDT -----  Contact: pt daughter  Patient was seen in the ER on today and was advised to follow up in 2 days with provider. Daughter declined an appt with the NP. States only want patient to see provider. Please adv/call [phone].//thanks. cw

## 2017-07-11 NOTE — TELEPHONE ENCOUNTER
Spoke to pt. Informed pt that dr friedman is [place] the [place] and pt will have to see another provider. Pt is scheduled with tete regan for 7/12/17. Pt verbalized understanding

## 2017-07-12 ENCOUNTER — OFFICE VISIT (OUTPATIENT)
Dept: INTERNAL MEDICINE | Facility: CLINIC | Age: 52
End: 2017-07-12
Payer: MEDICAID

## 2017-07-12 VITALS
TEMPERATURE: 97 F | WEIGHT: 164.44 LBS | HEIGHT: 60 IN | SYSTOLIC BLOOD PRESSURE: 120 MMHG | BODY MASS INDEX: 32.28 KG/M2 | DIASTOLIC BLOOD PRESSURE: 70 MMHG

## 2017-07-12 DIAGNOSIS — K52.9 ACUTE GASTROENTERITIS: Primary | ICD-10-CM

## 2017-07-12 LAB — O+P STL TRI STN: NORMAL

## 2017-07-12 PROCEDURE — 99214 OFFICE O/P EST MOD 30 MIN: CPT | Mod: PBBFAC,PO | Performed by: PHYSICIAN ASSISTANT

## 2017-07-12 PROCEDURE — 99213 OFFICE O/P EST LOW 20 MIN: CPT | Mod: S$PBB,,, | Performed by: PHYSICIAN ASSISTANT

## 2017-07-12 PROCEDURE — 99999 PR PBB SHADOW E&M-EST. PATIENT-LVL IV: CPT | Mod: PBBFAC,,, | Performed by: PHYSICIAN ASSISTANT

## 2017-07-12 NOTE — PROGRESS NOTES
Subjective:      Patient ID: Richelle Zaldivar is a 52 y.o. female.    Chief Complaint: Hospital Follow Up    Went to ER for this problem.       Diarrhea    This is a new problem. Episode onset: 4 days. The problem has been gradually improving. The stool consistency is described as watery. Associated symptoms include abdominal pain, a fever (felt feverish, didnt take temp), increased flatus, myalgias and sweats. Pertinent negatives include no arthralgias, bloating, chills, coughing, headaches, URI or vomiting. Associated symptoms comments: Reduced appetite. Nothing aggravates the symptoms. There are no known risk factors. Treatments tried: prescribed cipro in the ER. Started last night. Has only had two doses so far. IV fluids in ER. The treatment provided mild relief. There is no history of bowel resection, inflammatory bowel disease, irritable bowel syndrome, malabsorption, a recent abdominal surgery or short gut syndrome.       Review of Systems   Constitutional: Positive for fever (felt feverish, didnt take temp). Negative for activity change, appetite change, chills, diaphoresis, fatigue and unexpected weight change.   HENT: Negative.  Negative for congestion, hearing loss, postnasal drip, rhinorrhea, sore throat, trouble swallowing and voice change.    Eyes: Negative.  Negative for visual disturbance.   Respiratory: Negative.  Negative for cough, choking, chest tightness and shortness of breath.    Cardiovascular: Negative for chest pain, palpitations and leg swelling.   Gastrointestinal: Positive for abdominal pain, diarrhea and flatus. Negative for abdominal distention, bloating, blood in stool, constipation, nausea and vomiting.   Endocrine: Negative for cold intolerance, heat intolerance, polydipsia and polyuria.   Genitourinary: Negative.  Negative for difficulty urinating and frequency.   Musculoskeletal: Positive for myalgias. Negative for arthralgias, back pain, gait problem and joint swelling.   Skin:  Negative for color change, pallor, rash and wound.   Neurological: Negative for dizziness, tremors, weakness, light-headedness, numbness and headaches.   Hematological: Negative for adenopathy.   Psychiatric/Behavioral: Negative for behavioral problems, confusion, self-injury, sleep disturbance and suicidal ideas. The patient is not nervous/anxious.      Objective:   /70   Temp 96.5 °F (35.8 °C) (Tympanic)   Ht 5' (1.524 m)   Wt 74.6 kg (164 lb 7.4 oz)   BMI 32.12 kg/m²     Physical Exam   Constitutional: She is oriented to person, place, and time. She appears well-developed and well-nourished.   HENT:   Head: Normocephalic and atraumatic.   Right Ear: External ear normal.   Left Ear: External ear normal.   Nose: Nose normal.   Mouth/Throat: Uvula is midline, oropharynx is clear and moist and mucous membranes are normal. Normal dentition. No tonsillar exudate.   Eyes: Conjunctivae and EOM are normal. Pupils are equal, round, and reactive to light.   Neck: Normal range of motion. Neck supple.   Cardiovascular: Normal rate, regular rhythm and normal heart sounds.  Exam reveals no gallop and no friction rub.    No murmur heard.  Pulmonary/Chest: Effort normal and breath sounds normal. No respiratory distress. She has no wheezes. She has no rales. She exhibits no tenderness.   Abdominal: Soft. Normal appearance and bowel sounds are normal. She exhibits no distension. There is tenderness in the periumbilical area. There is no rigidity, no guarding and negative Ackerman's sign.   Musculoskeletal: Normal range of motion.   Lymphadenopathy:     She has no cervical adenopathy.   Neurological: She is alert and oriented to person, place, and time.   Skin: Skin is warm and dry.   Psychiatric: She has a normal mood and affect. Her behavior is normal. Judgment and thought content normal.   Vitals reviewed.      Assessment:     1. Acute gastroenteritis      Plan:   Acute gastroenteritis    -increase electrolyte rich  fluids. Newberry diet for bowel rest. Educational handout on over-the-counter medications and at-home conservative care, pertinent to the patients diagnosis today, was handed to the patient and discussed in detail.    Return if symptoms worsen or fail to improve.

## 2017-07-12 NOTE — PATIENT INSTRUCTIONS
Treating Diarrhea    Diarrhea happens when you have loose, watery, or frequent bowel movements. It is a common problem with many causes. Most cases of diarrhea clear up on their own. But certain cases may need treatment. Be sure to see your healthcare provider if your symptoms do not improve within a few days.  Getting relief  Treatment of diarrhea depends on its cause. Diarrhea caused by bacterial or parasite infection is often treated with antibiotics. Diarrhea caused by other factors, such as a stomach virus, often improves with simple home treatment. The tips below may also help relieve your symptoms.  · Drink plenty of fluids. This helps prevent too much fluid loss (dehydration). Water, clear soups, and electrolyte solutions are good choices. Avoid alcohol, coffee, tea, and milk. These can irritate your intestines and make symptoms worse.  · Suck on ice chips if drinking makes you queasy.  · Return to your normal diet slowly. You may want to eat bland foods at first, such as rice and toast. Also, you may need to avoid certain foods for a while, such as dairy products. These can make symptoms worse. Ask your healthcare provider if there are any other foods you should avoid.  · If you were prescribed antibiotics, take them as directed.  · Do not take anti-diarrhea medicines without asking your healthcare provider first.  Call your healthcare provider   Call your healthcare provider if you have any of the following:   · A fever of 100.4°F (38.0°C) or higher, or as directed by your healthcare provider  · Severe pain  · Worsening diarrhea or diarrhea for more than 2 days  · Bloody vomit or stool  · Signs of dehydration (dizziness, dry mouth and tongue, rapid pulse, dark urine)  Date Last Reviewed: 7/1/2016 © 2000-2016 [employer]. [address] [place] [address], [place] [address]. All rights reserved. This information is not intended as a substitute for professional medical care. Always follow your  healthcare professional's instructions.        Nonspecific Vomiting and Diarrhea (Adult)  Vomiting and diarrhea can have many causes, including:  · Helping your body get rid of harmful substances   · Gastroenteritis caused by viruses, parasites, bacteria, or toxins.  · Allergy to or side effect of a food or medicine  · Severe stress or worry (anxiety)   · Other illnesses  · Pregnancy  It is often hard to pinpoint an exact cause, even with testing. Vomiting and diarrhea often go away within a day or two without problems. If they continue, though, they can lead to too much loss of fluid (dehydration). This can be serious if not treated.    Home care  Medications  · You may use acetaminophen or NSAID medicines like ibuprofen or naproxen to control fever, unless another medicine was prescribed. If you have chronic liver or kidney disease, talk with your healthcare provider before using these medicines. Also talk with your provider if you've had a stomach ulcer or gastrointestinal bleeding. Don't give aspirin to anyone under 18 years of age who is ill with a fever. Don't use NSAID medicines if you are already taking one for another condition (like arthritis) or are on aspirin (such as for heart disease or after a stroke)  · If medicines for diarrhea or vomiting were prescribed, take these only as directed. Never take these without a healthcare providers approval.  General care  · If symptoms are severe, rest at home for the next 24 hours, or until you are feeling better.  · Washing your hands with soap and water, or using alcohol-based hand  is the best way to stop the spread of infection. Wash your hands after touching anyone who is sick.  · Wash your hands after using the toilet and before meals. Clean the toilet after each use.  · Caffeine, tobacco, and alcohol can make the diarrhea, cramping, and pain worse. Remember, caffeine not only is in coffee, but also is in chocolate, some energy drinks, and  teas.  Diet  · Water and clear liquids are important so you don't get dehydrated. Drink a small amount at a time. Don't guzzle down the drinks. That may increase your nausea, make cramping worse, and cause the drinks to come back up.  · Sports drinks may also help. Make sure they are not too sugary, because this can sometimes make things worse. Also, don't drink beverages that are too acidic, like orange juice and grape juice.  · If you are very dehydrated, sports drinks aren't a good choice. They have too much sugar and not enough electrolytes. In this case, commercially available products called oral rehydration solutions are best.  Food  · Don't force yourself to eat, especially if you have cramps, diarrhea, or vomiting. Eat just a little at a time, and then wait a few minutes before you try to eat more.  · Don't eat fatty, greasy, spicy, or fried foods.  · Don't eat dairy products if you have diarrhea. They can make it worse.  During the first 24 hours (the first full day), follow the diet below:  · Beverages: Sports drinks, soft drinks without caffeine, mineral water, and decaffeinated tea and coffee  · Soups: Clear broth, consommé, and bouillon  · Desserts: Plain gelatin, popsicles, and fruit juice bars  During the next 24 hours (the second day), you may add the following to the above if you are better. If not, continue what you did the first day:  · Hot cereal, plain toast, bread, rolls, crackers  · Plain noodles, rice, mashed potatoes, chicken noodle or rice soup  · Unsweetened canned fruit (avoid pineapple), bananas  · Limit fat intake to less than 15 grams per day by avoiding margarine, butter, oils, mayonnaise, sauces, gravies, fried foods, peanut butter, meat, poultry, and fish.  · Limit fiber. Avoid raw or cooked vegetables, fresh fruits (except bananas) and bran cereals.  · Limit caffeine and chocolate. No spices or seasonings except salt.  During the next 24 hours:  · Gradually resume a normal diet,  as you feel better and your symptoms improve.  · If at any time your symptoms start getting worse again, go back to clear liquids until you feel better.  Food preparation  · If you have diarrhea, you should not prepare food for others. When preparing foods, wash your hands before and after.  · Wash your hands or use alcohol-based  after using cutting boards, countertops, and knives that have been in contact with raw food.  · Keep uncooked meats away from cooked and ready-to-eat foods.  Follow-up care  Follow up with your healthcare advisor, or as advised. Call if you do not get better in the next 2 to 3 days. If a stool (diarrhea) sample was taken, or cultures done, you will be told if they are positive, or if your treatment needs to be changed. You may call as directed for the results.  If X-rays were taken, and a radiologist has not yet looked at them, he or she will do so. You will be told if there is a change in the reading, especially if it affects your treatment.  Call 911  Call 911 if any of these occur:  · Trouble breathing  · Chest pain  · Confusion  · Severe drowsiness or trouble awakening  · Fainting or loss of consciousness  · Rapid heart rate  · Seizure  · Stiff neck  · Severe weakness, dizziness, or lightheadedness  When to seek medical advice  Call your healthcare provider right away if any of these occur:  · Bloody or black vomit or stools.  · Severe, steady abdominal pain or any abdominal pain that is getting worse.  · Severe headache or stiff neck  · An inability to hold down even sips of liquids for more than 12 hours.  · Vomiting that lasts more than 24 hours.  · Diarrhea that lasts more than 24 hours.  · Fever of 100.4°F (38.0°C) or higher that lasts more than 48 hours, or as directed by your health care provider  · Yellowish color to your skin or the whites of your eyes.  · Signs of dehydration, such as dry mouth, little urine (less than every 6 hours), or very dark urine.  Date Last  Reviewed: 1/3/2016  © 5384-2195 [employer]. [address] [place] [address]. All rights reserved. This information is not intended as a substitute for professional medical care. Always follow your healthcare professional's instructions.        Dehydration    The human body is comprised largely of water. If you lose more fluids than you take in, you can become dehydrated. This means there are not enough fluids in your body for it to function right. Mild dehydration can cause weakness, confusion, or muscle cramps. In extreme cases, it can lead to brain damage and even death. That's why prompt treatment is crucial.  Risk factors  Anyone can become dehydrated. But infants, children, and older adults are at greatest risk. You are most likely to lose fluids with severe vomiting, diarrhea, or a fever. Exercising or working hard--especially in hot weather--can also cause excess fluid loss.  What to do  Drinking liquids is the best way to prevent dehydration. Water is best, but juice or frozen pops can also help. Your doctor may suggest electrolyte solutions for sick infants and young children.  When to go to the emergency room (ER)  Go to an ER right away for these symptoms:  Adults  · Very dark urine and little urine output  · Dizziness, weakness, confusion, fainting  Children  · Sunken eyes  · Little or no urine output (for infants, no wet diaper in 8 hours)  · Very dark urine  · Skin that doesn't bounce back quickly when pinched  · Crying without tears  What to expect in the ER  Your blood pressure, temperature, and heart rate will be checked. You may have blood or urine tests. The main treatment for dehydration is fluids. You may be given these to drink. Or, you may receive them through a vein in your arm. You also may be treated for diarrhea, vomiting, or a high fever.   Date Last Reviewed: 7/18/2015  © 4428-7146 [employer]. [address] [place] [address]. All rights  reserved. This information is not intended as a substitute for professional medical care. Always follow your healthcare professional's instructions.

## 2017-07-13 LAB
E COLI SXT1 STL QL IA: NEGATIVE
E COLI SXT2 STL QL IA: NEGATIVE

## 2017-07-15 LAB — BACTERIA STL CULT: NORMAL

## 2017-08-02 ENCOUNTER — OFFICE VISIT (OUTPATIENT)
Dept: OPHTHALMOLOGY | Facility: CLINIC | Age: 52
End: 2017-08-02
Payer: COMMERCIAL

## 2017-08-02 DIAGNOSIS — H35.52 RETINITIS PIGMENTOSA OF BOTH EYES: ICD-10-CM

## 2017-08-02 DIAGNOSIS — H20.9 IRITIS OF LEFT EYE: ICD-10-CM

## 2017-08-02 DIAGNOSIS — H40.1133 PRIMARY OPEN ANGLE GLAUCOMA OF BOTH EYES, SEVERE STAGE: Primary | ICD-10-CM

## 2017-08-02 DIAGNOSIS — Z98.42 CATARACT EXTRACTION STATUS, LEFT: ICD-10-CM

## 2017-08-02 PROCEDURE — 99999 PR PBB SHADOW E&M-EST. PATIENT-LVL II: CPT | Mod: PBBFAC,,, | Performed by: OPHTHALMOLOGY

## 2017-08-02 PROCEDURE — 99212 OFFICE O/P EST SF 10 MIN: CPT | Mod: PBBFAC,PO | Performed by: OPHTHALMOLOGY

## 2017-08-02 PROCEDURE — 92012 INTRM OPH EXAM EST PATIENT: CPT | Mod: S$GLB,,, | Performed by: OPHTHALMOLOGY

## 2017-08-02 NOTE — PROGRESS NOTES
HPI     Glaucoma    Additional comments: Latanoprost QHS OS, Cosopt BID OS, Brimonidine BID   OS           Comments   Pt states there is some pain OU. 4-5 on the pain scale. No change in   vision. 100% compliant with gtts.     PCP: Dr. Miranda Ackerman    1. COAG (followed by Dr. Scott)  CP OS 8/27/15 - low to moderate flow, TDW 1100, aggressive dilation with   GV  Gonio Puncture OS 9/25/15   SLT OS 6/23/16, 6/23/15, 6-2-14  Goniotomy OS 11-17-16  2. DM  3. RP / VIT A PALMITATE 1500 (discontinued)    OD: Atropine BID OD  OS: Latanoprost QHS OS, Cosopt BID OS, Brimonidine BID OS, Lotemax PRN       Last edited by Calixto Pascual, Patient Care Assistant on 8/2/2017  9:24   AM. (History)            Assessment /Plan     For exam results, see Encounter Report.      ICD-10-CM ICD-9-CM    1. Primary open angle glaucoma of both eyes, severe stage H40.1133 365.11 IOP not within acceptable range relative to target IOP with risk of irreversible visual loss. Additional treatment advised but will follow iop and 10-2 Hvf OS due to monocular status and high level of anxiety in patient.  Discussed options, risks, and benefits of additional medication, SLT laser, or incisional glaucoma surgery.       Reviewed importance of continued compliance with treatment and follow up.        365.73    2. Iritis of left eye H20.9 364.3 Monitor for now and follow off Lotemax since patient is not symptomatic.   3. Retinitis pigmentosa of both eyes H35.52 362.74 Monitor for now   4. Cataract extraction status, left Z98.42 V45.61 stable   5. Uncontrolled type 2 diabetes mellitus without complication, without long-term current use of insulin E11.65 250.02 Monitor for now       RETURN TO CLINIC in one month for IOP check, iritis check, and 10-2 HVF OS    OD: Atropine BID OD  OS: Latanoprost QHS OS, Cosopt BID OS, Brimonidine BID OS

## 2017-08-03 ENCOUNTER — TELEPHONE (OUTPATIENT)
Dept: INTERNAL MEDICINE | Facility: CLINIC | Age: 52
End: 2017-08-03

## 2017-08-03 NOTE — TELEPHONE ENCOUNTER
Spoke to pt daughter. Informed her that pt can see urgent care for abdomen pain. Pt daughter verbalized understanding

## 2017-08-03 NOTE — TELEPHONE ENCOUNTER
Pt wants to be worked in for an appt. Pt has medicaid and wants to be seen for abdominal pain. Your soonest medicaid appt is in November. Pt declined seeing urgent care

## 2017-08-03 NOTE — TELEPHONE ENCOUNTER
----- Message from Nelia Bunch sent at 8/3/2017 10:48 AM CDT -----  Contact: [phone]  Pt would like to consult with nurse about fit in appt. States having abdominal pains and burning in feet. Please call back at [phone]. x. nelida

## 2017-08-04 DIAGNOSIS — H40.1190 PRIMARY OPEN ANGLE GLAUCOMA: ICD-10-CM

## 2017-08-04 DIAGNOSIS — K21.9 GASTROESOPHAGEAL REFLUX DISEASE WITHOUT ESOPHAGITIS: ICD-10-CM

## 2017-08-04 DIAGNOSIS — H40.9 SEVERE STAGE GLAUCOMA: ICD-10-CM

## 2017-08-04 DIAGNOSIS — E11.42 TYPE 2 DIABETES MELLITUS WITH DIABETIC POLYNEUROPATHY: ICD-10-CM

## 2017-08-04 RX ORDER — LANSOPRAZOLE 30 MG/1
CAPSULE, DELAYED RELEASE ORAL
Qty: 180 CAPSULE | Refills: 0 | OUTPATIENT
Start: 2017-08-04

## 2017-08-04 RX ORDER — LANSOPRAZOLE 30 MG/1
CAPSULE, DELAYED RELEASE ORAL
Qty: 180 CAPSULE | Refills: 0 | Status: SHIPPED | OUTPATIENT
Start: 2017-08-04 | End: 2017-10-18 | Stop reason: CLARIF

## 2017-08-04 RX ORDER — GABAPENTIN 300 MG/1
CAPSULE ORAL
Qty: 90 CAPSULE | Refills: 0 | Status: SHIPPED | OUTPATIENT
Start: 2017-08-04 | End: 2017-10-18 | Stop reason: SDUPTHER

## 2017-08-04 RX ORDER — LATANOPROST 50 UG/ML
SOLUTION/ DROPS OPHTHALMIC
Qty: 3 BOTTLE | Refills: 3 | Status: SHIPPED | OUTPATIENT
Start: 2017-08-04 | End: 2017-09-13 | Stop reason: SDUPTHER

## 2017-08-04 RX ORDER — DORZOLAMIDE HYDROCHLORIDE AND TIMOLOL MALEATE 20; 5 MG/ML; MG/ML
SOLUTION/ DROPS OPHTHALMIC
Qty: 3 BOTTLE | Refills: 3 | Status: SHIPPED | OUTPATIENT
Start: 2017-08-04 | End: 2017-09-13 | Stop reason: SDUPTHER

## 2017-08-07 ENCOUNTER — TELEPHONE (OUTPATIENT)
Dept: INTERNAL MEDICINE | Facility: CLINIC | Age: 52
End: 2017-08-07

## 2017-08-07 NOTE — TELEPHONE ENCOUNTER
Spoke to pt. Informed her that I will do PA Friday bc dr friedman will not be here. We are seeing pts all week and I wont have time to do it except Friday. Pt verbalized understanding

## 2017-08-07 NOTE — TELEPHONE ENCOUNTER
----- Message from Xi Valenzuela sent at 8/7/2017  3:36 PM CDT -----  Contact: Pt   Pt called and stated she was returning a call to the nurse. She can be reached at [phone] (home).       Thanks,  TF

## 2017-08-22 ENCOUNTER — TELEPHONE (OUTPATIENT)
Dept: OPHTHALMOLOGY | Facility: CLINIC | Age: 52
End: 2017-08-22

## 2017-08-22 NOTE — TELEPHONE ENCOUNTER
Spoke with pt/Daughter.  Pt states she can't tolerate generic Alphagan.  We have attempted to get approval for brand 4 times, but pre-authorization has been denied 4 times.  They will be on [employer] as of September 1st.  She will have pharmacy or ins send request for pre-auth to us when her new insurance becomes effective Sept 1st.

## 2017-08-22 NOTE — TELEPHONE ENCOUNTER
----- Message from Ally Crawford sent at 8/22/2017  2:37 PM CDT -----  Contact: daughter/Chu Zaldivar   States she's calling regarding her eye drops and it needs authorization. States she needs to speak to the nurse, she has the fax# for her insurance. Please call Chu Zaldivar at [phone]. Thank you

## 2017-09-13 ENCOUNTER — OFFICE VISIT (OUTPATIENT)
Dept: OPHTHALMOLOGY | Facility: CLINIC | Age: 52
End: 2017-09-13
Payer: COMMERCIAL

## 2017-09-13 DIAGNOSIS — H35.52 RETINITIS PIGMENTOSA OF BOTH EYES: ICD-10-CM

## 2017-09-13 DIAGNOSIS — H20.022 IRITIS, RECURRENT, LEFT: Primary | ICD-10-CM

## 2017-09-13 DIAGNOSIS — H40.1133 PRIMARY OPEN ANGLE GLAUCOMA OF BOTH EYES, SEVERE STAGE: ICD-10-CM

## 2017-09-13 DIAGNOSIS — Z98.42 CATARACT EXTRACTION STATUS, LEFT: ICD-10-CM

## 2017-09-13 PROCEDURE — 92012 INTRM OPH EXAM EST PATIENT: CPT | Mod: S$GLB,,, | Performed by: OPHTHALMOLOGY

## 2017-09-13 PROCEDURE — 99212 OFFICE O/P EST SF 10 MIN: CPT | Mod: PBBFAC,PO | Performed by: OPHTHALMOLOGY

## 2017-09-13 PROCEDURE — 99999 PR PBB SHADOW E&M-EST. PATIENT-LVL II: CPT | Mod: PBBFAC,,, | Performed by: OPHTHALMOLOGY

## 2017-09-13 RX ORDER — LOTEPREDNOL ETABONATE 5 MG/ML
1 SUSPENSION/ DROPS OPHTHALMIC 4 TIMES DAILY
Qty: 5 ML | Refills: 3 | Status: SHIPPED | OUTPATIENT
Start: 2017-09-13 | End: 2017-11-21 | Stop reason: SDUPTHER

## 2017-09-13 NOTE — PROGRESS NOTES
HPI     uveitis    Additional comments: blurry vision and a little pain OS since yesterday,   OS: Latanoprost QHS OS, Cosopt BID OS, Brimonidine BID OS, Lotemax prn           Comments   Pt states yesterday she started having some blurry vision with a little   pain and a little photophobia. Not all the time but sometimes when she is   out in the sun, the glare hurt her eyes. She's only using drops in the   left eye, states the Brimonidine is not covered by her insurance.     PCP: Dr. Miranda Ackerman    1. COAG (followed by Dr. Scott)  CP OS 8/27/15 - low to moderate flow, TDW 1100, aggressive dilation with   GV  Gonio Puncture OS 9/25/15   SLT OS 6/23/16, 6/23/15, 6-2-14  Goniotomy OS 11-17-16  2. DM  3. RP / VIT A PALMITATE 1500 (discontinued)    OD: Atropine BID OD  OS: Latanoprost QHS OS, Cosopt BID OS, Brimonidine BID OS, Lotemax one   time per day       Last edited by Robert Scott MD on 9/13/2017 11:52 AM. (History)            Assessment /Plan     For exam results, see Encounter Report.      ICD-10-CM ICD-9-CM    1. Iritis, recurrent, left H20.022 364.02 Much worse on exam OS today   2. Primary open angle glaucoma of both eyes, severe stage H40.1133 365.11 iop controlled     365.73    3. Retinitis pigmentosa of both eyes H35.52 362.74    4. Cataract extraction status, left Z98.42 V45.61        OD: Atropine BID OD  OS: Latanoprost QHS OS, Cosopt BID OS  Alphagan tid OS  May need to change to Brimonidine in the future prn.  Lotemax fours time per day  Return to clinic 1 week

## 2017-09-19 ENCOUNTER — OFFICE VISIT (OUTPATIENT)
Dept: OPHTHALMOLOGY | Facility: CLINIC | Age: 52
End: 2017-09-19
Payer: COMMERCIAL

## 2017-09-19 DIAGNOSIS — H40.1133 PRIMARY OPEN ANGLE GLAUCOMA OF BOTH EYES, SEVERE STAGE: ICD-10-CM

## 2017-09-19 DIAGNOSIS — H35.52 RETINITIS PIGMENTOSA OF BOTH EYES: ICD-10-CM

## 2017-09-19 DIAGNOSIS — H20.022 IRITIS, RECURRENT, LEFT: Primary | ICD-10-CM

## 2017-09-19 PROCEDURE — 99999 PR PBB SHADOW E&M-EST. PATIENT-LVL II: CPT | Mod: PBBFAC,,, | Performed by: OPHTHALMOLOGY

## 2017-09-19 PROCEDURE — 92012 INTRM OPH EXAM EST PATIENT: CPT | Mod: S$GLB,,, | Performed by: OPHTHALMOLOGY

## 2017-09-19 PROCEDURE — 99212 OFFICE O/P EST SF 10 MIN: CPT | Mod: PBBFAC,PO | Performed by: OPHTHALMOLOGY

## 2017-09-19 NOTE — PROGRESS NOTES
HPI     Here for f/u iritis OS.  Pt states OS a little better, 3-4 on pain scale,   no light sensitivity.    1. COAG (followed by Dr. Scott)  CP OS 8/27/15 - low to moderate flow, TDW 1100, aggressive dilation with   GV  Gonio Puncture OS 9/25/15   SLT OS 6/23/16, 6/23/15, 6-2-14  Goniotomy OS 11-17-16  2. DM  3. RP / VIT A PALMITATE 1500 (discontinued)    OS: Latanoprost QHS OS, Cosopt BID OS, Brimonidine BID OS, Lotamax qid OS      Last edited by Yuliya Pedersen on 9/19/2017 12:00 PM. (History)            Assessment /Plan     For exam results, see Encounter Report.      ICD-10-CM ICD-9-CM    1. Iritis, recurrent, left H20.022 364.02 Improved today   2. Primary open angle glaucoma of both eyes, severe stage H40.1133 365.11 iop controlled     365.73    3. Retinitis pigmentosa of both eyes H35.52 362.74    4. Uncontrolled type 2 diabetes mellitus without complication, without long-term current use of insulin E11.65 250.02 Not dilated today     Refracted today per patient request  OS: Latanoprost QHS OS, Cosopt BID OS, Brimonidine BID OS  Reduce Lotamax to tid   Return to clinic 3-4 weeks

## 2017-10-04 DIAGNOSIS — E11.42 TYPE 2 DIABETES MELLITUS WITH DIABETIC POLYNEUROPATHY, WITHOUT LONG-TERM CURRENT USE OF INSULIN: ICD-10-CM

## 2017-10-04 RX ORDER — GLIMEPIRIDE 2 MG/1
TABLET ORAL
Qty: 60 TABLET | Refills: 3 | Status: SHIPPED | OUTPATIENT
Start: 2017-10-04 | End: 2017-10-18 | Stop reason: SDUPTHER

## 2017-10-17 ENCOUNTER — OFFICE VISIT (OUTPATIENT)
Dept: OPHTHALMOLOGY | Facility: CLINIC | Age: 52
End: 2017-10-17
Payer: MEDICAID

## 2017-10-17 DIAGNOSIS — H20.022 IRITIS, RECURRENT, LEFT: Primary | ICD-10-CM

## 2017-10-17 DIAGNOSIS — H40.1133 PRIMARY OPEN ANGLE GLAUCOMA OF BOTH EYES, SEVERE STAGE: ICD-10-CM

## 2017-10-17 PROCEDURE — 92012 INTRM OPH EXAM EST PATIENT: CPT | Mod: S$PBB,,, | Performed by: OPHTHALMOLOGY

## 2017-10-17 PROCEDURE — 99212 OFFICE O/P EST SF 10 MIN: CPT | Mod: PBBFAC,PO | Performed by: OPHTHALMOLOGY

## 2017-10-17 PROCEDURE — 99999 PR PBB SHADOW E&M-EST. PATIENT-LVL II: CPT | Mod: PBBFAC,,, | Performed by: OPHTHALMOLOGY

## 2017-10-17 NOTE — PROGRESS NOTES
HPI     Follow-up    Additional comments: 4 wk uveitis ck           Glaucoma    Additional comments: IOP chk           Comments   1. COAG (followed by Dr. Scott)  CP OS 8/27/15 - low to moderate flow, TDW 1100, aggressive dilation with   GV  Gonio Puncture OS 9/25/15   SLT OS 6/23/16, 6/23/15, 6-2-14  Goniotomy OS 11-17-16  2. DM  3. RP / VIT A PALMITATE 1500 (discontinued)    OS: Latanoprost QHS OS, Cosopt BID OS, Brimonidine BID OS  Lotemax tid OS       Last edited by Kaylie Chery MA on 10/17/2017 11:42 AM. (History)            Assessment /Plan     For exam results, see Encounter Report.      ICD-10-CM ICD-9-CM    1. Iritis, recurrent, left H20.022 364.02 Improving. Reduce lotemax to qd   2. Primary open angle glaucoma of both eyes, severe stage H40.1133 365.11 IOP elevated OS today, reduce lotemax.      365.73      Reduce lotemax to qd OS  Atropine bid prn for pain OD  OS: Latanoprost QHS OS, Cosopt BID OS, Brimonidine BID OS  Return to clinic 3 weeks.

## 2017-10-18 ENCOUNTER — OFFICE VISIT (OUTPATIENT)
Dept: INTERNAL MEDICINE | Facility: CLINIC | Age: 52
End: 2017-10-18
Payer: MEDICAID

## 2017-10-18 VITALS
TEMPERATURE: 97 F | SYSTOLIC BLOOD PRESSURE: 120 MMHG | BODY MASS INDEX: 32.73 KG/M2 | DIASTOLIC BLOOD PRESSURE: 74 MMHG | HEIGHT: 60 IN | HEART RATE: 97 BPM | WEIGHT: 166.69 LBS | OXYGEN SATURATION: 98 %

## 2017-10-18 DIAGNOSIS — E55.9 VITAMIN D INSUFFICIENCY: ICD-10-CM

## 2017-10-18 DIAGNOSIS — Z00.00 ROUTINE GENERAL MEDICAL EXAMINATION AT A HEALTH CARE FACILITY: Primary | ICD-10-CM

## 2017-10-18 DIAGNOSIS — E11.59 HYPERTENSION ASSOCIATED WITH DIABETES: ICD-10-CM

## 2017-10-18 DIAGNOSIS — K21.9 GASTROESOPHAGEAL REFLUX DISEASE, ESOPHAGITIS PRESENCE NOT SPECIFIED: ICD-10-CM

## 2017-10-18 DIAGNOSIS — E66.9 OBESITY (BMI 30-39.9): ICD-10-CM

## 2017-10-18 DIAGNOSIS — E11.22 TYPE 2 DIABETES MELLITUS WITH STAGE 3 CHRONIC KIDNEY DISEASE, WITHOUT LONG-TERM CURRENT USE OF INSULIN: ICD-10-CM

## 2017-10-18 DIAGNOSIS — Z12.39 BREAST SCREENING: ICD-10-CM

## 2017-10-18 DIAGNOSIS — D50.9 IRON DEFICIENCY ANEMIA, UNSPECIFIED IRON DEFICIENCY ANEMIA TYPE: ICD-10-CM

## 2017-10-18 DIAGNOSIS — N18.30 TYPE 2 DIABETES MELLITUS WITH STAGE 3 CHRONIC KIDNEY DISEASE, WITHOUT LONG-TERM CURRENT USE OF INSULIN: ICD-10-CM

## 2017-10-18 DIAGNOSIS — I15.2 HYPERTENSION ASSOCIATED WITH DIABETES: ICD-10-CM

## 2017-10-18 DIAGNOSIS — E11.42 TYPE 2 DIABETES MELLITUS WITH DIABETIC POLYNEUROPATHY, WITHOUT LONG-TERM CURRENT USE OF INSULIN: ICD-10-CM

## 2017-10-18 DIAGNOSIS — E11.69 HYPERLIPIDEMIA ASSOCIATED WITH TYPE 2 DIABETES MELLITUS: ICD-10-CM

## 2017-10-18 DIAGNOSIS — E78.5 HYPERLIPIDEMIA ASSOCIATED WITH TYPE 2 DIABETES MELLITUS: ICD-10-CM

## 2017-10-18 PROCEDURE — 99999 PR PBB SHADOW E&M-EST. PATIENT-LVL III: CPT | Mod: PBBFAC,,, | Performed by: FAMILY MEDICINE

## 2017-10-18 PROCEDURE — 90471 IMMUNIZATION ADMIN: CPT | Mod: PBBFAC,PO

## 2017-10-18 PROCEDURE — 99396 PREV VISIT EST AGE 40-64: CPT | Mod: 25,S$PBB,, | Performed by: FAMILY MEDICINE

## 2017-10-18 PROCEDURE — 99213 OFFICE O/P EST LOW 20 MIN: CPT | Mod: PBBFAC,25,PO | Performed by: FAMILY MEDICINE

## 2017-10-18 RX ORDER — GABAPENTIN 300 MG/1
300 CAPSULE ORAL NIGHTLY
Qty: 90 CAPSULE | Refills: 3 | Status: SHIPPED | OUTPATIENT
Start: 2017-10-18 | End: 2018-03-06 | Stop reason: SDUPTHER

## 2017-10-18 RX ORDER — OMEPRAZOLE 40 MG/1
40 CAPSULE, DELAYED RELEASE ORAL DAILY
Qty: 30 CAPSULE | Refills: 11 | Status: SHIPPED | OUTPATIENT
Start: 2017-10-18 | End: 2017-10-26 | Stop reason: SDUPTHER

## 2017-10-18 RX ORDER — GLIMEPIRIDE 2 MG/1
TABLET ORAL
Qty: 180 TABLET | Refills: 3 | Status: SHIPPED | OUTPATIENT
Start: 2017-10-18 | End: 2018-03-06 | Stop reason: SDUPTHER

## 2017-10-18 NOTE — PROGRESS NOTES
Subjective:       Patient ID: Richelle Zaldivar is a 52 y.o. female.    Chief Complaint: Follow-up    52-year-old female patient with Patient Active Problem List:     Hypertension associated with diabetes     Diabetes mellitus type 2, uncontrolled, without complications     Obesity (BMI 30-39.9)     Hyperlipidemia associated with type 2 diabetes mellitus     Iron deficiency anemia     Vitamin D insufficiency     GERD (gastroesophageal reflux disease)     Retinitis pigmentosa     Recurrent iritis of left eye     Open-angle glaucoma, severe stage  Accompanied by her daughter, here for routine annual physicals patient reports that she has been having, abdominal discomfort off and on lately in spite of taking Prevacid, Nexium was not covered by her insurance and Prevacid is also no longer covered by her insurance.  Patient has been taking her blood pressure medication once daily.  Denies of any chest pain or shortness of breath.  Continues to have minimal tingling and numbness sensation to extremities in spite of taking gabapentin  Requesting refills on her medications as patient has trip coming up to [facility] for 2 months next month      Review of Systems   Constitutional: Negative for fatigue.   Eyes: Negative for visual disturbance.   Respiratory: Negative for shortness of breath.    Cardiovascular: Negative for chest pain and leg swelling.   Gastrointestinal: Positive for constipation and nausea. Negative for abdominal pain, blood in stool and vomiting.   Endocrine: Negative for polydipsia, polyphagia and polyuria.   Genitourinary: Negative for dysuria, flank pain and urgency.   Musculoskeletal: Negative for myalgias.   Skin: Negative for rash.   Neurological: Positive for numbness. Negative for weakness, light-headedness and headaches.   Psychiatric/Behavioral: Negative for sleep disturbance.         /74 (BP Location: Right arm, Patient Position: Sitting)   Pulse 97   Temp 96.9 °F (36.1 °C) (Tympanic)   Ht 5'  (1.524 m)   Wt 75.6 kg (166 lb 10.7 oz)   SpO2 98%   BMI 32.55 kg/m²   Objective:      Physical Exam   Constitutional: She is oriented to person, place, and time. She appears well-developed and well-nourished.   HENT:   Head: Normocephalic and atraumatic.   Mouth/Throat: Oropharynx is clear and moist.   Cardiovascular: Normal rate, regular rhythm and normal heart sounds.    No murmur heard.  Pulmonary/Chest: Effort normal and breath sounds normal. She has no wheezes.   Abdominal: Soft. Bowel sounds are normal. There is tenderness.   Positive for minimal suprapubic tenderness noted on exam today   Musculoskeletal: She exhibits no edema or tenderness.   Neurological: She is alert and oriented to person, place, and time.   Skin: Skin is warm and dry. No rash noted.   Psychiatric: She has a normal mood and affect.         Assessment:       1. Routine general medical examination at a health care facility    2. Hypertension associated with diabetes    3. Hyperlipidemia associated with type 2 diabetes mellitus    4. Uncontrolled type 2 diabetes mellitus with stage 3 chronic kidney disease, without long-term current use of insulin    5. Gastroesophageal reflux disease, esophagitis presence not specified    6. Vitamin D insufficiency    7. Iron deficiency anemia, unspecified iron deficiency anemia type    8. Obesity (BMI 30-39.9)    9. Breast screening    10. Type 2 diabetes mellitus with diabetic polyneuropathy, without long-term current use of insulin        Plan:   Routine general medical examination at a health care facility  -     Comprehensive metabolic panel; Future; Expected date: 10/18/2017  -     Lipid panel; Future; Expected date: 10/18/2017  -     CBC auto differential; Future; Expected date: 10/18/2017  -     TSH; Future; Expected date: 10/18/2017  -     Urinalysis; Future; Expected date: 10/18/2017  Vital signs stable today.  Clinical exam stable.  Encouraged to start lifestyle modifications with low-fat and  low-cholesterol diet and exercise 30 minutes daily      Hypertension associated with diabetes  -     Comprehensive metabolic panel; Future; Expected date: 10/18/2017  -     Lipid panel; Future; Expected date: 10/18/2017  Blood pressure stable today currently taking lisinopril hydrochlorothiazide 20/25 mg, advised to take it once daily  Blood pressure stable today    Hyperlipidemia associated with type 2 diabetes mellitus  -     Lipid panel; Future; Expected date: 10/18/2017  Currently on Lipitor 20 mg, after checking fasting labs will consider further refills.    Uncontrolled type 2 diabetes mellitus with stage 3 chronic kidney disease, without long-term current use of insulin  -     Comprehensive metabolic panel; Future; Expected date: 10/18/2017  -     Lipid panel; Future; Expected date: 10/18/2017  -     Hemoglobin A1c; Future; Expected date: 10/18/2017  -     Microalbumin/creatinine urine ratio; Future; Expected date: 10/18/2017  -     glimepiride (AMARYL) 2 MG tablet; TAKE ONE TABLET BY MOUTH TWICE DAILY  Dispense: 180 tablet; Refill: 3  Currently on Amaryl 2 mg twice daily and metformin thousand milligrams twice daily  Up-to-date with diabetic foot and eye exam  Strict lifestyle changes recommended with diet and exercise  Drink adequate fluids to avoid worsening kidney functions    Gastroesophageal reflux disease, esophagitis presence not specified  -     omeprazole (PRILOSEC) 40 MG capsule; Take 1 capsule (40 mg total) by mouth once daily.  Dispense: 30 capsule; Refill: 11  Will discontinue Prevacid and was advised to start taking omeprazole 40 mg daily for acid reflex symptoms.  Eat small frequent meals    Vitamin D insufficiency  -     Vitamin D; Future; Expected date: 10/18/2017  Will recheck vitamin D as it was low in the past    Iron deficiency anemia, unspecified iron deficiency anemia type  -     CBC auto differential; Future; Expected date: 10/18/2017  -     Ferritin; Future; Expected date:  10/18/2017  Will recheck further labs, eat iron and protein rich diet    Obesity (BMI 30-39.9)-start lifestyle modifications with diet and exercise to lose weight with BMI 32    Breast screening  -     Mammo Digital Screening Bilat with CAD; Future; Expected date: 10/18/2017  Due for mammogram    Type 2 diabetes mellitus with diabetic polyneuropathy, without long-term current use of insulin  -     gabapentin (NEURONTIN) 300 MG capsule; Take 1 capsule (300 mg total) by mouth every evening.  Dispense: 90 capsule; Refill: 3  Doing well taking gabapentin 300 mg    Other orders  -     Influenza - Quadrivalent (3 years & older) (PF)  Flu shot given today

## 2017-10-26 DIAGNOSIS — K21.9 GASTROESOPHAGEAL REFLUX DISEASE, ESOPHAGITIS PRESENCE NOT SPECIFIED: ICD-10-CM

## 2017-10-26 RX ORDER — OMEPRAZOLE 40 MG/1
40 CAPSULE, DELAYED RELEASE ORAL DAILY
Qty: 90 CAPSULE | Refills: 1 | Status: SHIPPED | OUTPATIENT
Start: 2017-10-26 | End: 2018-10-03 | Stop reason: SDUPTHER

## 2017-11-20 DIAGNOSIS — E11.42 TYPE 2 DIABETES MELLITUS WITH DIABETIC POLYNEUROPATHY, WITHOUT LONG-TERM CURRENT USE OF INSULIN: ICD-10-CM

## 2017-11-20 RX ORDER — METFORMIN HYDROCHLORIDE 1000 MG/1
1000 TABLET ORAL 2 TIMES DAILY WITH MEALS
Qty: 180 TABLET | Refills: 3 | Status: SHIPPED | OUTPATIENT
Start: 2017-11-20 | End: 2018-03-06 | Stop reason: SDUPTHER

## 2017-11-21 ENCOUNTER — OFFICE VISIT (OUTPATIENT)
Dept: OPHTHALMOLOGY | Facility: CLINIC | Age: 52
End: 2017-11-21
Payer: MEDICAID

## 2017-11-21 DIAGNOSIS — H40.9 SEVERE STAGE GLAUCOMA: Primary | ICD-10-CM

## 2017-11-21 DIAGNOSIS — E78.5 HYPERLIPIDEMIA ASSOCIATED WITH TYPE 2 DIABETES MELLITUS: ICD-10-CM

## 2017-11-21 DIAGNOSIS — H40.1133 PRIMARY OPEN ANGLE GLAUCOMA OF BOTH EYES, SEVERE STAGE: ICD-10-CM

## 2017-11-21 DIAGNOSIS — E11.69 HYPERLIPIDEMIA ASSOCIATED WITH TYPE 2 DIABETES MELLITUS: ICD-10-CM

## 2017-11-21 PROCEDURE — 92012 INTRM OPH EXAM EST PATIENT: CPT | Mod: S$PBB,,, | Performed by: OPHTHALMOLOGY

## 2017-11-21 PROCEDURE — 99212 OFFICE O/P EST SF 10 MIN: CPT | Mod: PBBFAC,PO | Performed by: OPHTHALMOLOGY

## 2017-11-21 PROCEDURE — 99999 PR PBB SHADOW E&M-EST. PATIENT-LVL II: CPT | Mod: PBBFAC,,, | Performed by: OPHTHALMOLOGY

## 2017-11-21 RX ORDER — ATROPINE SULFATE 10 MG/ML
1 SOLUTION/ DROPS OPHTHALMIC 2 TIMES DAILY PRN
Qty: 1 BOTTLE | Refills: 1 | Status: SHIPPED | OUTPATIENT
Start: 2017-11-21 | End: 2018-03-19 | Stop reason: SDUPTHER

## 2017-11-21 RX ORDER — DORZOLAMIDE HYDROCHLORIDE AND TIMOLOL MALEATE 20; 5 MG/ML; MG/ML
SOLUTION/ DROPS OPHTHALMIC
Qty: 10 ML | Refills: 11 | Status: SHIPPED | OUTPATIENT
Start: 2017-11-21 | End: 2017-11-21 | Stop reason: SDUPTHER

## 2017-11-21 RX ORDER — ATORVASTATIN CALCIUM 20 MG/1
20 TABLET, FILM COATED ORAL DAILY
Qty: 90 TABLET | Refills: 3 | Status: SHIPPED | OUTPATIENT
Start: 2017-11-21 | End: 2018-01-15 | Stop reason: SDUPTHER

## 2017-11-21 RX ORDER — DORZOLAMIDE HYDROCHLORIDE AND TIMOLOL MALEATE 20; 5 MG/ML; MG/ML
SOLUTION/ DROPS OPHTHALMIC
Qty: 10 ML | Refills: 11 | Status: SHIPPED | OUTPATIENT
Start: 2017-11-21 | End: 2018-02-28 | Stop reason: SDUPTHER

## 2017-11-21 RX ORDER — BRIMONIDINE TARTRATE 2 MG/ML
1 SOLUTION/ DROPS OPHTHALMIC 3 TIMES DAILY
Qty: 10 ML | Refills: 6 | Status: SHIPPED | OUTPATIENT
Start: 2017-11-21 | End: 2018-07-17 | Stop reason: SDUPTHER

## 2017-11-21 RX ORDER — LATANOPROST 50 UG/ML
1 SOLUTION/ DROPS OPHTHALMIC NIGHTLY
Qty: 3 BOTTLE | Refills: 4 | Status: SHIPPED | OUTPATIENT
Start: 2017-11-21 | End: 2018-07-17 | Stop reason: SDUPTHER

## 2017-11-21 RX ORDER — LOTEPREDNOL ETABONATE 5 MG/ML
1 SUSPENSION/ DROPS OPHTHALMIC 4 TIMES DAILY
Qty: 5 ML | Refills: 3 | Status: SHIPPED | OUTPATIENT
Start: 2017-11-21 | End: 2018-03-20 | Stop reason: SDUPTHER

## 2017-11-21 NOTE — PROGRESS NOTES
HPI     1 month IOP check  No change in VA or pain level  100% compliant with drops    PCP: Dr. Miranda Ackerman    1. COAG (followed by Dr. Scott)  CP OS 8/27/15 - low to moderate flow, TDW 1100, aggressive dilation with   GV  Gonio Puncture OS 9/25/15   SLT OS 6/23/16, 6/23/15, 6-2-14  Goniotomy OS 11-17-16  2. DM  3. RP / VIT A PALMITATE 1500 (discontinued)    OS: Latanoprost QHS OS, Cosopt BID OS, Brimonidine BID OS  Lotemax QDd OS    Uses atropine bid od prn for pain    Last edited by Merry Chicas on 11/21/2017  4:13 PM. (History)            Assessment /Plan     For exam results, see Encounter Report.      ICD-10-CM ICD-9-CM    1. Severe stage glaucoma H40.9 365.73 atropine 1% (ISOPTO ATROPINE) 1 % Drop      brimonidine 0.2% (ALPHAGAN) 0.2 % Drop      loteprednol (LOTEMAX) 0.5 % ophthalmic suspension      dorzolamide-timolol 2-0.5% (COSOPT) 22.3-6.8 mg/mL ophthalmic solution      latanoprost 0.005 % ophthalmic solution   2. Primary open angle glaucoma of both eyes, severe stage H40.1133 365.11 atropine 1% (ISOPTO ATROPINE) 1 % Drop     365.73 brimonidine 0.2% (ALPHAGAN) 0.2 % Drop      loteprednol (LOTEMAX) 0.5 % ophthalmic suspension      dorzolamide-timolol 2-0.5% (COSOPT) 22.3-6.8 mg/mL ophthalmic solution      latanoprost 0.005 % ophthalmic solution       RETURN TO CLINIC February for IOP and DOA   Pt is going out of town soon until January       OS: Latanoprost QHS OS, Cosopt BID OS, Increase Brimonidine TID OS  Lotemax QIDd OS

## 2018-01-15 DIAGNOSIS — E11.69 HYPERLIPIDEMIA ASSOCIATED WITH TYPE 2 DIABETES MELLITUS: ICD-10-CM

## 2018-01-15 DIAGNOSIS — E78.5 HYPERLIPIDEMIA ASSOCIATED WITH TYPE 2 DIABETES MELLITUS: ICD-10-CM

## 2018-01-15 RX ORDER — ATORVASTATIN CALCIUM 20 MG/1
TABLET, FILM COATED ORAL
Qty: 60 TABLET | Refills: 5 | Status: SHIPPED | OUTPATIENT
Start: 2018-01-15 | End: 2018-10-04 | Stop reason: DRUGHIGH

## 2018-02-14 ENCOUNTER — OFFICE VISIT (OUTPATIENT)
Dept: OPHTHALMOLOGY | Facility: CLINIC | Age: 53
End: 2018-02-14
Payer: MEDICAID

## 2018-02-14 DIAGNOSIS — H40.1133 PRIMARY OPEN ANGLE GLAUCOMA OF BOTH EYES, SEVERE STAGE: Primary | ICD-10-CM

## 2018-02-14 DIAGNOSIS — H04.123 DRY EYE SYNDROME, BILATERAL: ICD-10-CM

## 2018-02-14 DIAGNOSIS — H20.022 IRITIS, RECURRENT, LEFT: ICD-10-CM

## 2018-02-14 DIAGNOSIS — H35.52 RETINITIS PIGMENTOSA OF BOTH EYES: ICD-10-CM

## 2018-02-14 PROCEDURE — 99999 PR PBB SHADOW E&M-EST. PATIENT-LVL II: CPT | Mod: PBBFAC,,, | Performed by: OPHTHALMOLOGY

## 2018-02-14 PROCEDURE — 99212 OFFICE O/P EST SF 10 MIN: CPT | Mod: PBBFAC,PO | Performed by: OPHTHALMOLOGY

## 2018-02-14 PROCEDURE — 92012 INTRM OPH EXAM EST PATIENT: CPT | Mod: S$PBB,,, | Performed by: OPHTHALMOLOGY

## 2018-02-14 NOTE — PROGRESS NOTES
HPI     Glaucoma    Additional comments: 3 month IOP check, OS: Latanoprost QHS, Cosopt BID,   Brimonidine TID, Lotemax daily, Atropine BID OD for pain           Comments   No problems since her last visit. She had to use the Atropine in the right   eye on Monday. No pain or irritation today. Will need refills on all   drops. Defer dilation today.     PCP: Dr. Miranda Ackerman    1. COAG (followed by Dr. Scott)  CP OS 8/27/15 - low to moderate flow, TDW 1100, aggressive dilation with   GV  Gonio Puncture OS 9/25/15   SLT OS 6/23/16, 6/23/15, 6-2-14  Goniotomy OS 11-17-16  2. DM  3. RP / VIT A PALMITATE 1500 (discontinued)    OS: Latanoprost QHS OS, Cosopt BID OS, Brimonidine BID OS  Lotemax QDd OS    Uses atropine bid od prn for pain       Last edited by Ranulfo Kumari on 2/14/2018 10:19 AM. (History)            Assessment /Plan     For exam results, see Encounter Report.      ICD-10-CM ICD-9-CM    1. Primary open angle glaucoma of both eyes, severe stage H40.1133 365.11 IOP stable OS  IOP low in the OD, continue atropine - follow at this time  Pt declines Dil today      365.73    2. Iritis, recurrent, left H20.022 364.02 Stable- follow    3. Retinitis pigmentosa of both eyes H35.52 362.74 Follow    4. Dry eye syndrome, bilateral H04.123 375.15 Appears stable          OS: Latanoprost QHS, Cosopt BID,   Brimonidine TID, Lotemax daily,     Atropine BID OD     RETURN TO CLINIC 3 months DOA and Optos

## 2018-02-28 DIAGNOSIS — I10 ESSENTIAL HYPERTENSION: ICD-10-CM

## 2018-02-28 DIAGNOSIS — H40.9 SEVERE STAGE GLAUCOMA: ICD-10-CM

## 2018-02-28 DIAGNOSIS — H40.1133 PRIMARY OPEN ANGLE GLAUCOMA OF BOTH EYES, SEVERE STAGE: ICD-10-CM

## 2018-02-28 RX ORDER — LISINOPRIL AND HYDROCHLOROTHIAZIDE 20; 25 MG/1; MG/1
TABLET ORAL
Qty: 90 TABLET | Refills: 3 | Status: SHIPPED | OUTPATIENT
Start: 2018-02-28 | End: 2018-09-13 | Stop reason: SDUPTHER

## 2018-02-28 RX ORDER — DORZOLAMIDE HYDROCHLORIDE AND TIMOLOL MALEATE 20; 5 MG/ML; MG/ML
1 SOLUTION/ DROPS OPHTHALMIC EVERY 12 HOURS
Qty: 10 ML | Refills: 11 | Status: SHIPPED | OUTPATIENT
Start: 2018-02-28 | End: 2018-07-17 | Stop reason: SDUPTHER

## 2018-03-06 ENCOUNTER — OFFICE VISIT (OUTPATIENT)
Dept: DIABETES | Facility: CLINIC | Age: 53
End: 2018-03-06
Payer: MEDICAID

## 2018-03-06 VITALS — WEIGHT: 165.81 LBS | HEIGHT: 58 IN | BODY MASS INDEX: 34.8 KG/M2

## 2018-03-06 DIAGNOSIS — E11.42 TYPE 2 DIABETES MELLITUS WITH DIABETIC POLYNEUROPATHY, WITHOUT LONG-TERM CURRENT USE OF INSULIN: ICD-10-CM

## 2018-03-06 PROCEDURE — 99999 PR PBB SHADOW E&M-EST. PATIENT-LVL III: CPT | Mod: PBBFAC,,, | Performed by: NURSE PRACTITIONER

## 2018-03-06 PROCEDURE — 99214 OFFICE O/P EST MOD 30 MIN: CPT | Mod: S$PBB,,, | Performed by: NURSE PRACTITIONER

## 2018-03-06 PROCEDURE — 99213 OFFICE O/P EST LOW 20 MIN: CPT | Mod: PBBFAC,PO | Performed by: NURSE PRACTITIONER

## 2018-03-06 RX ORDER — GABAPENTIN 300 MG/1
300 CAPSULE ORAL 2 TIMES DAILY
Qty: 180 CAPSULE | Refills: 3 | Status: SHIPPED | OUTPATIENT
Start: 2018-03-06 | End: 2018-10-03 | Stop reason: SDUPTHER

## 2018-03-06 RX ORDER — INSULIN PUMP SYRINGE, 3 ML
EACH MISCELLANEOUS
Qty: 1 EACH | Refills: 0 | Status: SHIPPED | OUTPATIENT
Start: 2018-03-06 | End: 2021-05-26 | Stop reason: SDUPTHER

## 2018-03-06 RX ORDER — GLIMEPIRIDE 2 MG/1
TABLET ORAL
Qty: 180 TABLET | Refills: 3 | Status: SHIPPED | OUTPATIENT
Start: 2018-03-06 | End: 2018-09-13 | Stop reason: SDUPTHER

## 2018-03-06 RX ORDER — METFORMIN HYDROCHLORIDE 1000 MG/1
1000 TABLET ORAL 2 TIMES DAILY WITH MEALS
Qty: 180 TABLET | Refills: 3 | Status: SHIPPED | OUTPATIENT
Start: 2018-03-06 | End: 2018-09-13 | Stop reason: SDUPTHER

## 2018-03-06 RX ORDER — LANCETS
1 EACH MISCELLANEOUS 3 TIMES DAILY
Qty: 100 EACH | Refills: 11 | Status: SHIPPED | OUTPATIENT
Start: 2018-03-06 | End: 2023-05-04 | Stop reason: SDUPTHER

## 2018-03-06 NOTE — PROGRESS NOTES
Patient presents to department although appointment is next week 1 hour later than what she believed to be her scheduled appointment time. As provider schedule was full at the time patient presented, patient was offered several different appointment times from tomorrow through Friday, along with appts with pcp team. Patient declined, daughter refused to leave lobby until patient was seen. Provider overbooked schedule and added patient was added on.

## 2018-03-06 NOTE — PATIENT INSTRUCTIONS
PATIENT INSTRUCTIONS  - Follow up as scheduled.   - Carb Count: 30-45G/meal and 15G/snack  - Exercise: Goal is 150 minutes or more per week  - Bring meter and blood sugar log to each appointment.     - You will take your blood sugar two times daily. Once will always be in the morning before you have any food, (known as your fasting blood sugar), and once should be two hours after EITHER your breakfast, lunch, or dinner, (known as your post-prandial blood sugar). Each day you should check a different meal, (ie. Monday-breakfast; Tuesday- lunch; Wednesday- supper, then repeat).     - Send me your blood sugars weekly if directed to do so. The easiest way to do this is through [facility]. Send in logs on Tuesdays, Wednesdays, or Thursdays.    - Blood Sugar Goals:  1. The goal for fasting blood sugars is 80 -130 mg/dl.   2. The goal for the 2 hour after meal blood sugars is below 180 mg/dl.  3. Blood sugars below 70 are considered LOW and you must eat or drink 15G of carbohydrates immediately, then recheck blood sugar after 15 minutes to ensure blood sugar has returned to normal range, (70 or above)                                                              Diabetes (General Information)  Diabetes is a long-term health problem. It means your body does not make enough insulin. Or it may mean that your body cannot use the insulin it makes. Insulin is a hormone in your body. It lets blood sugar (glucose) reach the cells in your body. All of your cells need glucose for fuel.  When you have diabetes, the glucose in your blood builds up because it cannot get into the cells. This buildup is called high blood sugar (hyperglycemia).  Your blood sugar level depends on several things. It depends on what kind of food you eat and how much of it you eat. It also depends on how much exercise you get, and how much insulin you have in your body. Eating too much of the wrong kinds of food or not taking diabetes medicine on time can cause  high blood sugar. Infections can cause high blood sugar even if you are taking medicines correctly.  These things can also cause low blood sugar:  · Missing meals  · Not eating enough food  · Taking too much diabetes medicine  Diabetes can cause serious problems over time if you do not get treated. These problems include heart disease, stroke, kidney failure, and blindness. They also include nerve pain or loss of feeling in your legs and feet, and gangrene of the feet. By keeping your blood sugar under control you can prevent or delay these problems.  Normal blood sugar levels are 80 to 100 before a meal and less than 180 in the 1 to 2 hours after a meal.  Home care  Follow these guidelines when caring for yourself at home:  · Follow the diet your healthcare provider gives you. Take insulin or other diabetes medicine exactly as told to.  · Watch your blood sugar as you are told to. Keep a log of your results. This will help your provider change your medicines to keep your blood sugar under control.  · Try to reach your ideal weight. You may be able to cut back on or not have to take diabetes medicine if you eat the right foods and get exercise.  · Do not smoke. Smoking worsens the effects of diabetes on your circulation. You are much more likely to have a heart attack if you have diabetes and you smoke.  · Take good care of your feet. If you have lost feeling in your feet, you may not see an injury or infection. Check your feet and between your toes at least once a week.  · Wear a medical alert bracelet or necklace, or carry a card in your wallet that says you have diabetes. This will help healthcare providers give you the right care if you get very ill and cannot tell them that you have diabetes.  Sick day plan  If you get a cold, the flu, or a bacterial or viral infection, take these steps:  · Look at your diabetes sick plan and call your healthcare provider as you were told to. You may need to call your provider  right away if:  ¨ Your blood sugar is above 240 while taking your diabetes medicine  ¨ Your urine ketone levels are above normal or high  ¨ You have been vomiting more than 6 hours  ¨ You have trouble breathing or your breath ha s a fruity smell  ¨ You have a high fever  ¨ You have a fever for several days and you are not getting better  ¨ You get light-headed and are sleepier than usual  · Keep taking your diabetes pills (oral medicine) even if you have been vomiting and are feeling sick. Call your provider right away because you may need insulin to lower your blood sugar until you recover from your illness.  · Keep taking your insulin even if you have been vomiting and are feeling sick. Call your provider right away to ask if you need to change your insulin dose. This will depend on your blood sugar results.  · Check your blood sugar every 2 to 4 hours, or at least 4 times a day.  · Check your ketones often. If you are vomiting and having diarrhea, watch them more often.  · Do not skip meals. Try to eat small meals on a regular schedule. Do this even if you do not feel like eating.  · Drink water or other liquids that do not have caffeine or calories. This will keep you from getting dehydrated. If you are nauseated or vomiting, takes small sips every 5 minutes. To prevent dehydration try to drink a cup (8 ounces) of fluids every hour while you are awake.  General care  Always bring a source of fast-acting sugar with you in case you have symptoms of low blood sugar (below 70). At the first sign of low blood sugar, eat or drink 15 to 20 grams of fast-acting sugar to raise your blood sugar. Examples are:  · 3 to 4 glucose tablets. You can buy these at most drugstores.  · 4 ounces (1/2 cup) of regular (not diet) soft drinks  · 4 ounces (1/2 cup) of any fruit juice  · 8 ounces (1 cup) of milk  · 5 to 6 pieces of hard candy  · 1 tablespoon of honey  Check your blood sugar 15 minutes after treating yourself. If it is  still below 70, take 15 to 20 more grams of fast-acting sugar. Test again in 15 minutes. If it returns to normal (70 or above), eat a snack or meal to keep your blood sugar in a safe range. If it stays low, call your doctor or go to an emergency room.  Follow-up care  Follow-up with your healthcare provider, or as advised. For more information about diabetes, visit the American Diabetes Association website at www.diabetes.org or call 567-690-7116.  When to seek medical advice  Call your healthcare provider right away if you have any of these symptoms of high blood sugar:  · Frequent urination  · Dizziness  · Drowsiness  · Thirst  · Headache  · Nausea or vomiting  · Abdominal pain  · Eyesight changes  · Fast breathing  · Confusion or loss of consciousness  Also call your provider right away if you have any of these signs of low blood sugar:  · Fatigue  · Headache  · Shakes  · Excess sweating  · Hunger  · Feeling anxious or restless  · Eyesight changes  · Drowsiness  · Weakness  · Confusion or loss of consciousness  Call 911  Call for emergency help right away if any of these occur:  · Chest pain or shortness of breath  · Dizziness or fainting  · Weakness of an arm or leg or one side of the face  · Trouble speaking or seeing   Date Last Reviewed: 6/1/2016  © 2343-2105 [employer]. [address] [place] [address], [place] [address]. All rights reserved. This information is not intended as a substitute for professional medical care. Always follow your healthcare professional's instructions.                                                                     Understanding Type 2 Diabetes  When your body is working normally, the food you eat is digested and used as fuel. This fuel supplies energy to the bodys cells. When you have diabetes, the fuel cant enter the cells. Without treatment, diabetes can cause serious long-term health problems.     Your body breaks down the food you eat into glucose.          How the  body gets energy  The digestive system breaks down food, resulting in a sugar called glucose. Some of this glucose is stored in the liver. But most of it enters the bloodstream and travels to the cells to be used as fuel. Glucose needs the help of a hormone called insulin to enter the cells. Insulin is made in the pancreas. It is released into the bloodstream in response to the presence of glucose in the blood. Think of insulin as a key. When insulin reaches a cell, it attaches to the cell wall. This signals the cell to create an opening that allows glucose to enter the cell.  When you have type 2 diabetes  Early in type 2 diabetes, your cells dont respond properly to insulin. Because of this, less glucose than normal moves into cells. This is called insulin resistance. In response, the pancreas makes more insulin. But eventually, the pancreas cant produce enough insulin to overcome insulin resistance. As less and less glucose enters cells, it builds up to a harmful level in the bloodstream. This is known as high blood sugar or hyperglycemia. The result is type 2 diabetes. The cells become starved for energy, which can leave you feeling tired and rundown.  Why high blood sugar is a problem  If high blood sugar is not controlled, blood vessels throughout the body become damaged. Prolonged high blood sugar affects organs, blood vessels, and nerves. As a result, the risks of damage to the heart, kidneys, eyes, and limbs increase. Diabetes also makes other problems, such as high blood pressure and high cholesterol, more dangerous. Over time, people with uncontrolled high blood sugar have an increase in risk of dying of, or being disabled by, heart attack or stroke.  Date Last Reviewed: 7/1/2016  © 7984-0950 [employer]. [address] [place] [address], [place] [address]. All rights reserved. This information is not intended as a substitute for professional medical care. Always follow your healthcare  professional's instructions.                                                            Using a Blood Sugar Log    You have diabetes. This means your body has trouble regulating a sugar called glucose. To help manage your diabetes, youll need to check your blood sugar level as directed by your healthcare provider. Keeping a log of your blood sugar levels will help you track your blood sugar readings. Its a simple and easy way to see how well you are controlling your diabetes.  Checking your blood sugar level  You can check your blood sugar level with a blood glucose meter. Youll first prick the side of your finger with a tiny lancet to draw a tiny drop of blood onto the test strip. Some glucose meters let you use another place on your body to test. But these other places should not be used in some cases as they may be inaccurate. Follow the instructions for your glucose meter. And talk with your healthcare provider before doing the test on other places.  The strip goes into the meter first, then a drop of blood is placed on the tip of the strip. The meter then shows a reading that tells you the level of your blood sugar. Your readings should be in your target range as often as possible. This means not too high or too low. Staying in this range helps lower your risk for complications. Your healthcare provider will help you figure out the target range that is best for you.  Tracking your readings  Every time you check your blood sugar, use your log to keep track of your readings. Your meter will also probably have a memory feature that your healthcare provider can check at your next visit. You may be advised by your healthcare provider to check your blood sugar in the morning, at bedtime, and before and after meals. Be sure to write down all of your numbers. Also use your log to record things that might have affected your blood sugar. Some examples include being sick, certain medicines, being physically active,  feeling stressed, or skipping meals.   Lessons learned from your readings  Tracking your blood sugar readings helps you see patterns. These patterns tell you how your actions affect your blood sugar. For instance, you may have higher numbers after eating certain foods or lower numbers after exercise. They just help you understand how to stay in your target range more often, so that your diabetes remains in good control.  Sharing your log with your healthcare team  Bring your blood sugar log and glucose meter with you to all of your healthcare appointments. This can help your healthcare team make changes to your treatment plan, if needed. This may involve making changes in what you eat, what medicines you take, or how much you exercise.  To learn more  The resources below can help you learn more:  · American Diabetes Association 884-128-1570 www.diabetes.org  · [employer] 456-875-9336 www.lighthouse.org  · National [facility] [place] 335-119-1791 www.nei.nih.gov  · Hormone [employer] 859-418-2012 www.hormone.org  Date Last Reviewed: 5/1/2016  © 7333-0717 [employer]. [address] [place] [address]. All rights reserved. This information is not intended as a substitute for professional medical care. Always follow your healthcare professional's instructions.                                                                                            Diabetes: Exams and Tests    For your diabetes care, you may see your primary care provider or a specialist 2 to 4 times a year, or as directed. This page lists some of the regular exams and tests recommended for people with diabetes. To learn more, contact the American Diabetes Association (354-577-5718, www.diabetes.org).  Tests and immunizations  These should be done at least as often as stated below:  · Blood pressure check: every healthcare provider visit  · A1C: at first, every 3 months; if controlled, then every 3 to 6  months   · Cholesterol and blood lipid tests: at least every 12 months.  · Urine tests for kidney function: every 12 months  · Flu shots: once a year  · Pneumonia shots: talk with your healthcare provider about which pneumonia vaccines are right for you  · Hepatitis B shots: as soon as possible if youre under 60, or as advised by your healthcare provider if youre older than 60  · Shingles vaccine after age 60, even if you have already had shingles   · Other tests or vaccines: as advised by your healthcare provider  · Individualized medical nutrition therapy: at least once, then as needed  · Stop smoking counseling, if you still smoke, at each visit   Regular exams  The following exams help keep you healthy:  · Foot exams. Nerve and blood vessel problems can affect your feet sooner than other parts of your body. Make sure that your healthcare provider checks your feet at every office visit.  · Eye exams. You can have problems with your eyes even if you dont have trouble seeing. An ophthalmologist (eye healthcare provider) or specially trained optometrist will give you a dilated eye exam at least once a year. If you see dark spots, see poorly in dim light, have eye pain or pressure, or notice any other problems, tell your healthcare provider right away.  · Dental exams. Gum disease (also called periodontal disease) and other mouth problems are common in people with diabetes. To help prevent these problems, see your dentist two or more times a year.  Ask your healthcare provider what other exams youll need on a regular basis.  Date Last Reviewed: 6/1/2016  © 3492-3271 [employer]. [address] [place] [address], [place] [address]. All rights reserved. This information is not intended as a substitute for professional medical care. Always follow your healthcare professional's instructions.

## 2018-03-07 ENCOUNTER — TELEPHONE (OUTPATIENT)
Dept: INTERNAL MEDICINE | Facility: CLINIC | Age: 53
End: 2018-03-07

## 2018-03-07 NOTE — TELEPHONE ENCOUNTER
----- Message from Monica Nj sent at 3/7/2018 10:12 AM CST -----  Contact: Patient  Patient states that she needs to renew her handicap tag and a form needs to be filled out. Please call her back at [phone]. Thank you

## 2018-03-07 NOTE — TELEPHONE ENCOUNTER
Patient states that she needs to renew her handicap tag.is it okay to give the form  to pt? Please advise

## 2018-03-07 NOTE — PROGRESS NOTES
Patient presented to clinic on the wrong day in which her appointment  wasn't until the following week. I advised the patient that she was on the wrong day and the schedule was completely full. I also offered her other available appointment times later in the week because of the misunderstanding of the appointment date and time and patient refused to leave because she was out of medication. I informed the patient that it would be a while before she is seen because of the full schedule and she said ok she will wait.

## 2018-03-19 DIAGNOSIS — H40.1133 PRIMARY OPEN ANGLE GLAUCOMA OF BOTH EYES, SEVERE STAGE: ICD-10-CM

## 2018-03-19 DIAGNOSIS — H40.9 SEVERE STAGE GLAUCOMA: ICD-10-CM

## 2018-03-20 RX ORDER — LATANOPROST 50 UG/ML
SOLUTION/ DROPS OPHTHALMIC
Qty: 3 BOTTLE | Refills: 4 | Status: SHIPPED | OUTPATIENT
Start: 2018-03-20 | End: 2018-04-16 | Stop reason: SDUPTHER

## 2018-03-20 RX ORDER — LOTEPREDNOL ETABONATE 5 MG/ML
1 SUSPENSION/ DROPS OPHTHALMIC 4 TIMES DAILY
Qty: 5 ML | Refills: 3 | Status: SHIPPED | OUTPATIENT
Start: 2018-03-20 | End: 2018-07-17

## 2018-03-20 RX ORDER — ATROPINE SULFATE 10 MG/ML
SOLUTION/ DROPS OPHTHALMIC
Qty: 5 ML | Refills: 1 | Status: SHIPPED | OUTPATIENT
Start: 2018-03-20 | End: 2018-07-17 | Stop reason: SDUPTHER

## 2018-03-28 RX ORDER — LOTEPREDNOL ETABONATE 5 MG/ML
1 SUSPENSION/ DROPS OPHTHALMIC 4 TIMES DAILY
Qty: 5 ML | Refills: 2 | Status: SHIPPED | OUTPATIENT
Start: 2018-03-28 | End: 2018-05-15 | Stop reason: DRUGHIGH

## 2018-03-28 NOTE — TELEPHONE ENCOUNTER
REFILLED HER LOTEMAX DROPS AND SENT TO HER [facility].  I SPOKE TO MS SAUER AND LET HER KNOW TODAY. KF

## 2018-04-03 ENCOUNTER — TELEPHONE (OUTPATIENT)
Dept: OPHTHALMOLOGY | Facility: CLINIC | Age: 53
End: 2018-04-03

## 2018-04-03 NOTE — TELEPHONE ENCOUNTER
I called the pt and they say Lotemax was called in, but not covered by Insurance   I then called [facility] and had the RX re-ran with Lotemax gel with a coupon   BIN [id number]  RX PCN [id number]  ISSUER [id number]  GROUP [id number]  ID [id number]     The pharm states its approved yet is 60.00-I will then notify the pt's Daughter of this

## 2018-04-03 NOTE — TELEPHONE ENCOUNTER
----- Message from Tae Guzman sent at 4/3/2018 10:40 AM CDT -----  Contact: Pt  Please give pt a call at ..[phone] (home) regarding her drops not being at the pharmacy.

## 2018-04-10 ENCOUNTER — PATIENT OUTREACH (OUTPATIENT)
Dept: ADMINISTRATIVE | Facility: HOSPITAL | Age: 53
End: 2018-04-10

## 2018-04-16 ENCOUNTER — OFFICE VISIT (OUTPATIENT)
Dept: OPHTHALMOLOGY | Facility: CLINIC | Age: 53
End: 2018-04-16
Payer: MEDICAID

## 2018-04-16 DIAGNOSIS — H20.022 IRITIS, RECURRENT, LEFT: ICD-10-CM

## 2018-04-16 DIAGNOSIS — H04.123 DRY EYE SYNDROME, BILATERAL: ICD-10-CM

## 2018-04-16 DIAGNOSIS — H40.1133 PRIMARY OPEN ANGLE GLAUCOMA OF BOTH EYES, SEVERE STAGE: Primary | ICD-10-CM

## 2018-04-16 DIAGNOSIS — H35.52 RETINITIS PIGMENTOSA OF BOTH EYES: ICD-10-CM

## 2018-04-16 PROCEDURE — 99212 OFFICE O/P EST SF 10 MIN: CPT | Mod: PBBFAC | Performed by: OPHTHALMOLOGY

## 2018-04-16 PROCEDURE — 99999 PR PBB SHADOW E&M-EST. PATIENT-LVL II: CPT | Mod: PBBFAC,,, | Performed by: OPHTHALMOLOGY

## 2018-04-16 PROCEDURE — 92012 INTRM OPH EXAM EST PATIENT: CPT | Mod: S$PBB,,, | Performed by: OPHTHALMOLOGY

## 2018-04-16 NOTE — PROGRESS NOTES
HPI     C/o eye pain      1. COAG (followed by Dr. Scott)  CP OS 8/27/15 - low to moderate flow, TDW 1100, aggressive dilation with   GV  Gonio Puncture OS 9/25/15   SLT OS 6/23/16, 6/23/15, 6-2-14  Goniotomy OS 11-17-16  2. DM  3. RP / VIT A PALMITATE 1500 (discontinued)          OS: Latanoprost QHS Cosopt BID , Brimonidine BID , Lotemax QD  OD atropine bid               Last edited by JOSE Thapa on 4/16/2018 11:48 AM. (History)            Assessment /Plan     For exam results, see Encounter Report.      ICD-10-CM ICD-9-CM    1. Primary open angle glaucoma of both eyes, severe stage H40.1133 365.11 iop up today OS, will stop Lotemax     365.73    2. Dry eye syndrome, bilateral H04.123 375.15 Primary cause of the blurred vision symptoms along with the occasional pain   3. Iritis, recurrent, left H20.022 364.02    4. Retinitis pigmentosa of both eyes H35.52 362.74        OS: Latanoprost QHS Cosopt BID , Brimonidine BID  Will stop Lotemax QD due to elevated iop  OD atropine bid   Add Systane Ultra qid OU or Clear Eyes Pure Relief QID, will not go to gel q hs due to difficulty of use  Keep appointment in 4 weeks

## 2018-05-08 ENCOUNTER — TELEPHONE (OUTPATIENT)
Dept: OPHTHALMOLOGY | Facility: CLINIC | Age: 53
End: 2018-05-08

## 2018-05-08 NOTE — TELEPHONE ENCOUNTER
Spoke to daughter and I informed her that per Dr. Scott she was to stop the Lotemax after 4-16-18 visit.

## 2018-05-08 NOTE — TELEPHONE ENCOUNTER
----- Message from Dixie Sen sent at 5/8/2018 11:17 AM CDT -----  Contact: Patient   Patient would like a call back at [phone], Regards to her drops she only got a 10 day supple's.    Thanks  Td

## 2018-05-09 ENCOUNTER — TELEPHONE (OUTPATIENT)
Dept: OPHTHALMOLOGY | Facility: CLINIC | Age: 53
End: 2018-05-09

## 2018-05-09 NOTE — TELEPHONE ENCOUNTER
I spoke to Chuy, the pharmacist, at the patient's pharmacy and he states that the Lotemax gel drops will cost the patient $60. The patient has been notified.

## 2018-05-15 ENCOUNTER — OFFICE VISIT (OUTPATIENT)
Dept: OPHTHALMOLOGY | Facility: CLINIC | Age: 53
End: 2018-05-15
Payer: MEDICAID

## 2018-05-15 DIAGNOSIS — H04.123 DRY EYE SYNDROME, BILATERAL: ICD-10-CM

## 2018-05-15 DIAGNOSIS — H20.022 IRITIS, RECURRENT, LEFT: ICD-10-CM

## 2018-05-15 DIAGNOSIS — H35.52 RETINITIS PIGMENTOSA OF BOTH EYES: ICD-10-CM

## 2018-05-15 DIAGNOSIS — H40.1133 PRIMARY OPEN ANGLE GLAUCOMA OF BOTH EYES, SEVERE STAGE: Primary | ICD-10-CM

## 2018-05-15 PROCEDURE — 92012 INTRM OPH EXAM EST PATIENT: CPT | Mod: S$PBB,,, | Performed by: OPHTHALMOLOGY

## 2018-05-15 PROCEDURE — 99212 OFFICE O/P EST SF 10 MIN: CPT | Mod: PBBFAC,PO | Performed by: OPHTHALMOLOGY

## 2018-05-15 PROCEDURE — 99999 PR PBB SHADOW E&M-EST. PATIENT-LVL II: CPT | Mod: PBBFAC,,, | Performed by: OPHTHALMOLOGY

## 2018-05-15 NOTE — PROGRESS NOTES
"HPI     Patient returns for a one month iop check and dry eye check, patient is   100% compliant with drop usage.  Eye begins to hurt when she does not use Lotemax - only uses when she   "hurts"    PCP: Dr. Miranda Ackerman    1. COAG (followed by Dr. Scott)  CP OS 8/27/15 - low to moderate flow, TDW 1100, aggressive dilation with   GV  Gonio Puncture OS 9/25/15   SLT OS 6/23/16, 6/23/15, 6-2-14  Goniotomy OS 11-17-16  2. DM  3. RP / VIT A PALMITATE 1500 (discontinued)    OS: Latanoprost QHS Cosopt BID , Brimonidine BID , Lotemax QD  OD atropine bid     Last edited by Robert Scott MD on 5/15/2018 10:08 AM. (History)            Assessment /Plan     For exam results, see Encounter Report.      ICD-10-CM ICD-9-CM    1. Primary open angle glaucoma of both eyes, severe stage H40.1133 365.11 iop borderline acceptable , no observable inflammation     365.73    2. Dry eye syndrome, bilateral H04.123 375.15 stable   3. Iritis, recurrent, left H20.022 364.02 As above   4. Retinitis pigmentosa of both eyes H35.52 362.74 stable       OS: Latanoprost QHS Cosopt BID , Brimonidine BID , Lotemax prn  OD atropine bid      Return to clinic 2 months              "

## 2018-05-26 DIAGNOSIS — E55.9 VITAMIN D DEFICIENCY: ICD-10-CM

## 2018-05-27 RX ORDER — ERGOCALCIFEROL 1.25 MG/1
CAPSULE ORAL
Qty: 10 CAPSULE | Refills: 0 | Status: SHIPPED | OUTPATIENT
Start: 2018-05-27 | End: 2018-10-04 | Stop reason: SDUPTHER

## 2018-07-17 ENCOUNTER — OFFICE VISIT (OUTPATIENT)
Dept: OPHTHALMOLOGY | Facility: CLINIC | Age: 53
End: 2018-07-17
Payer: MEDICAID

## 2018-07-17 DIAGNOSIS — H20.022 IRITIS, RECURRENT, LEFT: ICD-10-CM

## 2018-07-17 DIAGNOSIS — H40.1133 PRIMARY OPEN ANGLE GLAUCOMA OF BOTH EYES, SEVERE STAGE: Primary | ICD-10-CM

## 2018-07-17 DIAGNOSIS — H35.52 RETINITIS PIGMENTOSA OF BOTH EYES: ICD-10-CM

## 2018-07-17 PROCEDURE — 99212 OFFICE O/P EST SF 10 MIN: CPT | Mod: PBBFAC,PO | Performed by: OPHTHALMOLOGY

## 2018-07-17 PROCEDURE — 99999 PR PBB SHADOW E&M-EST. PATIENT-LVL II: CPT | Mod: PBBFAC,,, | Performed by: OPHTHALMOLOGY

## 2018-07-17 PROCEDURE — 92012 INTRM OPH EXAM EST PATIENT: CPT | Mod: S$PBB,,, | Performed by: OPHTHALMOLOGY

## 2018-07-17 RX ORDER — BRIMONIDINE TARTRATE 2 MG/ML
1 SOLUTION/ DROPS OPHTHALMIC 3 TIMES DAILY
Qty: 10 ML | Refills: 6 | Status: SHIPPED | OUTPATIENT
Start: 2018-07-17 | End: 2018-08-01 | Stop reason: SDUPTHER

## 2018-07-17 RX ORDER — DORZOLAMIDE HYDROCHLORIDE AND TIMOLOL MALEATE 20; 5 MG/ML; MG/ML
1 SOLUTION/ DROPS OPHTHALMIC EVERY 12 HOURS
Qty: 10 ML | Refills: 11 | Status: SHIPPED | OUTPATIENT
Start: 2018-08-21 | End: 2019-09-24

## 2018-07-17 RX ORDER — ATROPINE SULFATE 10 MG/ML
SOLUTION/ DROPS OPHTHALMIC
Qty: 5 ML | Refills: 1 | Status: SHIPPED | OUTPATIENT
Start: 2018-07-17 | End: 2019-02-22 | Stop reason: SDUPTHER

## 2018-07-17 RX ORDER — LOTEPREDNOL ETABONATE 5 MG/ML
1 SUSPENSION/ DROPS OPHTHALMIC 4 TIMES DAILY
Qty: 5 ML | Refills: 3 | Status: CANCELLED | OUTPATIENT
Start: 2018-07-17

## 2018-07-17 RX ORDER — PREDNISOLONE ACETATE 10 MG/ML
1 SUSPENSION/ DROPS OPHTHALMIC 4 TIMES DAILY
Qty: 10 ML | Refills: 1 | Status: SHIPPED | OUTPATIENT
Start: 2018-07-17 | End: 2018-08-01

## 2018-07-17 RX ORDER — LATANOPROST 50 UG/ML
1 SOLUTION/ DROPS OPHTHALMIC NIGHTLY
Qty: 7.5 ML | Refills: 4 | Status: SHIPPED | OUTPATIENT
Start: 2018-08-07 | End: 2019-08-14 | Stop reason: SDUPTHER

## 2018-07-17 NOTE — PROGRESS NOTES
"HPI     Glaucoma    Additional comments: OS" Latanoprost QHS, Cosopt, Alphagan BID / OD   Atropine BID           Comments   2 month IOP check  No changes in Va  OS dull pain     PCP: Dr. Miranda Ackerman    1. COAG (followed by Dr. Scott)  CP OS 8/27/15 - low to moderate flow, TDW 1100, aggressive dilation with   GV  Gonio Puncture OS 9/25/15   SLT OS 6/23/16, 6/23/15, 6-2-14  Goniotomy OS 11-17-16  2. DM  3. RP / VIT A PALMITATE 1500 (discontinued)    OS: Latanoprost QHS Cosopt BID , Brimonidine BID , Lotemax QD  OD atropine bid       Last edited by Merry Chicas on 7/17/2018 10:14 AM. (History)            Assessment /Plan     For exam results, see Encounter Report.      ICD-10-CM ICD-9-CM    1. Primary open angle glaucoma of both eyes, severe stage H40.1133 365.11 latanoprost 0.005 % ophthalmic solution     365.73 dorzolamide-timolol 2-0.5% (COSOPT) 22.3-6.8 mg/mL ophthalmic solution      loteprednol (LOTEMAX) 0.5 % ophthalmic suspension      brimonidine 0.2% (ALPHAGAN) 0.2 % Drop      atropine 1% (ISOPTO ATROPINE) 1 % Drop      Pt did not use cosopt this morning    IOP not within acceptable range relative to target IOP with risk of irreversible visual loss. Additional treatment required.  Discussed options, risks, and benefits of additional medication, SLT laser, or incisional glaucoma surgery.     recommend switch from lotemax to pred acetate OS. Use cosopt bid OU, if not better, proceed with ahmed OS     Patient chooses above     Reviewed importance of continued compliance with treatment and follow up.       Consider surgical intervention this week if not lower-ahmed valve         Uncontrolled glaucoma OS on maximum tolerated therapy: Significant risk of continued irreversible glaucomatous vision loss with current level of treatment. Therefore surgical options were reviewed at great length with the pt and ahmed OS  recommended in hopes of reducing the IOP to a more tolerable level. A lengthy discussion of the " risks, benefits and alternatives was presented to the pt including balancing the immediate risks of vision loss from surgery vs the likelihood of continued vision loss from the inadequately controlled glaucoma. Also discussed the need for frequent, careful follow-up exams for monitoring and other possible postop adjustments.            D/c lotemax, switch to pred acetate QID OS to rule out inflammation as cause of pressure spike.         *referral placed today for ahmed OS in case surgery is needed this Thursday. Will cancel it IOP better tomorrow afternoon. Surgery drops not sent today.   OS: Latanoprost QHS Cosopt BID , Brimonidine BID, pred acetate qid OS  OD atropine bid   Return to clinic tomorrow afternoon with IOP check. Proceed with ahmed if IOP not lower OS

## 2018-07-18 ENCOUNTER — OFFICE VISIT (OUTPATIENT)
Dept: OPHTHALMOLOGY | Facility: CLINIC | Age: 53
End: 2018-07-18
Payer: MEDICAID

## 2018-07-18 DIAGNOSIS — H40.1133 PRIMARY OPEN ANGLE GLAUCOMA OF BOTH EYES, SEVERE STAGE: Primary | ICD-10-CM

## 2018-07-18 DIAGNOSIS — H04.123 DRY EYE SYNDROME, BILATERAL: ICD-10-CM

## 2018-07-18 DIAGNOSIS — H35.52 RETINITIS PIGMENTOSA OF BOTH EYES: ICD-10-CM

## 2018-07-18 PROCEDURE — 99999 PR PBB SHADOW E&M-EST. PATIENT-LVL II: CPT | Mod: PBBFAC,,, | Performed by: OPHTHALMOLOGY

## 2018-07-18 PROCEDURE — 92012 INTRM OPH EXAM EST PATIENT: CPT | Mod: S$PBB,,, | Performed by: OPHTHALMOLOGY

## 2018-07-18 PROCEDURE — 99212 OFFICE O/P EST SF 10 MIN: CPT | Mod: PBBFAC,PO | Performed by: OPHTHALMOLOGY

## 2018-07-18 NOTE — PROGRESS NOTES
HPI     Patient returns for a one day iop check, patient has been using Brim bid   .          PCP: Dr. Miranda Ackerman    1. COAG (followed by Dr. Scott)  CP OS 8/27/15 - low to moderate flow, TDW 1100, aggressive dilation with   GV  Gonio Puncture OS 9/25/15   SLT OS 6/23/16, 6/23/15, 6-2-14  Goniotomy OS 11-17-16  2. DM  3. RP / VIT A PALMITATE 1500 (discontinued)    OS: Latanoprost QHS Cosopt BID , Brimonidine BID ,  (stopped Lotemax   yesterday ) Pred Qid   OD atropine bid ( uses once daily )    Last edited by JOSE Thapa on 7/18/2018  4:14 PM. (History)            Assessment /Plan     For exam results, see Encounter Report.      ICD-10-CM ICD-9-CM    1. Primary open angle glaucoma of both eyes, severe stage H40.1133 365.11 iop still elevated. Patient desires to follow without surgery. Explained that eye is likely being damaged without surgery. Will add Rhopressa as a trial     365.73    2. Dry eye syndrome, bilateral H04.123 375.15    3. Retinitis pigmentosa of both eyes H35.52 362.74    4. Uncontrolled type 2 diabetes mellitus without complication, without long-term current use of insulin E11.65 250.02        OS:   Latanoprost QHS Cosopt BID   Brimonidine BID  Pred Qid  Add Rhopressa qd OS     OD atropine bid ( uses once daily )  Return to clinic 1 week

## 2018-07-23 ENCOUNTER — PATIENT MESSAGE (OUTPATIENT)
Dept: INTERNAL MEDICINE | Facility: CLINIC | Age: 53
End: 2018-07-23

## 2018-07-23 ENCOUNTER — TELEPHONE (OUTPATIENT)
Dept: INTERNAL MEDICINE | Facility: CLINIC | Age: 53
End: 2018-07-23

## 2018-07-23 NOTE — TELEPHONE ENCOUNTER
Contacted pt via patient portal regarding appt with Dr. Bergman, advised that due to patient having medicaid insurance the providers are only allowed a certain number of medicaid slots and that Dr. Bergman is already booked out in advance but I could to see if there's any openings with any other MD, PA or NP.

## 2018-07-25 ENCOUNTER — OFFICE VISIT (OUTPATIENT)
Dept: OPHTHALMOLOGY | Facility: CLINIC | Age: 53
End: 2018-07-25
Payer: MEDICAID

## 2018-07-25 DIAGNOSIS — H04.123 DRY EYE SYNDROME, BILATERAL: ICD-10-CM

## 2018-07-25 DIAGNOSIS — H40.1133 PRIMARY OPEN ANGLE GLAUCOMA OF BOTH EYES, SEVERE STAGE: Primary | ICD-10-CM

## 2018-07-25 DIAGNOSIS — H35.52 RETINITIS PIGMENTOSA OF BOTH EYES: ICD-10-CM

## 2018-07-25 DIAGNOSIS — H20.022 IRITIS, RECURRENT, LEFT: ICD-10-CM

## 2018-07-25 PROCEDURE — 92014 COMPRE OPH EXAM EST PT 1/>: CPT | Mod: S$PBB,,, | Performed by: OPHTHALMOLOGY

## 2018-07-25 PROCEDURE — 99212 OFFICE O/P EST SF 10 MIN: CPT | Mod: PBBFAC,PO | Performed by: OPHTHALMOLOGY

## 2018-07-25 PROCEDURE — 99999 PR PBB SHADOW E&M-EST. PATIENT-LVL II: CPT | Mod: PBBFAC,,, | Performed by: OPHTHALMOLOGY

## 2018-07-25 NOTE — PROGRESS NOTES
HPI     Glaucoma    Additional comments: 1 wk IOP ck            Comments   1. COAG (followed by Dr. Scott)  CP OS 8/27/15 - low to moderate flow, TDW 1100, aggressive dilation with   GV  Gonio Puncture OS 9/25/15   SLT OS 6/23/16, 6/23/15, 6-2-14  Goniotomy OS 11-17-16  2. DM  3. RP / VIT A PALMITATE 1500 (discontinued)        OS: Latanoprost QHS Cosopt BID , Brimonidine TID, Pred Qid, rhopressa qd  OD atropine qd       Last edited by Kaylie Shaw MA on 7/25/2018 10:20 AM. (History)            Assessment /Plan     For exam results, see Encounter Report.      ICD-10-CM ICD-9-CM    1. Primary open angle glaucoma of both eyes, severe stage H40.1133 365.11 IOP not within acceptable range relative to target IOP with risk of irreversible visual loss. Additional treatment required.  Discussed options, risks, and benefits of additional medication, SLT laser, or incisional glaucoma surgery.     recommend AHMED OS     Patient chooses to ck IOP next week, if not lower will proceed with Ahmed OS on 8/9   Will pencil in surgery date now     Reviewed importance of continued compliance with treatment and follow up.        365.73    2. Dry eye syndrome, bilateral H04.123 375.15    3. Retinitis pigmentosa of both eyes H35.52 362.74    4. Uncontrolled type 2 diabetes mellitus without complication, without long-term current use of insulin E11.65 250.02    5. Iritis, recurrent, left H20.022 364.02      D/c pred, use Lotemax   OS: Latanoprost QHS Cosopt BID , Brimonidine TID, rhopressa qd, lotemax qd   OD atropine qd   Pt Daughter instructed to bring all eye meds(those being used and not)  in at next visit so we can see what she needs called in for pre-op       RETURN TO CLINIC 1 week IOP

## 2018-08-01 ENCOUNTER — OFFICE VISIT (OUTPATIENT)
Dept: OPHTHALMOLOGY | Facility: CLINIC | Age: 53
End: 2018-08-01
Payer: MEDICAID

## 2018-08-01 DIAGNOSIS — H40.1133 PRIMARY OPEN ANGLE GLAUCOMA OF BOTH EYES, SEVERE STAGE: Primary | ICD-10-CM

## 2018-08-01 DIAGNOSIS — Z91.199 NON COMPLIANCE WITH MEDICAL TREATMENT: ICD-10-CM

## 2018-08-01 DIAGNOSIS — H35.52 RETINITIS PIGMENTOSA OF BOTH EYES: ICD-10-CM

## 2018-08-01 PROCEDURE — 92012 INTRM OPH EXAM EST PATIENT: CPT | Mod: S$PBB,,, | Performed by: OPHTHALMOLOGY

## 2018-08-01 PROCEDURE — 99212 OFFICE O/P EST SF 10 MIN: CPT | Mod: PBBFAC,PO | Performed by: OPHTHALMOLOGY

## 2018-08-01 PROCEDURE — 99999 PR PBB SHADOW E&M-EST. PATIENT-LVL II: CPT | Mod: PBBFAC,,, | Performed by: OPHTHALMOLOGY

## 2018-08-01 RX ORDER — POLYMYXIN B SULFATE AND TRIMETHOPRIM 1; 10000 MG/ML; [USP'U]/ML
1 SOLUTION OPHTHALMIC EVERY 4 HOURS
Qty: 10 ML | Refills: 1 | Status: SHIPPED | OUTPATIENT
Start: 2018-08-01 | End: 2018-08-14 | Stop reason: ALTCHOICE

## 2018-08-01 RX ORDER — KETOROLAC TROMETHAMINE 5 MG/ML
1 SOLUTION OPHTHALMIC 4 TIMES DAILY
Qty: 10 ML | Refills: 3 | Status: SHIPPED | OUTPATIENT
Start: 2018-08-01 | End: 2018-08-14 | Stop reason: SDUPTHER

## 2018-08-01 RX ORDER — BRIMONIDINE TARTRATE 2 MG/ML
1 SOLUTION/ DROPS OPHTHALMIC 3 TIMES DAILY
Qty: 10 ML | Refills: 6 | Status: SHIPPED | OUTPATIENT
Start: 2018-08-01 | End: 2019-08-01

## 2018-08-01 RX ORDER — LOTEPREDNOL ETABONATE 5 MG/ML
1 SUSPENSION/ DROPS OPHTHALMIC 4 TIMES DAILY
COMMUNITY
End: 2018-08-14 | Stop reason: SDUPTHER

## 2018-08-01 NOTE — PROGRESS NOTES
HPI     Glaucoma    Additional comments: 1 week IOP check, Latanoprost QHS OS, Cosopt BID OS,   Brimonidine TID OS, Rhopressa daily OS, lotemax daily OS, Atropine daily   OD           Comments   Pt has been compliant with her drops, brought them in today. States   sometimes her eye hurts, not all the time. Also itchy and puffy every now   and then. No pain today. Vision is a little better in the left eye. Pt did   stop the prednisolone drops last visit.     PCP: Dr. Miranda Ackerman    1. COAG (followed by Dr. Scott)  CP OS 8/27/15 - low to moderate flow, TDW 1100, aggressive dilation with   GV  Gonio Puncture OS 9/25/15   SLT OS 6/23/16, 6/23/15, 6-2-14  Goniotomy OS 11-17-16  2. DM  3. RP / VIT A PALMITATE 1500 (discontinued)        OS: Latanoprost QHS, Cosopt BID , Brimonidine TID, rhopressa qd, lotemax   daily  OD atropine qd  Systane BID OU       Last edited by Ranulfo Kumari on 8/1/2018  9:30 AM. (History)            Assessment /Plan     For exam results, see Encounter Report.      ICD-10-CM ICD-9-CM    1. Primary open angle glaucoma of both eyes, severe stage H40.1133 365.11 Uncontrolled glaucoma OS on maximum tolerated therapy: Significant risk of continued irreversible glaucomatous vision loss with current level of treatment. Therefore surgical options were reviewed at great length with the pt and AHMED  recommended in hopes of reducing the IOP to a more tolerable level. A lengthy discussion of the risks, benefits and alternatives was presented to the pt including balancing the immediate risks of vision loss from surgery vs the likelihood of continued vision loss from the inadequately controlled glaucoma. Also discussed the need for frequent, careful follow-up exams for monitoring and other possible postop adjustments.       MATT with patient and Daughter that she will be patched for 3 hours after sx- and that sx will not restore any vision- only help lower the pressure and avoid any future va loss from  elevated IOP     MATT with pt and Daughter without surgery she will go totally blind     BOOK AHMED LEFT EYE FOR 8/2   COULD TRY TOPICAL     Will use Lotemax, Poly and Keto for pre-op      365.73    2. Retinitis pigmentosa of both eyes H35.52 362.74    3.      Non compliance with medical treatment                           Pt has deferred glaucoma sx since 4/2018 and understands she is loosing vision by waiting and IOP being this high     Matt with pt to not rub eye harsh         OS: Latanoprost QHS, Cosopt BID , Brimonidine TID, increase Lotemax to QID OS today   Stop Rhopressa today   OD atropine qd  Systane BID OU

## 2018-08-03 ENCOUNTER — OFFICE VISIT (OUTPATIENT)
Dept: OPHTHALMOLOGY | Facility: CLINIC | Age: 53
End: 2018-08-03
Payer: MEDICAID

## 2018-08-03 DIAGNOSIS — H40.1133 PRIMARY OPEN ANGLE GLAUCOMA OF BOTH EYES, SEVERE STAGE: ICD-10-CM

## 2018-08-03 DIAGNOSIS — Z98.890 POST-OPERATIVE STATE: Primary | ICD-10-CM

## 2018-08-03 PROCEDURE — 99024 POSTOP FOLLOW-UP VISIT: CPT | Mod: ,,, | Performed by: OPHTHALMOLOGY

## 2018-08-03 PROCEDURE — 99999 PR PBB SHADOW E&M-EST. PATIENT-LVL II: CPT | Mod: PBBFAC,,, | Performed by: OPHTHALMOLOGY

## 2018-08-03 PROCEDURE — 99212 OFFICE O/P EST SF 10 MIN: CPT | Mod: PBBFAC,PO | Performed by: OPHTHALMOLOGY

## 2018-08-03 NOTE — PROGRESS NOTES
HPI     Post-op Evaluation    Additional comments: 1 day Ahmed Valve OS           Comments   Vision blurred  Pain is rated at 7 or 8 today   Using Polymyxin and prednisolone every 2 hours  Systane PRN    PCP: Dr. Miranda Ackerman    1. COAG (followed by Dr. Scott)  CP OS 8/27/15 - low to moderate flow, TDW 1100, aggressive dilation with   GV  Gonio Puncture OS 9/25/15   SLT OS 6/23/16, 6/23/15, 6-2-14  Goniotomy OS 11-17-16  2. DM  3. RP / VIT A PALMITATE 1500 (discontinued)  4. Ahmed Valve OS 8/2/18      OS: Latanoprost QHS, Cosopt BID , Brimonidine TID, rhopressa qd, lotemax   daily (not using)  OD atropine qd       Last edited by Jolene Carter, PCT on 8/3/2018 10:42 AM. (History)            Assessment /Plan     For exam results, see Encounter Report.      ICD-10-CM ICD-9-CM    1. Post-operative state Z98.890 V45.89    2. Primary open angle glaucoma of both eyes, severe stage H40.1133 365.11      365.73        PO Day 1 S/P Phaco/IOL  left eye  Doing well.    Use Durezol QID  Ketorolac qid  Polymyxin B 4 x day  Start genteal gel QID and systane ultra QID     No coag meds to left eye at bedtime   Reinstructed in importance of absolute compliance with Post-OP instructions including medications, shield at bedtime, and limitation of activities. Follow up appointments in approximately one and six weeks or call immediately for increased pain, redness or vision loss.   Start ointment qid

## 2018-08-06 ENCOUNTER — OFFICE VISIT (OUTPATIENT)
Dept: OPHTHALMOLOGY | Facility: CLINIC | Age: 53
End: 2018-08-06
Payer: MEDICAID

## 2018-08-06 DIAGNOSIS — Z98.890 POST-OPERATIVE STATE: Primary | ICD-10-CM

## 2018-08-06 PROCEDURE — 99024 POSTOP FOLLOW-UP VISIT: CPT | Mod: ,,, | Performed by: OPHTHALMOLOGY

## 2018-08-06 PROCEDURE — 99999 PR PBB SHADOW E&M-EST. PATIENT-LVL II: CPT | Mod: PBBFAC,,, | Performed by: OPHTHALMOLOGY

## 2018-08-06 PROCEDURE — 99212 OFFICE O/P EST SF 10 MIN: CPT | Mod: PBBFAC | Performed by: OPHTHALMOLOGY

## 2018-08-06 RX ORDER — PREDNISOLONE ACETATE 10 MG/ML
1 SUSPENSION/ DROPS OPHTHALMIC 4 TIMES DAILY
Qty: 5 ML | Refills: 2 | Status: SHIPPED | OUTPATIENT
Start: 2018-08-06 | End: 2018-08-14 | Stop reason: ALTCHOICE

## 2018-08-06 NOTE — PROGRESS NOTES
HPI     Post-op Evaluation    Additional comments: Pt says pain scale 5  Constant fb sensation- using Genteal gel helps with sxs            Comments   PCP: Dr. Miranda Ackerman    1. COAG (followed by Dr. Scott)  CP OS 8/27/15 - low to moderate flow, TDW 1100, aggressive dilation with   GV  Gonio Puncture OS 9/25/15   SLT OS 6/23/16, 6/23/15, 6-2-14  Goniotomy OS 11-17-16  2. DM  3. RP / VIT A PALMITATE 1500 (discontinued)  4. Ahmed Valve OS 8/2/18    OS: Using Polymyxin, Keto and prednisolone QID   Systane and genteal gel QID   OD atropine qd    Held all of these after ahmed   Latanoprost QHS, Cosopt BID , Brimonidine TID,         Last edited by Robert Scott MD on 8/6/2018  8:37 AM. (History)            Assessment /Plan     For exam results, see Encounter Report.      ICD-10-CM ICD-9-CM    1. Post-operative state Z98.890 V45.89        OS: Using Polymyxin, Keto and prednisolone QID   Regreaf/genteal gel as often as needed  OD: Atropine qd, will add Pred qid OD since OD is hurting (sore)

## 2018-08-09 NOTE — TELEPHONE ENCOUNTER
Dr. Berrios is not here today to ask if he wants ms sexton on lotemax drops.  She does have a appt. Next week. Mickie sent dr. Berrios a message to ask if he wants her on that drop now are wait until her next appt. kf

## 2018-08-10 ENCOUNTER — TELEPHONE (OUTPATIENT)
Dept: OPHTHALMOLOGY | Facility: CLINIC | Age: 53
End: 2018-08-10

## 2018-08-10 NOTE — TELEPHONE ENCOUNTER
Spoke to Chu, patients' daughter and advised that pharmacy called office yesterday to inquire about Lotemax.  After conferring with [employer], advised Chu that her mother should not be using Lotemax until further notice by [employer].  Chu verified that mother has not been using Lotemax.

## 2018-08-13 ENCOUNTER — TELEPHONE (OUTPATIENT)
Dept: DIABETES | Facility: CLINIC | Age: 53
End: 2018-08-13

## 2018-08-13 NOTE — TELEPHONE ENCOUNTER
----- Message from Estrella Mayo sent at 8/13/2018  7:36 AM CDT -----  Contact: pt  Please call pt @ [phone] regarding appt today pt cancel and need to rescheudle for morning appt, none available.

## 2018-08-14 ENCOUNTER — OFFICE VISIT (OUTPATIENT)
Dept: OPHTHALMOLOGY | Facility: CLINIC | Age: 53
End: 2018-08-14
Payer: MEDICAID

## 2018-08-14 DIAGNOSIS — H40.1133 PRIMARY OPEN ANGLE GLAUCOMA OF BOTH EYES, SEVERE STAGE: ICD-10-CM

## 2018-08-14 DIAGNOSIS — Z98.890 POST-OPERATIVE STATE: Primary | ICD-10-CM

## 2018-08-14 PROCEDURE — 99213 OFFICE O/P EST LOW 20 MIN: CPT | Mod: PBBFAC,PO | Performed by: OPHTHALMOLOGY

## 2018-08-14 PROCEDURE — 99999 PR PBB SHADOW E&M-EST. PATIENT-LVL III: CPT | Mod: PBBFAC,,, | Performed by: OPHTHALMOLOGY

## 2018-08-14 PROCEDURE — 99024 POSTOP FOLLOW-UP VISIT: CPT | Mod: ,,, | Performed by: OPHTHALMOLOGY

## 2018-08-14 RX ORDER — KETOROLAC TROMETHAMINE 5 MG/ML
1 SOLUTION OPHTHALMIC 4 TIMES DAILY
Qty: 10 ML | Refills: 3 | Status: SHIPPED | OUTPATIENT
Start: 2018-08-14 | End: 2018-11-21

## 2018-08-14 RX ORDER — LOTEPREDNOL ETABONATE 5 MG/G
1 GEL OPHTHALMIC 4 TIMES DAILY
Qty: 5 G | Refills: 4 | Status: SHIPPED | OUTPATIENT
Start: 2018-08-14 | End: 2018-08-21 | Stop reason: SDUPTHER

## 2018-08-14 NOTE — PROGRESS NOTES
HPI     Post-op Evaluation      Additional comments: OS: Ketorolac Pred, Systane and Genteal Gel QID              Comments     10 day PO Ahmed OS  Pain OS 3-4  Pain scale  Blurred OS    PCP: Dr. Miranda Ackerman    1. COAG (followed by Dr. Scott)  CP OS 8/27/15 - low to moderate flow, TDW 1100, aggressive dilation with   GV  Gonio Puncture OS 9/25/15   SLT OS 6/23/16, 6/23/15, 6-2-14  Goniotomy OS 11-17-16  2. DM  3. RP / VIT A PALMITATE 1500 (discontinued)  4. Ahmed Valve OS 8/2/18    OS: Using  Keto and prednisolone QID   Systane and genteal gel QID   OD atropine qd, Pred QID    Held all of these after ahmed   Latanoprost QHS, Cosopt BID , Brimonidine TID,          Last edited by Merry Chicas on 8/14/2018  9:42 AM. (History)            Assessment /Plan     For exam results, see Encounter Report.      ICD-10-CM ICD-9-CM    1. Post-operative state Z98.890 V45.89    2. Primary open angle glaucoma of both eyes, severe stage H40.1133 365.11 loteprednol (LOTEMAX) 0.5 % ophthalmic suspension     365.73 ketorolac 0.5% (ACULAR) 0.5 % Drop       iop rising post Ahmed  D/c Pred a and start Lotemax qid OS  Continue Keto qid and Systane and gel prn FB sensation  Return to clinic 1 week

## 2018-08-17 ENCOUNTER — TELEPHONE (OUTPATIENT)
Dept: OPHTHALMOLOGY | Facility: CLINIC | Age: 53
End: 2018-08-17

## 2018-08-17 NOTE — TELEPHONE ENCOUNTER
----- Message from Majo Borjas sent at 8/17/2018  2:11 PM CDT -----  Contact: Daughter [phone]   Would like to speak with Dr. Scott's nurse about sore painful eye did not want to schedule before speaking with staff .  Please call back at [phone].

## 2018-08-17 NOTE — TELEPHONE ENCOUNTER
Spoke with pt's daughter.  She has some FB sensation in post-op eye.  She also experiences some irritation after she puts drops in.  No deep pain or other complaints.  Advised can still use Systane in addition to other drops.

## 2018-08-21 ENCOUNTER — OFFICE VISIT (OUTPATIENT)
Dept: OPHTHALMOLOGY | Facility: CLINIC | Age: 53
End: 2018-08-21
Payer: MEDICAID

## 2018-08-21 DIAGNOSIS — Z98.890 POST-OPERATIVE STATE: Primary | ICD-10-CM

## 2018-08-21 DIAGNOSIS — H40.1133 PRIMARY OPEN ANGLE GLAUCOMA OF BOTH EYES, SEVERE STAGE: ICD-10-CM

## 2018-08-21 PROCEDURE — 99212 OFFICE O/P EST SF 10 MIN: CPT | Mod: PBBFAC,PO | Performed by: OPHTHALMOLOGY

## 2018-08-21 PROCEDURE — 99999 PR PBB SHADOW E&M-EST. PATIENT-LVL II: CPT | Mod: PBBFAC,,, | Performed by: OPHTHALMOLOGY

## 2018-08-21 PROCEDURE — 99024 POSTOP FOLLOW-UP VISIT: CPT | Mod: ,,, | Performed by: OPHTHALMOLOGY

## 2018-08-21 RX ORDER — LOTEPREDNOL ETABONATE 5 MG/G
1 GEL OPHTHALMIC 4 TIMES DAILY
Qty: 5 G | Refills: 4 | Status: SHIPPED | OUTPATIENT
Start: 2018-08-21 | End: 2018-10-15 | Stop reason: SDUPTHER

## 2018-08-21 NOTE — PROGRESS NOTES
HPI     Post-op Evaluation      Additional comments: s/p ahmed OS 8/2/18              Comments     1. COAG (followed by Dr. Scott)  CP OS 8/27/15 - low to moderate flow, TDW 1100, aggressive dilation with   GV  Gonio Puncture OS 9/25/15   SLT OS 6/23/16, 6/23/15, 6-2-14  Goniotomy OS 11-17-16  2. DM  3. RP / VIT A PALMITATE 1500 (discontinued)  4. Ahmed Valve OS 8/2/18    OS: Using Keto qid, lotemax qid OS   Systane and genteal gel QID   OD atropine qd, Pred QID prn           Last edited by Kaylie Shaw MA on 8/21/2018 10:48 AM. (History)            Assessment /Plan     For exam results, see Encounter Report.      ICD-10-CM ICD-9-CM    1. Post-operative state Z98.890 V45.89 S/p Ahmed os - some uveitis still present   resume coag meds os    2. Primary open angle glaucoma of both eyes, severe stage H40.1133 365.11      365.73          Resume OS Latanoprost QHS,  cosopt  Bid and Brimonidine TID   OS: Using Keto qid, lotemax qid OS   Systane and genteal gel QID   OD atropine qd, Pred QID prn   RETURN TO CLINIC  3 days     Do not used Pred A to left eye - pt daughter thinks she may have used it recently to the left eye

## 2018-08-22 RX ORDER — LOTEPREDNOL ETABONATE 5 MG/G
GEL OPHTHALMIC
Qty: 1 G | Refills: 2 | Status: SHIPPED | OUTPATIENT
Start: 2018-08-22 | End: 2018-10-22 | Stop reason: SDUPTHER

## 2018-08-24 ENCOUNTER — OFFICE VISIT (OUTPATIENT)
Dept: OPHTHALMOLOGY | Facility: CLINIC | Age: 53
End: 2018-08-24
Payer: MEDICAID

## 2018-08-24 DIAGNOSIS — Z98.890 POST-OPERATIVE STATE: Primary | ICD-10-CM

## 2018-08-24 DIAGNOSIS — H40.1133 PRIMARY OPEN ANGLE GLAUCOMA OF BOTH EYES, SEVERE STAGE: ICD-10-CM

## 2018-08-24 PROCEDURE — 99999 PR PBB SHADOW E&M-EST. PATIENT-LVL II: CPT | Mod: PBBFAC,,, | Performed by: OPHTHALMOLOGY

## 2018-08-24 PROCEDURE — 99212 OFFICE O/P EST SF 10 MIN: CPT | Mod: PBBFAC,PO | Performed by: OPHTHALMOLOGY

## 2018-08-24 PROCEDURE — 99024 POSTOP FOLLOW-UP VISIT: CPT | Mod: ,,, | Performed by: OPHTHALMOLOGY

## 2018-08-24 NOTE — PROGRESS NOTES
HPI     Post-op Evaluation      Additional comments: s/p ahmed OS 8/2/2018. resumed coag meds.               Comments       1. COAG (followed by Dr. Scott)  CP OS 8/27/15 - low to moderate flow, TDW 1100, aggressive dilation with   GV  Gonio Puncture OS 9/25/15   SLT OS 6/23/16, 6/23/15, 6-2-14  Goniotomy OS 11-17-16  2. DM  3. RP / VIT A PALMITATE 1500 (discontinued)  4. Ahmed Valve OS 8/2/18    OS: Using Keto qid, lotemax qid OS  OS: latanoprost qhs, cosopt bid, brimonidine tid (resumed all 8/21/2018)  Systane and genteal gel QID   OD atropine qd, Pred QID prn          Last edited by Kaylie Shaw MA on 8/24/2018  9:03 AM. (History)            Assessment /Plan     For exam results, see Encounter Report.      ICD-10-CM ICD-9-CM    1. Post-operative state Z98.890 V45.89 S/p Ahmed os- IOP Much better today back on meds - follow    2. Primary open angle glaucoma of both eyes, severe stage H40.1133 365.11      365.73          OS: Using Keto qid, lotemax qid OS  OS: latanoprost qhs, cosopt bid, brimonidine tid Systane and genteal gel QID   OD atropine qd, Pred QID prn     RETURN TO CLINIC 3 weeks

## 2018-08-31 ENCOUNTER — TELEPHONE (OUTPATIENT)
Dept: OPHTHALMOLOGY | Facility: CLINIC | Age: 53
End: 2018-08-31

## 2018-08-31 NOTE — TELEPHONE ENCOUNTER
Received a prior auth request for lotemax from [facility]. Pt has been able to fill lotemax in the past, so unsure why it was denied.  Checked with [facility] where pt usually gets her drops from, and she picked up lotemax 2 days ago, so she has it.    Kaylie

## 2018-09-06 ENCOUNTER — TELEPHONE (OUTPATIENT)
Dept: OPHTHALMOLOGY | Facility: CLINIC | Age: 53
End: 2018-09-06

## 2018-09-06 NOTE — TELEPHONE ENCOUNTER
----- Message from Majo Borjas sent at 9/6/2018 10:28 AM CDT -----  Contact: pt  Please give pt a call at ..[phone] (home) regarding some questions she has about her eye drops.

## 2018-09-06 NOTE — TELEPHONE ENCOUNTER
Spoke with pt's daughter.  Lotomax drops aren't lasting, and she has been unable to use coupon.  Spoke with pharmacy here and they will cover it with the coupon.

## 2018-09-13 ENCOUNTER — OFFICE VISIT (OUTPATIENT)
Dept: DIABETES | Facility: CLINIC | Age: 53
End: 2018-09-13
Payer: MEDICAID

## 2018-09-13 VITALS
WEIGHT: 173.75 LBS | BODY MASS INDEX: 36.47 KG/M2 | DIASTOLIC BLOOD PRESSURE: 64 MMHG | HEIGHT: 58 IN | SYSTOLIC BLOOD PRESSURE: 124 MMHG

## 2018-09-13 DIAGNOSIS — I15.2 HYPERTENSION ASSOCIATED WITH DIABETES: ICD-10-CM

## 2018-09-13 DIAGNOSIS — E11.69 HYPERLIPIDEMIA ASSOCIATED WITH TYPE 2 DIABETES MELLITUS: ICD-10-CM

## 2018-09-13 DIAGNOSIS — E11.59 HYPERTENSION ASSOCIATED WITH DIABETES: ICD-10-CM

## 2018-09-13 DIAGNOSIS — E78.5 HYPERLIPIDEMIA ASSOCIATED WITH TYPE 2 DIABETES MELLITUS: ICD-10-CM

## 2018-09-13 DIAGNOSIS — E11.22 TYPE 2 DIABETES MELLITUS WITH STAGE 3 CHRONIC KIDNEY DISEASE, WITHOUT LONG-TERM CURRENT USE OF INSULIN: Primary | ICD-10-CM

## 2018-09-13 DIAGNOSIS — N18.30 TYPE 2 DIABETES MELLITUS WITH STAGE 3 CHRONIC KIDNEY DISEASE, WITHOUT LONG-TERM CURRENT USE OF INSULIN: Primary | ICD-10-CM

## 2018-09-13 LAB — GLUCOSE SERPL-MCNC: 132 MG/DL (ref 70–110)

## 2018-09-13 PROCEDURE — 82948 REAGENT STRIP/BLOOD GLUCOSE: CPT | Mod: PBBFAC,PO | Performed by: NURSE PRACTITIONER

## 2018-09-13 PROCEDURE — 99213 OFFICE O/P EST LOW 20 MIN: CPT | Mod: PBBFAC,PO | Performed by: NURSE PRACTITIONER

## 2018-09-13 PROCEDURE — 99214 OFFICE O/P EST MOD 30 MIN: CPT | Mod: S$PBB,,, | Performed by: NURSE PRACTITIONER

## 2018-09-13 PROCEDURE — 99999 PR PBB SHADOW E&M-EST. PATIENT-LVL III: CPT | Mod: PBBFAC,,, | Performed by: NURSE PRACTITIONER

## 2018-09-13 RX ORDER — GLIMEPIRIDE 2 MG/1
TABLET ORAL
Qty: 180 TABLET | Refills: 3 | Status: SHIPPED | OUTPATIENT
Start: 2018-09-13 | End: 2019-03-11 | Stop reason: DRUGHIGH

## 2018-09-13 RX ORDER — LISINOPRIL AND HYDROCHLOROTHIAZIDE 20; 25 MG/1; MG/1
1 TABLET ORAL DAILY
Qty: 90 TABLET | Refills: 0 | Status: SHIPPED | OUTPATIENT
Start: 2018-09-13 | End: 2019-03-18 | Stop reason: SDUPTHER

## 2018-09-13 RX ORDER — METFORMIN HYDROCHLORIDE 1000 MG/1
1000 TABLET ORAL 2 TIMES DAILY WITH MEALS
Qty: 180 TABLET | Refills: 3 | Status: SHIPPED | OUTPATIENT
Start: 2018-09-13 | End: 2019-09-05 | Stop reason: SDUPTHER

## 2018-09-13 NOTE — LETTER
September 13, 2018      Oneyda Bergman MD  [address] [facility] [address]  [place] [address]           [facility]  [address] [place] [address]  Phone: [phone]  Fax: [phone]          Patient: Richelle Zaldivar   MR Number: [id number]   YOB: 1965   Date of Visit: 9/13/2018       Dear Dr. Oneyda Bergman:    Thank you for referring Richelle Zaldivar to me for evaluation. Attached you will find relevant portions of my assessment and plan of care.    If you have questions, please do not hesitate to call me. I look forward to following Richelle Zaldivar along with you.    Sincerely,    Clare Moore, NP    Enclosure  CC:  No Recipients    If you would like to receive this communication electronically, please contact [email or web address] or [phone] to request more information on [facility] [employer] access.    For providers and/or their staff who would like to refer a patient to [facility], please contact us through our one-stop-shop provider referral line, [facility], at [phone].    If you feel you have received this communication in error or would no longer like to receive these types of communications, please e-mail [email or web address]

## 2018-09-13 NOTE — PATIENT INSTRUCTIONS
FASTING LABS ON October 18, 2018.    MULTIVITAMIN OVER THE COUNTER. TAKE AS DIRECTED ANY BRAND IS FINE, JUST PICK ONE THAT IS GEARED TOWARDS WOMEN AND INCREASED ENERGY.    LOTION FOR FEET, LOTION ONE TO TWO TIMES DAILY

## 2018-09-18 ENCOUNTER — OFFICE VISIT (OUTPATIENT)
Dept: OPHTHALMOLOGY | Facility: CLINIC | Age: 53
End: 2018-09-18
Payer: MEDICAID

## 2018-09-18 DIAGNOSIS — H04.123 DRY EYE SYNDROME, BILATERAL: ICD-10-CM

## 2018-09-18 DIAGNOSIS — H40.1133 PRIMARY OPEN ANGLE GLAUCOMA OF BOTH EYES, SEVERE STAGE: ICD-10-CM

## 2018-09-18 DIAGNOSIS — H35.52 RETINITIS PIGMENTOSA OF BOTH EYES: ICD-10-CM

## 2018-09-18 DIAGNOSIS — Z98.890 POST-OPERATIVE STATE: Primary | ICD-10-CM

## 2018-09-18 PROCEDURE — 99999 PR PBB SHADOW E&M-EST. PATIENT-LVL II: CPT | Mod: PBBFAC,,, | Performed by: OPHTHALMOLOGY

## 2018-09-18 PROCEDURE — 99024 POSTOP FOLLOW-UP VISIT: CPT | Mod: ,,, | Performed by: OPHTHALMOLOGY

## 2018-09-18 PROCEDURE — 99212 OFFICE O/P EST SF 10 MIN: CPT | Mod: PBBFAC,PO | Performed by: OPHTHALMOLOGY

## 2018-09-18 NOTE — PROGRESS NOTES
HPI     Post-op Evaluation      Additional comments: 3 week s/p ahmed valve OS 8/2/18, OS: Keto QID,   Lotemax QID, Latanprost QHS, cosopt BID, Brimonidine TID, Systane, OD:   Atropine daily, Pred QID prn              Comments     Pt states she's using her drops as directed. Sometimes both eyes hurt,   not all the time, not everyday. Vision is about the same. Will call if   they need refills on her drops.     PCP: Dr. Miranda Ackerman    1. COAG (followed by Dr. Scott)  CP OS 8/27/15 - low to moderate flow, TDW 1100, aggressive dilation with   GV  Gonio Puncture OS 9/25/15   SLT OS 6/23/16, 6/23/15, 6-2-14  Goniotomy OS 11-17-16  2. DM  3. RP / VIT A PALMITATE 1500 (discontinued)  4. Ahmed Valve OS 8/2/18    OS: Using Keto qid, lotemax qid OS  OS: latanoprost qhs, cosopt bid, brimonidine tid (resumed all 8/21/2018)  Systane and genteal gel QID   OD atropine qd, Pred QID prn - pt only used pred when OD hurts- not used it much recently          Last edited by Ranulfo Kumari on 9/18/2018  9:20 AM. (History)            Assessment /Plan     For exam results, see Encounter Report.      ICD-10-CM ICD-9-CM    1. Post-operative state Z98.890 V45.89 S/p Ahmed valve left eye- uveitis still present- IOP stable and doing well     MATT With pt and Daughter to be careful placing drop pf lotemax since they are using a 5ml bottle in a week for some reason   She must stay on lotemax at this time to the left eye     2. Primary open angle glaucoma of both eyes, severe stage H40.1133 365.11      365.73    3. Retinitis pigmentosa of both eyes H35.52 362.74    4. Dry eye syndrome, bilateral H04.123 375.15        OS: Using Keto qid, lotemax qid OS  OS: latanoprost qhs, cosopt bid, brimonidine tid (resumed all 8/21/2018)  Systane and genteal gel QID   OD Increase atropine  BID , Use Pred QID     RETURN TO CLINIC 4    Weeks

## 2018-10-03 ENCOUNTER — OFFICE VISIT (OUTPATIENT)
Dept: INTERNAL MEDICINE | Facility: CLINIC | Age: 53
End: 2018-10-03
Payer: MEDICARE

## 2018-10-03 ENCOUNTER — LAB VISIT (OUTPATIENT)
Dept: LAB | Facility: HOSPITAL | Age: 53
End: 2018-10-03
Attending: FAMILY MEDICINE
Payer: MEDICARE

## 2018-10-03 VITALS
HEIGHT: 58 IN | TEMPERATURE: 99 F | HEART RATE: 90 BPM | DIASTOLIC BLOOD PRESSURE: 80 MMHG | BODY MASS INDEX: 36.69 KG/M2 | SYSTOLIC BLOOD PRESSURE: 138 MMHG | OXYGEN SATURATION: 99 % | WEIGHT: 174.81 LBS

## 2018-10-03 DIAGNOSIS — E78.5 HYPERLIPIDEMIA ASSOCIATED WITH TYPE 2 DIABETES MELLITUS: ICD-10-CM

## 2018-10-03 DIAGNOSIS — E11.22 TYPE 2 DIABETES MELLITUS WITH STAGE 3 CHRONIC KIDNEY DISEASE, WITHOUT LONG-TERM CURRENT USE OF INSULIN: ICD-10-CM

## 2018-10-03 DIAGNOSIS — E11.42 TYPE 2 DIABETES MELLITUS WITH DIABETIC POLYNEUROPATHY, WITHOUT LONG-TERM CURRENT USE OF INSULIN: ICD-10-CM

## 2018-10-03 DIAGNOSIS — N18.30 TYPE 2 DIABETES MELLITUS WITH STAGE 3 CHRONIC KIDNEY DISEASE, WITHOUT LONG-TERM CURRENT USE OF INSULIN: ICD-10-CM

## 2018-10-03 DIAGNOSIS — E55.9 VITAMIN D INSUFFICIENCY: ICD-10-CM

## 2018-10-03 DIAGNOSIS — I15.2 HYPERTENSION ASSOCIATED WITH DIABETES: Primary | ICD-10-CM

## 2018-10-03 DIAGNOSIS — E11.59 HYPERTENSION ASSOCIATED WITH DIABETES: Primary | ICD-10-CM

## 2018-10-03 DIAGNOSIS — I15.2 HYPERTENSION ASSOCIATED WITH DIABETES: ICD-10-CM

## 2018-10-03 DIAGNOSIS — K21.9 GASTROESOPHAGEAL REFLUX DISEASE, ESOPHAGITIS PRESENCE NOT SPECIFIED: ICD-10-CM

## 2018-10-03 DIAGNOSIS — E11.69 HYPERLIPIDEMIA ASSOCIATED WITH TYPE 2 DIABETES MELLITUS: ICD-10-CM

## 2018-10-03 DIAGNOSIS — E11.59 HYPERTENSION ASSOCIATED WITH DIABETES: ICD-10-CM

## 2018-10-03 DIAGNOSIS — E66.01 SEVERE OBESITY (BMI 35.0-35.9 WITH COMORBIDITY): ICD-10-CM

## 2018-10-03 DIAGNOSIS — D50.9 IRON DEFICIENCY ANEMIA, UNSPECIFIED IRON DEFICIENCY ANEMIA TYPE: ICD-10-CM

## 2018-10-03 LAB
25(OH)D3+25(OH)D2 SERPL-MCNC: 14 NG/ML
ALBUMIN SERPL BCP-MCNC: 3.7 G/DL
ALP SERPL-CCNC: 57 U/L
ALT SERPL W/O P-5'-P-CCNC: 10 U/L
ANION GAP SERPL CALC-SCNC: 9 MMOL/L
AST SERPL-CCNC: 15 U/L
BASOPHILS # BLD AUTO: 0.04 K/UL
BASOPHILS NFR BLD: 0.5 %
BILIRUB SERPL-MCNC: 0.2 MG/DL
BUN SERPL-MCNC: 12 MG/DL
CALCIUM SERPL-MCNC: 9.5 MG/DL
CHLORIDE SERPL-SCNC: 108 MMOL/L
CHOLEST SERPL-MCNC: 243 MG/DL
CHOLEST/HDLC SERPL: 5.5 {RATIO}
CO2 SERPL-SCNC: 22 MMOL/L
CREAT SERPL-MCNC: 1.1 MG/DL
DIFFERENTIAL METHOD: ABNORMAL
EOSINOPHIL # BLD AUTO: 0.3 K/UL
EOSINOPHIL NFR BLD: 4.1 %
ERYTHROCYTE [DISTWIDTH] IN BLOOD BY AUTOMATED COUNT: 15.7 %
EST. GFR  (AFRICAN AMERICAN): >60 ML/MIN/1.73 M^2
EST. GFR  (NON AFRICAN AMERICAN): 56.7 ML/MIN/1.73 M^2
GLUCOSE SERPL-MCNC: 133 MG/DL
HCT VFR BLD AUTO: 30.5 %
HDLC SERPL-MCNC: 44 MG/DL
HDLC SERPL: 18.1 %
HGB BLD-MCNC: 8.8 G/DL
IMM GRANULOCYTES # BLD AUTO: 0.04 K/UL
IMM GRANULOCYTES NFR BLD AUTO: 0.5 %
LDLC SERPL CALC-MCNC: 159.6 MG/DL
LYMPHOCYTES # BLD AUTO: 2.6 K/UL
LYMPHOCYTES NFR BLD: 33.8 %
MCH RBC QN AUTO: 22.5 PG
MCHC RBC AUTO-ENTMCNC: 28.9 G/DL
MCV RBC AUTO: 78 FL
MONOCYTES # BLD AUTO: 0.6 K/UL
MONOCYTES NFR BLD: 7.3 %
NEUTROPHILS # BLD AUTO: 4.1 K/UL
NEUTROPHILS NFR BLD: 53.8 %
NONHDLC SERPL-MCNC: 199 MG/DL
NRBC BLD-RTO: 0 /100 WBC
PLATELET # BLD AUTO: 302 K/UL
PMV BLD AUTO: 10.7 FL
POTASSIUM SERPL-SCNC: 4.3 MMOL/L
PROT SERPL-MCNC: 7.1 G/DL
RBC # BLD AUTO: 3.91 M/UL
SODIUM SERPL-SCNC: 139 MMOL/L
T4 FREE SERPL-MCNC: 0.73 NG/DL
TRIGL SERPL-MCNC: 197 MG/DL
TSH SERPL DL<=0.005 MIU/L-ACNC: 31.87 UIU/ML
WBC # BLD AUTO: 7.57 K/UL

## 2018-10-03 PROCEDURE — 90686 IIV4 VACC NO PRSV 0.5 ML IM: CPT | Mod: PBBFAC,PO

## 2018-10-03 PROCEDURE — 84439 ASSAY OF FREE THYROXINE: CPT

## 2018-10-03 PROCEDURE — 99213 OFFICE O/P EST LOW 20 MIN: CPT | Mod: PBBFAC,PO,25 | Performed by: FAMILY MEDICINE

## 2018-10-03 PROCEDURE — 80053 COMPREHEN METABOLIC PANEL: CPT

## 2018-10-03 PROCEDURE — 83036 HEMOGLOBIN GLYCOSYLATED A1C: CPT

## 2018-10-03 PROCEDURE — 84443 ASSAY THYROID STIM HORMONE: CPT

## 2018-10-03 PROCEDURE — 82306 VITAMIN D 25 HYDROXY: CPT

## 2018-10-03 PROCEDURE — 85025 COMPLETE CBC W/AUTO DIFF WBC: CPT

## 2018-10-03 PROCEDURE — 99999 PR PBB SHADOW E&M-EST. PATIENT-LVL III: CPT | Mod: PBBFAC,,, | Performed by: FAMILY MEDICINE

## 2018-10-03 PROCEDURE — 36415 COLL VENOUS BLD VENIPUNCTURE: CPT | Mod: PO

## 2018-10-03 PROCEDURE — 99214 OFFICE O/P EST MOD 30 MIN: CPT | Mod: 25,S$PBB,, | Performed by: FAMILY MEDICINE

## 2018-10-03 PROCEDURE — 80061 LIPID PANEL: CPT

## 2018-10-03 RX ORDER — GABAPENTIN 300 MG/1
300 CAPSULE ORAL 2 TIMES DAILY
Qty: 180 CAPSULE | Refills: 3 | Status: SHIPPED | OUTPATIENT
Start: 2018-10-03 | End: 2019-09-05 | Stop reason: SDUPTHER

## 2018-10-03 RX ORDER — OMEPRAZOLE 40 MG/1
40 CAPSULE, DELAYED RELEASE ORAL DAILY
Qty: 90 CAPSULE | Refills: 1 | Status: SHIPPED | OUTPATIENT
Start: 2018-10-03 | End: 2018-12-21 | Stop reason: SDUPTHER

## 2018-10-03 RX ORDER — AMLODIPINE BESYLATE 2.5 MG/1
2.5 TABLET ORAL DAILY
Qty: 30 TABLET | Refills: 3 | Status: SHIPPED | OUTPATIENT
Start: 2018-10-03 | End: 2019-03-18 | Stop reason: SDUPTHER

## 2018-10-03 NOTE — PROGRESS NOTES
Subjective:       Patient ID: Richelle Zaldivar is a 53 y.o. female.    Chief Complaint: Hypertension    53-year-old female patient accompanied by her daughter with Patient Active Problem List:     Hypertension associated with diabetes     Type 2 diabetes mellitus with stage 3 chronic kidney disease, without long-term current use of insulin     Hyperlipidemia associated with type 2 diabetes mellitus     Iron deficiency anemia     Vitamin D insufficiency     GERD (gastroesophageal reflux disease)     Retinitis pigmentosa     Recurrent iritis of left eye     Open-angle glaucoma, severe stage     Severe obesity (BMI 35.0-35.9 with comorbidity)  Here for follow-up on chronic medical conditions and requesting refills on her medications today.  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   Reports bilateral knee tenderness and has been having tingling and numbness sensation off and on lately but has not been taking gabapentin twice daily      Review of Systems   Constitutional: Negative for fatigue.   Eyes: Positive for visual disturbance.   Respiratory: Negative for shortness of breath.    Cardiovascular: Negative for chest pain and leg swelling.   Gastrointestinal: Negative for abdominal pain, nausea and vomiting.   Endocrine: Negative for polydipsia, polyphagia and polyuria.   Musculoskeletal: Positive for arthralgias. Negative for myalgias.   Skin: Negative for  "rash.   Neurological: Positive for numbness and headaches. Negative for weakness and light-headedness.   Psychiatric/Behavioral: Negative for sleep disturbance.         /80   Pulse 90   Temp 98.7 °F (37.1 °C) (Tympanic)   Ht 4' 10" (1.473 m)   Wt 79.3 kg (174 lb 13.2 oz)   SpO2 99%   BMI 36.54 kg/m²   Objective:      Physical Exam   Constitutional: She is oriented to person, place, and time. She appears well-developed and well-nourished.   HENT:   Head: Normocephalic and atraumatic.   Mouth/Throat: Oropharynx is clear and moist.   Cardiovascular: Normal rate, regular rhythm and normal heart sounds.   No murmur heard.  Pulses:       Dorsalis pedis pulses are 2+ on the right side, and 2+ on the left side.   Pulmonary/Chest: Effort normal and breath sounds normal. She has no wheezes.   Abdominal: Soft. Bowel sounds are normal. There is no tenderness.   Musculoskeletal: She exhibits tenderness. She exhibits no edema.   Positive for bilateral knee tenderness   Feet:   Right Foot:   Protective Sensation: 10 sites tested. 10 sites sensed.   Skin Integrity: Positive for dry skin. Negative for callus.   Left Foot:   Protective Sensation: 10 sites tested. 10 sites sensed.   Skin Integrity: Positive for dry skin. Negative for callus.   Neurological: She is alert and oriented to person, place, and time.   Skin: Skin is warm and dry. No rash noted.   Psychiatric: She has a normal mood and affect.         Assessment:       1. Hypertension associated with diabetes    2. Hyperlipidemia associated with type 2 diabetes mellitus    3. Type 2 diabetes mellitus with stage 3 chronic kidney disease, without long-term current use of insulin    4. Gastroesophageal reflux disease, esophagitis presence not specified    5. Iron deficiency anemia, unspecified iron deficiency anemia type    6. Vitamin D insufficiency    7. Severe obesity (BMI 35.0-35.9 with comorbidity)    8. Type 2 diabetes mellitus with diabetic polyneuropathy, " without long-term current use of insulin        Plan:   Hypertension associated with diabetes  -     Comprehensive metabolic panel; Future; Expected date: 10/03/2018  -     Lipid panel; Future; Expected date: 10/03/2018  -     TSH; Future; Expected date: 10/03/2018  -     Urinalysis; Future; Expected date: 10/03/2018  -     amLODIPine (NORVASC) 2.5 MG tablet; Take 1 tablet (2.5 mg total) by mouth once daily.  Dispense: 30 tablet; Refill: 3  Blood pressure is stable today currently on lisinopril hydrochlorothiazide 20/25 mg, will add amlodipine 2.5 mg in the evening, to avoid fluctuations in blood pressure  Patient was advised to monitor blood pressure trends and restrict salt intake    Hyperlipidemia associated with type 2 diabetes mellitus  -     Lipid panel; Future; Expected date: 10/03/2018  Currently taking Lipitor 20 mg daily, Will check fasting labs today    Type 2 diabetes mellitus with stage 3 chronic kidney disease, without long-term current use of insulin  -     CBC auto differential; Future; Expected date: 10/03/2018  -     Comprehensive metabolic panel; Future; Expected date: 10/03/2018  -     Lipid panel; Future; Expected date: 10/03/2018  -     Hemoglobin A1c; Future; Expected date: 10/03/2018  -     Microalbumin/creatinine urine ratio; Future; Expected date: 10/03/2018  Currently on metformin 1000 mg twice daily and Amaryl 2 mg twice daily  Patient was encouraged to increase gabapentin to 300 mg twice daily secondary to neuropathy  Strict lifestyle changes recommended with 1800 ADA low-fat and low-cholesterol diet and exercise 30 min daily  Drink adequate fluids  Diabetic foot exam stable today  Patient has been followed by ophthalmologist secondary to glaucoma    Gastroesophageal reflux disease, esophagitis presence not specified  -     omeprazole (PRILOSEC) 40 MG capsule; Take 1 capsule (40 mg total) by mouth once daily.  Dispense: 90 capsule; Refill: 1  Patient was encouraged to take omeprazole 40  mg daily    Iron deficiency anemia, unspecified iron deficiency anemia type    Vitamin D insufficiency  -     Vitamin D; Future; Expected date: 10/03/2018  Will check vitamin-D levels, taking it weekly    Severe obesity (BMI 35.0-35.9 with comorbidity)-Lifestyle modifications recommended to lose weight with BMI 36 with diet and exercise    Type 2 diabetes mellitus with diabetic polyneuropathy, without long-term current use of insulin  -     gabapentin (NEURONTIN) 300 MG capsule; Take 1 capsule (300 mg total) by mouth 2 (two) times daily.  Dispense: 180 capsule; Refill: 3    Other orders  -     Influenza - Quadrivalent (3 years & older) (PF)  Flu shot given today

## 2018-10-04 ENCOUNTER — TELEPHONE (OUTPATIENT)
Dept: INTERNAL MEDICINE | Facility: CLINIC | Age: 53
End: 2018-10-04

## 2018-10-04 DIAGNOSIS — K21.9 GASTROESOPHAGEAL REFLUX DISEASE, ESOPHAGITIS PRESENCE NOT SPECIFIED: ICD-10-CM

## 2018-10-04 DIAGNOSIS — R79.89 ABNORMAL TSH: ICD-10-CM

## 2018-10-04 DIAGNOSIS — K21.9 GASTROESOPHAGEAL REFLUX DISEASE WITHOUT ESOPHAGITIS: ICD-10-CM

## 2018-10-04 DIAGNOSIS — E11.22 TYPE 2 DIABETES MELLITUS WITH STAGE 3 CHRONIC KIDNEY DISEASE, WITHOUT LONG-TERM CURRENT USE OF INSULIN: ICD-10-CM

## 2018-10-04 DIAGNOSIS — E11.69 HYPERLIPIDEMIA ASSOCIATED WITH TYPE 2 DIABETES MELLITUS: Primary | ICD-10-CM

## 2018-10-04 DIAGNOSIS — E55.9 VITAMIN D DEFICIENCY: ICD-10-CM

## 2018-10-04 DIAGNOSIS — D50.9 IRON DEFICIENCY ANEMIA, UNSPECIFIED IRON DEFICIENCY ANEMIA TYPE: ICD-10-CM

## 2018-10-04 DIAGNOSIS — N18.30 TYPE 2 DIABETES MELLITUS WITH STAGE 3 CHRONIC KIDNEY DISEASE, WITHOUT LONG-TERM CURRENT USE OF INSULIN: ICD-10-CM

## 2018-10-04 DIAGNOSIS — R94.6 ABNORMAL RESULTS OF THYROID FUNCTION STUDIES: ICD-10-CM

## 2018-10-04 DIAGNOSIS — E78.5 HYPERLIPIDEMIA ASSOCIATED WITH TYPE 2 DIABETES MELLITUS: Primary | ICD-10-CM

## 2018-10-04 LAB
ESTIMATED AVG GLUCOSE: 166 MG/DL
HBA1C MFR BLD HPLC: 7.4 %

## 2018-10-04 RX ORDER — LEVOTHYROXINE SODIUM 25 UG/1
25 TABLET ORAL DAILY
Qty: 30 TABLET | Refills: 11 | Status: SHIPPED | OUTPATIENT
Start: 2018-10-04 | End: 2019-09-05 | Stop reason: SDUPTHER

## 2018-10-04 RX ORDER — FERROUS SULFATE 325(65) MG
325 TABLET ORAL 2 TIMES DAILY
Qty: 60 TABLET | Refills: 3 | Status: SHIPPED | OUTPATIENT
Start: 2018-10-04 | End: 2019-03-11 | Stop reason: SDUPTHER

## 2018-10-04 RX ORDER — ERGOCALCIFEROL 1.25 MG/1
50000 CAPSULE ORAL
Qty: 10 CAPSULE | Refills: 0 | Status: SHIPPED | OUTPATIENT
Start: 2018-10-04 | End: 2019-03-11

## 2018-10-04 RX ORDER — ATORVASTATIN CALCIUM 40 MG/1
40 TABLET, FILM COATED ORAL DAILY
Qty: 90 TABLET | Refills: 3 | Status: SHIPPED | OUTPATIENT
Start: 2018-10-04 | End: 2019-09-05 | Stop reason: SDUPTHER

## 2018-10-04 NOTE — TELEPHONE ENCOUNTER
----- Message from Juju Navas sent at 10/4/2018  2:17 PM CDT -----  Contact: patient  Returning your call. Please call patient ASAP @ [phone]. Thanks, roberto

## 2018-10-04 NOTE — TELEPHONE ENCOUNTER
Spoke with Carlee Zaldivar the pt's daughter. Just wanted further clarification on some questions she had concerning pt's cholesterol and thyroid levels. Answered pt's daughters question. Pt's daughter was thankful. Also informed her the letter has been mailed with results for them to review.

## 2018-10-04 NOTE — TELEPHONE ENCOUNTER
Spoke with pt's daughter who would like pt to go back to the prevacid as she now has medicare instead of medicaid and she said the prevacid will be covered

## 2018-10-04 NOTE — TELEPHONE ENCOUNTER
----- Message from Ashley Heredia sent at 10/4/2018  2:54 PM CDT -----  Contact: pt daughter  Caller is requesting a call back from the nurse in regards to pt med  [phone]

## 2018-10-05 RX ORDER — LANSOPRAZOLE 30 MG/1
30 CAPSULE, DELAYED RELEASE ORAL 2 TIMES DAILY
Qty: 180 CAPSULE | Refills: 0 | OUTPATIENT
Start: 2018-10-05

## 2018-10-15 DIAGNOSIS — H40.1133 PRIMARY OPEN ANGLE GLAUCOMA OF BOTH EYES, SEVERE STAGE: ICD-10-CM

## 2018-10-15 RX ORDER — LOTEPREDNOL ETABONATE 5 MG/G
1 GEL OPHTHALMIC 4 TIMES DAILY
Qty: 5 G | Refills: 6 | Status: SHIPPED | OUTPATIENT
Start: 2018-10-15 | End: 2018-10-22 | Stop reason: SDUPTHER

## 2018-10-22 ENCOUNTER — OFFICE VISIT (OUTPATIENT)
Dept: OPHTHALMOLOGY | Facility: CLINIC | Age: 53
End: 2018-10-22
Payer: MEDICARE

## 2018-10-22 DIAGNOSIS — Z98.890 POST-OPERATIVE STATE: Primary | ICD-10-CM

## 2018-10-22 DIAGNOSIS — H40.1133 PRIMARY OPEN ANGLE GLAUCOMA OF BOTH EYES, SEVERE STAGE: ICD-10-CM

## 2018-10-22 DIAGNOSIS — H35.52 RETINITIS PIGMENTOSA OF BOTH EYES: ICD-10-CM

## 2018-10-22 PROCEDURE — 99999 PR PBB SHADOW E&M-EST. PATIENT-LVL III: CPT | Mod: PBBFAC,,, | Performed by: OPHTHALMOLOGY

## 2018-10-22 PROCEDURE — 99024 POSTOP FOLLOW-UP VISIT: CPT | Mod: POP,,, | Performed by: OPHTHALMOLOGY

## 2018-10-22 PROCEDURE — 99213 OFFICE O/P EST LOW 20 MIN: CPT | Mod: PBBFAC | Performed by: OPHTHALMOLOGY

## 2018-10-22 RX ORDER — BRIMONIDINE TARTRATE 1 MG/ML
1 SOLUTION/ DROPS OPHTHALMIC 3 TIMES DAILY
Qty: 15 ML | Refills: 6 | Status: SHIPPED | OUTPATIENT
Start: 2018-10-22 | End: 2019-04-01

## 2018-10-22 NOTE — PROGRESS NOTES
HPI       Patient returns for a one month o.o. visit      PCP: Dr. Miranda Ackerman    1. COAG (followed by Dr. Scott)  CP OS 8/27/15 - low to moderate flow, TDW 1100, aggressive dilation with   GV  Gonio Puncture OS 9/25/15   SLT OS 6/23/16, 6/23/15, 6-2-14  Goniotomy OS 11-17-16  2. DM  3. RP / VIT A PALMITATE 1500 (discontinued)  4. Ahmed Valve OS 8/2/18    OS: Using Keto qid, lotemax qid OS  OS: latanoprost qhs, cosopt bid, brimonidine tid (resumed all 8/21/2018)  Systane and genteal gel QID   OD atropine BID Pred QID prn    Last edited by JOSE Thapa on 10/22/2018 11:09 AM. (History)            Assessment /Plan     For exam results, see Encounter Report.      ICD-10-CM ICD-9-CM    1. Post-operative state Z98.890 V45.89    2. Primary open angle glaucoma of both eyes, severe stage H40.1133 365.11      365.73    3. Retinitis pigmentosa of both eyes H35.52 362.74        Doing well post Ahmed valve OS    OS: reduce Keto and lotemax to tid OS  OS: latanoprost qhs, cosopt bid, brimonidine tid (resumed all 8/21/2018)  Pt reqt brand alphagan 0.1 to be sent to Pharmacy   Systane and genteal gel QID   OD atropine BID Pred QID prn     RETURN TO CLINIC 6 weeks IOP

## 2018-11-13 ENCOUNTER — TELEPHONE (OUTPATIENT)
Dept: OPHTHALMOLOGY | Facility: CLINIC | Age: 53
End: 2018-11-13

## 2018-11-13 NOTE — TELEPHONE ENCOUNTER
Spoke with Kari in pharmacy.  Pt is no longer able to use coupon for lotemax, even with new coupon. Theresa is currently working on prior auth.  Called pt and advised her of this.  Advised her to contact pharmacy to see when Rx is ready for pt.

## 2018-11-13 NOTE — TELEPHONE ENCOUNTER
----- Message from Martha Gonzalez sent at 11/13/2018 10:26 AM CST -----  Contact: self [phone]  Returning call. Please call back at [phone]... Thanks sj

## 2018-11-14 ENCOUNTER — TELEPHONE (OUTPATIENT)
Dept: PHARMACY | Facility: CLINIC | Age: 53
End: 2018-11-14

## 2018-11-14 NOTE — TELEPHONE ENCOUNTER
Called and spoke with patient notifying her PA approved for Lotemax 0.5% Gel Drops for a 20 days supply resulting in a copayment of $3.70.    Patient was thankful and will  tomorrow.    PA Information:  SADIA  [phone]  PA Case ID# [id number]  PA Approval Dates: 10/15/18-11/14/19  PA Approved for Lotemax 0.5% Gel Drops 5 grams per 20 days    Thanks,   Theresa Sheehan CPhT, B.A  Patient Care Advocate   [facility]  Phone: [phone] Ext [phone]  Fax: [phone]

## 2018-11-16 ENCOUNTER — OFFICE VISIT (OUTPATIENT)
Dept: OPHTHALMOLOGY | Facility: CLINIC | Age: 53
End: 2018-11-16
Payer: MEDICARE

## 2018-11-16 DIAGNOSIS — H40.1133 PRIMARY OPEN ANGLE GLAUCOMA OF BOTH EYES, SEVERE STAGE: Primary | ICD-10-CM

## 2018-11-16 DIAGNOSIS — H04.123 DRY EYE SYNDROME, BILATERAL: ICD-10-CM

## 2018-11-16 DIAGNOSIS — H20.022 IRITIS, RECURRENT, LEFT: ICD-10-CM

## 2018-11-16 PROCEDURE — 99999 PR PBB SHADOW E&M-EST. PATIENT-LVL II: CPT | Mod: PBBFAC,,, | Performed by: OPHTHALMOLOGY

## 2018-11-16 PROCEDURE — 99212 OFFICE O/P EST SF 10 MIN: CPT | Mod: PBBFAC,PO | Performed by: OPHTHALMOLOGY

## 2018-11-16 PROCEDURE — 92012 INTRM OPH EXAM EST PATIENT: CPT | Mod: S$PBB,,, | Performed by: OPHTHALMOLOGY

## 2018-11-16 NOTE — PROGRESS NOTES
HPI     Glaucoma      Additional comments: s/p ahmed OS 8/2/18              Comments     Pt states she is having pain in OS X 2 days. States blurry in the corner.   Took tylenol pm last night with little to no effect. Pt says today she isn't using Lotemax but every few days- she ran out recently     1. COAG (followed by Dr. Scott)  CP OS 8/27/15 - low to moderate flow, TDW 1100, aggressive dilation with   GV  Gonio Puncture OS 9/25/15   SLT OS 6/23/16, 6/23/15, 6-2-14  Goniotomy OS 11-17-16  2. DM  3. RP / VIT A PALMITATE 1500 (discontinued)  4. Ahmed Valve OS 8/2/18    OS: Using Keto tid, lotemax tidOS  OS: latanoprost qhs, cosopt bid, brimonidine tid   Systane and genteal gel QID   OD atropine BID Pred TID           Last edited by Kaylie Shaw MA on 11/16/2018 10:36 AM. (History)            Assessment /Plan     For exam results, see Encounter Report.      ICD-10-CM ICD-9-CM    1. Primary open angle glaucoma of both eyes, severe stage H40.1133 365.11      365.73    2. Iritis, recurrent, left H20.022 364.02 Increase Iritis Left eye today - Increase Lotemax to QID    3. Dry eye syndrome, bilateral H04.123 375.15 Still present, follow        Stop Ketorolac now due to Dryness   Increase Lotemax to QID OS   RETURN TO CLINIC 3 weeks       OS: lotemax QID OS  OS: latanoprost qhs, cosopt bid, brimonidine tid   Systane and genteal gel QID   OD atropine BID Pred TID  - pt interested in trying to stop Pred and Atropine in OD- MATT with pt is he gets pain OD, then resume it

## 2018-11-20 ENCOUNTER — OFFICE VISIT (OUTPATIENT)
Dept: PODIATRY | Facility: CLINIC | Age: 53
End: 2018-11-20
Payer: MEDICARE

## 2018-11-20 VITALS
DIASTOLIC BLOOD PRESSURE: 79 MMHG | SYSTOLIC BLOOD PRESSURE: 117 MMHG | HEIGHT: 55 IN | WEIGHT: 174.19 LBS | BODY MASS INDEX: 40.31 KG/M2 | HEART RATE: 110 BPM

## 2018-11-20 DIAGNOSIS — E11.22 TYPE 2 DIABETES MELLITUS WITH STAGE 3 CHRONIC KIDNEY DISEASE, WITHOUT LONG-TERM CURRENT USE OF INSULIN: ICD-10-CM

## 2018-11-20 DIAGNOSIS — M54.16 LUMBAR RADICULOPATHY, CHRONIC: ICD-10-CM

## 2018-11-20 DIAGNOSIS — N18.30 TYPE 2 DIABETES MELLITUS WITH STAGE 3 CHRONIC KIDNEY DISEASE, WITHOUT LONG-TERM CURRENT USE OF INSULIN: ICD-10-CM

## 2018-11-20 DIAGNOSIS — L84 CORN OR CALLUS: ICD-10-CM

## 2018-11-20 DIAGNOSIS — E11.42 DM TYPE 2 WITH DIABETIC PERIPHERAL NEUROPATHY: Primary | ICD-10-CM

## 2018-11-20 PROCEDURE — 99215 OFFICE O/P EST HI 40 MIN: CPT | Mod: PBBFAC,PO | Performed by: PODIATRIST

## 2018-11-20 PROCEDURE — 99214 OFFICE O/P EST MOD 30 MIN: CPT | Mod: S$PBB,,, | Performed by: PODIATRIST

## 2018-11-20 PROCEDURE — 99999 PR PBB SHADOW E&M-EST. PATIENT-LVL V: CPT | Mod: PBBFAC,,, | Performed by: PODIATRIST

## 2018-11-20 RX ORDER — UREA 40 %
CREAM (GRAM) TOPICAL 2 TIMES DAILY
Qty: 1 TUBE | Refills: 3 | Status: SHIPPED | OUTPATIENT
Start: 2018-11-20

## 2018-11-20 NOTE — PROGRESS NOTES
PODIATRIC MEDICINE AND SURGERY      CHIEF COMPLAINT  Chief Complaint   Patient presents with    Diabetic Foot Exam         HPI:    Richelle Zaldivar is a 53 y.o. female who complains of pain to the bilatearl heel.  Pain is described as achy. She is accompanied with her daughter who is translating for her as patient speaks Gujarati.   Pain is worse in the morning and after periods of sitting down and then getting up and walking again.  RIGHT foot is worse than left. Pain is 6/10 on pain scale. Pain is on top of foot and plantar heel bilateral. She is taking gabapentin.   Pain has been present for 1 year. Vitamin D has helped with burning (per daughter).  Pain is worsening.  Pain is aggravated by weight bearing.  She has lower back and right LE pain. She is seeing an orthopedist at [facility].  Work: non employed       Hemoglobin A1C   Date Value Ref Range Status   10/03/2018 7.4 (H) 4.0 - 5.6 % Final     Comment:     ADA Screening Guidelines:  5.7-6.4%  Consistent with prediabetes  >or=6.5%  Consistent with diabetes  High levels of fetal hemoglobin interfere with the HbA1C  assay. Heterozygous hemoglobin variants (HbS, HgC, etc)do  not significantly interfere with this assay.   However, presence of multiple variants may affect accuracy.     02/24/2017 6.8 (H) 4.5 - 6.2 % Final     Comment:     According to ADA guidelines, hemoglobin A1C <7.0% represents  optimal control in non-pregnant diabetic patients.  Different  metrics may apply to specific populations.   Standards of Medical Care in Diabetes - 2016.  For the purpose of screening for the presence of diabetes:  <5.7%     Consistent with the absence of diabetes  5.7-6.4%  Consistent with increasing risk for diabetes   (prediabetes)  >or=6.5%  Consistent with diabetes  Currently no consensus exists for use of hemoglobin A1C  for diagnosis of diabetes for children.     01/05/2017 6.5 (H) 4.5 - 6.2 % Final     Comment:     According to ADA guidelines, hemoglobin  A1C <7.0% represents  optimal control in non-pregnant diabetic patients.  Different  metrics may apply to specific populations.   Standards of Medical Care in Diabetes - 2016.  For the purpose of screening for the presence of diabetes:  <5.7%     Consistent with the absence of diabetes  5.7-6.4%  Consistent with increasing risk for diabetes   (prediabetes)  >or=6.5%  Consistent with diabetes  Currently no consensus exists for use of hemoglobin A1C  for diagnosis of diabetes for children.         PMH  Past Medical History:   Diagnosis Date    Acid reflux     Cataract     Diabetes mellitus, type 2     Glaucoma     HTN (hypertension)     Hyperlipidemia     Recurrent iritis of left eye 4/13/2016        PSH  Past Surgical History:   Procedure Laterality Date    CATARACT EXTRACTION      GLAUCOMA SURGERY Left 08/02/2018    [facility]    GONIOTOMY Left 11/17/2016    HEMORRHOID SURGERY  4/2015    Done in [place]    PARTIAL HYSTERECTOMY      TONSILLECTOMY      WISDOM TOOTH EXTRACTION          MEDS  Current Outpatient Medications on File Prior to Visit   Medication Sig Dispense Refill    amLODIPine (NORVASC) 2.5 MG tablet Take 1 tablet (2.5 mg total) by mouth once daily. 30 tablet 3    atorvastatin (LIPITOR) 40 MG tablet Take 1 tablet (40 mg total) by mouth once daily. 90 tablet 3    atropine 1% (ISOPTO ATROPINE) 1 % Drop INSTILL ONE DROP INTO RIGHT EYE TWICE DAILY AS NEEDED 5 mL 1    blood sugar diagnostic Strp 1 each by Misc.(Non-Drug; Combo Route) route 3 (three) times daily. 100 strip 11    blood-glucose meter kit Use as instructed Insurance Preferred 1 each 0    brimonidine 0.1% (ALPHAGAN P) 0.1 % Drop Place 1 drop into both eyes 3 (three) times daily. 15 mL 6    brimonidine 0.2% (ALPHAGAN) 0.2 % Drop Place 1 drop into both eyes 3 (three) times daily. 10 mL 6    dorzolamide-timolol 2-0.5% (COSOPT) 22.3-6.8 mg/mL ophthalmic solution Place 1 drop into both eyes every 12 (twelve) hours. 10 mL 11     ergocalciferol (VITAMIN D2) 50,000 unit Cap Take 1 capsule (50,000 Units total) by mouth every 7 days. 10 capsule 0    ferrous sulfate (FEOSOL) 325 mg (65 mg iron) Tab tablet Take 1 tablet (325 mg total) by mouth 2 (two) times daily. 60 tablet 3    gabapentin (NEURONTIN) 300 MG capsule Take 1 capsule (300 mg total) by mouth 2 (two) times daily. 180 capsule 3    glimepiride (AMARYL) 2 MG tablet TAKE ONE TABLET BY MOUTH TWICE DAILY 180 tablet 3    ketorolac 0.5% (ACULAR) 0.5 % Drop Place 1 drop into the left eye 4 (four) times daily. for 14 days 10 mL 3    lancets Misc 1 each by Misc.(Non-Drug; Combo Route) route 3 (three) times daily. 100 each 11    latanoprost 0.005 % ophthalmic solution Place 1 drop into both eyes every evening. 7.5 mL 4    levothyroxine (SYNTHROID) 25 MCG tablet Take 1 tablet (25 mcg total) by mouth once daily. 30 tablet 11    lisinopril-hydrochlorothiazide (PRINZIDE,ZESTORETIC) 20-25 mg Tab Take 1 tablet by mouth once daily. 90 tablet 0    loteprednol etabonate (LOTEMAX) 0.5 % DrpG place one drop into the left eye 4 times 5 g 6    metFORMIN (GLUCOPHAGE) 1000 MG tablet Take 1 tablet (1,000 mg total) by mouth 2 (two) times daily with meals. 180 tablet 3    omeprazole (PRILOSEC) 40 MG capsule Take 1 capsule (40 mg total) by mouth once daily. 90 capsule 1    prednisoLONE acetate (PRED FORTE) 1 % DrpS Place one drop into both eyes four times daily 5 mL 2     No current facility-administered medications on file prior to visit.          Medication List           Accurate as of 11/20/18 10:54 AM. If you have any questions, ask your nurse or doctor.               START taking these medications    urea 40 % Crea  Commonly known as:  CARMOL  Apply topically 2 (two) times daily. Apply to thickened areas of skin  Started by:  Jackie Bean DPM        CONTINUE taking these medications    amLODIPine 2.5 MG tablet  Commonly known as:  NORVASC  Take 1 tablet (2.5 mg total) by mouth once daily.      atorvastatin 40 MG tablet  Commonly known as:  LIPITOR  Take 1 tablet (40 mg total) by mouth once daily.     atropine 1% 1 % Drop  Commonly known as:  ISOPTO ATROPINE  INSTILL ONE DROP INTO RIGHT EYE TWICE DAILY AS NEEDED     blood sugar diagnostic Strp  1 each by Misc.(Non-Drug; Combo Route) route 3 (three) times daily.     blood-glucose meter kit  Use as instructed Insurance Preferred     * brimonidine 0.2% 0.2 % Drop  Commonly known as:  ALPHAGAN  Place 1 drop into both eyes 3 (three) times daily.     * ALPHAGAN P 0.1 % Drop  Generic drug:  brimonidine 0.1%  Place 1 drop into both eyes 3 (three) times daily.     dorzolamide-timolol 2-0.5% 22.3-6.8 mg/mL ophthalmic solution  Commonly known as:  COSOPT  Place 1 drop into both eyes every 12 (twelve) hours.     ergocalciferol 50,000 unit Cap  Commonly known as:  VITAMIN D2  Take 1 capsule (50,000 Units total) by mouth every 7 days.     ferrous sulfate 325 mg (65 mg iron) Tab tablet  Commonly known as:  FEOSOL  Take 1 tablet (325 mg total) by mouth 2 (two) times daily.     gabapentin 300 MG capsule  Commonly known as:  NEURONTIN  Take 1 capsule (300 mg total) by mouth 2 (two) times daily.     glimepiride 2 MG tablet  Commonly known as:  AMARYL  TAKE ONE TABLET BY MOUTH TWICE DAILY     ketorolac 0.5% 0.5 % Drop  Commonly known as:  ACULAR  Place 1 drop into the left eye 4 (four) times daily. for 14 days     lancets Misc  1 each by Misc.(Non-Drug; Combo Route) route 3 (three) times daily.     latanoprost 0.005 % ophthalmic solution  Place 1 drop into both eyes every evening.     levothyroxine 25 MCG tablet  Commonly known as:  SYNTHROID  Take 1 tablet (25 mcg total) by mouth once daily.     lisinopril-hydrochlorothiazide 20-25 mg Tab  Commonly known as:  PRINZIDE,ZESTORETIC  Take 1 tablet by mouth once daily.     LOTEMAX 0.5 % Drpg  Generic drug:  loteprednol etabonate  place one drop into the left eye 4 times     metFORMIN 1000 MG tablet  Commonly known as:   GLUCOPHAGE  Take 1 tablet (1,000 mg total) by mouth 2 (two) times daily with meals.     omeprazole 40 MG capsule  Commonly known as:  PRILOSEC  Take 1 capsule (40 mg total) by mouth once daily.     prednisoLONE acetate 1 % Drps  Commonly known as:  PRED FORTE  Place one drop into both eyes four times daily         * This list has 2 medication(s) that are the same as other medications prescribed for you. Read the directions carefully, and ask your doctor or other care provider to review them with you.               Where to Get Your Medications      These medications were sent to [facility] [address]  [address] [address]    Phone:  [phone]   · urea 40 % Crea         ALLG  Review of patient's allergies indicates:  No Known Allergies    SOCIAL Hx  Social History     Socioeconomic History    Marital status:      Spouse name: None    Number of children: 3    Years of education: None    Highest education level: None   Social Needs    Financial resource strain: None    Food insecurity - worry: None    Food insecurity - inability: None    Transportation needs - medical: None    Transportation needs - non-medical: None   Occupational History    None   Tobacco Use    Smoking status: Never Smoker    Smokeless tobacco: Never Used   Substance and Sexual Activity    Alcohol use: No     Alcohol/week: 0.0 oz    Drug use: No    Sexual activity: Yes     Partners: Male     Birth control/protection: None   Other Topics Concern    None   Social History Narrative    , 3 kids.       FAM Hx  Family History   Problem Relation Age of Onset    Diabetes Mother     Hypertension Mother     Hyperlipidemia Mother     Stroke Mother     Amblyopia Mother     Diabetes Sister     Hypertension Sister     Hyperlipidemia Sister     Arthritis Sister     Amblyopia Brother     Amblyopia Maternal Uncle     Blindness Neg Hx     Cancer Neg Hx      "Cataracts Neg Hx     Glaucoma Neg Hx     Macular degeneration Neg Hx     Retinal detachment Neg Hx     Strabismus Neg Hx     Thyroid disease Neg Hx        Review of systems:  The patient denies nausea, vomiting, fevers, chills, shortness of breath, chest pain, and calf pain.      OBJECTIVE:  Vitals:    11/20/18 0905   BP: 117/79   Pulse: 110   Weight: 79 kg (174 lb 2.6 oz)   Height: 4' 1" (1.245 m)   PainSc: 0-No pain       LOWER EXTREMITY  VASCULAR  Dorsalis pedis and posterior tibial pulses palpable 2/4 bilaterally.   Capillary refill time immediate to the toes.   Feet are warm to the touch. Skin temperature warm to warm from proximally to distally   There are varicosities, telangiectasias noted to bilateral foot and ankle regions.   There are no ecchymoses noted to bilateral foot and ankle regions.   There is no gross lower extremity edema.    DERMATOLOGIC  Skin moist with healthy texture and turgor.  There are no open ulcerations, lacerations, or fissures to bilateral foot and ankle regions. There are no signs of infection as there is no erythema, no proximal-extending lymphangiitis, no fluctuance, or crepitus noted on palpation to bilateral foot and ankle regions.   There is no interdigital maceration.   There are diffuse hyperkeratotic lesions noted to feet. Nails are well-trimmed.    NEUROLOGIC  Epicritic sensation is intact as the patient is able to sense light touch to bilateral foot and ankle regions. There is a negative Tinel's sign on percussion of the tibial nerve, dorsal cutaneous nerves, sural nerves of the foot.  Achilles and patellar deep tendon reflexes intact  Babinski reflex absent    ORTHOPEDIC/BIOMECHANICAL  Pain on palpation plantar medial tubercle of calcaneus, bilateral foot. No pain along plantar fascia bilaterally.   No pain with medial-lateral compression of calcaneus.  5/5 muscle strength in all 4 quadrants.   Ankle joint range of motion decreased with knee extended and flexed " b/l.      ASSESSMENT     Encounter Diagnoses   Name Primary?    Type 2 diabetes mellitus with stage 3 chronic kidney disease, without long-term current use of insulin     Lumbar radiculopathy, chronic     DM type 2 with diabetic peripheral neuropathy Yes    Corn or callus          PLAN:   1. The patient was examined and evaluated   2. Treatment options were discussed in detail; the patient elected to undergo conservative treatment. Discussed different treatment option    DM type 2 with diabetic peripheral neuropathy  -     DIABETIC SHOES FOR HOME USE  -     Ambulatory Referral to Physical/Occupational Therapy    Type 2 diabetes mellitus with stage 3 chronic kidney disease, without long-term current use of insulin  -     DIABETIC SHOES FOR HOME USE  -     Ambulatory Referral to Physical/Occupational Therapy    Lumbar radiculopathy, chronic  -     DIABETIC SHOES FOR HOME USE  -     Ambulatory Referral to Physical/Occupational Therapy    Corn or callus  -     DIABETIC SHOES FOR HOME USE    Other orders  -     urea (CARMOL) 40 % Crea; Apply topically 2 (two) times daily. Apply to thickened areas of skin  Dispense: 1 Tube; Refill: 3        3. I gave written and verbal instructions on heel cord stretching and this was demonstrated for the patient. A theraband was also provided to the patient for stretching exercises. Recommendations were given for plantar fasciitis treatment including icing, stretching, arch supports, avoidance of barefoot walking, appropriate shoe wear, and strict compliance. Discussed importance of patient to perform stretching exercises.   4. Prescription was written for OTC arch supports and DM shoes.  I Discussed that plantar fasciitis typically resolves on its own in a variable amount of time. If no improvement, will proceed with cortisone injection. I also  discussed possible tx with SLC, PT, Dynasplint and custom orthotics.Surgical intervention is the last resort for plantar fasciitis.  Physical therapy referral placed    Patient has above noted diagnosis and/or medical co-morbidities.They are being treated and managed by their  Primary Care Physician for management of comorbid states which are deemed to be currently stable.        5. Information was given regarding the patient's condition and prognosis. All the patient's questions were answered.   6. Return to clinic in 4-6 weeks for follow up evaluation, or sooner if symptoms worsen.   7. The patient is welcome to call or email with any questions or concerns at any time.     Future Appointments   Date Time Provider Department Center   12/3/2018  9:45 AM Robert Scott MD [facility]  [facility]   12/11/2018  9:30 AM Robert Scott MD [facility] [place]       Report Electronically Signed By:    Jackie Bean DPM   Podiatric Medicine & Surgery  [facility] [place]  11/20/2018    Disclaimer: This note was partially prepared using a voice recognition system and is likely to have sound alike errors within the text.

## 2018-11-20 NOTE — PATIENT INSTRUCTIONS
You can purchase UREA 40% cream online     [email or web address]     PurSources   Urea 40% Foot Cream 4 oz - Best Callus Remover - Moisturizes & Rehydrates Thick, Cracked, Rough, Dead & Dry Skin - For Feet, Elbows and Hands + Free Pumice Stone - 100% Money Back Guarantee   4.6 out of 5 stars 1,181   $14.99 Prime    You can also purchase Flexitol heel balm cream. This can be found at [employer] [facility] or online [email or web address]

## 2018-11-20 NOTE — LETTER
November 20, 2018      [place]  [address] [place] [address]           [facility]  [address] [place] [address]  Phone: [phone]  Fax: [phone]          Patient: Richelle Zaldivar   MR Number: [id number]   YOB: 1965   Date of Visit: 11/20/2018       Dear Clare Moore:    Thank you for referring Richelle Zaldivar to me for evaluation. Attached you will find relevant portions of my assessment and plan of care.    If you have questions, please do not hesitate to call me. I look forward to following Richelle Zaldivar along with you.    Sincerely,    Jackie Bean, STEVE    Enclosure  CC:  No Recipients    If you would like to receive this communication electronically, please contact [email or web address] or [phone] to request more information on [employer] access.    For providers and/or their staff who would like to refer a patient to [facility], please contact us through our one-stop-shop provider referral line, [facility] , at [phone].    If you feel you have received this communication in error or would no longer like to receive these types of communications, please e-mail [email or web address]

## 2018-11-26 ENCOUNTER — TELEPHONE (OUTPATIENT)
Dept: PODIATRY | Facility: CLINIC | Age: 53
End: 2018-11-26

## 2018-11-26 NOTE — TELEPHONE ENCOUNTER
----- Message from Tigist Lopez sent at 11/26/2018 11:12 AM CST -----  Contact: self  Pt is calling to speak with nurse in regards to diabetic food. Pt states she needs prescription from . Please call pt back at [phone].        Thanks,   Tigist Lopez

## 2018-11-26 NOTE — TELEPHONE ENCOUNTER
Returned pt's call and stated that she needed a replacement Diabetic show rx. She asked me to mail her a copy and I told her I would send it out today. Pt was pleased.

## 2018-12-03 ENCOUNTER — OFFICE VISIT (OUTPATIENT)
Dept: OPHTHALMOLOGY | Facility: CLINIC | Age: 53
End: 2018-12-03
Payer: MEDICARE

## 2018-12-03 DIAGNOSIS — H40.1133 PRIMARY OPEN ANGLE GLAUCOMA OF BOTH EYES, SEVERE STAGE: ICD-10-CM

## 2018-12-03 DIAGNOSIS — H20.022 IRITIS, RECURRENT, LEFT: Primary | ICD-10-CM

## 2018-12-03 DIAGNOSIS — H35.52 RETINITIS PIGMENTOSA OF BOTH EYES: ICD-10-CM

## 2018-12-03 DIAGNOSIS — H04.123 DRY EYE SYNDROME, BILATERAL: ICD-10-CM

## 2018-12-03 PROCEDURE — 99999 PR PBB SHADOW E&M-EST. PATIENT-LVL II: CPT | Mod: PBBFAC,,, | Performed by: OPHTHALMOLOGY

## 2018-12-03 PROCEDURE — 99212 OFFICE O/P EST SF 10 MIN: CPT | Mod: PBBFAC | Performed by: OPHTHALMOLOGY

## 2018-12-03 PROCEDURE — 92012 INTRM OPH EXAM EST PATIENT: CPT | Mod: S$PBB,,, | Performed by: OPHTHALMOLOGY

## 2018-12-21 DIAGNOSIS — H40.9 SEVERE STAGE GLAUCOMA: ICD-10-CM

## 2018-12-21 DIAGNOSIS — H40.1133 PRIMARY OPEN ANGLE GLAUCOMA OF BOTH EYES, SEVERE STAGE: ICD-10-CM

## 2018-12-21 DIAGNOSIS — K21.9 GASTROESOPHAGEAL REFLUX DISEASE, ESOPHAGITIS PRESENCE NOT SPECIFIED: ICD-10-CM

## 2018-12-21 RX ORDER — LOTEPREDNOL ETABONATE 5 MG/G
GEL OPHTHALMIC
Qty: 5 G | Refills: 6 | Status: SHIPPED | OUTPATIENT
Start: 2018-12-21 | End: 2019-01-21 | Stop reason: SDUPTHER

## 2018-12-21 RX ORDER — ATROPINE SULFATE 10 MG/ML
SOLUTION/ DROPS OPHTHALMIC
Qty: 5 ML | Refills: 3 | Status: SHIPPED | OUTPATIENT
Start: 2018-12-21 | End: 2019-09-05 | Stop reason: SDUPTHER

## 2018-12-21 RX ORDER — OMEPRAZOLE 40 MG/1
CAPSULE, DELAYED RELEASE ORAL
Qty: 90 CAPSULE | Refills: 1 | Status: SHIPPED | OUTPATIENT
Start: 2018-12-21 | End: 2019-03-28 | Stop reason: SDUPTHER

## 2018-12-21 RX ORDER — LOTEPREDNOL ETABONATE 5 MG/G
GEL OPHTHALMIC
Qty: 5 G | Refills: 6 | Status: CANCELLED | OUTPATIENT
Start: 2018-12-21

## 2018-12-21 NOTE — TELEPHONE ENCOUNTER
----- Message from Yaa Owens sent at 12/21/2018  1:24 PM CST -----  Contact: pt  She is calling to get a refill...    1. What is the name of the medication you are requesting? Heartburn medication  2. What is the dose? unknown  3. How do you take the medication? Orally, topically, etc? orally  4. How often do you take this medication? Twice daily  5. Do you need a 30 day or 90 day supply? 30  6. How many refills are you requesting? 1  7. What is your preferred pharmacy and location of the pharmacy? [facility] in [facility] [place]  8. Who can we contact with further questions? [phone] ([place])

## 2019-01-15 ENCOUNTER — TELEPHONE (OUTPATIENT)
Dept: INTERNAL MEDICINE | Facility: CLINIC | Age: 54
End: 2019-01-15

## 2019-01-15 NOTE — TELEPHONE ENCOUNTER
----- Message from Esvin Rai sent at 1/15/2019 10:06 AM CST -----  Pt is requesting a call to get a prescription for high blood pressure.          Please call pt back at [phone]

## 2019-01-15 NOTE — TELEPHONE ENCOUNTER
Returned call to pt to verify which blood pressure medication(s) she needed a refill on since she takes more than one. Pt stated that she needed a refill on lisinopril, advised that I will pend to Dr. Bergman and will be sent to pharmacy once approved. SABINA

## 2019-01-21 ENCOUNTER — OFFICE VISIT (OUTPATIENT)
Dept: OPHTHALMOLOGY | Facility: CLINIC | Age: 54
End: 2019-01-21
Payer: MEDICARE

## 2019-01-21 DIAGNOSIS — H40.1133 PRIMARY OPEN ANGLE GLAUCOMA OF BOTH EYES, SEVERE STAGE: ICD-10-CM

## 2019-01-21 DIAGNOSIS — H20.022 IRITIS, RECURRENT, LEFT: Primary | ICD-10-CM

## 2019-01-21 PROCEDURE — 92012 PR EYE EXAM, EST PATIENT,INTERMED: ICD-10-PCS | Mod: S$PBB,,, | Performed by: OPHTHALMOLOGY

## 2019-01-21 PROCEDURE — 99212 OFFICE O/P EST SF 10 MIN: CPT | Mod: PBBFAC | Performed by: OPHTHALMOLOGY

## 2019-01-21 PROCEDURE — 92012 INTRM OPH EXAM EST PATIENT: CPT | Mod: S$PBB,,, | Performed by: OPHTHALMOLOGY

## 2019-01-21 PROCEDURE — 99999 PR PBB SHADOW E&M-EST. PATIENT-LVL II: ICD-10-PCS | Mod: PBBFAC,,, | Performed by: OPHTHALMOLOGY

## 2019-01-21 PROCEDURE — 99999 PR PBB SHADOW E&M-EST. PATIENT-LVL II: CPT | Mod: PBBFAC,,, | Performed by: OPHTHALMOLOGY

## 2019-01-21 RX ORDER — LOTEPREDNOL ETABONATE 5 MG/G
1 GEL OPHTHALMIC
Qty: 5 G | Refills: 6 | Status: SHIPPED | OUTPATIENT
Start: 2019-01-21 | End: 2019-04-08

## 2019-01-21 NOTE — PROGRESS NOTES
HPI     Iritis      Additional comments: OS: Lotemax QID, latanoprost qhs, cosopt bid,   brimonidine tid,Systane and genteal gel QID, OD: Pred TID              Comments     Pt here for 1m Iritis chk. Pt is still experiencing pain at around a 6-7   almost constantly. Pt has to instill an extra drop of Lotemax in her left   eye and it helps with the pain. Pt has been using the Pred OD BID instead   of TID. 100% compliant with all other gtts. Pt says bottle runs out fast     PCP: Dr. Miranda Ackerman    1. COAG (followed by Dr. Scott)  CP OS 8/27/15 - low to moderate flow, TDW 1100, aggressive dilation with   GV  Gonio Puncture OS 9/25/15   SLT OS 6/23/16, 6/23/15, 6-2-14  Goniotomy OS 11-17-16  2. DM  3. RP / VIT A PALMITATE 1500 (discontinued)  4. Ahmed Valve OS 8/2/18    OS: Lotemax QID  OS: latanoprost qhs, cosopt bid, brimonidine tid   OS: Systane and genteal gel QID   OD: Pred TID          Last edited by Robert Scott MD on 1/21/2019 10:19 AM. (History)            Assessment /Plan     For exam results, see Encounter Report.      ICD-10-CM ICD-9-CM    1. Iritis, recurrent, left H20.022 364.02 Still present, increase tx since pt is hurting and not dosing correctly- she runs out of bottle and therefore is cutting dose and having pain    2. Primary open angle glaucoma of both eyes, severe stage H40.1133 365.11      365.73          OS: Lotemax increase dosing to QID, Rx will dose 6 for now   OS: latanoprost qhs, cosopt bid, brimonidine tid   OS: Systane and genteal gel QID   OD: Pred TID     RETURN TO CLINIC 2 months

## 2019-02-22 ENCOUNTER — TELEPHONE (OUTPATIENT)
Dept: OPHTHALMOLOGY | Facility: CLINIC | Age: 54
End: 2019-02-22

## 2019-02-22 DIAGNOSIS — H40.1133 PRIMARY OPEN ANGLE GLAUCOMA OF BOTH EYES, SEVERE STAGE: ICD-10-CM

## 2019-02-22 RX ORDER — ATROPINE SULFATE 10 MG/ML
SOLUTION/ DROPS OPHTHALMIC
Qty: 5 ML | Refills: 1 | Status: CANCELLED | OUTPATIENT
Start: 2019-02-22

## 2019-02-22 RX ORDER — ATROPINE SULFATE 10 MG/ML
SOLUTION/ DROPS OPHTHALMIC
Qty: 5 ML | Refills: 1 | Status: SHIPPED | OUTPATIENT
Start: 2019-02-22 | End: 2019-03-11 | Stop reason: SDUPTHER

## 2019-02-27 ENCOUNTER — TELEPHONE (OUTPATIENT)
Dept: INTERNAL MEDICINE | Facility: CLINIC | Age: 54
End: 2019-02-27

## 2019-02-27 DIAGNOSIS — I15.2 HYPERTENSION ASSOCIATED WITH DIABETES: ICD-10-CM

## 2019-02-27 DIAGNOSIS — E11.69 HYPERLIPIDEMIA ASSOCIATED WITH TYPE 2 DIABETES MELLITUS: ICD-10-CM

## 2019-02-27 DIAGNOSIS — E55.9 VITAMIN D DEFICIENCY: ICD-10-CM

## 2019-02-27 DIAGNOSIS — E11.22 TYPE 2 DIABETES MELLITUS WITH STAGE 3 CHRONIC KIDNEY DISEASE, WITHOUT LONG-TERM CURRENT USE OF INSULIN: ICD-10-CM

## 2019-02-27 DIAGNOSIS — D50.9 IRON DEFICIENCY ANEMIA, UNSPECIFIED IRON DEFICIENCY ANEMIA TYPE: ICD-10-CM

## 2019-02-27 DIAGNOSIS — E78.5 HYPERLIPIDEMIA ASSOCIATED WITH TYPE 2 DIABETES MELLITUS: ICD-10-CM

## 2019-02-27 DIAGNOSIS — N18.30 TYPE 2 DIABETES MELLITUS WITH STAGE 3 CHRONIC KIDNEY DISEASE, WITHOUT LONG-TERM CURRENT USE OF INSULIN: ICD-10-CM

## 2019-02-27 DIAGNOSIS — Z01.818 PRE-OP EXAMINATION: Primary | ICD-10-CM

## 2019-02-27 DIAGNOSIS — E11.59 HYPERTENSION ASSOCIATED WITH DIABETES: ICD-10-CM

## 2019-02-27 NOTE — TELEPHONE ENCOUNTER
----- Message from See Cotton sent at 2/27/2019  8:00 AM CST -----  Contact: [phone]   Pt would like to consult with the nurse about surgery.  Please call back at [phone]. Thx-AH

## 2019-02-27 NOTE — TELEPHONE ENCOUNTER
----- Message from Estrella Mayo sent at 2/27/2019  3:16 PM CST -----  Contact: pt  Type:  Patient Returning Call    Who Called:pt  Who Left Message for Patient:celine  Does the patient know what this is regarding?:test before surgery  Would the patient rather a call back or a response via [facility]? Call back  Best Call Back Number:[phone]  Additional Information: na

## 2019-02-27 NOTE — TELEPHONE ENCOUNTER
Returned called. Scheduled pt for 3/04/19 for all pre-op testing. Pt scheduled for pre-op exam on 3/11/19 at 10 am. Call ended well.

## 2019-02-27 NOTE — TELEPHONE ENCOUNTER
Spoke with pt's daughter pt needs pre-op clearance. She stated she will speak with her sister and give us a call back.

## 2019-02-27 NOTE — TELEPHONE ENCOUNTER
Spoke with pt's daughter. They wanted to do pre-op exam with you next week. I informed them you will be out of clinic all next week. They wanted to know is there any way to do the labs, urine, CXR and EKG next week and then come in March.11 for pre-op exam or does everything have to be done on March.11th. I informed pt's daughter they may have to wait until March.11th to do everything. I also informed them the MRSA screen for sure will have to wait until 3/11. Pt needs CBC, Hepatic panel,PT,PTT,BMP,Urinalysis,Chest X-ray, EKG, Type ABO screen.

## 2019-02-27 NOTE — TELEPHONE ENCOUNTER
----- Message from Yaa Owens sent at 2/27/2019  3:36 PM CST -----  Contact: pt  Type:  Needs Medical Advice    Who Called: pt  Symptoms (please be specific): n/a  How long has patient had these symptoms:  n/a  Pharmacy name and phone #:  n/a  Would the patient rather a call back or a response via [facility]? Call back  Best Call Back Number: [phone] (home)   Additional Information:  The pt is calling to get some testing scheduled before her upcoming surgery.

## 2019-03-04 ENCOUNTER — CLINICAL SUPPORT (OUTPATIENT)
Dept: CARDIOLOGY | Facility: CLINIC | Age: 54
End: 2019-03-04
Payer: MEDICARE

## 2019-03-04 ENCOUNTER — HOSPITAL ENCOUNTER (OUTPATIENT)
Dept: RADIOLOGY | Facility: HOSPITAL | Age: 54
Discharge: HOME | End: 2019-03-04
Attending: FAMILY MEDICINE
Payer: MEDICARE

## 2019-03-04 DIAGNOSIS — Z01.818 PRE-OP EXAMINATION: ICD-10-CM

## 2019-03-04 PROCEDURE — 71046 X-RAY EXAM CHEST 2 VIEWS: CPT | Mod: TC

## 2019-03-04 PROCEDURE — 93010 EKG 12-LEAD: ICD-10-PCS | Mod: S$PBB,,, | Performed by: NUCLEAR MEDICINE

## 2019-03-04 PROCEDURE — 93005 ELECTROCARDIOGRAM TRACING: CPT | Mod: PBBFAC,PN | Performed by: NUCLEAR MEDICINE

## 2019-03-04 PROCEDURE — 71046 XR CHEST PA AND LATERAL: ICD-10-PCS | Mod: 26,,, | Performed by: RADIOLOGY

## 2019-03-04 PROCEDURE — 93010 ELECTROCARDIOGRAM REPORT: CPT | Mod: S$PBB,,, | Performed by: NUCLEAR MEDICINE

## 2019-03-04 PROCEDURE — 71046 X-RAY EXAM CHEST 2 VIEWS: CPT | Mod: 26,,, | Performed by: RADIOLOGY

## 2019-03-11 ENCOUNTER — TELEPHONE (OUTPATIENT)
Dept: OPHTHALMOLOGY | Facility: CLINIC | Age: 54
End: 2019-03-11

## 2019-03-11 ENCOUNTER — PATIENT MESSAGE (OUTPATIENT)
Dept: ADMINISTRATIVE | Facility: OTHER | Age: 54
End: 2019-03-11

## 2019-03-11 ENCOUNTER — OFFICE VISIT (OUTPATIENT)
Dept: INTERNAL MEDICINE | Facility: CLINIC | Age: 54
End: 2019-03-11
Payer: MEDICARE

## 2019-03-11 VITALS
SYSTOLIC BLOOD PRESSURE: 110 MMHG | DIASTOLIC BLOOD PRESSURE: 70 MMHG | BODY MASS INDEX: 39.49 KG/M2 | HEART RATE: 96 BPM | OXYGEN SATURATION: 99 % | WEIGHT: 170.63 LBS | HEIGHT: 55 IN | TEMPERATURE: 99 F

## 2019-03-11 DIAGNOSIS — H40.1133 PRIMARY OPEN ANGLE GLAUCOMA OF BOTH EYES, SEVERE STAGE: ICD-10-CM

## 2019-03-11 DIAGNOSIS — N18.30 TYPE 2 DIABETES MELLITUS WITH STAGE 3 CHRONIC KIDNEY DISEASE, WITHOUT LONG-TERM CURRENT USE OF INSULIN: ICD-10-CM

## 2019-03-11 DIAGNOSIS — E11.22 TYPE 2 DIABETES MELLITUS WITH STAGE 3 CHRONIC KIDNEY DISEASE, WITHOUT LONG-TERM CURRENT USE OF INSULIN: ICD-10-CM

## 2019-03-11 DIAGNOSIS — E11.9 TYPE 2 DIABETES MELLITUS: ICD-10-CM

## 2019-03-11 DIAGNOSIS — Z01.818 PRE-OPERATIVE CLEARANCE: ICD-10-CM

## 2019-03-11 DIAGNOSIS — I15.2 HYPERTENSION ASSOCIATED WITH DIABETES: ICD-10-CM

## 2019-03-11 DIAGNOSIS — D50.9 IRON DEFICIENCY ANEMIA, UNSPECIFIED IRON DEFICIENCY ANEMIA TYPE: ICD-10-CM

## 2019-03-11 DIAGNOSIS — E66.01 MORBID OBESITY WITH BMI OF 45.0-49.9, ADULT: ICD-10-CM

## 2019-03-11 DIAGNOSIS — E55.9 VITAMIN D INSUFFICIENCY: ICD-10-CM

## 2019-03-11 DIAGNOSIS — E11.69 HYPERLIPIDEMIA ASSOCIATED WITH TYPE 2 DIABETES MELLITUS: ICD-10-CM

## 2019-03-11 DIAGNOSIS — E11.59 HYPERTENSION ASSOCIATED WITH DIABETES: ICD-10-CM

## 2019-03-11 DIAGNOSIS — E03.9 HYPOTHYROIDISM, UNSPECIFIED TYPE: ICD-10-CM

## 2019-03-11 DIAGNOSIS — E78.5 HYPERLIPIDEMIA ASSOCIATED WITH TYPE 2 DIABETES MELLITUS: ICD-10-CM

## 2019-03-11 DIAGNOSIS — M17.11 PRIMARY OSTEOARTHRITIS OF RIGHT KNEE: Primary | ICD-10-CM

## 2019-03-11 DIAGNOSIS — K21.9 GASTROESOPHAGEAL REFLUX DISEASE, ESOPHAGITIS PRESENCE NOT SPECIFIED: ICD-10-CM

## 2019-03-11 PROBLEM — M17.9 OSTEOARTHRITIS OF KNEE: Status: ACTIVE | Noted: 2019-03-11

## 2019-03-11 PROCEDURE — 99999 PR PBB SHADOW E&M-EST. PATIENT-LVL III: ICD-10-PCS | Mod: PBBFAC,,, | Performed by: FAMILY MEDICINE

## 2019-03-11 PROCEDURE — 99213 OFFICE O/P EST LOW 20 MIN: CPT | Mod: PBBFAC,PN | Performed by: FAMILY MEDICINE

## 2019-03-11 PROCEDURE — 99214 OFFICE O/P EST MOD 30 MIN: CPT | Mod: S$PBB,,, | Performed by: FAMILY MEDICINE

## 2019-03-11 PROCEDURE — 99214 PR OFFICE/OUTPT VISIT, EST, LEVL IV, 30-39 MIN: ICD-10-PCS | Mod: S$PBB,,, | Performed by: FAMILY MEDICINE

## 2019-03-11 PROCEDURE — 99999 PR PBB SHADOW E&M-EST. PATIENT-LVL III: CPT | Mod: PBBFAC,,, | Performed by: FAMILY MEDICINE

## 2019-03-11 RX ORDER — ERGOCALCIFEROL 1.25 MG/1
50000 CAPSULE ORAL
Qty: 10 CAPSULE | Refills: 0 | Status: SHIPPED | OUTPATIENT
Start: 2019-03-11 | End: 2019-09-05 | Stop reason: ALTCHOICE

## 2019-03-11 RX ORDER — FERROUS SULFATE 325(65) MG
325 TABLET ORAL DAILY
Qty: 30 TABLET | Refills: 3 | Status: SHIPPED | OUTPATIENT
Start: 2019-03-11 | End: 2019-09-17

## 2019-03-11 RX ORDER — ATROPINE SULFATE 10 MG/ML
SOLUTION/ DROPS OPHTHALMIC
Qty: 5 ML | Refills: 1 | Status: CANCELLED | OUTPATIENT
Start: 2019-03-11

## 2019-03-11 RX ORDER — GLIMEPIRIDE 4 MG/1
4 TABLET ORAL 2 TIMES DAILY
Qty: 180 TABLET | Refills: 3 | Status: SHIPPED | OUTPATIENT
Start: 2019-03-11 | End: 2019-09-05 | Stop reason: SDUPTHER

## 2019-03-11 RX ORDER — FENOFIBRATE 54 MG/1
54 TABLET ORAL DAILY
Qty: 90 TABLET | Refills: 3 | Status: SHIPPED | OUTPATIENT
Start: 2019-03-11 | End: 2019-09-05 | Stop reason: SDUPTHER

## 2019-03-11 NOTE — PROGRESS NOTES
Subjective:       Patient ID: Richelle Zaldivar is a 53 y.o. female.    Chief Complaint: Pre-op Exam    53-year-old female patient accompanied by her daughter with Patient Active Problem List:     Hypertension associated with diabetes     Type 2 diabetes mellitus with stage 3 chronic kidney disease, without long-term current use of insulin     Hyperlipidemia associated with type 2 diabetes mellitus     Iron deficiency anemia     Vitamin D insufficiency     GERD (gastroesophageal reflux disease)     Retinitis pigmentosa     Recurrent iritis of left eye     Open-angle glaucoma, severe stage     Osteoarthritis of knee     Hypothyroidism     Morbid obesity with BMI of 45.0-49.9, adult  Here for preop clearance for right knee replacement scheduled by Dr. Mehta on 03/25/2019 at [facility].   Patient reports that she has been having bilateral knee pains but worse on the right side and would like to consider getting right knee replacement.  Reports that she has been taking her medications regularly, but not sure why her sugars have been running high  Patient denies any polyuria polydipsia polyphagia, reports that she has not been taking iron supplements as it makes her constipated  Denies any dizziness  Reports that she feels bad after taking levothyroxine, and would like to check on her thyroid numbers        Review of Systems   Constitutional: Negative for fatigue.   Eyes: Negative for visual disturbance.   Respiratory: Negative for shortness of breath.    Cardiovascular: Negative for chest pain and leg swelling.   Gastrointestinal: Negative for abdominal pain, nausea and vomiting.   Musculoskeletal: Positive for arthralgias. Negative for gait problem, joint swelling and myalgias.   Skin: Negative for rash.   Neurological: Negative for weakness, light-headedness, numbness and headaches.   Psychiatric/Behavioral: Negative for sleep disturbance.       Past Surgical History:   Procedure Laterality Date    CATARACT  EXTRACTION      GLAUCOMA SURGERY Left 08/02/2018    [facility]    GONIOTOMY Left 11/17/2016    HEMORRHOID SURGERY  4/2015    Done in [place]    PARTIAL HYSTERECTOMY      TONSILLECTOMY      WISDOM TOOTH EXTRACTION         Family History   Problem Relation Age of Onset    Diabetes Mother     Hypertension Mother     Hyperlipidemia Mother     Stroke Mother     Amblyopia Mother     Diabetes Sister     Hypertension Sister     Hyperlipidemia Sister     Arthritis Sister     Amblyopia Brother     Amblyopia Maternal Uncle     Blindness Neg Hx     Cancer Neg Hx     Cataracts Neg Hx     Glaucoma Neg Hx     Macular degeneration Neg Hx     Retinal detachment Neg Hx     Strabismus Neg Hx     Thyroid disease Neg Hx      Review of patient's allergies indicates:  No Known Allergies    Outpatient and Clinic-Administered Medications    urea (CARMOL) 40 % Crea 2 times daily       Summary: Apply topically 2 (two) times daily. Apply to thickened areas of skin, Starting Tue 11/20/2018, Normal       timolol maleate 0.5% (TIMOPTIC) 0.5 % Drop        Summary: Place 1 drop into both eyes twice daily, Starting Fri 2/22/2019, Normal       prednisoLONE acetate (PRED FORTE) 1 % DrpS        Summary: Place one drop into both eyes four times daily, Starting Sun 8/12/2018, Normal             Note (10/22/2018): QID OD ONLY             omeprazole (PRILOSEC) 40 MG capsule        Summary: TAKE 1 CAPSULE BY MOUTH ONCE DAILY, Normal       metFORMIN (GLUCOPHAGE) 1000 MG tablet 1,000 mg, 2 times daily with meals       Summary: Take 1 tablet (1,000 mg total) by mouth 2 (two) times daily with meals., Starting Thu 9/13/2018, Normal       loteprednol etabonate (LOTEMAX) 0.5 % DrpG 1 drop, 6 times daily       Summary: Place 1 drop into the left eye 6 (six) times daily., Starting Mon 1/21/2019, Normal       lisinopril-hydrochlorothiazide (PRINZIDE,ZESTORETIC) 20-25 mg Tab 1 tablet, Daily       Summary: Take 1 tablet by mouth once daily., Starting  Thu 9/13/2018, Normal       levothyroxine (SYNTHROID) 25 MCG tablet 25 mcg, Daily       Summary: Take 1 tablet (25 mcg total) by mouth once daily., Starting Thu 10/4/2018, Until Fri 10/4/2019, Normal       latanoprost 0.005 % ophthalmic solution 1 drop, Nightly       Summary: Place 1 drop into both eyes every evening., Starting Tue 8/7/2018, Normal             Note (7/18/2018): qhs os only             lancets Misc 1 each, 3 times daily       Summary: 1 each by Misc.(Non-Drug; Combo Route) route 3 (three) times daily., Starting Tue 3/6/2018, Normal       glimepiride (AMARYL) 4 MG tablet 4 mg, 2 times daily       Summary: Take 1 tablet (4 mg total) by mouth 2 (two) times daily., Starting Mon 3/11/2019, Until Tue 3/10/2020, Normal            Summary: TAKE ONE TABLET BY MOUTH TWICE DAILY, Normal       gabapentin (NEURONTIN) 300 MG capsule 300 mg, 2 times daily       Summary: Take 1 capsule (300 mg total) by mouth 2 (two) times daily., Starting Wed 10/3/2018, Normal       ferrous sulfate (FEOSOL) 325 mg (65 mg iron) Tab tablet 325 mg, Daily       Summary: Take 1 tablet (325 mg total) by mouth once daily., Starting Mon 3/11/2019, Normal       fenofibrate (TRICOR) 54 MG tablet 54 mg, Daily       Summary: Take 1 tablet (54 mg total) by mouth once daily., Starting Mon 3/11/2019, Until Tue 3/10/2020, Normal            Summary: Take 1 capsule (50,000 Units total) by mouth every 7 days., Starting Thu 10/4/2018, Until Mon 3/11/2019, Normal       ergocalciferol (ERGOCALCIFEROL) 50,000 unit Cap 50,000 Units, Every 7 days       Summary: Take 1 capsule (50,000 Units total) by mouth every 7 days., Starting Mon 3/11/2019, Normal       dorzolamide-timolol 2-0.5% (COSOPT) 22.3-6.8 mg/mL ophthalmic solution 1 drop, Every 12 hours       Summary: Place 1 drop into both eyes every 12 (twelve) hours., Starting Tue 8/21/2018, Normal             Note (10/22/2018): Bid os only             dorzolamide (TRUSOPT) 2 % ophthalmic solution         "Summary: Place 1 drop into both eyes twice daily, Starting Fri 2/22/2019, Normal       brimonidine 0.2% (ALPHAGAN) 0.2 % Drop 1 drop, 3 times daily       Summary: Place 1 drop into both eyes 3 (three) times daily., Starting Wed 8/1/2018, Until Thu 8/1/2019, Normal             Note (10/22/2018): Tid os only             brimonidine 0.1% (ALPHAGAN P) 0.1 % Drop 1 drop, 3 times daily       Summary: Place 1 drop into both eyes 3 (three) times daily., Starting Mon 10/22/2018, Until Fri 3/29/2019, Normal       blood-glucose meter kit        Summary: Use as instructed Insurance Preferred, Normal       blood sugar diagnostic Strp 1 each, 3 times daily       Summary: 1 each by Misc.(Non-Drug; Combo Route) route 3 (three) times daily., Starting Tue 3/6/2018, Normal       atropine 1% (ISOPTO ATROPINE) 1 % Drop        Summary: INSTILL 1 DROP INTO RIGHT EYE TWICE DAILY AS NEEDED, Normal            Summary: INSTILL ONE DROP INTO RIGHT EYE TWICE DAILY AS NEEDED, Normal       atropine 1% (ISOPTO ATROPINE) 1 % Drop        Summary: Place 1 drop into the right eye twice daily, Starting Mon 3/11/2019, Normal       atorvastatin (LIPITOR) 40 MG tablet 40 mg, Daily       Summary: Take 1 tablet (40 mg total) by mouth once daily., Starting Thu 10/4/2018, Until Fri 10/4/2019, Normal       amLODIPine (NORVASC) 2.5 MG tablet 2.5 mg, Daily       Summary: Take 1 tablet (2.5 mg total) by mouth once daily., Starting Wed 10/3/2018, Until Thu 10/3/2019, Normal          /70 (BP Location: Right arm, Patient Position: Sitting)   Pulse 96   Temp 98.9 °F (37.2 °C) (Tympanic)   Ht 4' 1" (1.245 m)   Wt 77.4 kg (170 lb 10.2 oz)   SpO2 99%   BMI 49.97 kg/m²   Objective:      Physical Exam   Constitutional: She is oriented to person, place, and time. She appears well-developed and well-nourished.   HENT:   Head: Normocephalic and atraumatic.   Mouth/Throat: Oropharynx is clear and moist.   Cardiovascular: Normal rate, regular rhythm and normal " heart sounds.   No murmur heard.  Pulmonary/Chest: Effort normal and breath sounds normal. She has no wheezes.   Abdominal: Soft. Bowel sounds are normal. There is no tenderness.   Musculoskeletal: She exhibits tenderness. She exhibits no edema.   Positive for tenderness to bilateral knees but worse on the right side anteriorly   Neurological: She is alert and oriented to person, place, and time.   Skin: Skin is warm and dry. No rash noted. No erythema.   Psychiatric: She has a normal mood and affect.       Lab Visit on 03/04/2019   Component Date Value Ref Range Status    WBC 03/04/2019 5.38  3.90 - 12.70 K/uL Final    RBC 03/04/2019 4.23  4.00 - 5.40 M/uL Final    Hemoglobin 03/04/2019 9.5* 12.0 - 16.0 g/dL Final    Hematocrit 03/04/2019 33.0* 37.0 - 48.5 % Final    MCV 03/04/2019 78* 82 - 98 fL Final    MCH 03/04/2019 22.5* 27.0 - 31.0 pg Final    MCHC 03/04/2019 28.8* 32.0 - 36.0 g/dL Final    RDW 03/04/2019 16.6* 11.5 - 14.5 % Final    Platelets 03/04/2019 286  150 - 350 K/uL Final    MPV 03/04/2019 10.4  9.2 - 12.9 fL Final    Immature Granulocytes 03/04/2019 0.6* 0.0 - 0.5 % Final    Gran # (ANC) 03/04/2019 2.9  1.8 - 7.7 K/uL Final    Immature Grans (Abs) 03/04/2019 0.03  0.00 - 0.04 K/uL Final    Comment: Mild elevation in immature granulocytes is non specific and   can be seen in a variety of conditions including stress response,   acute inflammation, trauma and pregnancy. Correlation with other   laboratory and clinical findings is essential.      Lymph # 03/04/2019 1.9  1.0 - 4.8 K/uL Final    Mono # 03/04/2019 0.3  0.3 - 1.0 K/uL Final    Eos # 03/04/2019 0.3  0.0 - 0.5 K/uL Final    Baso # 03/04/2019 0.03  0.00 - 0.20 K/uL Final    nRBC 03/04/2019 0  0 /100 WBC Final    Gran% 03/04/2019 53.0  38.0 - 73.0 % Final    Lymph% 03/04/2019 35.1  18.0 - 48.0 % Final    Mono% 03/04/2019 6.1  4.0 - 15.0 % Final    Eosinophil% 03/04/2019 4.6  0.0 - 8.0 % Final    Basophil% 03/04/2019 0.6   0.0 - 1.9 % Final    Differential Method 03/04/2019 Automated   Final    Sodium 03/04/2019 139  136 - 145 mmol/L Final    Potassium 03/04/2019 4.4  3.5 - 5.1 mmol/L Final    Chloride 03/04/2019 106  95 - 110 mmol/L Final    CO2 03/04/2019 22* 23 - 29 mmol/L Final    Glucose 03/04/2019 201* 70 - 110 mg/dL Final    BUN, Bld 03/04/2019 14  6 - 20 mg/dL Final    Creatinine 03/04/2019 1.3  0.5 - 1.4 mg/dL Final    Calcium 03/04/2019 9.6  8.7 - 10.5 mg/dL Final    Anion Gap 03/04/2019 11  8 - 16 mmol/L Final    eGFR if African American 03/04/2019 53.4* >60 mL/min/1.73 m^2 Final    eGFR if non African American 03/04/2019 46.3* >60 mL/min/1.73 m^2 Final    Comment: Calculation used to obtain the estimated glomerular filtration  rate (eGFR) is the CKD-EPI equation.       Group & Rh 03/04/2019 O POS   Final    aPTT 03/04/2019 26.2  21.0 - 32.0 sec Final    aPTT therapeutic range = 39-69 seconds    Prothrombin Time 03/04/2019 10.3  9.0 - 12.5 sec Final    INR 03/04/2019 1.0  0.8 - 1.2 Final    Comment: Coumadin Therapy:  2.0 - 3.0 for INR for all indicators except mechanical heart valves  and antiphospholipid syndromes which should use 2.5 - 3.5.      Sodium 03/04/2019 139  136 - 145 mmol/L Final    Potassium 03/04/2019 4.4  3.5 - 5.1 mmol/L Final    Chloride 03/04/2019 106  95 - 110 mmol/L Final    CO2 03/04/2019 22* 23 - 29 mmol/L Final    Glucose 03/04/2019 201* 70 - 110 mg/dL Final    BUN, Bld 03/04/2019 14  6 - 20 mg/dL Final    Creatinine 03/04/2019 1.3  0.5 - 1.4 mg/dL Final    Calcium 03/04/2019 9.6  8.7 - 10.5 mg/dL Final    Total Protein 03/04/2019 6.9  6.0 - 8.4 g/dL Final    Albumin 03/04/2019 3.7  3.5 - 5.2 g/dL Final    Total Bilirubin 03/04/2019 0.4  0.1 - 1.0 mg/dL Final    Comment: For infants and newborns, interpretation of results should be based  on gestational age, weight and in agreement with clinical  observations.  Premature Infant recommended reference ranges:  Up to 24  hours.............<8.0 mg/dL  Up to 48 hours............<12.0 mg/dL  3-5 days..................<15.0 mg/dL  6-29 days.................<15.0 mg/dL      Alkaline Phosphatase 03/04/2019 60  55 - 135 U/L Final    AST 03/04/2019 14  10 - 40 U/L Final    ALT 03/04/2019 12  10 - 44 U/L Final    Anion Gap 03/04/2019 11  8 - 16 mmol/L Final    eGFR if African American 03/04/2019 53.4* >60 mL/min/1.73 m^2 Final    eGFR if non African American 03/04/2019 46.3* >60 mL/min/1.73 m^2 Final    Comment: Calculation used to obtain the estimated glomerular filtration  rate (eGFR) is the CKD-EPI equation.       Cholesterol 03/04/2019 161  120 - 199 mg/dL Final    Comment: The National Cholesterol Education Program (NCEP) has set the  following guidelines (reference ranges) for Cholesterol:  Optimal.....................<200 mg/dL  Borderline High.............200-239 mg/dL  High........................> or = 240 mg/dL      Triglycerides 03/04/2019 234* 30 - 150 mg/dL Final    Comment: The National Cholesterol Education Program (NCEP) has set the  following guidelines (reference values) for triglycerides:  Normal......................<150 mg/dL  Borderline High.............150-199 mg/dL  High........................200-499 mg/dL      HDL 03/04/2019 36* 40 - 75 mg/dL Final    Comment: The National Cholesterol Education Program (NCEP) has set the  following guidelines (reference values) for HDL Cholesterol:  Low...............<40 mg/dL  Optimal...........>60 mg/dL      LDL Cholesterol 03/04/2019 78.2  63.0 - 159.0 mg/dL Final    Comment: The National Cholesterol Education Program (NCEP) has set the  following guidelines (reference values) for LDL Cholesterol:  Optimal.......................<130 mg/dL  Borderline High...............130-159 mg/dL  High..........................160-189 mg/dL  Very High.....................>190 mg/dL      HDL/Chol Ratio 03/04/2019 22.4  20.0 - 50.0 % Final    Total Cholesterol/HDL Ratio 03/04/2019  4.5  2.0 - 5.0 Final    Non-HDL Cholesterol 03/04/2019 125  mg/dL Final    Comment: Risk category and Non-HDL cholesterol goals:  Coronary heart disease (CHD)or equivalent (10-year risk of CHD >20%):  Non-HDL cholesterol goal     <130 mg/dL  Two or more CHD risk factors and 10-year risk of CHD <= 20%:  Non-HDL cholesterol goal     <160 mg/dL  0 to 1 CHD risk factor:  Non-HDL cholesterol goal     <190 mg/dL      TSH 03/04/2019 3.249  0.400 - 4.000 uIU/mL Final    Hemoglobin A1C 03/04/2019 9.6* 4.0 - 5.6 % Final    Comment: ADA Screening Guidelines:  5.7-6.4%  Consistent with prediabetes  >or=6.5%  Consistent with diabetes  High levels of fetal hemoglobin interfere with the HbA1C  assay. Heterozygous hemoglobin variants (HbS, HgC, etc)do  not significantly interfere with this assay.   However, presence of multiple variants may affect accuracy.      Estimated Avg Glucose 03/04/2019 229* 68 - 131 mg/dL Final    Vit D, 25-Hydroxy 03/04/2019 15* 30 - 96 ng/mL Final    Comment: Vitamin D deficiency.........<10 ng/mL                              Vitamin D insufficiency......10-29 ng/mL       Vitamin D sufficiency........> or equal to 30 ng/mL  Vitamin D toxicity............>100 ng/mL     Lab Visit on 03/04/2019   Component Date Value Ref Range Status    Specimen UA 03/04/2019 Urine, Clean Catch   Final    Color, UA 03/04/2019 Yellow  Yellow, Straw, Clarissa Final    Appearance, UA 03/04/2019 Clear  Clear Final    pH, UA 03/04/2019 6.0  5.0 - 8.0 Final    Specific Gravity, UA 03/04/2019 1.015  1.005 - 1.030 Final    Protein, UA 03/04/2019 Negative  Negative Final    Comment: Recommend a 24 hour urine protein or a urine   protein/creatinine ratio if globulin induced proteinuria is  clinically suspected.      Glucose, UA 03/04/2019 Negative  Negative Final    Ketones, UA 03/04/2019 Negative  Negative Final    Bilirubin (UA) 03/04/2019 Negative  Negative Final    Occult Blood UA 03/04/2019 Negative  Negative Final     Nitrite, UA 03/04/2019 Negative  Negative Final    Leukocytes, UA 03/04/2019 Trace* Negative Final    Microalbum.,U,Random 03/04/2019 7.0  ug/mL Final    Creatinine, Random Ur 03/04/2019 119.0  15.0 - 325.0 mg/dL Final    Comment: The random urine reference ranges provided were established   for 24 hour urine collections.  No reference ranges exist for  random urine specimens.  Correlate clinically.      Microalb Creat Ratio 03/04/2019 5.9  0.0 - 30.0 ug/mg Final    RBC, UA 03/04/2019 4  0 - 4 /hpf Final    WBC, UA 03/04/2019 8* 0 - 5 /hpf Final    Bacteria, UA 03/04/2019 Few* None-Occ /hpf Final    Squam Epithel, UA 03/04/2019 20  /hpf Final    Microscopic Comment 03/04/2019 SEE COMMENT   Final    Comment: Other formed elements not mentioned in the report are not   present in the microscopic examination.        Chest xray     Heart and pulmonary vasculature within normal limits.  Trachea is midline.  CP angles are sharp.  There is no focal consolidation or effusion.  Smooth elevation right hemidiaphragm.  Osteopenia and mild spondylosis.  Prominent degenerative change bilateral shoulders.      Impression       No acute infiltrate.  See above.         EKG  Vent. Rate : 091 BPM     Atrial Rate : 091 BPM     P-R Int : 134 ms          QRS Dur : 062 ms      QT Int : 354 ms       P-R-T Axes : 059 015 061 degrees     QTc Int : 435 ms    Normal sinus rhythm  Nonspecific T wave abnormality  No previous ECGs available  Confirmed by PARTH PHAM MD [id number]) on 3/4/2019 5:16:20 PM    Assessment:       1. Primary osteoarthritis of right knee    2. Pre-operative clearance    3. Hypertension associated with diabetes    4. Hyperlipidemia associated with type 2 diabetes mellitus    5. Type 2 diabetes mellitus with stage 3 chronic kidney disease, without long-term current use of insulin    6. Gastroesophageal reflux disease, esophagitis presence not specified    7. Vitamin D insufficiency    8. Iron deficiency  anemia, unspecified iron deficiency anemia type    9. Morbid obesity with BMI of 45.0-49.9, adult    10. Hypothyroidism, unspecified type        Plan:   Primary osteoarthritis of right knee  Pre-operative clearance  Reviewed preop labs showing moderate degree of anemia, low vitamin-D levels , chronic kidney disease stage 3  Reviewed chest x-ray and EKG showing no acute findings  Discussed about urine test results and patient clinically asymptomatic.  Advised to drink adequate fluids  Patient is at moderate risk for the proposed surgery  Will send out preop clearance to Dr. Mehta at [facility]    Hypertension associated with diabetes  -     Diabetes Digital Medicine (DDMP) Enrollment Order  -     Hypertension Digital Medicine (HDMP) Enrollment Order  Blood pressure is stable today currently on amlodipine 2.5 mg, lisinopril hydrochlorothiazide 20/25 mg daily     Hyperlipidemia associated with type 2 diabetes mellitus  -     fenofibrate (TRICOR) 54 MG tablet; Take 1 tablet (54 mg total) by mouth once daily.  Dispense: 90 tablet; Refill: 3  Noted elevated triglycerides in spite of taking Lipitor 40 mg, will add Tricor 54 mg  Restrict carbohydrates    Type 2 diabetes mellitus with stage 3 chronic kidney disease, without long-term current use of insulin  -     glimepiride (AMARYL) 4 MG tablet; Take 1 tablet (4 mg total) by mouth 2 (two) times daily.  Dispense: 180 tablet; Refill: 3  -     Diabetes Digital Medicine (DDMP): Assign Onboarding Questionnaires  -     Hypertension Digital Medicine (HDMP): Assign Onboarding Questionnaires  Currently on metformin 1000 mg twice daily  Will increase Amaryl from 2-4 mg twice daily secondary to elevated A1c  Strict lifestyle changes recommended with 1800 ADA low-fat and low-cholesterol diet and will enroll in digital program    Gastroesophageal reflux disease, esophagitis presence not specified-stable on omeprazole 40 mg daily    Vitamin D insufficiency  -      ergocalciferol (ERGOCALCIFEROL) 50,000 unit Cap; Take 1 capsule (50,000 Units total) by mouth every 7 days.  Dispense: 10 capsule; Refill: 0  Low vitamin-D levels noted, will consider refill on vitamin-D by prescription weekly    Iron deficiency anemia, unspecified iron deficiency anemia type  -     ferrous sulfate (FEOSOL) 325 mg (65 mg iron) Tab tablet; Take 1 tablet (325 mg total) by mouth once daily.  Dispense: 30 tablet; Refill: 3  Secondary to moderate degree of anemia advised to start taking iron supplements once daily    Morbid obesity with BMI of 45.0-49.9, adult-strict lifestyle changes recommended with diet and exercise to lose weight with BMI 49    Hypothyroidism, unspecified type-stable thyroid labs currently taking levothyroxine 25 mcg, advised to continue current doses

## 2019-03-11 NOTE — TELEPHONE ENCOUNTER
Pt went to pharmacy to fill atropine. Pt was told to d/c atropine at appt on 11/16/18, but if she were to feel she needs it she could resume bid OD.   Dr. Scott refilled atropine BID OD for patient.

## 2019-03-12 ENCOUNTER — PATIENT OUTREACH (OUTPATIENT)
Dept: OTHER | Facility: OTHER | Age: 54
End: 2019-03-12

## 2019-03-12 NOTE — PROGRESS NOTES
Pt reports that she's at daughter's school. Request to complete enrollment call at a later time.        Last 5 Patient Entered Readings                                      Current 30 Day Average: 116/87     Recent Readings 3/11/2019    SBP (mmHg) 116    DBP (mmHg) 87    Pulse 100

## 2019-03-12 NOTE — LETTER
March 28, 2019     Richelle Zaldivar  [address] [place] [address]       Dear Richelle,    Welcome to [facility]! Our goal is to make care effective, proactive and convenient by using data you send us from home to better treat your chronic conditions.        My name is Neeta Nance, and I am your dedicated Digital Medicine clinician. As an expert in medication management, I will help ensure that the medications you are taking continue to provide the intended benefits and help you reach your goals. You can reach me directly at [phone] or by sending me a message directly through your [facility] account.      I am Stephani De Jesus and I will be your health . My job is to help you identify lifestyle changes to improve your disease control. We will talk about nutrition, exercise, and other ways you may be able to adjust your current habits to better your health. Additionally, we will help ensure you are completing the tests and screenings that are necessary to help manage your conditions. You can reach me directly at  or by sending me a message directly through your [facility] account.    Most importantly, YOU are at the center of this team. Together, we will work to improve your overall health and encourage you to meet your goals for a healthier lifestyle.     What we expect from YOU:  · Please take frequent home blood pressure measurements. We ask that you take at least 1 blood pressure reading per week, but more information will better help us get you know you. Be sure you rest for a few minutes before taking the reading in a quiet, comfortable place.     Be available to receive phone calls or [facility] messages, when appropriate, from your care team. Please let us know if there are any specific days or times that work best for us to reach you via phone.     Complete routine tests and screenings. Dont worry, we will help keep you on track!           What you should expect from  your Digital Medicine Care Team:   We will work with you to create a personalized plan of care and provide you with encouragement and education, including regarding lifestyle changes, that could help you manage your disease states.     We will adjust your current medications, if needed, and continue to monitor your long-term progress.     We will provide you and your physician with monthly progress reports after you have been in the program for more than 30 days.     We will send you reminders through [facility] and text messages to help ensure you do not miss any testing deadlines to help manage your disease states.    You will be able to reach us by phone or through your [facility] account by clicking our names under Care Team on the right side of the home screen.    I look forward to working with you to achieve your blood pressure goals!    We look forward to working with you to help manage your health,    Sincerely,    Your Digital Medicine Team    Please visit our websites to learn more:   · Hypertension: [email or web address]      Remember, we are not available for emergencies. If you have an emergency, please contact your doctors office directly or call [facility] ([phone] or [phone]) or [phone].

## 2019-03-14 NOTE — PROGRESS NOTES
Pt reports that she's at daughter's school. Scheduled enrollment call 3/15/19 @ 10am        Last 5 Patient Entered Readings                                      Current 30 Day Average: 116/84     Recent Readings 3/12/2019 3/12/2019 3/11/2019    SBP (mmHg) 115 115 116    DBP (mmHg) 81 81 87    Pulse 93 93 100

## 2019-03-15 NOTE — PROGRESS NOTES
Pt's relative states that the pt is busy and will return my call in 30 minutes.        Last 5 Patient Entered Readings                                      Current 30 Day Average: 116/84     Recent Readings 3/12/2019 3/12/2019 3/11/2019    SBP (mmHg) 115 115 116    DBP (mmHg) 81 81 87    Pulse 93 93 100

## 2019-03-17 ENCOUNTER — PATIENT MESSAGE (OUTPATIENT)
Dept: INTERNAL MEDICINE | Facility: CLINIC | Age: 54
End: 2019-03-17

## 2019-03-18 ENCOUNTER — PATIENT MESSAGE (OUTPATIENT)
Dept: INTERNAL MEDICINE | Facility: CLINIC | Age: 54
End: 2019-03-18

## 2019-03-18 ENCOUNTER — TELEPHONE (OUTPATIENT)
Dept: INTERNAL MEDICINE | Facility: CLINIC | Age: 54
End: 2019-03-18

## 2019-03-18 DIAGNOSIS — E11.59 HYPERTENSION ASSOCIATED WITH DIABETES: ICD-10-CM

## 2019-03-18 DIAGNOSIS — I15.2 HYPERTENSION ASSOCIATED WITH DIABETES: ICD-10-CM

## 2019-03-18 RX ORDER — AMLODIPINE BESYLATE 2.5 MG/1
2.5 TABLET ORAL DAILY
Qty: 30 TABLET | Refills: 3 | Status: SHIPPED | OUTPATIENT
Start: 2019-03-18 | End: 2019-04-11 | Stop reason: SDUPTHER

## 2019-03-18 NOTE — TELEPHONE ENCOUNTER
Spoke with patient and advised that I did fax over pre op clearance along with all pre op testing. Patient verbalized understanding and I apologized to patient about the delay

## 2019-03-18 NOTE — TELEPHONE ENCOUNTER
Attempted to call patient no answer, LVM.     Faxed Preop clearance and testing to Dr. Mehta's office

## 2019-03-18 NOTE — TELEPHONE ENCOUNTER
----- Message from Juju Navas sent at 3/18/2019  3:00 PM CDT -----  Contact: PATIENT  CALLING CONCERNING NEEDING HER PRE OP CLEARANCE FOR SURGERY ASAP VERY URGENT!!! TODAY. PLEASE FAX [phone]. PLEASE CALL PATIENT @ [phone]. THANKS, WILMAR

## 2019-03-19 RX ORDER — LISINOPRIL AND HYDROCHLOROTHIAZIDE 20; 25 MG/1; MG/1
1 TABLET ORAL DAILY
Qty: 90 TABLET | Refills: 0 | Status: SHIPPED | OUTPATIENT
Start: 2019-03-19 | End: 2019-05-21 | Stop reason: SDUPTHER

## 2019-03-22 ENCOUNTER — OFFICE VISIT (OUTPATIENT)
Dept: OPHTHALMOLOGY | Facility: CLINIC | Age: 54
End: 2019-03-22
Payer: MEDICARE

## 2019-03-22 DIAGNOSIS — H20.022 IRITIS, RECURRENT, LEFT: ICD-10-CM

## 2019-03-22 DIAGNOSIS — H40.1133 PRIMARY OPEN ANGLE GLAUCOMA OF BOTH EYES, SEVERE STAGE: Primary | ICD-10-CM

## 2019-03-22 PROCEDURE — 99999 PR PBB SHADOW E&M-EST. PATIENT-LVL II: CPT | Mod: PBBFAC,,, | Performed by: OPHTHALMOLOGY

## 2019-03-22 PROCEDURE — 92012 INTRM OPH EXAM EST PATIENT: CPT | Mod: S$PBB,,, | Performed by: OPHTHALMOLOGY

## 2019-03-22 PROCEDURE — 99999 PR PBB SHADOW E&M-EST. PATIENT-LVL II: ICD-10-PCS | Mod: PBBFAC,,, | Performed by: OPHTHALMOLOGY

## 2019-03-22 PROCEDURE — 99212 OFFICE O/P EST SF 10 MIN: CPT | Mod: PBBFAC,PN | Performed by: OPHTHALMOLOGY

## 2019-03-22 PROCEDURE — 92012 PR EYE EXAM, EST PATIENT,INTERMED: ICD-10-PCS | Mod: S$PBB,,, | Performed by: OPHTHALMOLOGY

## 2019-03-22 RX ORDER — DIFLUPREDNATE OPHTHALMIC 0.5 MG/ML
1 EMULSION OPHTHALMIC 2 TIMES DAILY
Qty: 1 BOTTLE | Refills: 1 | Status: SHIPPED | OUTPATIENT
Start: 2019-03-22 | End: 2019-03-22 | Stop reason: SDUPTHER

## 2019-03-22 RX ORDER — DIFLUPREDNATE OPHTHALMIC 0.5 MG/ML
1 EMULSION OPHTHALMIC 2 TIMES DAILY
Qty: 5 ML | Refills: 1 | Status: SHIPPED | OUTPATIENT
Start: 2019-03-22 | End: 2019-05-24

## 2019-03-22 NOTE — PROGRESS NOTES
HPI     Follow-up      Additional comments: iritis follow up               Comments     Pt having knee replacement soon on right eye   Some sinus pressure in face    1. COAG (followed by Dr. Scott)  CP OS 8/27/15 - low to moderate flow, TDW 1100, aggressive dilation with   GV  Gonio Puncture OS 9/25/15   SLT OS 6/23/16, 6/23/15, 6-2-14  Goniotomy OS 11-17-16  2. DM  3. RP / VIT A PALMITATE 1500 (discontinued)  4. Ahmed Valve OS 8/2/18    OS: Lotemax QID  OS: latanoprost qhs, cosopt bid, brimonidine tid   OS: Systane and genteal gel QID   OD: Pred TID(has been out about a month), atropine bid          Last edited by Robert Scott MD on 3/22/2019  8:36 AM. (History)            Assessment /Plan     For exam results, see Encounter Report.      ICD-10-CM ICD-9-CM    1. Primary open angle glaucoma of both eyes, severe stage H40.1133 365.11      365.73    2. Iritis, recurrent, left H20.022 364.02 Worse today ou, pt been out of steroid lately od        OS: Lotemax QID, can use Durezol QD OS on M, W, F, S since she is running out of lotemax before insurance will pay for refill  OS: latanoprost qhs, cosopt bid, brimonidine tid   OS: Systane and genteal gel QID   OD: start Durezol BID, (pt states pred a was on backorder and she's not been able to use in months)            continue  atropine bid  OD       RETURN TO CLINIC 2 months

## 2019-03-27 NOTE — PROGRESS NOTES
Last 5 Patient Entered Readings                                      Current 30 Day Average: 131/81     Recent Readings 3/21/2019 3/21/2019 3/16/2019 3/16/2019 3/12/2019    SBP (mmHg) 168 168 124 124 115    DBP (mmHg) 75 75 82 82 81    Pulse 104 104 90 90 93          Digital Medicine Enrollment Call    Introduced Mrs. Richelle Zaldivar's daughter, Chu Zaldivar, who handles all of her care, to Digital Medicine.     Discussed program expectations and requirements.    Introduced digital medicine care team.     Reviewed the importance of self-monitoring for digital medicine participation.     Reviewed that the Digital Medicine team is not available for emergencies and instructed the patient to call 911 or [facility] ([phone] or [phone]) if one arises.    Patient is currently in the hospital following knee replacement surgery. Mrs. Zaldivar's daughter, Chu Zaldivar, answered the phone number on file and stated that she will be the primary contact, as she handles all medication and health related things for her mother.

## 2019-03-28 DIAGNOSIS — K21.9 GASTROESOPHAGEAL REFLUX DISEASE, ESOPHAGITIS PRESENCE NOT SPECIFIED: ICD-10-CM

## 2019-03-28 RX ORDER — OMEPRAZOLE 40 MG/1
40 CAPSULE, DELAYED RELEASE ORAL DAILY
Qty: 90 CAPSULE | Refills: 3 | Status: SHIPPED | OUTPATIENT
Start: 2019-03-28 | End: 2019-09-05 | Stop reason: ALTCHOICE

## 2019-04-01 RX ORDER — BRIMONIDINE TARTRATE 1 MG/ML
1 SOLUTION/ DROPS OPHTHALMIC 3 TIMES DAILY
Qty: 15 ML | Refills: 6 | Status: SHIPPED | OUTPATIENT
Start: 2019-04-01 | End: 2019-08-14 | Stop reason: SDUPTHER

## 2019-04-02 ENCOUNTER — PATIENT OUTREACH (OUTPATIENT)
Dept: OTHER | Facility: OTHER | Age: 54
End: 2019-04-02

## 2019-04-02 DIAGNOSIS — I15.2 HYPERTENSION ASSOCIATED WITH DIABETES: ICD-10-CM

## 2019-04-02 DIAGNOSIS — E11.59 HYPERTENSION ASSOCIATED WITH DIABETES: ICD-10-CM

## 2019-04-02 NOTE — PROGRESS NOTES
Last 5 Patient Entered Readings                                      Current 30 Day Average: 146/86     Recent Readings 4/1/2019 4/1/2019 4/1/2019 4/1/2019 4/1/2019    SBP (mmHg) 161 161 147 147 173    DBP (mmHg) 89 89 87 87 100    Pulse 86 86 83 83 85        LMFCB to do her initial pharmacist call.

## 2019-04-08 ENCOUNTER — PATIENT MESSAGE (OUTPATIENT)
Dept: INTERNAL MEDICINE | Facility: CLINIC | Age: 54
End: 2019-04-08

## 2019-04-09 RX ORDER — LOTEPREDNOL ETABONATE 5 MG/G
1 GEL OPHTHALMIC 4 TIMES DAILY
Qty: 5 G | Refills: 6 | Status: SHIPPED | OUTPATIENT
Start: 2019-04-09 | End: 2019-06-05 | Stop reason: ALTCHOICE

## 2019-04-11 RX ORDER — AMLODIPINE BESYLATE 5 MG/1
5 TABLET ORAL DAILY
Qty: 30 TABLET | Refills: 3 | Status: SHIPPED | OUTPATIENT
Start: 2019-04-11 | End: 2019-09-05 | Stop reason: SDUPTHER

## 2019-04-11 NOTE — PROGRESS NOTES
Last 5 Patient Entered Readings                                      Current 30 Day Average: 152/90     Recent Readings 4/10/2019 4/10/2019 4/9/2019 4/9/2019 4/8/2019    SBP (mmHg) 159 159 179 179 147    DBP (mmHg) 100 100 103 103 94    Pulse 103 103 97 97 97      Patient's daughterAngelica will be her contact.  HPI:   52 yo female with a PMH listed below.  Past Medical History:   Diagnosis Date    Acid reflux     Cataract     Diabetes mellitus, type 2     Glaucoma     HTN (hypertension)     Hyperlipidemia     Osteoarthritis of knee 3/11/2019    Recurrent iritis of left eye 4/13/2016     Patient's daughter endorses adherence to medication regimen and takes care of the patient's medications.     Patient's daughter denies hypotensive s/sx (lightheadedness, dizziness, nausea, fatigue); patient denies hypertensive s/sx (SOB, CP, severe headaches, changes in vision). Instructed patient to seek medical care if BP > 180/110 and is accompanied by hypertensive s/sx associated, patient confirms understanding.     Assessment:  Reviewed recent readings. Per 2017 ACC/ AHA HTN guidelines (goal of BP < 130/80), current 30-day average needs to be addressed more thoroughly today.     Plan:  Increase amlodipine from 2.5mg daily to 5mg daily.   Patient's daughter encouraged to fill out on-boarding questionnaires.  I will continue to monitor regularly and will follow-up in 2 to 3 weeks, sooner if blood pressure begins to trend upward or downward.     Current medication regimen:  Hypertension Medications             amLODIPine (NORVASC) 2.5 MG tablet Take 1 tablet (2.5 mg total) by mouth once daily.    lisinopril-hydrochlorothiazide (PRINZIDE,ZESTORETIC) 20-25 mg Tab Take 1 tablet by mouth once daily.          Patient denies having questions or concerns. Patient has my contact information and knows to call with any concerns or clinical changes.

## 2019-04-12 NOTE — PROGRESS NOTES
Last 5 Patient Entered Readings                                      Current 30 Day Average: 154/91     Recent Readings 4/10/2019 4/10/2019 4/9/2019 4/9/2019 4/8/2019    SBP (mmHg) 159 159 179 179 147    DBP (mmHg) 100 100 103 103 94    Pulse 103 103 97 97 97        Digital Medicine: Health  Introduction Call     Left voicemail and requested call back in order to complete Digital Medicine health  introduction call.

## 2019-04-22 NOTE — PROGRESS NOTES
Last 5 Patient Entered Readings                                      Current 30 Day Average: 146/90     Recent Readings 4/17/2019 4/17/2019 4/15/2019 4/15/2019 4/12/2019    SBP (mmHg) 83 139 85 129 152    DBP (mmHg) 63 99 62 83 98    Pulse 84 93 84 92 90              Digital Medicine: Health  Introduction    Introduced Mrs. Richelle Zaldivar to Digital Medicine. Discussed health  role and recommended lifestyle modifications.    Lifestyle Assessment:  Current Dietary Habits(i.e. low sodium, food labels, dining out): Patient states that no one has ever mentioned a low sodium diet to her.  I reviewed AHA recommendations and ways to cut sodium intake(not adding salt, reading food labels, limiting fast foods, adding more fresh or frozen fruits and vegetables.)    Exercise:Patient had total knee replacement and currently doing rehab exercises daily as prescribed.    Alcohol/Tobacco:none    Medication Adherence: has been compliant with the medicaiton regimen     Other goals:    Reviewed AHA/AACE recommendations:  Limit sodium intake to <2000mg/day  Recommended CHO intake, 45-65% of daily caloric intake  Perform 150 minutes of physical activity per week    Reviewed the importance of self-monitoring, medication adherence, and that the health  can be used as a resource for lifestyle modifications to help reduce or maintain a healthy lifestyle.  Reviewed that the Digital Medicine team is not available for emergencies and instructed the patient to call 911 or [facility] ([phone] or [phone]) if one arises.

## 2019-04-25 ENCOUNTER — PATIENT OUTREACH (OUTPATIENT)
Dept: OTHER | Facility: OTHER | Age: 54
End: 2019-04-25

## 2019-04-25 NOTE — LETTER
May 24, 2019     Richelle Zaldivar  [address] [place] [address]       Dear Richelle,    Thank you for enrolling in [facility]. To participate, we ask that you submit information at least once weekly through your [facility] account and maintain regular contact with your Care Team. We have not received any data or heard from you in some time.     The Digital Medicine Care Team has attempted to reach you on multiple occasions to determine if you would like to continue participating in the program. While we encourage you to continue participating fully, we understand that circumstances may change.     To continue participating in the program, please contact me at [phone]. If we do not hear back, you will be un-enrolled, and your physician will be notified of your decision.    If you have submitted data and believe you are receiving this letter in error, please call the Digital Medicine Patient Support Line at [phone] for troubleshooting.      We look forward to hearing from you soon.    Sincerely,     Stephani De Jesus  [facility]

## 2019-04-25 NOTE — PROGRESS NOTES
Last 5 Patient Entered Readings                                      Current 30 Day Average: 141/88     Recent Readings 4/24/2019 4/24/2019 4/24/2019 4/23/2019 4/23/2019    SBP (mmHg) 91 114 119 98 138    DBP (mmHg) 76 82 82 60 83    Pulse 91 98 98 93 104      LMFCB to discuss how she is tolerating amlodipine 5mg daily.

## 2019-05-21 DIAGNOSIS — E11.42 TYPE 2 DIABETES MELLITUS WITH DIABETIC POLYNEUROPATHY, WITHOUT LONG-TERM CURRENT USE OF INSULIN: ICD-10-CM

## 2019-05-21 DIAGNOSIS — E11.59 HYPERTENSION ASSOCIATED WITH DIABETES: ICD-10-CM

## 2019-05-21 DIAGNOSIS — I15.2 HYPERTENSION ASSOCIATED WITH DIABETES: ICD-10-CM

## 2019-05-22 ENCOUNTER — PATIENT OUTREACH (OUTPATIENT)
Dept: OTHER | Facility: OTHER | Age: 54
End: 2019-05-22

## 2019-05-22 RX ORDER — METFORMIN HYDROCHLORIDE 1000 MG/1
TABLET ORAL
Qty: 180 TABLET | Refills: 3 | Status: SHIPPED | OUTPATIENT
Start: 2019-05-22 | End: 2019-09-05 | Stop reason: SDUPTHER

## 2019-05-22 RX ORDER — LISINOPRIL AND HYDROCHLOROTHIAZIDE 20; 25 MG/1; MG/1
1 TABLET ORAL DAILY
Qty: 90 TABLET | Refills: 0 | Status: SHIPPED | OUTPATIENT
Start: 2019-05-22 | End: 2019-09-05 | Stop reason: SDUPTHER

## 2019-05-22 NOTE — PROGRESS NOTES
Last 5 Patient Entered Readings                                      Current 30 Day Average: 111/77     Recent Readings 5/19/2019 5/18/2019 5/13/2019 5/13/2019 5/11/2019    SBP (mmHg) 133 138 103 130 150    DBP (mmHg) 82 87 58 82 86    Pulse 98 114 92 96 104          Digital Medicine: Health  Follow Up    Left voicemail to follow up with Prosper Aylindanna Zaldivar.  Current BP average 111/77 mmHg is at goal, 130/80 mmHg.

## 2019-05-29 NOTE — PROGRESS NOTES
Last 5 Patient Entered Readings                                      Current 30 Day Average: 115/75     Recent Readings 5/19/2019 5/18/2019 5/13/2019 5/13/2019 5/11/2019    SBP (mmHg) 133 138 103 130 150    DBP (mmHg) 82 87 58 82 86    Pulse 98 114 92 96 104            Digital Medicine: Health  Follow Up    Left voicemail to follow up with Prosper Seguncharlene Zaldivar.  Current BP average 115/75 mmHg is at goal,130/80 mmHg.

## 2019-06-05 ENCOUNTER — OFFICE VISIT (OUTPATIENT)
Dept: OPHTHALMOLOGY | Facility: CLINIC | Age: 54
End: 2019-06-05
Payer: MEDICARE

## 2019-06-05 DIAGNOSIS — H20.022 IRITIS, RECURRENT, LEFT: ICD-10-CM

## 2019-06-05 DIAGNOSIS — H40.1133 PRIMARY OPEN ANGLE GLAUCOMA OF BOTH EYES, SEVERE STAGE: Primary | ICD-10-CM

## 2019-06-05 DIAGNOSIS — H04.123 DRY EYE SYNDROME, BILATERAL: ICD-10-CM

## 2019-06-05 PROCEDURE — 99999 PR PBB SHADOW E&M-EST. PATIENT-LVL II: ICD-10-PCS | Mod: PBBFAC,,, | Performed by: OPHTHALMOLOGY

## 2019-06-05 PROCEDURE — 99212 OFFICE O/P EST SF 10 MIN: CPT | Mod: PBBFAC | Performed by: OPHTHALMOLOGY

## 2019-06-05 PROCEDURE — 92012 INTRM OPH EXAM EST PATIENT: CPT | Mod: S$PBB,,, | Performed by: OPHTHALMOLOGY

## 2019-06-05 PROCEDURE — 99999 PR PBB SHADOW E&M-EST. PATIENT-LVL II: CPT | Mod: PBBFAC,,, | Performed by: OPHTHALMOLOGY

## 2019-06-05 PROCEDURE — 92012 PR EYE EXAM, EST PATIENT,INTERMED: ICD-10-PCS | Mod: S$PBB,,, | Performed by: OPHTHALMOLOGY

## 2019-06-05 RX ORDER — PREDNISOLONE ACETATE 10 MG/ML
1 SUSPENSION/ DROPS OPHTHALMIC 3 TIMES DAILY
Qty: 10 ML | Refills: 3 | Status: SHIPPED | OUTPATIENT
Start: 2019-06-05 | End: 2020-01-08 | Stop reason: SDUPTHER

## 2019-06-05 NOTE — PROGRESS NOTES
Last 5 Patient Entered Readings                                      Current 30 Day Average: 118/76     Recent Readings 5/31/2019 5/31/2019 5/31/2019 5/19/2019 5/18/2019    SBP (mmHg) 98 95 133 133 138    DBP (mmHg) 70 60 82 82 87    Pulse 103 108 99 98 114        Unable to leave  .Sent [place] message.

## 2019-06-05 NOTE — PROGRESS NOTES
HPI     Follow-up      Additional comments: 2 Months Uveitis               Comments     Patient states vision stable & no discomfort. 100% drops compliance     PCP: Dr. Miranda Ackerman      1. COAG (followed by Dr. Scott)  CP OS 8/27/15 - low to moderate flow, TDW 1100, aggressive dilation with   GV  Gonio Puncture OS 9/25/15   SLT OS 6/23/16, 6/23/15, 6-2-14  Goniotomy OS 11-17-16  2. DM  3. RP / VIT A PALMITATE 1500 (discontinued)  4. Ahmed Valve OS 8/2/18      OS: Lotemax QID   OS: latanoprost QHS   OS: Cosopt bid  OS: Brimonidine TID  OS: Systane and genteal gel QHS  OD: Durezol BID   OD: Atropine BID          Last edited by Nahomy Ramirez on 6/5/2019 10:28 AM. (History)            Assessment /Plan     For exam results, see Encounter Report.      ICD-10-CM ICD-9-CM    1. Primary open angle glaucoma of both eyes, severe stage H40.1133 365.11 Doing well - intraocular pressure OS is within acceptable range relative to target pressure with no evidence of progression.   Continue current treatment.  Reviewed importance of continued compliance with treatment and follow up.        365.73    2. Iritis, recurrent, left H20.022 364.02 prednisoLONE acetate (PRED FORTE) 1 % DrpS  Change Lotemax to Pred Acetate TID OS for one week then BID OS   3. Dry eye syndrome, bilateral H04.123 375.15    Continue:  OS: Pred Acetate TID x 1 week then BID  OS: latanoprost QHS   OS: Cosopt bid  OS: Brimonidine TID  OS: Systane and genteal gel QHS  OD: Durezol BID   OD: Atropine BID          RETURN TO CLINIC in 5 weeks for iritis check and IOP check

## 2019-06-07 ENCOUNTER — PATIENT OUTREACH (OUTPATIENT)
Dept: ADMINISTRATIVE | Facility: HOSPITAL | Age: 54
End: 2019-06-07

## 2019-06-21 RX ORDER — LOTEPREDNOL ETABONATE 5 MG/G
1 GEL OPHTHALMIC 4 TIMES DAILY
Qty: 5 G | Refills: 6 | Status: SHIPPED | OUTPATIENT
Start: 2019-06-21 | End: 2019-09-24

## 2019-06-21 NOTE — PROGRESS NOTES
Last 5 Patient Entered Readings                                      Current 30 Day Average: 110/78     Recent Readings 6/19/2019 6/19/2019 6/8/2019 5/31/2019 5/31/2019    SBP (mmHg) 96 134 135 98 95    DBP (mmHg) 65 88 102 70 60    Pulse 88 84 106 103 108        HPI:  Called patient to follow up since starting amlodipine. She is tolerating the dose of amlodipine.  Patient endorses adherence to medication regimen.   Patient denies hypotensive s/sx (lightheadedness, dizziness, nausea, fatigue); patient denies hypertensive s/sx (SOB, CP, severe headaches, changes in vision). Instructed patient to seek medical care if BP > 180/110 and is accompanied by hypertensive s/sx associated, patient confirms understanding.     Assessment:  Reviewed recent readings. Per 2017 ACC/ AHA HTN guidelines (goal of BP < 130/80), current 30-day average is well controlled.   Patient's daughter is using her mom's cuff because she is pregnant and concerned over hypotension. This is the reason for her low BP. We discussed only taking her mom's readings on the cuff and starting to increase the frequency of her mom's readings to assess if further adjustments are needed.    Plan:  Continue current medication regimen.   I will continue to monitor regularly and will follow-up in 2 to 3 weeks, sooner if blood pressure begins to trend upward or downward.     Current medication regimen:  Hypertension Medications             amLODIPine (NORVASC) 5 MG tablet Take 1 tablet (5 mg total) by mouth once daily.    lisinopril-hydrochlorothiazide (PRINZIDE,ZESTORETIC) 20-25 mg Tab TAKE 1 TABLET BY MOUTH ONCE DAILY          Patient denies having questions or concerns. Patient has my contact information and knows to call with any concerns or clinical changes.

## 2019-07-05 ENCOUNTER — TELEPHONE (OUTPATIENT)
Dept: OPHTHALMOLOGY | Facility: CLINIC | Age: 54
End: 2019-07-05

## 2019-07-05 NOTE — TELEPHONE ENCOUNTER
Pt wants appt before first available for eye pain.  This is an ongoing problem.  Appt with Dr. Craft on Monday at 1:50.    She has appt with Dr. Scott July 12.

## 2019-07-05 NOTE — PROGRESS NOTES
Last 5 Patient Entered Readings                                      Current 30 Day Average: 109/79     Recent Readings 7/3/2019 7/3/2019 6/19/2019 6/19/2019 6/8/2019    SBP (mmHg) 96 107 96 134 135    DBP (mmHg) 64 75 65 88 102    Pulse 91 94 88 84 106        Digital Medicine: Health  Follow Up    Lifestyle Modifications:    1.Dietary Modifications (Sodium intake <2,000mg/day, food labels, dining out): Patient says that she is trying not to eat salty foods.    2.Physical Activity: Patient has not started an exercise routine.  She does physical therapy exercises.    3.Medication Therapy: Patient has been compliant with the medication regimen.    4.Patient has the following medication side effects/concerns: none reported  (Frequency/Alleviating factors/Precipitating factors, etc.)     Follow up with Prosper Zaldivar completed. No further questions or concerns. Will continue to follow up to achieve health goals.

## 2019-07-05 NOTE — TELEPHONE ENCOUNTER
----- Message from Genesis Mckeon sent at 7/5/2019  9:04 AM CDT -----  Contact: Pt  .Type:  Sooner Apoointment Request    Caller is requesting a sooner appointment.  Caller declined first available appointment listed below.  Caller will not accept being placed on the waitlist and is requesting a message be sent to doctor.  Name of Caller:PT  When is the first available appointment?07/12  Symptoms:  Would the patient rather a call back or a response via [facility]? CALL BACK  Best Call Back Number:.[phone]  Additional Information:

## 2019-07-12 ENCOUNTER — OFFICE VISIT (OUTPATIENT)
Dept: OPHTHALMOLOGY | Facility: CLINIC | Age: 54
End: 2019-07-12
Payer: MEDICARE

## 2019-07-12 DIAGNOSIS — H20.022 IRITIS, RECURRENT, LEFT: ICD-10-CM

## 2019-07-12 DIAGNOSIS — H40.1133 PRIMARY OPEN ANGLE GLAUCOMA OF BOTH EYES, SEVERE STAGE: ICD-10-CM

## 2019-07-12 DIAGNOSIS — H04.123 DRY EYE SYNDROME, BILATERAL: ICD-10-CM

## 2019-07-12 DIAGNOSIS — H35.52 RETINITIS PIGMENTOSA OF BOTH EYES: ICD-10-CM

## 2019-07-12 DIAGNOSIS — H40.1133 PRIMARY OPEN ANGLE GLAUCOMA OF BOTH EYES, SEVERE STAGE: Primary | ICD-10-CM

## 2019-07-12 PROCEDURE — 99999 PR PBB SHADOW E&M-EST. PATIENT-LVL II: CPT | Mod: PBBFAC,,, | Performed by: OPHTHALMOLOGY

## 2019-07-12 PROCEDURE — 99212 OFFICE O/P EST SF 10 MIN: CPT | Mod: PBBFAC | Performed by: OPHTHALMOLOGY

## 2019-07-12 PROCEDURE — 92012 INTRM OPH EXAM EST PATIENT: CPT | Mod: S$PBB,,, | Performed by: OPHTHALMOLOGY

## 2019-07-12 PROCEDURE — 99999 PR PBB SHADOW E&M-EST. PATIENT-LVL II: ICD-10-PCS | Mod: PBBFAC,,, | Performed by: OPHTHALMOLOGY

## 2019-07-12 PROCEDURE — 92012 PR EYE EXAM, EST PATIENT,INTERMED: ICD-10-PCS | Mod: S$PBB,,, | Performed by: OPHTHALMOLOGY

## 2019-07-12 NOTE — PROGRESS NOTES
HPI     Follow-up      Additional comments: 5 Weeks Iritis Check & IOP Check              Comments     Patient states vision sometimes blurry & both eyes a little painful. 100%   drops compliance     PCP: Dr. Miranda Ackerman      1. COAG (followed by Dr. Scott)  CP OS 8/27/15 - low to moderate flow, TDW 1100, aggressive dilation with   GV  Gonio Puncture OS 9/25/15   SLT OS 6/23/16, 6/23/15, 6-2-14  Goniotomy OS 11-17-16  2. DM  3. RP / VIT A PALMITATE 1500 (discontinued)  4. Ahmed Valve OS 8/2/18      OS: Pred. Acetate TID x 1 week then BID  OS: latanoprost QHS   OS: Cosopt bid  OS: Brimonidine TID  OS: Systane and genteal gel QHS  OD: Durezol BID   OD: Atropine BID          Last edited by Nahomy Ramirez on 7/12/2019  2:28 PM. (History)            Assessment /Plan     For exam results, see Encounter Report.      ICD-10-CM ICD-9-CM    1. Primary open angle glaucoma of both eyes, severe stage H40.1133 365.11 iop elevated but iritis improved since on Pred A     365.73    2. Iritis, recurrent, left H20.022 364.02    3. Dry eye syndrome, bilateral H04.123 375.15    4. Retinitis pigmentosa of both eyes H35.52 362.74    5. Uncontrolled type 2 diabetes mellitus without complication, without long-term current use of insulin E11.65 250.02        OS:stop  Pred. Acetate BID and resume Lotemax 3-4 times  OS: latanoprost QHS   OS: Cosopt bid  OS: Brimonidine TID  OS: Systane and genteal gel QHS  OD: Durezol BID   OD: Atropine BID  Return to clinic 4 weeks

## 2019-07-24 ENCOUNTER — PATIENT OUTREACH (OUTPATIENT)
Dept: ADMINISTRATIVE | Facility: HOSPITAL | Age: 54
End: 2019-07-24

## 2019-07-24 DIAGNOSIS — E11.59 HYPERTENSION ASSOCIATED WITH DIABETES: ICD-10-CM

## 2019-07-24 DIAGNOSIS — I15.2 HYPERTENSION ASSOCIATED WITH DIABETES: ICD-10-CM

## 2019-07-25 RX ORDER — LATANOPROST 50 UG/ML
1 SOLUTION/ DROPS OPHTHALMIC NIGHTLY
Qty: 3 BOTTLE | Refills: 12 | Status: CANCELLED | OUTPATIENT
Start: 2019-07-25

## 2019-08-02 ENCOUNTER — PATIENT OUTREACH (OUTPATIENT)
Dept: OTHER | Facility: OTHER | Age: 54
End: 2019-08-02

## 2019-08-02 NOTE — LETTER
September 18, 2019     Richelle Zaldivar  [address] [place] [address]       Dear Richelle,    Thank you for enrolling in [facility]. To participate, we ask that you submit information at least once weekly through your [facility] account and maintain regular contact with your Care Team. We have not received any data or heard from you in some time.     The Digital Medicine Care Team has attempted to reach you on multiple occasions to determine if you would like to continue participating in the program. While we encourage you to continue participating fully, we understand that circumstances may change.     To continue participating in the program, please contact me at [phone]. If we do not hear back, you will be un-enrolled, and your physician will be notified of your decision.    If you have submitted data and believe you are receiving this letter in error, please call the Digital Medicine Patient Support Line at [phone] for troubleshooting.      We look forward to hearing from you soon.    Sincerely,     Stephani De Jesus  [facility]

## 2019-08-02 NOTE — PROGRESS NOTES
Last 5 Patient Entered Readings                                      Current 30 Day Average: 95/68     Recent Readings 7/12/2019 7/12/2019 7/12/2019 7/3/2019 7/3/2019    SBP (mmHg) 94 97 94 96 107    DBP (mmHg) 66 62 71 64 75    Pulse 93 94 94 91 94          Digital Medicine: Health  Follow Up    Left voicemail to follow up with Prosper Aylindanna Zaldivar.  Current BP average 95/68 mmHg is at goal, 130/80 mmHg.

## 2019-08-06 ENCOUNTER — PATIENT OUTREACH (OUTPATIENT)
Dept: ADMINISTRATIVE | Facility: HOSPITAL | Age: 54
End: 2019-08-06

## 2019-08-13 NOTE — PROGRESS NOTES
Last 5 Patient Entered Readings                                      Current 30 Day Average:  s    Recent Readings 7/12/2019 7/12/2019 7/12/2019 7/3/2019 7/3/2019    SBP (mmHg) 94 97 94 96 107    DBP (mmHg) 66 62 71 64 75    Pulse 93 94 94 91 94        Digital Medicine: Health  Follow Up    Left voicemail to follow up with Mrs. Richelle REINOSO Zaldivar.  Current BP average Not Enough Data/Not Enough Data mmHg is not at goal, 130/80 mmHg.  Sent [email or web address] message regarding lack of readings.

## 2019-08-14 ENCOUNTER — OFFICE VISIT (OUTPATIENT)
Dept: OPHTHALMOLOGY | Facility: CLINIC | Age: 54
End: 2019-08-14
Payer: MEDICARE

## 2019-08-14 DIAGNOSIS — H20.022 IRITIS, RECURRENT, LEFT: ICD-10-CM

## 2019-08-14 DIAGNOSIS — H40.1133 PRIMARY OPEN ANGLE GLAUCOMA OF BOTH EYES, SEVERE STAGE: Primary | ICD-10-CM

## 2019-08-14 DIAGNOSIS — H35.52 RETINITIS PIGMENTOSA OF BOTH EYES: ICD-10-CM

## 2019-08-14 DIAGNOSIS — H04.123 DRY EYE SYNDROME, BILATERAL: ICD-10-CM

## 2019-08-14 PROCEDURE — 92012 INTRM OPH EXAM EST PATIENT: CPT | Mod: S$PBB,,, | Performed by: OPHTHALMOLOGY

## 2019-08-14 PROCEDURE — 92012 PR EYE EXAM, EST PATIENT,INTERMED: ICD-10-PCS | Mod: S$PBB,,, | Performed by: OPHTHALMOLOGY

## 2019-08-14 PROCEDURE — 99999 PR PBB SHADOW E&M-EST. PATIENT-LVL II: ICD-10-PCS | Mod: PBBFAC,,, | Performed by: OPHTHALMOLOGY

## 2019-08-14 PROCEDURE — 99999 PR PBB SHADOW E&M-EST. PATIENT-LVL II: CPT | Mod: PBBFAC,,, | Performed by: OPHTHALMOLOGY

## 2019-08-14 PROCEDURE — 99212 OFFICE O/P EST SF 10 MIN: CPT | Mod: PBBFAC | Performed by: OPHTHALMOLOGY

## 2019-08-14 RX ORDER — BRIMONIDINE TARTRATE 1 MG/ML
1 SOLUTION/ DROPS OPHTHALMIC 3 TIMES DAILY
Qty: 15 ML | Refills: 6 | Status: SHIPPED | OUTPATIENT
Start: 2019-08-14 | End: 2020-04-22

## 2019-08-14 RX ORDER — LATANOPROST 50 UG/ML
1 SOLUTION/ DROPS OPHTHALMIC NIGHTLY
Qty: 7.5 ML | Refills: 4 | Status: SHIPPED | OUTPATIENT
Start: 2019-08-14 | End: 2020-06-30 | Stop reason: SDUPTHER

## 2019-08-14 RX ORDER — ATROPINE SULFATE 10 MG/ML
1 SOLUTION/ DROPS OPHTHALMIC 4 TIMES DAILY
Qty: 5 ML | Refills: 1 | Status: SHIPPED | OUTPATIENT
Start: 2019-08-14 | End: 2020-01-08 | Stop reason: SDUPTHER

## 2019-08-14 NOTE — PROGRESS NOTES
HPI     Glaucoma      Additional comments: 4 week IOP [facility]              Comments     Vision stable  No pain or discomfort  Using drops as directed  1. COAG (followed by Dr. Scott)  CP OS 8/27/15 - low to moderate flow, TDW 1100, aggressive dilation with   GV  Gonio Puncture OS 9/25/15   SLT OS 6/23/16, 6/23/15, 6-2-14  Goniotomy OS 11-17-16  2. DM  3. RP / VIT A PALMITATE 1500 (discontinued)  4. Ahmed Valve OS 8/2/18      OS: Lotemax QID   OS: latanoprost QHS   OS: Cosopt bid  OS: Brimonidine TID  OS: Systane and genteal gel QHS  OD: Durezol BID   OD: Atropine BID          Last edited by Jolene Carter, PCT on 8/14/2019 10:44 AM. (History)              Assessment /Plan     For exam results, see Encounter Report.      ICD-10-CM ICD-9-CM    1. Primary open angle glaucoma of both eyes, severe stage H40.1133 365.11 Hypotony right eye. Good IOP left eye. Instructed patient not to rub left eye due to tube covered by thin conj     365.73    2. Iritis, recurrent, left H20.022 364.02 Improved. continue lotemax    3. Dry eye syndrome, bilateral H04.123 375.15    4. Retinitis pigmentosa of both eyes H35.52 362.74    5. Uncontrolled type 2 diabetes mellitus without complication, without long-term current use of insulin E11.65 250.02 Dilated 7/25/2018     OD pre phthisical    OS: Lotemax QID   OS: latanoprost QHS   OS: Cosopt bid  OS: Brimonidine TID  OS: Systane and genteal gel QHS  OD: Durezol BID   OD: Atropine BID-can increase to QID for pain     Return to clinic 2 months with IOP check

## 2019-08-22 ENCOUNTER — PATIENT OUTREACH (OUTPATIENT)
Dept: ADMINISTRATIVE | Facility: HOSPITAL | Age: 54
End: 2019-08-22

## 2019-08-22 NOTE — LETTER
August 22, 2019        Richelle Zaldivar  [address] [facility]  [place] [address]      Dear Ms. Zaldivar,    You have an upcoming appointment with Oneyda Bergman MD on 09/05/19.      Your chart is indicating you may be due for the following and I will be happy to assist you in scheduling any needed appointments:  Health Maintenance Due   Topic    Mammogram     Foot Exam           If you have had any of the above done at another facility, please bring the records or information with you so that your record at [facility] will be complete.    We will be happy to assist you with scheduling any necessary appointments or you may contact the [facility] appointment desk at [phone] to schedule at your convenience.     Thank you for choosing [facility] for your healthcare needs,        Radha C., LPN Care Coordinator  [facility]  [phone]

## 2019-08-27 NOTE — PROGRESS NOTES
Last 5 Patient Entered Readings                                      Current 30 Day Average:      Recent Readings 7/12/2019 7/12/2019 7/12/2019 7/3/2019 7/3/2019    SBP (mmHg) 94 97 94 96 107    DBP (mmHg) 66 62 71 64 75    Pulse 93 94 94 91 94          Digital Medicine: Health  Follow Up    Left voicemail to follow up with Mrs. Richelle REINOSO Zaldivar.  Current BP average Not Enough Data/Not Enough Data mmHg is not at goal, 130/80 mmHg.  Sent message to enrolling provider.

## 2019-09-04 NOTE — PROGRESS NOTES
Last 5 Patient Entered Readings                                      Current 30 Day Average:      Recent Readings 7/12/2019 7/12/2019 7/12/2019 7/3/2019 7/3/2019    SBP (mmHg) 94 97 94 96 107    DBP (mmHg) 66 62 71 64 75    Pulse 93 94 94 91 94        Digital Medicine: Health  Follow Up    Left voicemail to follow up with Mrs. Richelle REINOSO Zaldivar.  Current BP average Not Enough Data/Not Enough Data mmHg is not at goal, 130/80 mmHg.  Sent message to enrolling provider. Start non-compliance.

## 2019-09-05 ENCOUNTER — OFFICE VISIT (OUTPATIENT)
Dept: INTERNAL MEDICINE | Facility: CLINIC | Age: 54
End: 2019-09-05
Payer: MEDICARE

## 2019-09-05 VITALS
DIASTOLIC BLOOD PRESSURE: 60 MMHG | OXYGEN SATURATION: 99 % | HEIGHT: 55 IN | TEMPERATURE: 98 F | SYSTOLIC BLOOD PRESSURE: 90 MMHG | BODY MASS INDEX: 37.3 KG/M2 | HEART RATE: 86 BPM | WEIGHT: 161.19 LBS | RESPIRATION RATE: 16 BRPM

## 2019-09-05 DIAGNOSIS — E11.69 HYPERLIPIDEMIA ASSOCIATED WITH TYPE 2 DIABETES MELLITUS: ICD-10-CM

## 2019-09-05 DIAGNOSIS — N18.30 TYPE 2 DIABETES MELLITUS WITH STAGE 3 CHRONIC KIDNEY DISEASE, WITHOUT LONG-TERM CURRENT USE OF INSULIN: ICD-10-CM

## 2019-09-05 DIAGNOSIS — K21.9 GASTROESOPHAGEAL REFLUX DISEASE, ESOPHAGITIS PRESENCE NOT SPECIFIED: ICD-10-CM

## 2019-09-05 DIAGNOSIS — E78.5 HYPERLIPIDEMIA ASSOCIATED WITH TYPE 2 DIABETES MELLITUS: ICD-10-CM

## 2019-09-05 DIAGNOSIS — E11.22 TYPE 2 DIABETES MELLITUS WITH STAGE 3 CHRONIC KIDNEY DISEASE, WITHOUT LONG-TERM CURRENT USE OF INSULIN: ICD-10-CM

## 2019-09-05 DIAGNOSIS — E66.01 MORBID OBESITY WITH BMI OF 45.0-49.9, ADULT: ICD-10-CM

## 2019-09-05 DIAGNOSIS — E03.9 HYPOTHYROIDISM, UNSPECIFIED TYPE: ICD-10-CM

## 2019-09-05 DIAGNOSIS — E55.9 VITAMIN D INSUFFICIENCY: ICD-10-CM

## 2019-09-05 DIAGNOSIS — I15.2 HYPERTENSION ASSOCIATED WITH DIABETES: Primary | ICD-10-CM

## 2019-09-05 DIAGNOSIS — D50.9 IRON DEFICIENCY ANEMIA, UNSPECIFIED IRON DEFICIENCY ANEMIA TYPE: ICD-10-CM

## 2019-09-05 DIAGNOSIS — L30.9 DERMATITIS: ICD-10-CM

## 2019-09-05 DIAGNOSIS — E11.42 TYPE 2 DIABETES MELLITUS WITH DIABETIC POLYNEUROPATHY, WITHOUT LONG-TERM CURRENT USE OF INSULIN: ICD-10-CM

## 2019-09-05 DIAGNOSIS — E11.59 HYPERTENSION ASSOCIATED WITH DIABETES: Primary | ICD-10-CM

## 2019-09-05 DIAGNOSIS — Z12.31 ENCOUNTER FOR SCREENING MAMMOGRAM FOR MALIGNANT NEOPLASM OF BREAST: ICD-10-CM

## 2019-09-05 PROCEDURE — 99999 PR PBB SHADOW E&M-EST. PATIENT-LVL IV: CPT | Mod: PBBFAC,,, | Performed by: FAMILY MEDICINE

## 2019-09-05 PROCEDURE — 99999 PR PBB SHADOW E&M-EST. PATIENT-LVL IV: ICD-10-PCS | Mod: PBBFAC,,, | Performed by: FAMILY MEDICINE

## 2019-09-05 PROCEDURE — 99214 OFFICE O/P EST MOD 30 MIN: CPT | Mod: PBBFAC | Performed by: FAMILY MEDICINE

## 2019-09-05 PROCEDURE — 99214 OFFICE O/P EST MOD 30 MIN: CPT | Mod: S$PBB,,, | Performed by: FAMILY MEDICINE

## 2019-09-05 PROCEDURE — 99214 PR OFFICE/OUTPT VISIT, EST, LEVL IV, 30-39 MIN: ICD-10-PCS | Mod: S$PBB,,, | Performed by: FAMILY MEDICINE

## 2019-09-05 RX ORDER — LISINOPRIL AND HYDROCHLOROTHIAZIDE 20; 25 MG/1; MG/1
1 TABLET ORAL DAILY
Qty: 90 TABLET | Refills: 3 | Status: SHIPPED | OUTPATIENT
Start: 2019-09-05 | End: 2020-07-06 | Stop reason: SDUPTHER

## 2019-09-05 RX ORDER — METFORMIN HYDROCHLORIDE 1000 MG/1
1000 TABLET ORAL 2 TIMES DAILY WITH MEALS
Qty: 180 TABLET | Refills: 3 | Status: SHIPPED | OUTPATIENT
Start: 2019-09-05 | End: 2020-07-22 | Stop reason: SDUPTHER

## 2019-09-05 RX ORDER — LANSOPRAZOLE 30 MG/1
30 TABLET, ORALLY DISINTEGRATING, DELAYED RELEASE ORAL DAILY
Qty: 90 TABLET | Refills: 1 | Status: SHIPPED | OUTPATIENT
Start: 2019-09-05 | End: 2019-09-16 | Stop reason: CLARIF

## 2019-09-05 RX ORDER — AMLODIPINE BESYLATE 5 MG/1
5 TABLET ORAL DAILY
Qty: 90 TABLET | Refills: 3 | Status: SHIPPED | OUTPATIENT
Start: 2019-09-05 | End: 2020-11-04 | Stop reason: SDUPTHER

## 2019-09-05 RX ORDER — ATORVASTATIN CALCIUM 40 MG/1
40 TABLET, FILM COATED ORAL DAILY
Qty: 90 TABLET | Refills: 3 | Status: SHIPPED | OUTPATIENT
Start: 2019-09-05 | End: 2020-11-04 | Stop reason: SDUPTHER

## 2019-09-05 RX ORDER — FENOFIBRATE 54 MG/1
54 TABLET ORAL DAILY
Qty: 90 TABLET | Refills: 3 | Status: SHIPPED | OUTPATIENT
Start: 2019-09-05 | End: 2021-01-07 | Stop reason: DRUGHIGH

## 2019-09-05 RX ORDER — GLIMEPIRIDE 4 MG/1
4 TABLET ORAL 2 TIMES DAILY
Qty: 180 TABLET | Refills: 3 | Status: SHIPPED | OUTPATIENT
Start: 2019-09-05 | End: 2020-06-02

## 2019-09-05 RX ORDER — GABAPENTIN 300 MG/1
300 CAPSULE ORAL 2 TIMES DAILY
Qty: 180 CAPSULE | Refills: 3 | Status: SHIPPED | OUTPATIENT
Start: 2019-09-05 | End: 2020-11-27 | Stop reason: SDUPTHER

## 2019-09-05 RX ORDER — LEVOTHYROXINE SODIUM 25 UG/1
25 TABLET ORAL DAILY
Qty: 90 TABLET | Refills: 3 | Status: SHIPPED | OUTPATIENT
Start: 2019-09-05 | End: 2020-11-04 | Stop reason: SDUPTHER

## 2019-09-05 RX ORDER — TRIAMCINOLONE ACETONIDE 1 MG/G
CREAM TOPICAL 2 TIMES DAILY
Qty: 15 G | Refills: 0 | Status: SHIPPED | OUTPATIENT
Start: 2019-09-05 | End: 2021-01-21

## 2019-09-05 NOTE — PROGRESS NOTES
Subjective:       Patient ID: Richelle Zaldivar is a 54 y.o. female.    Chief Complaint: Annual Exam    54-year-old  female patient accompanied by daughter and her  with Patient Active Problem List:     Hypertension associated with diabetes     Type 2 diabetes mellitus with stage 3 chronic kidney disease, without long-term current use of insulin     Hyperlipidemia associated with type 2 diabetes mellitus     Iron deficiency anemia     Vitamin D insufficiency     GERD (gastroesophageal reflux disease)     Retinitis pigmentosa     Recurrent iritis of left eye     Open-angle glaucoma, severe stage     Osteoarthritis of knee     Hypothyroidism     Morbid obesity with BMI of 45.0-49.9, adult  Here for follow-up on chronic medical conditions.  Reports that she has been doing well but continues to have right knee pains status post right knee replacement 4 months ago.  Patient has been taking her medications regularly and reports that her sugars have been running in the range of 150s  Continues to have tingling and numbness sensation to lower extremities  Has not been taking iron supplements and vitamin-D secondary to constipation  Denies any extreme fatigue  Patient reports hyperpigmentation around her chin and would like to be treated, denies any itching  Denies any chest pain or difficulty breathing or dizziness    Review of Systems   Constitutional: Negative for fatigue.   Eyes: Negative for visual disturbance.   Respiratory: Negative for shortness of breath.    Cardiovascular: Negative for chest pain and leg swelling.   Gastrointestinal: Negative for abdominal pain, nausea and vomiting.   Endocrine: Negative for polydipsia, polyphagia and polyuria.   Musculoskeletal: Positive for arthralgias. Negative for myalgias.   Skin: Positive for rash.   Neurological: Positive for numbness. Negative for weakness, light-headedness and headaches.   Psychiatric/Behavioral: Negative for sleep disturbance.         BP 90/60  "(BP Location: Right arm, Patient Position: Sitting, BP Method: Large (Manual))   Pulse 86   Temp 98.4 °F (36.9 °C) (Tympanic)   Resp 16   Ht 4' 1" (1.245 m)   Wt 73.1 kg (161 lb 2.5 oz)   SpO2 99%   BMI 47.19 kg/m²   Objective:      Physical Exam   Constitutional: She is oriented to person, place, and time. She appears well-developed and well-nourished.   HENT:   Head: Normocephalic and atraumatic.   Mouth/Throat: Oropharynx is clear and moist.   Cardiovascular: Normal rate, regular rhythm and normal heart sounds.   No murmur heard.  Pulmonary/Chest: Effort normal and breath sounds normal. She has no wheezes.   Abdominal: Soft. Bowel sounds are normal. There is no tenderness.   Musculoskeletal: She exhibits tenderness. She exhibits no edema.   Positive for right knee tenderness anteriorly status post right knee replacement   Neurological: She is alert and oriented to person, place, and time.   Skin: Skin is warm and dry. Rash noted.   Noted hyperpigmentation around the chin   Psychiatric: She has a normal mood and affect.         Assessment/Plan:   1. Hypertension associated with diabetes  - Comprehensive metabolic panel; Future  - Lipid panel; Future  - TSH; Future  - Urinalysis; Future  - amLODIPine (NORVASC) 5 MG tablet; Take 1 tablet (5 mg total) by mouth once daily.  Dispense: 90 tablet; Refill: 3  - lisinopril-hydrochlorothiazide (PRINZIDE,ZESTORETIC) 20-25 mg Tab; Take 1 tablet by mouth once daily.  Dispense: 90 tablet; Refill: 3  Blood pressure is stable today currently on amlodipine 5 mg and lisinopril hydrochlorothiazide 20/25 mg, refills given today  Restrict salt intake and eat low-fat and low-cholesterol diet    2. Hyperlipidemia associated with type 2 diabetes mellitus  - Lipid panel; Future  - atorvastatin (LIPITOR) 40 MG tablet; Take 1 tablet (40 mg total) by mouth once daily.  Dispense: 90 tablet; Refill: 3  - fenofibrate (TRICOR) 54 MG tablet; Take 1 tablet (54 mg total) by mouth once daily. "  Dispense: 90 tablet; Refill: 3  Patient was encouraged to continue taking Lipitor 40 mg and fenofibrate 54 mg daily  Will check fasting labs    3. Type 2 diabetes mellitus with stage 3 chronic kidney disease, without long-term current use of insulin  - Comprehensive metabolic panel; Future  - Lipid panel; Future  - Hemoglobin A1c; Future  - Microalbumin/creatinine urine ratio; Future  - glimepiride (AMARYL) 4 MG tablet; Take 1 tablet (4 mg total) by mouth 2 (two) times daily.  Dispense: 180 tablet; Refill: 3  Currently on Amaryl 4 mg twice daily and metformin 1000 mg twice a day  Drink adequate fluids and avoid over-the-counter NSAIDs  Will check fasting labs    4. Vitamin D insufficiency  - Vitamin D; Future  Finished vitamin-D by prescription    5. Gastroesophageal reflux disease, esophagitis presence not specified  Patient would like to try Prevacid 30 mg which has been helpful in the past, no response noted to omeprazole 40 mg daily      6. Hypothyroidism, unspecified type  - TSH; Future  - levothyroxine (SYNTHROID) 25 MCG tablet; Take 1 tablet (25 mcg total) by mouth once daily.  Dispense: 90 tablet; Refill: 3  Currently taking levothyroxine 25 mcg p.o. daily    7. Morbid obesity with BMI of 45.0-49.9, adult  Strict lifestyle changes recommended with low-fat and low-cholesterol diet and exercise 30 min daily to lose weight with BMI 47    8. Iron deficiency anemia, unspecified iron deficiency anemia type  - CBC auto differential; Future  - Ferritin; Future  Currently not taking iron supplements secondary to constipation associated with iron supplements    9. Encounter for screening mammogram for malignant neoplasm of breast  - Mammo Digital Screening Bilateral With CAD; Future  Due for mammogram    10. Dermatitis  - triamcinolone acetonide 0.1% (KENALOG) 0.1 % cream; Apply topically 2 (two) times daily. Apply around the chin  Dispense: 15 g; Refill: 0  Will do a trial of Kenalog cream    11. Type 2 diabetes  mellitus with diabetic polyneuropathy, without long-term current use of insulin  - gabapentin (NEURONTIN) 300 MG capsule; Take 1 capsule (300 mg total) by mouth 2 (two) times daily.  Dispense: 180 capsule; Refill: 3  Stable on gabapentin 300 mg twice daily

## 2019-09-16 ENCOUNTER — LAB VISIT (OUTPATIENT)
Dept: LAB | Facility: HOSPITAL | Age: 54
End: 2019-09-16
Attending: FAMILY MEDICINE
Payer: MEDICARE

## 2019-09-16 ENCOUNTER — TELEPHONE (OUTPATIENT)
Dept: INTERNAL MEDICINE | Facility: CLINIC | Age: 54
End: 2019-09-16

## 2019-09-16 ENCOUNTER — TELEPHONE (OUTPATIENT)
Dept: PHARMACY | Facility: CLINIC | Age: 54
End: 2019-09-16

## 2019-09-16 DIAGNOSIS — E78.5 HYPERLIPIDEMIA ASSOCIATED WITH TYPE 2 DIABETES MELLITUS: ICD-10-CM

## 2019-09-16 DIAGNOSIS — E11.69 HYPERLIPIDEMIA ASSOCIATED WITH TYPE 2 DIABETES MELLITUS: ICD-10-CM

## 2019-09-16 DIAGNOSIS — E03.9 HYPOTHYROIDISM, UNSPECIFIED TYPE: ICD-10-CM

## 2019-09-16 DIAGNOSIS — E11.22 TYPE 2 DIABETES MELLITUS WITH STAGE 3 CHRONIC KIDNEY DISEASE, WITHOUT LONG-TERM CURRENT USE OF INSULIN: ICD-10-CM

## 2019-09-16 DIAGNOSIS — D50.9 IRON DEFICIENCY ANEMIA, UNSPECIFIED IRON DEFICIENCY ANEMIA TYPE: ICD-10-CM

## 2019-09-16 DIAGNOSIS — K21.9 GASTROESOPHAGEAL REFLUX DISEASE, ESOPHAGITIS PRESENCE NOT SPECIFIED: Primary | ICD-10-CM

## 2019-09-16 DIAGNOSIS — I15.2 HYPERTENSION ASSOCIATED WITH DIABETES: ICD-10-CM

## 2019-09-16 DIAGNOSIS — N18.30 TYPE 2 DIABETES MELLITUS WITH STAGE 3 CHRONIC KIDNEY DISEASE, WITHOUT LONG-TERM CURRENT USE OF INSULIN: ICD-10-CM

## 2019-09-16 DIAGNOSIS — E55.9 VITAMIN D INSUFFICIENCY: ICD-10-CM

## 2019-09-16 DIAGNOSIS — E11.59 HYPERTENSION ASSOCIATED WITH DIABETES: ICD-10-CM

## 2019-09-16 LAB
25(OH)D3+25(OH)D2 SERPL-MCNC: 20 NG/ML (ref 30–96)
ALBUMIN SERPL BCP-MCNC: 3.8 G/DL (ref 3.5–5.2)
ALP SERPL-CCNC: 60 U/L (ref 55–135)
ALT SERPL W/O P-5'-P-CCNC: 8 U/L (ref 10–44)
ANION GAP SERPL CALC-SCNC: 11 MMOL/L (ref 8–16)
AST SERPL-CCNC: 15 U/L (ref 10–40)
BASOPHILS # BLD AUTO: 0.03 K/UL (ref 0–0.2)
BASOPHILS NFR BLD: 0.5 % (ref 0–1.9)
BILIRUB SERPL-MCNC: 0.4 MG/DL (ref 0.1–1)
BUN SERPL-MCNC: 12 MG/DL (ref 6–20)
CALCIUM SERPL-MCNC: 9.7 MG/DL (ref 8.7–10.5)
CHLORIDE SERPL-SCNC: 106 MMOL/L (ref 95–110)
CHOLEST SERPL-MCNC: 163 MG/DL (ref 120–199)
CHOLEST/HDLC SERPL: 4.3 {RATIO} (ref 2–5)
CO2 SERPL-SCNC: 22 MMOL/L (ref 23–29)
CREAT SERPL-MCNC: 1.1 MG/DL (ref 0.5–1.4)
DIFFERENTIAL METHOD: ABNORMAL
EOSINOPHIL # BLD AUTO: 0.2 K/UL (ref 0–0.5)
EOSINOPHIL NFR BLD: 3.3 % (ref 0–8)
ERYTHROCYTE [DISTWIDTH] IN BLOOD BY AUTOMATED COUNT: 16.9 % (ref 11.5–14.5)
EST. GFR  (AFRICAN AMERICAN): >60 ML/MIN/1.73 M^2
EST. GFR  (NON AFRICAN AMERICAN): 56.3 ML/MIN/1.73 M^2
ESTIMATED AVG GLUCOSE: 169 MG/DL (ref 68–131)
FERRITIN SERPL-MCNC: 1 NG/ML (ref 20–300)
GLUCOSE SERPL-MCNC: 143 MG/DL (ref 70–110)
HBA1C MFR BLD HPLC: 7.5 % (ref 4–5.6)
HCT VFR BLD AUTO: 31.5 % (ref 37–48.5)
HDLC SERPL-MCNC: 38 MG/DL (ref 40–75)
HDLC SERPL: 23.3 % (ref 20–50)
HGB BLD-MCNC: 8.6 G/DL (ref 12–16)
IMM GRANULOCYTES # BLD AUTO: 0.03 K/UL (ref 0–0.04)
IMM GRANULOCYTES NFR BLD AUTO: 0.5 % (ref 0–0.5)
LDLC SERPL CALC-MCNC: 94.6 MG/DL (ref 63–159)
LYMPHOCYTES # BLD AUTO: 1.6 K/UL (ref 1–4.8)
LYMPHOCYTES NFR BLD: 29.9 % (ref 18–48)
MCH RBC QN AUTO: 21 PG (ref 27–31)
MCHC RBC AUTO-ENTMCNC: 27.3 G/DL (ref 32–36)
MCV RBC AUTO: 77 FL (ref 82–98)
MONOCYTES # BLD AUTO: 0.4 K/UL (ref 0.3–1)
MONOCYTES NFR BLD: 6.8 % (ref 4–15)
NEUTROPHILS # BLD AUTO: 3.2 K/UL (ref 1.8–7.7)
NEUTROPHILS NFR BLD: 59 % (ref 38–73)
NONHDLC SERPL-MCNC: 125 MG/DL
NRBC BLD-RTO: 0 /100 WBC
PLATELET # BLD AUTO: 323 K/UL (ref 150–350)
PMV BLD AUTO: 10.2 FL (ref 9.2–12.9)
POTASSIUM SERPL-SCNC: 4.4 MMOL/L (ref 3.5–5.1)
PROT SERPL-MCNC: 7 G/DL (ref 6–8.4)
RBC # BLD AUTO: 4.09 M/UL (ref 4–5.4)
SODIUM SERPL-SCNC: 139 MMOL/L (ref 136–145)
TRIGL SERPL-MCNC: 152 MG/DL (ref 30–150)
TSH SERPL DL<=0.005 MIU/L-ACNC: 3.02 UIU/ML (ref 0.4–4)
WBC # BLD AUTO: 5.48 K/UL (ref 3.9–12.7)

## 2019-09-16 PROCEDURE — 83036 HEMOGLOBIN GLYCOSYLATED A1C: CPT

## 2019-09-16 PROCEDURE — 82306 VITAMIN D 25 HYDROXY: CPT

## 2019-09-16 PROCEDURE — 36415 COLL VENOUS BLD VENIPUNCTURE: CPT

## 2019-09-16 PROCEDURE — 80061 LIPID PANEL: CPT

## 2019-09-16 PROCEDURE — 85025 COMPLETE CBC W/AUTO DIFF WBC: CPT

## 2019-09-16 PROCEDURE — 84443 ASSAY THYROID STIM HORMONE: CPT

## 2019-09-16 PROCEDURE — 82728 ASSAY OF FERRITIN: CPT

## 2019-09-16 PROCEDURE — 80053 COMPREHEN METABOLIC PANEL: CPT

## 2019-09-16 RX ORDER — LANSOPRAZOLE 30 MG/1
30 CAPSULE, DELAYED RELEASE ORAL DAILY
Qty: 30 CAPSULE | Refills: 11 | Status: SHIPPED | OUTPATIENT
Start: 2019-09-16 | End: 2020-11-04 | Stop reason: SDUPTHER

## 2019-09-16 NOTE — TELEPHONE ENCOUNTER
----- Message from Theresa Sheehan sent at 9/16/2019  8:55 AM CDT -----  Regarding: Prevacid Prescription  Dr. Bergman,    I spoke with Ms. Zaldivar's daughter this morning concerning the PA on her Prevacid Solutabs.    She indicated that she does not want the Prevacid Solutabs, she wanted the Prevacid Capsules 30mg.      Please send us a new prescription if you are okay with this change.    Thank you!  Theresa

## 2019-09-16 NOTE — TELEPHONE ENCOUNTER
"Called and spoke with patient concerning PA on Prevcaid Solutabs.    Patient indicated that she is would like to go back to what she was taking previously prescribed by Dr. Ackerman, the capsules, not tablets you put under your tongue.    The capsules "work better for her body."     I told patient I would reach out to Dr. Bergman nurse for verification.    Verbalized understanding.  "

## 2019-09-17 ENCOUNTER — TELEPHONE (OUTPATIENT)
Dept: INTERNAL MEDICINE | Facility: CLINIC | Age: 54
End: 2019-09-17

## 2019-09-17 ENCOUNTER — PATIENT MESSAGE (OUTPATIENT)
Dept: INTERNAL MEDICINE | Facility: CLINIC | Age: 54
End: 2019-09-17

## 2019-09-17 DIAGNOSIS — D50.9 IRON DEFICIENCY ANEMIA, UNSPECIFIED IRON DEFICIENCY ANEMIA TYPE: ICD-10-CM

## 2019-09-17 DIAGNOSIS — D50.9 IRON DEFICIENCY ANEMIA, UNSPECIFIED IRON DEFICIENCY ANEMIA TYPE: Primary | ICD-10-CM

## 2019-09-17 DIAGNOSIS — E55.9 VITAMIN D DEFICIENCY: Primary | ICD-10-CM

## 2019-09-17 RX ORDER — FERROUS SULFATE 325(65) MG
325 TABLET ORAL 2 TIMES DAILY
Qty: 60 TABLET | Refills: 3 | Status: SHIPPED | OUTPATIENT
Start: 2019-09-17 | End: 2020-11-04

## 2019-09-17 RX ORDER — ERGOCALCIFEROL 1.25 MG/1
50000 CAPSULE ORAL
Qty: 10 CAPSULE | Refills: 0 | Status: SHIPPED | OUTPATIENT
Start: 2019-09-17 | End: 2020-11-04

## 2019-09-17 NOTE — TELEPHONE ENCOUNTER
Significant improvement in blood glucose levels noted  Continue current regimen  Cholesterol levels especially LDL still not under goal  Low vitamin-D and iron levels noted, will start on vitamin-D by prescription to be taken weekly and patient to increase iron supplements to be taken twice daily  Will refer to Hematology/Oncology to discuss further anemia    Please make sure that patient has an appointment with Hematology/Oncology

## 2019-09-19 DIAGNOSIS — N39.0 URINARY TRACT INFECTION WITHOUT HEMATURIA, SITE UNSPECIFIED: Primary | ICD-10-CM

## 2019-09-19 RX ORDER — SULFAMETHOXAZOLE AND TRIMETHOPRIM 800; 160 MG/1; MG/1
1 TABLET ORAL 2 TIMES DAILY
Qty: 14 TABLET | Refills: 0 | Status: SHIPPED | OUTPATIENT
Start: 2019-09-19 | End: 2019-09-26

## 2019-09-24 DIAGNOSIS — H40.1133 PRIMARY OPEN ANGLE GLAUCOMA OF BOTH EYES, SEVERE STAGE: ICD-10-CM

## 2019-09-24 RX ORDER — LOTEPREDNOL ETABONATE 5 MG/G
1 GEL OPHTHALMIC 4 TIMES DAILY
Qty: 5 G | Refills: 6 | Status: SHIPPED | OUTPATIENT
Start: 2019-09-24 | End: 2019-12-18

## 2019-09-24 RX ORDER — DORZOLAMIDE HYDROCHLORIDE AND TIMOLOL MALEATE 20; 5 MG/ML; MG/ML
1 SOLUTION/ DROPS OPHTHALMIC EVERY 12 HOURS
Qty: 10 ML | Refills: 11 | Status: SHIPPED | OUTPATIENT
Start: 2019-09-24 | End: 2020-06-30

## 2019-09-26 DIAGNOSIS — K21.9 GASTROESOPHAGEAL REFLUX DISEASE, ESOPHAGITIS PRESENCE NOT SPECIFIED: ICD-10-CM

## 2019-09-26 RX ORDER — LANSOPRAZOLE 30 MG/1
30 CAPSULE, DELAYED RELEASE ORAL DAILY
Qty: 30 CAPSULE | Refills: 11 | Status: CANCELLED | OUTPATIENT
Start: 2019-09-26 | End: 2020-09-25

## 2019-09-27 ENCOUNTER — TELEPHONE (OUTPATIENT)
Dept: ENDOSCOPY | Facility: HOSPITAL | Age: 54
End: 2019-09-27

## 2019-09-27 ENCOUNTER — INITIAL CONSULT (OUTPATIENT)
Dept: HEMATOLOGY/ONCOLOGY | Facility: CLINIC | Age: 54
End: 2019-09-27
Payer: MEDICARE

## 2019-09-27 VITALS
SYSTOLIC BLOOD PRESSURE: 133 MMHG | HEART RATE: 94 BPM | BODY MASS INDEX: 37.51 KG/M2 | OXYGEN SATURATION: 99 % | HEIGHT: 55 IN | DIASTOLIC BLOOD PRESSURE: 81 MMHG | TEMPERATURE: 98 F | WEIGHT: 162.06 LBS | RESPIRATION RATE: 18 BRPM

## 2019-09-27 DIAGNOSIS — D50.0 IRON DEFICIENCY ANEMIA DUE TO CHRONIC BLOOD LOSS: Primary | ICD-10-CM

## 2019-09-27 PROCEDURE — 99213 OFFICE O/P EST LOW 20 MIN: CPT | Mod: PBBFAC | Performed by: INTERNAL MEDICINE

## 2019-09-27 PROCEDURE — 99999 PR PBB SHADOW E&M-EST. PATIENT-LVL III: CPT | Mod: PBBFAC,,, | Performed by: INTERNAL MEDICINE

## 2019-09-27 PROCEDURE — 99205 OFFICE O/P NEW HI 60 MIN: CPT | Mod: S$PBB,,, | Performed by: INTERNAL MEDICINE

## 2019-09-27 PROCEDURE — 99999 PR PBB SHADOW E&M-EST. PATIENT-LVL III: ICD-10-PCS | Mod: PBBFAC,,, | Performed by: INTERNAL MEDICINE

## 2019-09-27 PROCEDURE — 99205 PR OFFICE/OUTPT VISIT, NEW, LEVL V, 60-74 MIN: ICD-10-PCS | Mod: S$PBB,,, | Performed by: INTERNAL MEDICINE

## 2019-09-27 RX ORDER — SODIUM CHLORIDE 0.9 % (FLUSH) 0.9 %
10 SYRINGE (ML) INJECTION
Status: CANCELLED | OUTPATIENT
Start: 2019-10-05

## 2019-09-27 RX ORDER — HEPARIN 100 UNIT/ML
500 SYRINGE INTRAVENOUS
Status: CANCELLED | OUTPATIENT
Start: 2019-09-27

## 2019-09-27 RX ORDER — SODIUM CHLORIDE 0.9 % (FLUSH) 0.9 %
10 SYRINGE (ML) INJECTION
Status: CANCELLED | OUTPATIENT
Start: 2019-09-27

## 2019-09-27 RX ORDER — HEPARIN 100 UNIT/ML
500 SYRINGE INTRAVENOUS
Status: CANCELLED | OUTPATIENT
Start: 2019-10-05

## 2019-09-27 NOTE — PROGRESS NOTES
Subjective:       Patient ID: Richelle Zaldivar is a 54 y.o. female.    Chief Complaint: Consult; Results; and Anemia    HPI 54-year-old female found to have progressive deficiency anemia with documentation low ferritin level.  Noted last 2 years patient reports abdominal discomfort right lower abdomen.  Patient states that she had a colonoscopy 2 years ago patient reports increasing fatigue    Past Medical History:   Diagnosis Date    Acid reflux     Cataract     Diabetes mellitus, type 2     Glaucoma     HTN (hypertension)     Hyperlipidemia     Osteoarthritis of knee 3/11/2019    Recurrent iritis of left eye 4/13/2016     Family History   Problem Relation Age of Onset    Diabetes Mother     Hypertension Mother     Hyperlipidemia Mother     Stroke Mother     Amblyopia Mother     Diabetes Sister     Hypertension Sister     Hyperlipidemia Sister     Arthritis Sister     Amblyopia Brother     Amblyopia Maternal Uncle     Blindness Neg Hx     Cancer Neg Hx     Cataracts Neg Hx     Glaucoma Neg Hx     Macular degeneration Neg Hx     Retinal detachment Neg Hx     Strabismus Neg Hx     Thyroid disease Neg Hx      Social History     Socioeconomic History    Marital status:      Spouse name: Not on file    Number of children: 3    Years of education: Not on file    Highest education level: Not on file   Occupational History    Not on file   Social Needs    Financial resource strain: Not on file    Food insecurity:     Worry: Not on file     Inability: Not on file    Transportation needs:     Medical: Not on file     Non-medical: Not on file   Tobacco Use    Smoking status: Never Smoker    Smokeless tobacco: Never Used   Substance and Sexual Activity    Alcohol use: No     Alcohol/week: 0.0 standard drinks    Drug use: No    Sexual activity: Yes     Partners: Male     Birth control/protection: None   Lifestyle    Physical activity:     Days per week: Not on file     Minutes per  session: Not on file    Stress: Not on file   Relationships    Social connections:     Talks on phone: Not on file     Gets together: Not on file     Attends Church service: Not on file     Active member of club or organization: Not on file     Attends meetings of clubs or organizations: Not on file     Relationship status: Not on file   Other Topics Concern    Not on file   Social History Narrative    , 3 kids.     Past Surgical History:   Procedure Laterality Date    CATARACT EXTRACTION      GLAUCOMA SURGERY Left 08/02/2018    [facility]    GONIOTOMY Left 11/17/2016    HEMORRHOID SURGERY  4/2015    [facility] in [facility]    PARTIAL HYSTERECTOMY      TONSILLECTOMY      WISDOM TOOTH EXTRACTION         Labs:  Lab Results   Component Value Date    WBC 5.48 09/16/2019    HGB 8.6 (L) 09/16/2019    HCT 31.5 (L) 09/16/2019    MCV 77 (L) 09/16/2019     09/16/2019     BMP  Lab Results   Component Value Date     09/16/2019    K 4.4 09/16/2019     09/16/2019    CO2 22 (L) 09/16/2019    BUN 12 09/16/2019    CREATININE 1.1 09/16/2019    CALCIUM 9.7 09/16/2019    ANIONGAP 11 09/16/2019    ESTGFRAFRICA >60.0 09/16/2019    EGFRNONAA 56.3 (A) 09/16/2019     Lab Results   Component Value Date    ALT 8 (L) 09/16/2019    AST 15 09/16/2019    ALKPHOS 60 09/16/2019    BILITOT 0.4 09/16/2019       Lab Results   Component Value Date    FERRITIN 1 (L) 09/16/2019     Lab Results   Component Value Date    OOMQCMRS17 194 (L) 02/24/2017     No results found for: FOLATE  Lab Results   Component Value Date    TSH 3.025 09/16/2019         Review of Systems   Constitutional: Positive for fatigue. Negative for activity change, appetite change, chills, diaphoresis, fever and unexpected weight change.   HENT: Negative for congestion, dental problem, drooling, ear discharge, ear pain, facial swelling, hearing loss, mouth sores, nosebleeds, postnasal drip, rhinorrhea, sinus pressure, sneezing, sore throat, tinnitus, trouble  swallowing and voice change.    Eyes: Negative for photophobia, pain, discharge, redness, itching and visual disturbance.   Respiratory: Negative for cough, choking, chest tightness, shortness of breath, wheezing and stridor.    Cardiovascular: Negative for chest pain, palpitations and leg swelling.   Gastrointestinal: Positive for abdominal pain. Negative for abdominal distention, anal bleeding, blood in stool, constipation, diarrhea, nausea, rectal pain and vomiting.   Endocrine: Negative for cold intolerance, heat intolerance, polydipsia, polyphagia and polyuria.   Genitourinary: Negative for decreased urine volume, difficulty urinating, dyspareunia, dysuria, enuresis, flank pain, frequency, genital sores, hematuria, menstrual problem, pelvic pain, urgency, vaginal bleeding, vaginal discharge and vaginal pain.   Musculoskeletal: Negative for arthralgias, back pain, gait problem, joint swelling, myalgias, neck pain and neck stiffness.   Skin: Negative for color change, pallor and rash.   Allergic/Immunologic: Negative for environmental allergies, food allergies and immunocompromised state.   Neurological: Positive for weakness. Negative for dizziness, tremors, seizures, syncope, facial asymmetry, speech difficulty, light-headedness, numbness and headaches.   Hematological: Negative for adenopathy. Does not bruise/bleed easily.   Psychiatric/Behavioral: Positive for dysphoric mood. Negative for agitation, behavioral problems, confusion, decreased concentration, hallucinations, self-injury, sleep disturbance and suicidal ideas. The patient is nervous/anxious. The patient is not hyperactive.        Objective:      Physical Exam   Constitutional: She is oriented to person, place, and time. She has a sickly appearance. She appears ill. She appears distressed.   HENT:   Head: Normocephalic and atraumatic.   Right Ear: Tympanic membrane and external ear normal.   Left Ear: Tympanic membrane and external ear normal.    Nose: Nose normal. Right sinus exhibits no maxillary sinus tenderness and no frontal sinus tenderness. Left sinus exhibits no maxillary sinus tenderness and no frontal sinus tenderness.   Mouth/Throat: Oropharynx is clear and moist. No oropharyngeal exudate.   Eyes: Pupils are equal, round, and reactive to light. Conjunctivae, EOM and lids are normal. Right eye exhibits no discharge. Left eye exhibits no discharge. Right conjunctiva is not injected. Right conjunctiva has no hemorrhage. Left conjunctiva is not injected. Left conjunctiva has no hemorrhage. No scleral icterus.   Neck: Normal range of motion. Neck supple. No JVD present. No tracheal deviation present. No thyromegaly present.   Cardiovascular: Normal rate, regular rhythm and normal heart sounds.   Pulmonary/Chest: Effort normal and breath sounds normal. No stridor. No respiratory distress. She exhibits no tenderness.   Abdominal: Soft. Bowel sounds are normal. She exhibits no distension and no mass. There is no splenomegaly or hepatomegaly. There is tenderness. There is no rebound.       Musculoskeletal: Normal range of motion. She exhibits no edema or tenderness.   Lymphadenopathy:     She has no cervical adenopathy.     She has no axillary adenopathy.        Right: No supraclavicular adenopathy present.        Left: No supraclavicular adenopathy present.   Neurological: She is alert and oriented to person, place, and time. No cranial nerve deficit. Coordination normal.   Skin: Skin is dry. No rash noted. She is not diaphoretic. No erythema.   Psychiatric: She has a normal mood and affect. Her behavior is normal. Judgment and thought content normal.   Vitals reviewed.          Assessment:      1. Iron deficiency anemia due to chronic blood loss           Plan:     Documented progressive iron deficiency anemia would recommend EGD and colon.  Intolerant of oral iron recommend patient proceed with IV iron return 6-8 weeks.        Matthew Orta Jr, MD  FACP

## 2019-09-27 NOTE — LETTER
September 27, 2019      Oneyda Bergman MD  [address] [address]           [facility]  [address] [facility] [place] [address]  Phone: [phone]  Fax: [phone]          Patient: Richelle Zaldivar   MR Number: [id number]   YOB: 1965   Date of Visit: 9/27/2019       Dear Dr. Oneyda Bergman:    Thank you for referring Richelle Zaldivar to me for evaluation. Attached you will find relevant portions of my assessment and plan of care.    If you have questions, please do not hesitate to call me. I look forward to following Richelle Zaldivar along with you.    Sincerely,    Matthew Orta MD    Enclosure  CC:  No Recipients    If you would like to receive this communication electronically, please contact [email or web address] or [phone] to request more information on [employer] access.    For providers and/or their staff who would like to refer a patient to [facility], please contact us through our one-stop-shop provider referral line, [facility], at [phone].    If you feel you have received this communication in error or would no longer like to receive these types of communications, please e-mail [email or web address]

## 2019-09-27 NOTE — TELEPHONE ENCOUNTER
----- Message from Karen Torres MA sent at 9/27/2019  9:34 AM CDT -----  Good morning ,    The above patient needs an appt for a colonoscopy.    Thanks,   Karen

## 2019-10-01 ENCOUNTER — TELEPHONE (OUTPATIENT)
Dept: OBSTETRICS AND GYNECOLOGY | Facility: CLINIC | Age: 54
End: 2019-10-01

## 2019-10-01 ENCOUNTER — TELEPHONE (OUTPATIENT)
Dept: ENDOSCOPY | Facility: HOSPITAL | Age: 54
End: 2019-10-01

## 2019-10-01 NOTE — TELEPHONE ENCOUNTER
Appt scheduled 10/09/19 at 10:30 am, daughter and patient verbalized understanding of appt and location.

## 2019-10-01 NOTE — TELEPHONE ENCOUNTER
Endoscopy Scheduling Questionnaire:    1. Have you been admitted overnight to the hospital in the past 3 months? no  2. Have you had a cardiac stent placed in the past 12 months? no  3. Have you had a stroke or heart attack in the past 6 months? no  4. Have you had any chest pain in the past 3 months? no      If so, have you been evaluated by your PCP or Cardiologist? no  5. Do you take prescription weight loss medication? no  6. Have you been diagnosed with Diverticulitis within the past 3 months? no  7. Are you on dialysis? no  8. Are you diabetic? yes Do you have an insulin pump? no  9. Do you have any other health issues that you feel might limit your ability to safely have the procedure and/or sedation? no  10. Is the patient over 81 yo? no        If so, has the patient been seen by their PCP or GI in the last 6 months? N/A       -I have reviewed the last colonoscopy for recommendations regarding surveillance and bowel prep  is NA  -I have reviewed the patient's medications and allergies. She is not on high risk medication, A clearance request NA  -I have verified the pharmacy information. The prep being used is Suprep. The patient's prep instructions were sent via mail..    Date Endoscopy Scheduled: Date: 10/18/19

## 2019-10-01 NOTE — TELEPHONE ENCOUNTER
----- Message from Ally Crawford sent at 10/1/2019 11:24 AM CDT -----  Contact: pt  Type:  Sooner Apoointment Request    Caller is requesting a sooner appointment.  Caller declined first available appointment listed below.  Caller will not accept being placed on the waitlist and is requesting a message be sent to doctor.  Name of Caller:pt  When is the first available appointment?10/31  Symptoms:pelvic pain  Would the patient rather a call back or a response via [facility]? Call back  Best Call Back Number:[phone]  Additional Information: She wants to see Dr Marina    Thank you     Thank you

## 2019-10-04 ENCOUNTER — INFUSION (OUTPATIENT)
Dept: INFUSION THERAPY | Facility: HOSPITAL | Age: 54
End: 2019-10-04
Attending: INTERNAL MEDICINE
Payer: MEDICARE

## 2019-10-04 VITALS
SYSTOLIC BLOOD PRESSURE: 135 MMHG | DIASTOLIC BLOOD PRESSURE: 81 MMHG | OXYGEN SATURATION: 98 % | TEMPERATURE: 98 F | RESPIRATION RATE: 18 BRPM | HEART RATE: 87 BPM

## 2019-10-04 DIAGNOSIS — D50.0 IRON DEFICIENCY ANEMIA DUE TO CHRONIC BLOOD LOSS: Primary | ICD-10-CM

## 2019-10-04 PROCEDURE — 63600175 PHARM REV CODE 636 W HCPCS: Performed by: INTERNAL MEDICINE

## 2019-10-04 PROCEDURE — 96365 THER/PROPH/DIAG IV INF INIT: CPT

## 2019-10-04 RX ADMIN — FERRIC CARBOXYMALTOSE INJECTION 750 MG: 50 INJECTION, SOLUTION INTRAVENOUS at 10:10

## 2019-10-04 NOTE — DISCHARGE INSTRUCTIONS
[facility]  [address] [facility]  [address] [address]  [phone] phone     [phone] fax  Hours of Operation: Monday- Friday 8:00am- 5:00pm  After hours phone  [phone]  Hematology / Oncology Physicians on call      Dr. You Mckenna      Please call with any concerns regarding your appointment today.    FALL PREVENTION   Falls often occur due to slipping, tripping or losing your balance. Here are ways to reduce your risk of falling again.   Was there anything that caused your fall that can be fixed, removed or replaced?   Make your home safe by keeping walkways clear of objects you may trip over.   Use non-slip pads under rugs.   Do not walk in poorly lit areas.   Do not stand on chairs or wobbly ladders.   Use caution when reaching overhead or looking upward. This position can cause a loss of balance.   Be sure your shoes fit properly, have non-slip bottoms and are in good condition.   Be cautious when going up and down stairs, curbs, and when walking on uneven sidewalks.   If your balance is poor, consider using a cane or walker.   If your fall was related to alcohol use, stop or limit alcohol intake.   If your fall was related to use of sleeping medicines, talk to your doctor about this. You may need to reduce your dosage at bedtime if you awaken during the night to go to the bathroom.   To reduce the need for nighttime bathroom trips:   Avoid drinking fluids for several hours before going to bed   Empty your bladder before going to bed   Men can keep a urinal at the bedside   © 1283-9004 [employer], [address] [place] [address], [place] [address]. All rights reserved. This information is not intended as a substitute for professional medical care. Always follow your healthcare professional's instructions.  IRON DEFICIENCY ANEMIA:  Anemia is a condition where the size or number of red blood cells in the body is reduced.  Iron is needed in the diet to make red cells. The red blood cells carry oxygen to all parts of the body. Anemia limits the delivery of oxygen to where it is needed. This causes a feeling of being tired and run down. When anemia becomes severe, the skin becomes pale and there is shortness of breath with exertion, headaches, dizziness, drowsiness and fatigue.  The cause of your anemia is lack of iron in your body. This may occur due to blood loss (for example, heavy menstrual periods or bleeding from the stomach or intestines) or a poor diet (not eating enough iron-containing foods), inability to absorb iron from your diet, or pregnancy.  If the blood count is low enough, an IRON SUPPLEMENT will be prescribed. It usually takes about 2-3 months of treatment with iron supplements to correct an anemia. Severe cases of anemia requires a blood transfusion to rapidly correct symptoms and deliver more oxygen to the cells.  HOME CARE:  1) Increase the iron stores in your body by eating foods high in iron content. This is a natural way of building your blood cells back up again. Beef, liver, spinach and other dark green leafy vegetables, whole grain products, beans and nuts are all natural sources of iron.  2) If you are having symptoms of anemia listed above:  -- Do not overexert yourself.  -- Talk to your doctor before flying on an airplane or travelling to high altitudes.  FOLLOW UP with your doctor in 2 months for a repeat red blood cell count, or as recommended by our staff, to be sure that the anemia has been corrected.  GET PROMPT MEDICAL ATTENTION if any of the following occur or worsen:  -- Shortness of breath or chest pain  -- Dizziness or fainting  -- Vomiting blood or passing red or black-colored stool  © 7866-5170 [employer], [address] [place] [address], [place] [address]. All rights reserved. This information is not intended as a substitute for professional medical care. Always follow your healthcare  professional's instructions.

## 2019-10-09 ENCOUNTER — OFFICE VISIT (OUTPATIENT)
Dept: OBSTETRICS AND GYNECOLOGY | Facility: CLINIC | Age: 54
End: 2019-10-09
Payer: MEDICARE

## 2019-10-09 VITALS
BODY MASS INDEX: 47.38 KG/M2 | SYSTOLIC BLOOD PRESSURE: 126 MMHG | WEIGHT: 161.81 LBS | DIASTOLIC BLOOD PRESSURE: 80 MMHG

## 2019-10-09 DIAGNOSIS — R30.0 DYSURIA: Primary | ICD-10-CM

## 2019-10-09 PROCEDURE — 99999 PR PBB SHADOW E&M-EST. PATIENT-LVL III: CPT | Mod: PBBFAC,,, | Performed by: OBSTETRICS & GYNECOLOGY

## 2019-10-09 PROCEDURE — 99213 PR OFFICE/OUTPT VISIT, EST, LEVL III, 20-29 MIN: ICD-10-PCS | Mod: S$PBB,,, | Performed by: OBSTETRICS & GYNECOLOGY

## 2019-10-09 PROCEDURE — 99213 OFFICE O/P EST LOW 20 MIN: CPT | Mod: S$PBB,,, | Performed by: OBSTETRICS & GYNECOLOGY

## 2019-10-09 PROCEDURE — 99213 OFFICE O/P EST LOW 20 MIN: CPT | Mod: PBBFAC | Performed by: OBSTETRICS & GYNECOLOGY

## 2019-10-09 PROCEDURE — 99999 PR PBB SHADOW E&M-EST. PATIENT-LVL III: ICD-10-PCS | Mod: PBBFAC,,, | Performed by: OBSTETRICS & GYNECOLOGY

## 2019-10-09 RX ORDER — PHENAZOPYRIDINE HYDROCHLORIDE 200 MG/1
200 TABLET, FILM COATED ORAL 3 TIMES DAILY PRN
Qty: 30 TABLET | Refills: 0 | Status: SHIPPED | OUTPATIENT
Start: 2019-10-09 | End: 2019-10-19

## 2019-10-09 NOTE — PROGRESS NOTES
Subjective:       Patient ID: Richelle Zaldivar is a 54 y.o. female.    Chief Complaint:  dysuria    History of Present Illness  HPI  presents c/o dysuria. was treated for Klebsiella UTI 19 but reports still burning w/ urination.  Has recently taken AZO over the counter    GYN & OB History  No LMP recorded. Patient has had a hysterectomy.   Date of Last Pap: 3/10/2017    OB History    Para Term  AB Living   8 3 3 0 5 3   SAB TAB Ectopic Multiple Live Births   5              # Outcome Date GA Lbr Pascual/2nd Weight Sex Delivery Anes PTL Lv   8 Term            7 Term            6 Term            5 SAB  25w0d          4 SAB            3 SAB            2 SAB            1 SAB                Review of Systems  Review of Systems   All other systems reviewed and are negative.      Objective:     Physical Exam   Constitutional: She is oriented to person, place, and time. She appears well-developed and well-nourished.   Pulmonary/Chest: Effort normal.   Genitourinary:   Genitourinary Comments: Normal external genitalia/urethral meatus. Bimanual exam without masses, mild bladder discomfort (I believe pt is feeling mostly pressure). No abnormal discharge   Musculoskeletal: Normal range of motion.   Neurological: She is alert and oriented to person, place, and time.   Skin: Skin is warm and dry.   Psychiatric: She has a normal mood and affect. Her behavior is normal.     Urine dip negative     Assessment:        1. Dysuria         Plan:      1. rec longer course of pyridium (200mg tid x 10d)

## 2019-10-09 NOTE — LETTER
October 9, 2019                   [facility]  [address] [address] [address]  Phone: [phone]  Fax: [phone] To whom it may concern,    Karol Zaldivar  is traveling to [place], [place].  To take care of family member's medical needs.    If you have any questions or concerns, please don't hesitate to call.    Sincerely,        Aster Marina MD

## 2019-10-11 ENCOUNTER — INFUSION (OUTPATIENT)
Dept: INFUSION THERAPY | Facility: HOSPITAL | Age: 54
End: 2019-10-11
Attending: INTERNAL MEDICINE
Payer: MEDICARE

## 2019-10-11 ENCOUNTER — TELEPHONE (OUTPATIENT)
Dept: INTERNAL MEDICINE | Facility: CLINIC | Age: 54
End: 2019-10-11

## 2019-10-11 VITALS
BODY MASS INDEX: 47.38 KG/M2 | HEART RATE: 78 BPM | TEMPERATURE: 97 F | DIASTOLIC BLOOD PRESSURE: 74 MMHG | RESPIRATION RATE: 18 BRPM | SYSTOLIC BLOOD PRESSURE: 132 MMHG | OXYGEN SATURATION: 99 % | WEIGHT: 161.81 LBS

## 2019-10-11 DIAGNOSIS — Z12.11 COLON CANCER SCREENING: Primary | ICD-10-CM

## 2019-10-11 DIAGNOSIS — D50.0 IRON DEFICIENCY ANEMIA DUE TO CHRONIC BLOOD LOSS: Primary | ICD-10-CM

## 2019-10-11 PROCEDURE — 25000003 PHARM REV CODE 250: Performed by: INTERNAL MEDICINE

## 2019-10-11 PROCEDURE — A4216 STERILE WATER/SALINE, 10 ML: HCPCS | Performed by: INTERNAL MEDICINE

## 2019-10-11 PROCEDURE — 96365 THER/PROPH/DIAG IV INF INIT: CPT

## 2019-10-11 PROCEDURE — 63600175 PHARM REV CODE 636 W HCPCS: Mod: JG | Performed by: INTERNAL MEDICINE

## 2019-10-11 RX ORDER — SODIUM, POTASSIUM,MAG SULFATES 17.5-3.13G
1 SOLUTION, RECONSTITUTED, ORAL ORAL DAILY
Qty: 354 ML | Refills: 0 | Status: SHIPPED | OUTPATIENT
Start: 2019-10-11 | End: 2019-10-13

## 2019-10-11 RX ORDER — SODIUM CHLORIDE 0.9 % (FLUSH) 0.9 %
10 SYRINGE (ML) INJECTION
Status: DISCONTINUED | OUTPATIENT
Start: 2019-10-11 | End: 2019-10-11 | Stop reason: HOSPADM

## 2019-10-11 RX ADMIN — FERRIC CARBOXYMALTOSE INJECTION 750 MG: 50 INJECTION, SOLUTION INTRAVENOUS at 09:10

## 2019-10-11 RX ADMIN — Medication 10 ML: at 09:10

## 2019-10-11 NOTE — DISCHARGE INSTRUCTIONS
[facility]  [address] [facility]  [address] [address]  [phone] phone     [phone] fax  Hours of Operation: Monday- Friday 8:00am- 5:00pm  After hours phone  [phone]  Hematology / Oncology Physicians on call      Dr. You Mckenna      Please call with any concerns regarding your appointment today.    FALL PREVENTION   Falls often occur due to slipping, tripping or losing your balance. Here are ways to reduce your risk of falling again.   Was there anything that caused your fall that can be fixed, removed or replaced?   Make your home safe by keeping walkways clear of objects you may trip over.   Use non-slip pads under rugs.   Do not walk in poorly lit areas.   Do not stand on chairs or wobbly ladders.   Use caution when reaching overhead or looking upward. This position can cause a loss of balance.   Be sure your shoes fit properly, have non-slip bottoms and are in good condition.   Be cautious when going up and down stairs, curbs, and when walking on uneven sidewalks.   If your balance is poor, consider using a cane or walker.   If your fall was related to alcohol use, stop or limit alcohol intake.   If your fall was related to use of sleeping medicines, talk to your doctor about this. You may need to reduce your dosage at bedtime if you awaken during the night to go to the bathroom.   To reduce the need for nighttime bathroom trips:   Avoid drinking fluids for several hours before going to bed   Empty your bladder before going to bed   Men can keep a urinal at the bedside   © 1063-9162 [employer], [address] [place] [address], [place] [address]. All rights reserved. This information is not intended as a substitute for professional medical care. Always follow your healthcare professional's instructions.

## 2019-10-16 ENCOUNTER — OFFICE VISIT (OUTPATIENT)
Dept: OPHTHALMOLOGY | Facility: CLINIC | Age: 54
End: 2019-10-16
Payer: MEDICARE

## 2019-10-16 DIAGNOSIS — H04.123 DRY EYE SYNDROME, BILATERAL: ICD-10-CM

## 2019-10-16 DIAGNOSIS — H40.1133 PRIMARY OPEN ANGLE GLAUCOMA OF BOTH EYES, SEVERE STAGE: Primary | ICD-10-CM

## 2019-10-16 DIAGNOSIS — H20.022 IRITIS, RECURRENT, LEFT: ICD-10-CM

## 2019-10-16 PROCEDURE — 92012 PR EYE EXAM, EST PATIENT,INTERMED: ICD-10-PCS | Mod: S$PBB,,, | Performed by: OPHTHALMOLOGY

## 2019-10-16 PROCEDURE — 99999 PR PBB SHADOW E&M-EST. PATIENT-LVL II: ICD-10-PCS | Mod: PBBFAC,,, | Performed by: OPHTHALMOLOGY

## 2019-10-16 PROCEDURE — 92012 INTRM OPH EXAM EST PATIENT: CPT | Mod: S$PBB,,, | Performed by: OPHTHALMOLOGY

## 2019-10-16 PROCEDURE — 99999 PR PBB SHADOW E&M-EST. PATIENT-LVL II: CPT | Mod: PBBFAC,,, | Performed by: OPHTHALMOLOGY

## 2019-10-16 PROCEDURE — 99212 OFFICE O/P EST SF 10 MIN: CPT | Mod: PBBFAC | Performed by: OPHTHALMOLOGY

## 2019-10-16 NOTE — PROGRESS NOTES
HPI     Glaucoma      Additional comments: 2M IOP check              Comments     The patient states her eyes are doing fine and she denies any ocular pain   at this time.    PCP: Dr. Miranda Ackerman      1. COAG (followed by Dr. Scott)  CP OS 8/27/15 - low to moderate flow, TDW 1100, aggressive dilation with   GV  Gonio Puncture OS 9/25/15   SLT OS 6/23/16, 6/23/15, 6-2-14  Goniotomy OS 11-17-16  2. DM  3. RP / VIT A PALMITATE 1500 (discontinued)  4. Ahmed Valve OS 8/2/18      OS: Lotemax QID(using TID)  OS: latanoprost QHS   OS: Cosopt bid  OS: Brimonidine TID  OS: Systane and genteal gel QHS  OD: Durezol BID(not using)  OD: Atropine BID          Last edited by Dianne Rodriguez on 10/16/2019  9:31 AM. (History)            Assessment /Plan     For exam results, see Encounter Report.      ICD-10-CM ICD-9-CM    1. Primary open angle glaucoma of both eyes, severe stage H40.1133 365.11 Doing well - intraocular pressure is within acceptable range relative to target pressure with no evidence of progression.   Continue current treatment.  Reviewed importance of continued compliance with treatment and follow up.        365.73    2. Iritis, recurrent, left H20.022 364.02 Still present, stable    3. Dry eye syndrome, bilateral H04.123 375.15      OS: Lotemax TID  OS: latanoprost QHS   OS: Cosopt bid  OS: Brimonidine TID  OS: Systane and genteal gel QHS  OD: durezol prn   OD: Atropine BID   Return to clinic 3 months with dilation and GOCT

## 2019-10-17 NOTE — PROGRESS NOTES
Digital Medicine: Health  Follow-Up    Patient states that she is having trouble connecting with her digital cuff.  She was not near her machine during her call.  She requested tech support call her between 12:00 - 1:00 PM    The history is provided by the patient.   Hypertension         Assessment/Plan    SDOH    Intervention/Plan    There are no preventive care reminders to display for this patient.    Last 5 Patient Entered Readings                                      Current 30 Day Average:      Recent Readings 7/12/2019 7/12/2019 7/12/2019 7/3/2019 7/3/2019    SBP (mmHg) 94 97 94 96 107    DBP (mmHg) 66 62 71 64 75    Pulse 93 94 94 91 94

## 2019-10-18 ENCOUNTER — HOSPITAL ENCOUNTER (OUTPATIENT)
Facility: HOSPITAL | Age: 54
Discharge: HOME | End: 2019-10-18
Attending: INTERNAL MEDICINE | Admitting: INTERNAL MEDICINE
Payer: MEDICARE

## 2019-10-18 ENCOUNTER — ANESTHESIA EVENT (OUTPATIENT)
Dept: ENDOSCOPY | Facility: HOSPITAL | Age: 54
End: 2019-10-18
Payer: MEDICARE

## 2019-10-18 ENCOUNTER — ANESTHESIA (OUTPATIENT)
Dept: ENDOSCOPY | Facility: HOSPITAL | Age: 54
End: 2019-10-18
Payer: MEDICARE

## 2019-10-18 VITALS
WEIGHT: 157.19 LBS | SYSTOLIC BLOOD PRESSURE: 114 MMHG | HEART RATE: 90 BPM | OXYGEN SATURATION: 99 % | HEIGHT: 59 IN | BODY MASS INDEX: 31.69 KG/M2 | TEMPERATURE: 98 F | RESPIRATION RATE: 24 BRPM | DIASTOLIC BLOOD PRESSURE: 59 MMHG

## 2019-10-18 DIAGNOSIS — D50.0 IRON DEFICIENCY ANEMIA DUE TO CHRONIC BLOOD LOSS: Primary | ICD-10-CM

## 2019-10-18 LAB — POCT GLUCOSE: 268 MG/DL (ref 70–110)

## 2019-10-18 PROCEDURE — 43239 EGD BIOPSY SINGLE/MULTIPLE: CPT | Performed by: INTERNAL MEDICINE

## 2019-10-18 PROCEDURE — 63600175 PHARM REV CODE 636 W HCPCS: Performed by: NURSE ANESTHETIST, CERTIFIED REGISTERED

## 2019-10-18 PROCEDURE — 82962 GLUCOSE BLOOD TEST: CPT | Performed by: INTERNAL MEDICINE

## 2019-10-18 PROCEDURE — 37000008 HC ANESTHESIA 1ST 15 MINUTES: Performed by: INTERNAL MEDICINE

## 2019-10-18 PROCEDURE — 27201012 HC FORCEPS, HOT/COLD, DISP: Performed by: INTERNAL MEDICINE

## 2019-10-18 PROCEDURE — 43239 EGD BIOPSY SINGLE/MULTIPLE: CPT | Mod: 51,,, | Performed by: INTERNAL MEDICINE

## 2019-10-18 PROCEDURE — 45378 DIAGNOSTIC COLONOSCOPY: CPT | Mod: ,,, | Performed by: INTERNAL MEDICINE

## 2019-10-18 PROCEDURE — 88305 TISSUE SPECIMEN TO PATHOLOGY - SURGERY: ICD-10-PCS | Mod: 26,,, | Performed by: PATHOLOGY

## 2019-10-18 PROCEDURE — 43239 PR EGD, FLEX, W/BIOPSY, SGL/MULTI: ICD-10-PCS | Mod: 51,,, | Performed by: INTERNAL MEDICINE

## 2019-10-18 PROCEDURE — 88305 TISSUE EXAM BY PATHOLOGIST: CPT | Mod: 26,,, | Performed by: PATHOLOGY

## 2019-10-18 PROCEDURE — 88305 TISSUE EXAM BY PATHOLOGIST: CPT | Mod: 59 | Performed by: PATHOLOGY

## 2019-10-18 PROCEDURE — 45378 DIAGNOSTIC COLONOSCOPY: CPT | Performed by: INTERNAL MEDICINE

## 2019-10-18 PROCEDURE — 37000009 HC ANESTHESIA EA ADD 15 MINS: Performed by: INTERNAL MEDICINE

## 2019-10-18 PROCEDURE — 45378 PR COLONOSCOPY,DIAGNOSTIC: ICD-10-PCS | Mod: ,,, | Performed by: INTERNAL MEDICINE

## 2019-10-18 RX ORDER — PROPOFOL 10 MG/ML
VIAL (ML) INTRAVENOUS
Status: DISCONTINUED | OUTPATIENT
Start: 2019-10-18 | End: 2019-10-18

## 2019-10-18 RX ORDER — SODIUM CHLORIDE, SODIUM LACTATE, POTASSIUM CHLORIDE, CALCIUM CHLORIDE 600; 310; 30; 20 MG/100ML; MG/100ML; MG/100ML; MG/100ML
INJECTION, SOLUTION INTRAVENOUS CONTINUOUS PRN
Status: DISCONTINUED | OUTPATIENT
Start: 2019-10-18 | End: 2019-10-18

## 2019-10-18 RX ADMIN — PROPOFOL 25 MG: 10 INJECTION, EMULSION INTRAVENOUS at 10:10

## 2019-10-18 RX ADMIN — SODIUM CHLORIDE, SODIUM LACTATE, POTASSIUM CHLORIDE, AND CALCIUM CHLORIDE: .6; .31; .03; .02 INJECTION, SOLUTION INTRAVENOUS at 10:10

## 2019-10-18 RX ADMIN — PROPOFOL 50 MG: 10 INJECTION, EMULSION INTRAVENOUS at 10:10

## 2019-10-18 NOTE — [employer] PATIENT INSTRUCTIONS
Discharge Summary/Instructions after an Endoscopic Procedure  Patient Name: Richelle Zaldivar  Patient MRN: [id number]  Patient YOB: 1965  Friday, October 18, 2019 Aster Krause MD  RESTRICTIONS:  During your procedure today, you received medications for sedation.  These   medications may affect your judgment, balance and coordination.  Therefore,   for 24 hours, you have the following restrictions:   - DO NOT drive a car, operate machinery, make legal/financial decisions,   sign important papers or drink alcohol.    ACTIVITY:  Today: no heavy lifting, straining or running due to procedural   sedation/anesthesia.  The following day: return to full activity including work.  DIET:  Eat and drink normally unless instructed otherwise.     TREATMENT FOR COMMON SIDE EFFECTS:  - Mild abdominal pain, nausea, belching, bloating or excessive gas:  rest,   eat lightly and use a heating pad.  - Sore Throat: treat with throat lozenges and/or gargle with warm salt   water.  - Because air was used during the procedure, expelling large amounts of air   from your rectum or belching is normal.  - If a bowel prep was taken, you may not have a bowel movement for 1-3 days.    This is normal.  SYMPTOMS TO WATCH FOR AND REPORT TO YOUR PHYSICIAN:  1. Abdominal pain or bloating, other than gas cramps.  2. Chest pain.  3. Back pain.  4. Signs of infection such as: chills or fever occurring within 24 hours   after the procedure.  5. Rectal bleeding, which would show as bright red, maroon, or black stools.   (A tablespoon of blood from the rectum is not serious, especially if   hemorrhoids are present.)  6. Vomiting.  7. Weakness or dizziness.  GO DIRECTLY TO THE NEAREST EMERGENCY ROOM IF YOU HAVE ANY OF THE FOLLOWING:      Difficulty breathing              Chills and/or fever over 101 F   Persistent vomiting and/or vomiting blood   Severe abdominal pain   Severe chest pain   Black, tarry stools   Bleeding- more than one  tablespoon   Any other symptom or condition that you feel may need urgent attention  Your doctor recommends these additional instructions:  If any biopsies were taken, your doctors clinic will contact you in 1 to 2   weeks with any results.  - Await pathology results.   - Discharge patient to home (via wheelchair).   - Resume previous diet.   - Continue present medications.   - Patient has a contact number available for emergencies.  The signs and   symptoms of potential delayed complications were discussed with the   patient.  Return to normal activities tomorrow.  Written discharge   instructions were provided to the patient.  For questions, problems or results please call your physician Aster Krause MD at Work:  [phone]  If you have any questions about the above instructions, call the GI   department at [phone] or call the endoscopy unit at [phone]   from 7am until 3 pm.  [facility], EMERGENCY ROOM PHONE NUMBER:   [phone]  IF A COMPLICATION OR EMERGENCY SITUATION ARISES AND YOU ARE UNABLE TO REACH   YOUR PHYSICIAN - GO DIRECTLY TO THE EMERGENCY ROOM.  I have read or have had read to me these discharge instructions for my   procedure and have received a written copy.  I understand these   instructions and will follow-up with my physician if I have any questions.     __________________________________       _____________________________________  Nurse Signature                                          Patient/Designated   Responsible Party Signature  MD Aster Castorena MD  10/18/2019 10:40:54 AM  [employer]

## 2019-10-18 NOTE — DISCHARGE SUMMARY
Endoscopy Discharge Summary      Admit Date: 10/18/2019    Discharge Date and Time:  10/18/2019 10:41 AM    Attending Physician: Aster Krause MD     Discharge Physician: Aster Krause MD     Principal Admitting Diagnoses: Iron deficiency anemia         Discharge Diagnosis: The encounter diagnosis was Iron deficiency anemia due to chronic blood loss.     Discharged Condition: Good    Indication for Admission: Iron deficiency anemia     Hospital Course: Patient was admitted for an inpatient procedure and tolerated the procedure well with no complications.    Significant Diagnostic Studies: EGD with biopsy and Colonsocopy  Pathology (if any):  Specimen (12h ago, onward)     Start     Ordered    10/18/19 1012  Specimen to Pathology - Surgery  Once     Comments:  1.  Distal small bowel biopsy -R/O Celiac2.  Duodenal bulb biopsy -R/O Celiac3.  Gastric biopsy -R/O H Pylori4.  Gastric polyp      10/18/19 1035                Estimated Blood Loss: 1 ml.    Discussed with: patient and family.    Disposition: Home.    Follow Up/Patient Instructions:   Current Discharge Medication List      CONTINUE these medications which have NOT CHANGED    Details   atorvastatin (LIPITOR) 40 MG tablet Take 1 tablet (40 mg total) by mouth once daily.  Qty: 90 tablet, Refills: 3    Associated Diagnoses: Hyperlipidemia associated with type 2 diabetes mellitus      atropine 1% (ISOPTO ATROPINE) 1 % Drop Place 1 drop into the right eye 4 (four) times daily.  Qty: 5 mL, Refills: 1      brimonidine 0.1% (ALPHAGAN P) 0.1 % Drop Place 1 drop into both eyes 3 (three) times daily.  Qty: 15 mL, Refills: 6    Associated Diagnoses: Primary open angle glaucoma of both eyes, severe stage      dorzolamide (TRUSOPT) 2 % ophthalmic solution Place 1 drop into both eyes twice daily  Qty: 10 mL, Refills: 4      dorzolamide-timolol 2-0.5% (COSOPT) 22.3-6.8 mg/mL ophthalmic solution Place 1 drop into both eyes every 12 (twelve) hours.  Qty: 10 mL,  Refills: 11    Associated Diagnoses: Primary open angle glaucoma of both eyes, severe stage      ergocalciferol (ERGOCALCIFEROL) 50,000 unit Cap Take 1 capsule (50,000 Units total) by mouth every 7 days.  Qty: 10 capsule, Refills: 0    Associated Diagnoses: Vitamin D deficiency      gabapentin (NEURONTIN) 300 MG capsule Take 1 capsule (300 mg total) by mouth 2 (two) times daily.  Qty: 180 capsule, Refills: 3    Associated Diagnoses: Type 2 diabetes mellitus with diabetic polyneuropathy, without long-term current use of insulin      glimepiride (AMARYL) 4 MG tablet Take 1 tablet (4 mg total) by mouth 2 (two) times daily.  Qty: 180 tablet, Refills: 3    Associated Diagnoses: Type 2 diabetes mellitus with stage 3 chronic kidney disease, without long-term current use of insulin      lansoprazole (PREVACID) 30 MG capsule Take 1 capsule (30 mg total) by mouth once daily.  Qty: 30 capsule, Refills: 11    Associated Diagnoses: Gastroesophageal reflux disease, esophagitis presence not specified      latanoprost 0.005 % ophthalmic solution Place 1 drop into both eyes every evening.  Qty: 7.5 mL, Refills: 4    Associated Diagnoses: Primary open angle glaucoma of both eyes, severe stage      lisinopril-hydrochlorothiazide (PRINZIDE,ZESTORETIC) 20-25 mg Tab Take 1 tablet by mouth once daily.  Qty: 90 tablet, Refills: 3    Associated Diagnoses: Hypertension associated with diabetes      loteprednol etabonate (LOTEMAX) 0.5 % DrpG Place 1 drop into the left eye 4 (four) times daily.  Qty: 5 g, Refills: 6      triamcinolone acetonide 0.1% (KENALOG) 0.1 % cream Apply topically 2 (two) times daily. Apply around the chin  Qty: 15 g, Refills: 0    Associated Diagnoses: Dermatitis      urea (CARMOL) 40 % Crea Apply topically 2 (two) times daily. Apply to thickened areas of skin  Qty: 1 Tube, Refills: 3      amLODIPine (NORVASC) 5 MG tablet Take 1 tablet (5 mg total) by mouth once daily.  Qty: 90 tablet, Refills: 3    Associated  Diagnoses: Hypertension associated with diabetes      blood sugar diagnostic Strp 1 each by Misc.(Non-Drug; Combo Route) route 3 (three) times daily.  Qty: 100 strip, Refills: 11    Associated Diagnoses: Type 2 diabetes mellitus with diabetic polyneuropathy, without long-term current use of insulin      blood-glucose meter kit Use as instructed Insurance Preferred  Qty: 1 each, Refills: 0    Associated Diagnoses: Type 2 diabetes mellitus with diabetic polyneuropathy, without long-term current use of insulin      fenofibrate (TRICOR) 54 MG tablet Take 1 tablet (54 mg total) by mouth once daily.  Qty: 90 tablet, Refills: 3    Associated Diagnoses: Hyperlipidemia associated with type 2 diabetes mellitus      ferrous sulfate (FEOSOL) 325 mg (65 mg iron) Tab tablet Take 1 tablet (325 mg total) by mouth 2 (two) times daily.  Qty: 60 tablet, Refills: 3    Associated Diagnoses: Iron deficiency anemia, unspecified iron deficiency anemia type      lancets Misc 1 each by Misc.(Non-Drug; Combo Route) route 3 (three) times daily.  Qty: 100 each, Refills: 11    Associated Diagnoses: Type 2 diabetes mellitus with diabetic polyneuropathy, without long-term current use of insulin      levothyroxine (SYNTHROID) 25 MCG tablet Take 1 tablet (25 mcg total) by mouth once daily.  Qty: 90 tablet, Refills: 3    Associated Diagnoses: Hypothyroidism, unspecified type      metFORMIN (GLUCOPHAGE) 1000 MG tablet Take 1 tablet (1,000 mg total) by mouth 2 (two) times daily with meals.  Qty: 180 tablet, Refills: 3      phenazopyridine (PYRIDIUM) 200 MG tablet Take 1 tablet (200 mg total) by mouth 3 (three) times daily as needed for Pain.  Qty: 30 tablet, Refills: 0      !! prednisoLONE acetate (PRED FORTE) 1 % DrpS Place one drop into both eyes four times daily  Qty: 5 mL, Refills: 2      !! prednisoLONE acetate (PRED FORTE) 1 % DrpS Place 1 drop into the left eye 3 (three) times daily. For one week then one drop into the the left eye 2 times a  day  Qty: 10 mL, Refills: 3    Associated Diagnoses: Iritis, recurrent, left      timolol maleate 0.5% (TIMOPTIC) 0.5 % Drop Place 1 drop into both eyes twice daily  Qty: 10 mL, Refills: 4       !! - Potential duplicate medications found. Please discuss with provider.          No discharge procedures on file.        Aster Krause MD  Gastroenterology and Hepatology

## 2019-10-18 NOTE — ANESTHESIA POSTPROCEDURE EVALUATION
Anesthesia Post Evaluation    Patient: Richelle Zaldivar    Procedure(s) Performed: Procedure(s) (LRB):  ESOPHAGOGASTRODUODENOSCOPY (EGD) (N/A)  COLONOSCOPY (N/A)    Final Anesthesia Type: MAC  Patient location during evaluation: PACU  Patient participation: Yes- Able to Participate  Level of consciousness: awake and alert  Post-procedure vital signs: reviewed and stable  Pain management: adequate  Airway patency: patent  PONV status at discharge: No PONV  Anesthetic complications: no      Cardiovascular status: blood pressure returned to baseline and hemodynamically stable  Respiratory status: unassisted  Hydration status: euvolemic  Follow-up not needed.          Vitals Value Taken Time   /73 10/18/2019  9:49 AM   Temp 36.6 °C (97.9 °F) 10/18/2019  9:49 AM   Pulse 98 10/18/2019  9:49 AM   Resp 18 10/18/2019  9:49 AM   SpO2 100 % 10/18/2019  9:49 AM         No case tracking events are documented in the log.      Pain/Nilsa Score: Nilsa Score: 10 (10/18/2019 10:40 AM)

## 2019-10-18 NOTE — TRANSFER OF CARE
"Anesthesia Transfer of Care Note    Patient: Richelle Zaldivar    Procedure(s) Performed: Procedure(s) (LRB):  ESOPHAGOGASTRODUODENOSCOPY (EGD) (N/A)  COLONOSCOPY (N/A)    Patient location: PACU    Anesthesia Type: MAC    Transport from OR: Transported from OR on room air with adequate spontaneous ventilation    Post pain: adequate analgesia    Post assessment: no apparent anesthetic complications    Post vital signs: stable    Level of consciousness: awake    Nausea/Vomiting: no nausea/vomiting    Complications: none    Transfer of care protocol was followed      Last vitals:   Visit Vitals  /73 (BP Location: Left arm, Patient Position: Lying)   Pulse 98   Temp 36.6 °C (97.9 °F) (Temporal)   Resp 18   Ht 4' 11" (1.499 m)   Wt 71.3 kg (157 lb 3 oz)   SpO2 100%   Breastfeeding? No   BMI 31.75 kg/m²     "

## 2019-10-18 NOTE — ANESTHESIA RELEASE NOTE
"Anesthesia Release from PACU Note    Patient: Richelle Zaldivar    Procedure(s) Performed: Procedure(s) (LRB):  ESOPHAGOGASTRODUODENOSCOPY (EGD) (N/A)  COLONOSCOPY (N/A)    Anesthesia type: MAC    Post pain: Adequate analgesia    Post assessment: no apparent anesthetic complications    Last Vitals:   Visit Vitals  /73 (BP Location: Left arm, Patient Position: Lying)   Pulse 98   Temp 36.6 °C (97.9 °F) (Temporal)   Resp 18   Ht 4' 11" (1.499 m)   Wt 71.3 kg (157 lb 3 oz)   SpO2 100%   Breastfeeding? No   BMI 31.75 kg/m²       Post vital signs: stable    Level of consciousness: awake    Nausea/Vomiting: no nausea/no vomiting    Complications: none    Airway Patency: patent    Respiratory: unassisted    Cardiovascular: stable and blood pressure at baseline    Hydration: euvolemic  "

## 2019-10-18 NOTE — ANESTHESIA PREPROCEDURE EVALUATION
10/18/2019  Richelle Zaldivar is a 54 y.o., female.    Anesthesia Evaluation    I have reviewed the Patient Summary Reports.     I have reviewed the Medications.     Review of Systems  Anesthesia Hx:  No problems with previous Anesthesia  History of prior surgery of interest to airway management or planning: Denies Family Hx of Anesthesia complications.   Denies Personal Hx of Anesthesia complications.   Social:  Non-Smoker    Hematology/Oncology:     Oncology Normal    -- Anemia: Hematology Comments: 09/16  hgb 8.6     EENT/Dental:   Glaucoma   Cardiovascular:   Hypertension hyperlipidemia    Pulmonary:  Pulmonary Normal    Renal/:   Chronic Renal Disease    Hepatic/GI:   GERD    Musculoskeletal:   Arthritis     Neurological:  Neurology Normal    Endocrine:   Diabetes Hypothyroidism    Psych:  Psychiatric Normal           Physical Exam  General:  Morbid Obesity    Airway/Jaw/Neck:  Airway Findings: Mouth Opening: Normal Tongue: Normal  General Airway Assessment: Adult  Mallampati: II  TM Distance: Normal, at least 6 cm          Chest/Lungs:  Chest/Lungs Findings: Normal Respiratory Rate     Heart/Vascular:  Heart Findings: Rate: Normal             Anesthesia Plan  Type of Anesthesia, risks & benefits discussed:  Anesthesia Type:  MAC  Patient's Preference:   Intra-op Monitoring Plan: standard ASA monitors  Intra-op Monitoring Plan Comments:   Post Op Pain Control Plan:   Post Op Pain Control Plan Comments:   Induction:   IV  Beta Blocker:  Patient is not currently on a Beta-Blocker (No further documentation required).       Informed Consent: Patient understands risks and agrees with Anesthesia plan.  Questions answered. Anesthesia consent signed with patient.  ASA Score: 3     Day of Surgery Review of History & Physical:    H&P update referred to the surgeon.         Ready For Surgery From Anesthesia  Perspective.

## 2019-10-18 NOTE — DISCHARGE INSTRUCTIONS
Gastritis (Adult)    Gastritis is inflammation and irritation of the stomach lining. It can be present for a short time (acute) or be long lasting (chronic). Gastritis is often caused by infection with bacteria called H pylori. More than a third of people in the [place] have this bacteria in their bodies. In many cases, H pylori causes no problems or symptoms. In some people, though, the infection irritates the stomach lining and causes gastritis. Other causes of stomach irritation include drinking alcohol or taking pain-relieving medicines called NSAIDs (such as aspirin or ibuprofen).   Symptoms of gastritis can include:  · Abdominal pain or bloating  · Loss of appetite  · Nausea or vomiting  · Vomiting blood or having black stools  · Feeling more tired than usual  An inflamed and irritated stomach lining is more likely to develop a sore called an ulcer. To help prevent this, gastritis should be treated.  Home care  If needed, medicines may be prescribed. If you have H pylori infection, treating it will likely relieve your symptoms. Other changes can help reduce stomach irritation and help it heal.  · If you have been prescribed medicines for H pylori infection, take them as directed. Take all of the medicine until it is finished or your healthcare provider tells you to stop, even if you feel better.  · Your healthcare provider may recommend avoiding NSAIDs. If you take daily aspirin for your heart or other medical reasons, do not stop without talking to your healthcare provider first.  · Avoid drinking alcohol.  · Stop smoking. Smoking can irritate the stomach and delay healing. As much as possible, stay away from second hand smoke.  Follow-up care  Follow up with your healthcare provider, or as advised by our staff. Testing may be needed to check for inflammation or an ulcer.  When to seek medical advice  Call your healthcare provider for any of the following:  · Stomach pain that gets worse or moves to the lower  right abdomen (appendix area)  · Chest pain that appears or gets worse, or spreads to the back, neck, shoulder, or arm  · Frequent vomiting (cant keep down liquids)  · Blood in the stool or vomit (red or black in color)  · Feeling weak or dizzy  · Fever of 100.4ºF (38ºC) or higher, or as directed by your healthcare provider  Date Last Reviewed: 6/22/2015  © 2610-8293 [employer]. [address] [place] [address]. All rights reserved. This information is not intended as a substitute for professional medical care. Always follow your healthcare professional's instructions.

## 2019-10-18 NOTE — H&P
Short Stay Endoscopy History and Physical    PCP - Oneyda Bergman MD    Procedure - EGD and colonoscopy  ASA - 2  Mallampati - per anesthesia  History of Anesthesia problems - no  Family history Anesthesia problems -  no     HPI:  This is a 54 y.o. female here for evaluation of :   Active Hospital Problems    Diagnosis  POA    *Iron deficiency anemia [D50.9]  Yes      Resolved Hospital Problems   No resolved problems to display.         Health Maintenance       Date Due Completion Date    Shingles Vaccine (1 of 2) 06/15/2013 ---    Pneumococcal Vaccine (Highest Risk) (2 of 3 - PCV13) 01/16/2016 1/16/2015    Mammogram 06/06/2019 6/6/2018 (Done)    Override on 6/6/2018: Done ([facility])    Influenza Vaccine (1) 09/01/2019 10/3/2018    Foot Exam 10/03/2019 10/3/2018    Override on 1/13/2016: Done    Override on 9/9/2015: Done    Override on 5/6/2015: Done    Override on 1/20/2015: Done    Override on 9/18/2014: Done    Override on 5/20/2014: Done    Override on 2/17/2014: Done    Override on 1/16/2014: Done    Hemoglobin A1c 03/16/2020 9/16/2019    Lipid Panel 09/16/2020 9/16/2019    Low Dose Statin 10/16/2020 10/16/2019    Eye Exam 10/16/2020 10/16/2019    Override on 12/15/2015: Done (DR NEHEMIAS NELSON. No bdr)    Override on 3/25/2015: Done    Override on 7/22/2014: Done    Override on 1/20/2014: Done    TETANUS VACCINE 10/20/2025 10/20/2015 (Done)    Override on 10/20/2015: Done    Colonoscopy 03/30/2027 3/30/2017 (Done)    Override on 3/30/2017: Done (Dr maloney - repeat in 3 years)    Override on 1/16/2015: Declined          EGD  Reflux - no  Dysphagia - no  Abdominal pain - no  Diarrhea - no  Anemia - yes  GI bleeding - no  Other - no    Colonoscopy  Screening - no  History of polyps - no  Diarrhea - no  Anemia - yes  Blood in stools - no  Abdominal pain - no  Other - no    ROS:  CONSTITUTIONAL: Denies weight change,  fatigue, fevers, chills, night sweats.  CARDIOVASCULAR: Denies chest pain, shortness of breath,  orthopnea and edema.  RESPIRATORY: Denies cough, hemoptysis, dyspnea, and wheezing.  GI: See HPI.    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       Surgical History:   Past Surgical History:   Procedure Laterality Date    CATARACT EXTRACTION      GLAUCOMA SURGERY Left 08/02/2018    [facility]    GONIOTOMY Left 11/17/2016    HEMORRHOID SURG[facility]  4/2015    [facility]    PARTIAL HYSTERECTOMY      TONSILLECTOMY      WISDOM TOOTH EXTRACTION         Family History:   Family History   Problem Relation Age of Onset    Diabetes Mother     Hypertension Mother     Hyperlipidemia Mother     Stroke Mother     Amblyopia Mother     Diabetes Sister     Hypertension Sister     Hyperlipidemia Sister     Arthritis Sister     Amblyopia Brother     Amblyopia Maternal Uncle     Blindness Neg Hx     Cancer Neg Hx     Cataracts Neg Hx     Glaucoma Neg Hx     Macular degeneration Neg Hx     Retinal detachment Neg Hx     Strabismus Neg Hx     Thyroid disease Neg Hx        Social History:   Social History     Tobacco Use    Smoking status: Never Smoker    Smokeless tobacco: Never Used   Substance Use Topics    Alcohol use: No     Alcohol/week: 0.0 standard drinks    Drug use: No       Allergies:   Review of patient's allergies indicates:  No Known Allergies    Medications:   No current facility-administered medications on file prior to encounter.      Current Outpatient Medications on File Prior to Encounter   Medication Sig Dispense Refill    atorvastatin (LIPITOR) 40 MG tablet Take 1 tablet (40 mg total) by mouth once daily. 90 tablet 3    atropine 1% (ISOPTO ATROPINE) 1 % Drop Place 1 drop into the right eye 4 (four) times daily. 5 mL 1    brimonidine 0.1% (ALPHAGAN P) 0.1 % Drop Place 1 drop into  both eyes 3 (three) times daily. 15 mL 6    dorzolamide (TRUSOPT) 2 % ophthalmic solution Place 1 drop into both eyes twice daily 10 mL 4    dorzolamide-timolol 2-0.5% (COSOPT) 22.3-6.8 mg/mL ophthalmic solution Place 1 drop into both eyes every 12 (twelve) hours. 10 mL 11    ergocalciferol (ERGOCALCIFEROL) 50,000 unit Cap Take 1 capsule (50,000 Units total) by mouth every 7 days. 10 capsule 0    gabapentin (NEURONTIN) 300 MG capsule Take 1 capsule (300 mg total) by mouth 2 (two) times daily. 180 capsule 3    glimepiride (AMARYL) 4 MG tablet Take 1 tablet (4 mg total) by mouth 2 (two) times daily. 180 tablet 3    lansoprazole (PREVACID) 30 MG capsule Take 1 capsule (30 mg total) by mouth once daily. 30 capsule 11    latanoprost 0.005 % ophthalmic solution Place 1 drop into both eyes every evening. 7.5 mL 4    lisinopril-hydrochlorothiazide (PRINZIDE,ZESTORETIC) 20-25 mg Tab Take 1 tablet by mouth once daily. 90 tablet 3    loteprednol etabonate (LOTEMAX) 0.5 % DrpG Place 1 drop into the left eye 4 (four) times daily. 5 g 6    triamcinolone acetonide 0.1% (KENALOG) 0.1 % cream Apply topically 2 (two) times daily. Apply around the chin 15 g 0    urea (CARMOL) 40 % Crea Apply topically 2 (two) times daily. Apply to thickened areas of skin 1 Tube 3    amLODIPine (NORVASC) 5 MG tablet Take 1 tablet (5 mg total) by mouth once daily. 90 tablet 3    blood sugar diagnostic Strp 1 each by Misc.(Non-Drug; Combo Route) route 3 (three) times daily. 100 strip 11    blood-glucose meter kit Use as instructed Insurance Preferred 1 each 0    fenofibrate (TRICOR) 54 MG tablet Take 1 tablet (54 mg total) by mouth once daily. 90 tablet 3    ferrous sulfate (FEOSOL) 325 mg (65 mg iron) Tab tablet Take 1 tablet (325 mg total) by mouth 2 (two) times daily. 60 tablet 3    lancets Misc 1 each by Misc.(Non-Drug; Combo Route) route 3 (three) times daily. 100 each 11    levothyroxine (SYNTHROID) 25 MCG tablet Take 1 tablet (25  mcg total) by mouth once daily. 90 tablet 3    metFORMIN (GLUCOPHAGE) 1000 MG tablet Take 1 tablet (1,000 mg total) by mouth 2 (two) times daily with meals. 180 tablet 3    prednisoLONE acetate (PRED FORTE) 1 % DrpS Place one drop into both eyes four times daily 5 mL 2    prednisoLONE acetate (PRED FORTE) 1 % DrpS Place 1 drop into the left eye 3 (three) times daily. For one week then one drop into the the left eye 2 times a day 10 mL 3    timolol maleate 0.5% (TIMOPTIC) 0.5 % Drop Place 1 drop into both eyes twice daily 10 mL 4       Physical Exam:  Vital Signs: There were no vitals filed for this visit.  General Appearance: Well appearing in no acute distress  ENT: OP clear  Chest: CTA B  CV: RRR, no m/r/g  Abd: s/nt/nd/nabs  Ext: no edema    Labs:Reviewed    IMP:  Active Hospital Problems    Diagnosis  POA    *Iron deficiency anemia [D50.9]  Yes      Resolved Hospital Problems   No resolved problems to display.         Plan:   I have explained the risks and benefits of upper endoscopy and colonoscopy to the patient including but not limited to bleeding, perforation, infection, and death. The patient wishes to proceed.

## 2019-10-18 NOTE — [employer] PATIENT INSTRUCTIONS
Discharge Summary/Instructions after an Endoscopic Procedure  Patient Name: Richelle Zaldivar  Patient MRN: [id number]  Patient YOB: 1965  Friday, October 18, 2019 Aster Krause MD  RESTRICTIONS:  During your procedure today, you received medications for sedation.  These   medications may affect your judgment, balance and coordination.  Therefore,   for 24 hours, you have the following restrictions:   - DO NOT drive a car, operate machinery, make legal/financial decisions,   sign important papers or drink alcohol.    ACTIVITY:  Today: no heavy lifting, straining or running due to procedural   sedation/anesthesia.  The following day: return to full activity including work.  DIET:  Eat and drink normally unless instructed otherwise.     TREATMENT FOR COMMON SIDE EFFECTS:  - Mild abdominal pain, nausea, belching, bloating or excessive gas:  rest,   eat lightly and use a heating pad.  - Sore Throat: treat with throat lozenges and/or gargle with warm salt   water.  - Because air was used during the procedure, expelling large amounts of air   from your rectum or belching is normal.  - If a bowel prep was taken, you may not have a bowel movement for 1-3 days.    This is normal.  SYMPTOMS TO WATCH FOR AND REPORT TO YOUR PHYSICIAN:  1. Abdominal pain or bloating, other than gas cramps.  2. Chest pain.  3. Back pain.  4. Signs of infection such as: chills or fever occurring within 24 hours   after the procedure.  5. Rectal bleeding, which would show as bright red, maroon, or black stools.   (A tablespoon of blood from the rectum is not serious, especially if   hemorrhoids are present.)  6. Vomiting.  7. Weakness or dizziness.  GO DIRECTLY TO THE NEAREST EMERGENCY ROOM IF YOU HAVE ANY OF THE FOLLOWING:      Difficulty breathing              Chills and/or fever over 101 F   Persistent vomiting and/or vomiting blood   Severe abdominal pain   Severe chest pain   Black, tarry stools   Bleeding- more than one  tablespoon   Any other symptom or condition that you feel may need urgent attention  Your doctor recommends these additional instructions:  If any biopsies were taken, your doctors clinic will contact you in 1 to 2   weeks with any results.  - Discharge patient to home (via wheelchair).   - Resume previous diet.   - Continue present medications.   - Repeat colonoscopy in 10 years for screening purposes.   - Patient has a contact number available for emergencies.  The signs and   symptoms of potential delayed complications were discussed with the   patient.  Return to normal activities tomorrow.  Written discharge   instructions were provided to the patient.  For questions, problems or results please call your physician Aster Krause MD at Work:  [phone]  If you have any questions about the above instructions, call the GI   department at [phone] or call the endoscopy unit at [phone]   from 7am until 3 pm.  [facility] [place], EMERGENCY ROOM PHONE NUMBER:   [phone]  IF A COMPLICATION OR EMERGENCY SITUATION ARISES AND YOU ARE UNABLE TO REACH   YOUR PHYSICIAN - GO DIRECTLY TO THE EMERGENCY ROOM.  I have read or have had read to me these discharge instructions for my   procedure and have received a written copy.  I understand these   instructions and will follow-up with my physician if I have any questions.     __________________________________       _____________________________________  Nurse Signature                                          Patient/Designated   Responsible Party Signature  MD Aster Castorena MD  10/18/2019 10:37:26 AM  [employer]

## 2019-10-23 DIAGNOSIS — D50.9 IRON DEFICIENCY ANEMIA, UNSPECIFIED IRON DEFICIENCY ANEMIA TYPE: Primary | ICD-10-CM

## 2019-10-24 NOTE — PHYSICIAN QUERY
PT Name: Richelle Zaldivar  MR #: [id number]     Physician Query Form - Documentation Clarification      CDS/: Darlene Casey               Contact information: [email or web address]    This form is a permanent document in the medical record.     Query Date: October 24, 2019    By submitting this query, we are merely seeking further clarification of documentation. Please utilize your independent clinical judgment when addressing the question(s) below.    The Medical record reflects the following:    Supporting Clinical Findings Location in Medical Record   Review of Systems  Renal/:   Chronic Renal Disease         Anesthesia Report                                                                                      Doctor, Please specify the type of chronic renal disease associated with the above clinical documentation.    Provider Use Only    I'm not a physician, I think this request has been sent to me in error.                                                                                                                     [  ] Clinically Undetermined

## 2019-11-13 ENCOUNTER — LAB VISIT (OUTPATIENT)
Dept: LAB | Facility: HOSPITAL | Age: 54
End: 2019-11-13
Attending: INTERNAL MEDICINE
Payer: MEDICARE

## 2019-11-13 DIAGNOSIS — D50.0 IRON DEFICIENCY ANEMIA DUE TO CHRONIC BLOOD LOSS: ICD-10-CM

## 2019-11-13 LAB
ANISOCYTOSIS BLD QL SMEAR: ABNORMAL
BASOPHILS # BLD AUTO: 0.05 K/UL (ref 0–0.2)
BASOPHILS NFR BLD: 0.8 % (ref 0–1.9)
DIFFERENTIAL METHOD: ABNORMAL
EOSINOPHIL # BLD AUTO: 0.4 K/UL (ref 0–0.5)
EOSINOPHIL NFR BLD: 5.8 % (ref 0–8)
ERYTHROCYTE [DISTWIDTH] IN BLOOD BY AUTOMATED COUNT: ABNORMAL % (ref 11.5–14.5)
FERRITIN SERPL-MCNC: 403 NG/ML (ref 20–300)
HCT VFR BLD AUTO: 37.7 % (ref 37–48.5)
HGB BLD-MCNC: 12 G/DL (ref 12–16)
IMM GRANULOCYTES # BLD AUTO: 0.05 K/UL (ref 0–0.04)
IMM GRANULOCYTES NFR BLD AUTO: 0.8 % (ref 0–0.5)
IRON SERPL-MCNC: 94 UG/DL (ref 30–160)
LYMPHOCYTES # BLD AUTO: 2.1 K/UL (ref 1–4.8)
LYMPHOCYTES NFR BLD: 31.6 % (ref 18–48)
MCH RBC QN AUTO: 27.9 PG (ref 27–31)
MCHC RBC AUTO-ENTMCNC: 31.8 G/DL (ref 32–36)
MCV RBC AUTO: 88 FL (ref 82–98)
MONOCYTES # BLD AUTO: 0.3 K/UL (ref 0.3–1)
MONOCYTES NFR BLD: 5.2 % (ref 4–15)
NEUTROPHILS # BLD AUTO: 3.7 K/UL (ref 1.8–7.7)
NEUTROPHILS NFR BLD: 56.6 % (ref 38–73)
NRBC BLD-RTO: 0 /100 WBC
PLATELET # BLD AUTO: 243 K/UL (ref 150–350)
PLATELET BLD QL SMEAR: ABNORMAL
PMV BLD AUTO: 9 FL (ref 9.2–12.9)
RBC # BLD AUTO: 4.3 M/UL (ref 4–5.4)
SATURATED IRON: 33 % (ref 20–50)
TOTAL IRON BINDING CAPACITY: 289 UG/DL (ref 250–450)
TRANSFERRIN SERPL-MCNC: 195 MG/DL (ref 200–375)
WBC # BLD AUTO: 6.59 K/UL (ref 3.9–12.7)

## 2019-11-13 PROCEDURE — 83540 ASSAY OF IRON: CPT

## 2019-11-13 PROCEDURE — 36415 COLL VENOUS BLD VENIPUNCTURE: CPT

## 2019-11-13 PROCEDURE — 82728 ASSAY OF FERRITIN: CPT

## 2019-11-13 PROCEDURE — 85025 COMPLETE CBC W/AUTO DIFF WBC: CPT

## 2019-11-15 NOTE — PHYSICIAN QUERY
PT Name: Richelle Zaldivar  MR #: [id number]     Physician Query Form - Documentation Clarification      CDS/: Darlene Casey               Contact information:[email or web address]    This form is a permanent document in the medical record.     Query Date: November 15, 2019    By submitting this query, we are merely seeking further clarification of documentation. Please utilize your independent clinical judgment when addressing the question(s) below.    The Medical record reflects the following:    Supporting Clinical Findings Location in Medical Record   Review of Systems  Renal/:   Chronic Renal Disease           Anesthesia Report                                                                                        Doctor, Please specify the type of chronic renal disease associated with the above clinical documentation.    Provider Use Only                                                                                                                                 [ X ] Clinically Undetermined

## 2019-11-22 ENCOUNTER — PATIENT OUTREACH (OUTPATIENT)
Dept: OTHER | Facility: OTHER | Age: 54
End: 2019-11-22

## 2019-11-22 NOTE — PROGRESS NOTES
Spoke with patient's daughter. They are still having difficulty getting the digital cuff to work. I advised that Tech Support tried reaching her.  I sent number to tech support through [employer] to patient.

## 2019-12-05 ENCOUNTER — OFFICE VISIT (OUTPATIENT)
Dept: HEMATOLOGY/ONCOLOGY | Facility: CLINIC | Age: 54
End: 2019-12-05
Payer: MEDICARE

## 2019-12-05 VITALS
HEART RATE: 98 BPM | RESPIRATION RATE: 18 BRPM | BODY MASS INDEX: 32.8 KG/M2 | TEMPERATURE: 99 F | DIASTOLIC BLOOD PRESSURE: 95 MMHG | HEIGHT: 59 IN | SYSTOLIC BLOOD PRESSURE: 136 MMHG | WEIGHT: 162.69 LBS | OXYGEN SATURATION: 99 %

## 2019-12-05 DIAGNOSIS — E11.59 HYPERTENSION ASSOCIATED WITH DIABETES: ICD-10-CM

## 2019-12-05 DIAGNOSIS — E78.5 HYPERLIPIDEMIA ASSOCIATED WITH TYPE 2 DIABETES MELLITUS: ICD-10-CM

## 2019-12-05 DIAGNOSIS — E11.69 HYPERLIPIDEMIA ASSOCIATED WITH TYPE 2 DIABETES MELLITUS: ICD-10-CM

## 2019-12-05 DIAGNOSIS — I15.2 HYPERTENSION ASSOCIATED WITH DIABETES: ICD-10-CM

## 2019-12-05 DIAGNOSIS — D50.0 IRON DEFICIENCY ANEMIA DUE TO CHRONIC BLOOD LOSS: Primary | ICD-10-CM

## 2019-12-05 DIAGNOSIS — M17.0 PRIMARY OSTEOARTHRITIS OF BOTH KNEES: ICD-10-CM

## 2019-12-05 PROCEDURE — 99214 PR OFFICE/OUTPT VISIT, EST, LEVL IV, 30-39 MIN: ICD-10-PCS | Mod: S$PBB,,, | Performed by: INTERNAL MEDICINE

## 2019-12-05 PROCEDURE — 99999 PR PBB SHADOW E&M-EST. PATIENT-LVL III: ICD-10-PCS | Mod: PBBFAC,,, | Performed by: INTERNAL MEDICINE

## 2019-12-05 PROCEDURE — 99213 OFFICE O/P EST LOW 20 MIN: CPT | Mod: PBBFAC | Performed by: INTERNAL MEDICINE

## 2019-12-05 PROCEDURE — 99999 PR PBB SHADOW E&M-EST. PATIENT-LVL III: CPT | Mod: PBBFAC,,, | Performed by: INTERNAL MEDICINE

## 2019-12-05 PROCEDURE — 99214 OFFICE O/P EST MOD 30 MIN: CPT | Mod: S$PBB,,, | Performed by: INTERNAL MEDICINE

## 2019-12-05 NOTE — PROGRESS NOTES
Subjective:       Patient ID: Richelle Zaldivar is a 54 y.o. female.    Chief Complaint: Anemia and Results    HPI 54-year-old female history of iron deficiency anemia returns for response intravenous iron in to review EGD and colon    Past Medical History:   Diagnosis Date    Acid reflux     Cataract     Diabetes mellitus, type 2     Glaucoma     HTN (hypertension)     Hyperlipidemia     Osteoarthritis of knee 3/11/2019    Recurrent iritis of left eye 4/13/2016     Family History   Problem Relation Age of Onset    Diabetes Mother     Hypertension Mother     Hyperlipidemia Mother     Stroke Mother     Amblyopia Mother     Diabetes Sister     Hypertension Sister     Hyperlipidemia Sister     Arthritis Sister     Amblyopia Brother     Amblyopia Maternal Uncle     Blindness Neg Hx     Cancer Neg Hx     Cataracts Neg Hx     Glaucoma Neg Hx     Macular degeneration Neg Hx     Retinal detachment Neg Hx     Strabismus Neg Hx     Thyroid disease Neg Hx      Social History     Socioeconomic History    Marital status:      Spouse name: Not on file    Number of children: 3    Years of education: Not on file    Highest education level: Not on file   Occupational History    Not on file   Social Needs    Financial resource strain: Not on file    Food insecurity:     Worry: Not on file     Inability: Not on file    Transportation needs:     Medical: Not on file     Non-medical: Not on file   Tobacco Use    Smoking status: Never Smoker    Smokeless tobacco: Never Used   Substance and Sexual Activity    Alcohol use: No     Alcohol/week: 0.0 standard drinks    Drug use: No    Sexual activity: Yes     Partners: Male     Birth control/protection: None   Lifestyle    Physical activity:     Days per week: Not on file     Minutes per session: Not on file    Stress: Not on file   Relationships    Social connections:     Talks on phone: Not on file     Gets together: Not on file     Attends  Orthodoxy service: Not on file     Active member of club or organization: Not on file     Attends meetings of clubs or organizations: Not on file     Relationship status: Not on file   Other Topics Concern    Not on file   Social History Narrative    , 3 kids.     Past Surgical History:   Procedure Laterality Date    CATARACT EXTRACTION      COLONOSCOPY N/A 10/18/2019    Procedure: COLONOSCOPY;  Surgeon: Aster Krause MD;  Location: [facility];  Service: Endoscopy;  Laterality: N/A;    ESOPHAGOGASTRODUODENOSCOPY N/A 10/18/2019    Procedure: ESOPHAGOGASTRODUODENOSCOPY (EGD);  Surgeon: Aster Krause MD;  Location: [facility];  Service: Endoscopy;  Laterality: N/A;    GLAUCOMA SURGERY Left 08/02/2018    [facility]    GONIOTOMY Left 11/17/2016    HEMORRHOID SURGERY  4/2015    Done in [facility]    PARTIAL HYSTERECTOMY      TONSILLECTOMY      WISDOM TOOTH EXTRACTION         Labs:  Lab Results   Component Value Date    WBC 6.59 11/13/2019    HGB 12.0 11/13/2019    HCT 37.7 11/13/2019    MCV 88 11/13/2019     11/13/2019     BMP  Lab Results   Component Value Date     09/16/2019    K 4.4 09/16/2019     09/16/2019    CO2 22 (L) 09/16/2019    BUN 12 09/16/2019    CREATININE 1.1 09/16/2019    CALCIUM 9.7 09/16/2019    ANIONGAP 11 09/16/2019    ESTGFRAFRICA >60.0 09/16/2019    EGFRNONAA 56.3 (A) 09/16/2019     Lab Results   Component Value Date    ALT 8 (L) 09/16/2019    AST 15 09/16/2019    ALKPHOS 60 09/16/2019    BILITOT 0.4 09/16/2019       Lab Results   Component Value Date    IRON 94 11/13/2019    TIBC 289 11/13/2019    FERRITIN 403 (H) 11/13/2019     Lab Results   Component Value Date    SVPUEHTR88 194 (L) 02/24/2017     No results found for: FOLATE  Lab Results   Component Value Date    TSH 3.025 09/16/2019         Review of Systems   Constitutional: Positive for fatigue. Negative for activity change, appetite change, chills, diaphoresis, fever and unexpected weight change.   HENT:  Negative for congestion, dental problem, drooling, ear discharge, ear pain, facial swelling, hearing loss, mouth sores, nosebleeds, postnasal drip, rhinorrhea, sinus pressure, sneezing, sore throat, tinnitus, trouble swallowing and voice change.    Eyes: Negative for photophobia, pain, discharge, redness, itching and visual disturbance.   Respiratory: Negative for cough, choking, chest tightness, shortness of breath, wheezing and stridor.    Cardiovascular: Negative for chest pain, palpitations and leg swelling.   Gastrointestinal: Negative for abdominal distention, abdominal pain, anal bleeding, blood in stool, constipation, diarrhea, nausea, rectal pain and vomiting.   Endocrine: Negative for cold intolerance, heat intolerance, polydipsia, polyphagia and polyuria.   Genitourinary: Negative for decreased urine volume, difficulty urinating, dyspareunia, dysuria, enuresis, flank pain, frequency, genital sores, hematuria, menstrual problem, pelvic pain, urgency, vaginal bleeding, vaginal discharge and vaginal pain.   Musculoskeletal: Positive for arthralgias, gait problem and myalgias. Negative for back pain, joint swelling, neck pain and neck stiffness.   Skin: Negative for color change, pallor and rash.   Allergic/Immunologic: Negative for environmental allergies, food allergies and immunocompromised state.   Neurological: Positive for weakness. Negative for dizziness, tremors, seizures, syncope, facial asymmetry, speech difficulty, light-headedness, numbness and headaches.   Hematological: Negative for adenopathy. Does not bruise/bleed easily.   Psychiatric/Behavioral: Positive for dysphoric mood. Negative for agitation, behavioral problems, confusion, decreased concentration, hallucinations, self-injury, sleep disturbance and suicidal ideas. The patient is nervous/anxious. The patient is not hyperactive.        Objective:      Physical Exam   Constitutional: She is oriented to person, place, and time. She appears  well-developed and well-nourished. She appears distressed.   HENT:   Head: Normocephalic and atraumatic.   Right Ear: External ear normal.   Left Ear: External ear normal.   Nose: Nose normal. Right sinus exhibits no maxillary sinus tenderness and no frontal sinus tenderness. Left sinus exhibits no maxillary sinus tenderness and no frontal sinus tenderness.   Mouth/Throat: Oropharynx is clear and moist. No oropharyngeal exudate.   Eyes: Pupils are equal, round, and reactive to light. Conjunctivae, EOM and lids are normal. Right eye exhibits no discharge. Left eye exhibits no discharge. Right conjunctiva is not injected. Right conjunctiva has no hemorrhage. Left conjunctiva is not injected. Left conjunctiva has no hemorrhage. No scleral icterus.   Neck: Normal range of motion. Neck supple. No JVD present. No tracheal deviation present. No thyromegaly present.   Cardiovascular: Normal rate and regular rhythm.   Pulmonary/Chest: Effort normal. No stridor. No respiratory distress. She exhibits no tenderness.   Abdominal: Soft. She exhibits no distension and no mass. There is no splenomegaly or hepatomegaly. There is no tenderness. There is no rebound.   Musculoskeletal: Normal range of motion. She exhibits tenderness. She exhibits no edema.   Pain in both knees right greater than left   Lymphadenopathy:     She has no cervical adenopathy.     She has no axillary adenopathy.        Right: No supraclavicular adenopathy present.        Left: No supraclavicular adenopathy present.   Neurological: She is alert and oriented to person, place, and time. No cranial nerve deficit. Coordination abnormal.   Skin: Skin is dry. No rash noted. She is not diaphoretic. No erythema.   Psychiatric: Her behavior is normal. Judgment and thought content normal. Her mood appears anxious.   Vitals reviewed.          Assessment:      1. Iron deficiency anemia due to chronic blood loss    2. Hyperlipidemia associated with type 2 diabetes  mellitus    3. Hypertension associated with diabetes    4. Primary osteoarthritis of both knees           Plan:     Review of EGD and colon no significant pathology noted. At this point will return in 4 months be seen by nurse practitioner patient is iron repleted.  Continue follow-up pain in her knees noted right greater than left will recommend follow-up with primary care physician discussed implications with a 25 min face-to-face time coordination of care greater than 50% of time face-to-face with patient        Matthew Orta Jr, MD FACP

## 2019-12-13 ENCOUNTER — IMMUNIZATION (OUTPATIENT)
Dept: INTERNAL MEDICINE | Facility: CLINIC | Age: 54
End: 2019-12-13
Payer: MEDICARE

## 2019-12-13 PROCEDURE — 99999 PR PBB SHADOW E&M-EST. PATIENT-LVL III: CPT | Mod: PBBFAC,,,

## 2019-12-13 PROCEDURE — 90686 IIV4 VACC NO PRSV 0.5 ML IM: CPT | Mod: PBBFAC

## 2019-12-13 PROCEDURE — 99999 PR PBB SHADOW E&M-EST. PATIENT-LVL III: ICD-10-PCS | Mod: PBBFAC,,,

## 2019-12-13 PROCEDURE — 99213 OFFICE O/P EST LOW 20 MIN: CPT | Mod: PBBFAC,25

## 2019-12-18 RX ORDER — LOTEPREDNOL ETABONATE 5 MG/G
1 GEL OPHTHALMIC 4 TIMES DAILY
Qty: 5 G | Refills: 6 | Status: CANCELLED | OUTPATIENT
Start: 2019-12-18

## 2019-12-23 RX ORDER — LOTEPREDNOL ETABONATE 5 MG/G
1 GEL OPHTHALMIC 4 TIMES DAILY
Qty: 5 G | Refills: 6 | Status: SHIPPED | OUTPATIENT
Start: 2019-12-23 | End: 2019-12-23 | Stop reason: SDUPTHER

## 2019-12-23 RX ORDER — LOTEPREDNOL ETABONATE 5 MG/G
1 GEL OPHTHALMIC 4 TIMES DAILY
Qty: 5 G | Refills: 6 | Status: SHIPPED | OUTPATIENT
Start: 2019-12-23 | End: 2020-03-16

## 2019-12-23 NOTE — TELEPHONE ENCOUNTER
----- Message from Diana Pate sent at 12/23/2019  1:36 PM CST -----  Contact: Patient's daughter  .Type:  RX Refill Request    Who Called: Patient  Refill or New Rx: Refill  RX Name and Strength:   loteprednol etabonate (LOTEMAX) 0.5 % DrpG   How is the patient currently taking it? (ex. 1XDay):   Is this a 30 day or 90 day RX  Preferred Pharmacy with phone number:    [facility]  [id number] [place] [id number]  Phone: [phone] Fax: [phone]      Local or Mail Order:local  Ordering Provider: Tyler  Would the patient rather a call back or a response via [facility]? Call  Best Call Back Number:.[phone] (home)   Additional Information:

## 2019-12-23 NOTE — TELEPHONE ENCOUNTER
Prescription forwarded to Dr. Scott for approval.    ----- Message from Majo Borjas sent at 12/23/2019  8:47 AM CST -----  Contact: pt  Please call pt at  [phone], pt have questions for Dr Scott nurse, pt need eye drops

## 2019-12-27 ENCOUNTER — TELEPHONE (OUTPATIENT)
Dept: OPHTHALMOLOGY | Facility: CLINIC | Age: 54
End: 2019-12-27

## 2019-12-27 NOTE — TELEPHONE ENCOUNTER
----- Message from Estrella Mayo sent at 12/27/2019  8:26 AM CST -----  Contact: pt  Please call pt @ [phone] regarding a earlier appt.

## 2019-12-27 NOTE — TELEPHONE ENCOUNTER
Spoke with pt. Daughter.  Pt is having pain and is concerned about her glaucoma.  Offered appt today or sooner with another , since Dr. Scott is out of office until next week.  She has an appt with Dr. Scott on 01/15/2019.  She declined all providers other than Dr. Scott.  Advised to call if changes mind.  Will add to wait list for Dr. Scott.

## 2020-01-02 ENCOUNTER — TELEPHONE (OUTPATIENT)
Dept: OPHTHALMOLOGY | Facility: CLINIC | Age: 55
End: 2020-01-02

## 2020-01-02 NOTE — TELEPHONE ENCOUNTER
----- Message from Majo Borjas sent at 1/2/2020 11:03 AM CST -----  Contact: pt  Pt request call back , needing to be advised  for eye pain ... Call back: [phone] (home)

## 2020-01-02 NOTE — PROGRESS NOTES
Spoke with patient's daughter and gave her the phone number to tech support verbally.  She stated that she will call them to resolve connection issue.

## 2020-01-02 NOTE — TELEPHONE ENCOUNTER
Patient having pain x 10 days and unable to come in earlier to see another physican.  Spoke to patient's daughter and was offered earlier appointment, but she refused wanted her to come in with her  on 1-8-20

## 2020-01-08 ENCOUNTER — OFFICE VISIT (OUTPATIENT)
Dept: OPHTHALMOLOGY | Facility: CLINIC | Age: 55
End: 2020-01-08
Payer: MEDICARE

## 2020-01-08 DIAGNOSIS — H35.52 RETINITIS PIGMENTOSA OF BOTH EYES: ICD-10-CM

## 2020-01-08 DIAGNOSIS — H04.123 DRY EYE SYNDROME, BILATERAL: ICD-10-CM

## 2020-01-08 DIAGNOSIS — H40.1133 PRIMARY OPEN ANGLE GLAUCOMA OF BOTH EYES, SEVERE STAGE: Primary | ICD-10-CM

## 2020-01-08 DIAGNOSIS — H20.022 IRITIS, RECURRENT, LEFT: ICD-10-CM

## 2020-01-08 PROCEDURE — 99999 PR PBB SHADOW E&M-EST. PATIENT-LVL II: CPT | Mod: PBBFAC,,, | Performed by: OPHTHALMOLOGY

## 2020-01-08 PROCEDURE — 99212 OFFICE O/P EST SF 10 MIN: CPT | Mod: PBBFAC | Performed by: OPHTHALMOLOGY

## 2020-01-08 PROCEDURE — 99999 PR PBB SHADOW E&M-EST. PATIENT-LVL II: ICD-10-PCS | Mod: PBBFAC,,, | Performed by: OPHTHALMOLOGY

## 2020-01-08 PROCEDURE — 92012 INTRM OPH EXAM EST PATIENT: CPT | Mod: S$PBB,,, | Performed by: OPHTHALMOLOGY

## 2020-01-08 PROCEDURE — 92012 PR EYE EXAM, EST PATIENT,INTERMED: ICD-10-PCS | Mod: S$PBB,,, | Performed by: OPHTHALMOLOGY

## 2020-01-08 RX ORDER — ATROPINE SULFATE 10 MG/ML
1 SOLUTION/ DROPS OPHTHALMIC 4 TIMES DAILY
Qty: 5 ML | Refills: 1 | Status: SHIPPED | OUTPATIENT
Start: 2020-01-08 | End: 2020-04-06 | Stop reason: SDUPTHER

## 2020-01-08 RX ORDER — PREDNISOLONE ACETATE 10 MG/ML
1 SUSPENSION/ DROPS OPHTHALMIC 4 TIMES DAILY
Qty: 10 ML | Refills: 3 | Status: SHIPPED | OUTPATIENT
Start: 2020-01-08 | End: 2020-06-19 | Stop reason: SDUPTHER

## 2020-01-08 NOTE — PROGRESS NOTES
HPI     The patient states the pain in her eyes started last month and they were   having a hard time getting in to see [facility] and she did not want to see   another dr. The patients eyes are also red and at times burns. The patient   also has been having headaches since the pain started in her eyes.    PCP: Dr. Miranda Ackerman      1. COAG (followed by Dr. Scott)  CP OS 8/27/15 - low to moderate flow, TDW 1100, aggressive dilation with   GV  Gonio Puncture OS 9/25/15   SLT OS 6/23/16, 6/23/15, 6-2-14  Goniotomy OS 11-17-16  2. DM  3. RP / VIT A PALMITATE 1500 (discontinued)  4. Ahmed Valve OS 8/2/18    OS: Lotemax TID  OS: latanoprost QHS   OS: Cosopt bid  OS: Brimonidine TID  OS: Systane and genteal gel QHS  OD: Durezol PRN (not currently using)  OD: Atropine BID    Last edited by Dianne Rodriguez on 1/8/2020  9:25 AM. (History)            Assessment /Plan     For exam results, see Encounter Report.      ICD-10-CM ICD-9-CM    1. Primary open angle glaucoma of both eyes, severe stage H40.1133 365.11 Doing well - intraocular pressure is within acceptable range relative to target pressure with no evidence of progression.   Continue current treatment.  Reviewed importance of continued compliance with treatment and follow up.        365.73    2. Iritis, recurrent, left H20.022 364.02 Quiet today.    3. Dry eye syndrome, bilateral H04.123 375.15 Improved. Continue current tx   4. Retinitis pigmentosa of both eyes H35.52 362.74    5. Uncontrolled type 2 diabetes mellitus without complication, without long-term current use of insulin E11.65 250.02      Will try to d/c cosopt-pt c/o pain/irritation     OS: Lotemax TID  OS: latanoprost QHS   OS: Brimonidine TID  OS: Systane and genteal gel QHS  OD: pred acetate prn for pain   OD: Atropine BID     Return to clinic 3 weeks with IOP check

## 2020-01-15 ENCOUNTER — PATIENT OUTREACH (OUTPATIENT)
Dept: OTHER | Facility: OTHER | Age: 55
End: 2020-01-15

## 2020-01-15 ENCOUNTER — TELEPHONE (OUTPATIENT)
Dept: OPHTHALMOLOGY | Facility: CLINIC | Age: 55
End: 2020-01-15

## 2020-01-15 NOTE — TELEPHONE ENCOUNTER
----- Message from Griselda Henry sent at 1/15/2020  2:50 PM CST -----  Contact: Pt   Pt is calling .Type:  Patient Returning Call    Who Called: Pt   Who Left Message for Patient: Nurse   Does the patient know what this is regarding?: returning missed call    Would the patient rather a call back or a response via [facility]?  Call back   Best Call Back Number: [phone]         .Thank You  Griselda Henry

## 2020-01-15 NOTE — TELEPHONE ENCOUNTER
----- Message from Majo Borjas sent at 1/15/2020  2:25 PM CST -----  Contact: pt  Patient requesting a call back regarding eyeglass rx.Can pickup tomorrow morning if ready. Please call back at [phone].

## 2020-01-29 ENCOUNTER — OFFICE VISIT (OUTPATIENT)
Dept: OPHTHALMOLOGY | Facility: CLINIC | Age: 55
End: 2020-01-29
Payer: MEDICARE

## 2020-01-29 DIAGNOSIS — H40.1133 PRIMARY OPEN ANGLE GLAUCOMA OF BOTH EYES, SEVERE STAGE: Primary | ICD-10-CM

## 2020-01-29 DIAGNOSIS — H04.123 DRY EYE SYNDROME, BILATERAL: ICD-10-CM

## 2020-01-29 DIAGNOSIS — Z91.199 NON COMPLIANCE WITH MEDICAL TREATMENT: ICD-10-CM

## 2020-01-29 DIAGNOSIS — H20.022 IRITIS, RECURRENT, LEFT: ICD-10-CM

## 2020-01-29 PROCEDURE — 99024 POSTOP FOLLOW-UP VISIT: CPT | Mod: POP,,, | Performed by: OPHTHALMOLOGY

## 2020-01-29 PROCEDURE — 99213 OFFICE O/P EST LOW 20 MIN: CPT | Mod: PBBFAC | Performed by: OPHTHALMOLOGY

## 2020-01-29 PROCEDURE — 99999 PR PBB SHADOW E&M-EST. PATIENT-LVL III: ICD-10-PCS | Mod: PBBFAC,,, | Performed by: OPHTHALMOLOGY

## 2020-01-29 PROCEDURE — 99024 PR POST-OP FOLLOW-UP VISIT: ICD-10-PCS | Mod: POP,,, | Performed by: OPHTHALMOLOGY

## 2020-01-29 PROCEDURE — 99999 PR PBB SHADOW E&M-EST. PATIENT-LVL III: CPT | Mod: PBBFAC,,, | Performed by: OPHTHALMOLOGY

## 2020-01-29 NOTE — PROGRESS NOTES
HPI     Glaucoma      Additional comments: 3 week IOP check              Comments     The patient states her eyes are doing fine but they do burn at times.  Pt stopped cosopt due to pain and itching     PCP: Dr. Miranda Ackerman    1. COAG (followed by Dr. Scott)  CP OS 8/27/15 - low to moderate flow, TDW 1100, aggressive dilation with   GV  Gonio Puncture OS 9/25/15   SLT OS 6/23/16, 6/23/15, 6-2-14  Goniotomy OS 11-17-16  2. DM  3. RP / VIT A PALMITATE 1500 (discontinued)  4. Ahmed Valve OS 8/2/18    OS: Lotemax TID  OS: latanoprost QHS   OS: Brimonidine TID  OS: Systane and genteal gel QHS  OD: pred acetate prn   OD: Atropine BID          Last edited by Robert Scott MD on 1/29/2020 10:47 AM. (History)            Assessment /Plan     For exam results, see Encounter Report.      ICD-10-CM ICD-9-CM    1. Primary open angle glaucoma of both eyes, severe stage H40.1133 365.11 Pt off cosopt and says ou feel the same off cosopt, resume cosopt today - if cosopt she stops again consider timolol - pt wants to try cosopt again   Add gel at bedtime to help with sxs of itching      365.73    2. Iritis, recurrent, left H20.022 364.02 Continue durezol    3. Dry eye syndrome, bilateral H04.123 375.15 Increase to gel at bedtime    4. Non compliance with medical treatment Z91.19 V15.81        OS: Lotemax QID  OS: latanoprost QHS   Resume Os: Doroz/ Timolol BID   OS: Brimonidine TID  OS: Systane and genteal gel QHS  OD: pred acetate prn   OD: Atropine BID       RETURN TO CLINIC 1 month

## 2020-02-10 NOTE — PROGRESS NOTES
Digital Medicine: Health  Follow-Up    Patient states that she will take more readings.  She states that her her glucometer is not working and she will take it to the [facility] for assistance and hung up.  I will address lifestyle at next encounter.  BG program was resolved by system, but patient would like to continue in both Hypertension and Diabetes Digital Medicine Programs.    The history is provided by the patient.       Intervention/Plan    There are no preventive care reminders to display for this patient.    Last 5 Patient Entered Readings                                      Current 30 Day Average: 115/79     Recent Readings 1/25/2020 1/24/2020 7/12/2019 7/12/2019 7/12/2019    SBP (mmHg) 130 99 94 97 94    DBP (mmHg) 94 64 66 62 71    Pulse 85 92 93 94 94                  Screenings    SDOH

## 2020-02-12 ENCOUNTER — PATIENT MESSAGE (OUTPATIENT)
Dept: DIABETES | Facility: CLINIC | Age: 55
End: 2020-02-12

## 2020-02-12 ENCOUNTER — LAB VISIT (OUTPATIENT)
Dept: LAB | Facility: HOSPITAL | Age: 55
End: 2020-02-12
Attending: PHYSICIAN ASSISTANT
Payer: MEDICARE

## 2020-02-12 ENCOUNTER — OFFICE VISIT (OUTPATIENT)
Dept: DIABETES | Facility: CLINIC | Age: 55
End: 2020-02-12
Payer: MEDICARE

## 2020-02-12 VITALS
DIASTOLIC BLOOD PRESSURE: 85 MMHG | HEART RATE: 78 BPM | BODY MASS INDEX: 32.67 KG/M2 | SYSTOLIC BLOOD PRESSURE: 128 MMHG | HEIGHT: 59 IN | WEIGHT: 162.06 LBS

## 2020-02-12 DIAGNOSIS — E66.01 MORBID OBESITY WITH BMI OF 45.0-49.9, ADULT: ICD-10-CM

## 2020-02-12 DIAGNOSIS — N18.30 TYPE 2 DIABETES MELLITUS WITH STAGE 3 CHRONIC KIDNEY DISEASE, WITHOUT LONG-TERM CURRENT USE OF INSULIN: ICD-10-CM

## 2020-02-12 DIAGNOSIS — I15.2 HYPERTENSION ASSOCIATED WITH DIABETES: ICD-10-CM

## 2020-02-12 DIAGNOSIS — E11.22 TYPE 2 DIABETES MELLITUS WITH STAGE 3 CHRONIC KIDNEY DISEASE, WITHOUT LONG-TERM CURRENT USE OF INSULIN: Primary | ICD-10-CM

## 2020-02-12 DIAGNOSIS — E11.22 TYPE 2 DIABETES MELLITUS WITH STAGE 3 CHRONIC KIDNEY DISEASE, WITHOUT LONG-TERM CURRENT USE OF INSULIN: ICD-10-CM

## 2020-02-12 DIAGNOSIS — E78.5 HYPERLIPIDEMIA ASSOCIATED WITH TYPE 2 DIABETES MELLITUS: ICD-10-CM

## 2020-02-12 DIAGNOSIS — N18.30 TYPE 2 DIABETES MELLITUS WITH STAGE 3 CHRONIC KIDNEY DISEASE, WITHOUT LONG-TERM CURRENT USE OF INSULIN: Primary | ICD-10-CM

## 2020-02-12 DIAGNOSIS — E03.9 HYPOTHYROIDISM, UNSPECIFIED TYPE: ICD-10-CM

## 2020-02-12 DIAGNOSIS — E11.59 HYPERTENSION ASSOCIATED WITH DIABETES: ICD-10-CM

## 2020-02-12 DIAGNOSIS — E11.69 HYPERLIPIDEMIA ASSOCIATED WITH TYPE 2 DIABETES MELLITUS: ICD-10-CM

## 2020-02-12 PROCEDURE — 83036 HEMOGLOBIN GLYCOSYLATED A1C: CPT

## 2020-02-12 PROCEDURE — 99214 OFFICE O/P EST MOD 30 MIN: CPT | Mod: S$PBB,,, | Performed by: PHYSICIAN ASSISTANT

## 2020-02-12 PROCEDURE — 99214 PR OFFICE/OUTPT VISIT, EST, LEVL IV, 30-39 MIN: ICD-10-PCS | Mod: S$PBB,,, | Performed by: PHYSICIAN ASSISTANT

## 2020-02-12 PROCEDURE — 36415 COLL VENOUS BLD VENIPUNCTURE: CPT

## 2020-02-12 PROCEDURE — 99999 PR PBB SHADOW E&M-EST. PATIENT-LVL IV: ICD-10-PCS | Mod: PBBFAC,,, | Performed by: PHYSICIAN ASSISTANT

## 2020-02-12 PROCEDURE — 99999 PR PBB SHADOW E&M-EST. PATIENT-LVL IV: CPT | Mod: PBBFAC,,, | Performed by: PHYSICIAN ASSISTANT

## 2020-02-12 PROCEDURE — 99214 OFFICE O/P EST MOD 30 MIN: CPT | Mod: PBBFAC | Performed by: PHYSICIAN ASSISTANT

## 2020-02-12 NOTE — PROGRESS NOTES
PCP: Oneyda Bergman MD    Subjective:     Chief Complaint: Diabetes - Established Patient    HISTORY OF PRESENT ILLNESS: 54 y.o. [place]  female presenting for diabetes management visit.   Patient has previously been established with the Diabetes Management Department and was last seen by Clare Griffiths NP on 09/13/18.   Patient is new to me and is here for establishment of future care. English is not patient's first language, however patient declines  and gives consent for daughter to translate.   Patient has had Type II diabetes since 10 or more years.  Pertinent to decision making is the following comorbidities: HTN, HLD, CKD III, Hypothyroidism and Obesity by BMI  Patient has the following Diabetes complications: with diabetic chronic kidney disease  She  has attended diabetes education in the past.     Patient's most recent A1c of 7.5% was completed 5 months ago.   Patient states since Her last A1c Her blood glucose levels have been both high and low throughout the day .   Patient monitors blood glucose 2 times per day with meter : Fasting, Before Lunch, Before Dinner and After Dinner.   Patient blood glucose monitoring device will not be uploaded into [employer] today  secondary to unable to upload successfully.   Per meter recall: Fasting 42 - 193, before lunch 131 - 156, before dinner 75 - 158, and after dinner 112 - 291.   Patient endorses the following diabetes related symptoms: Nausea.   Patient is due today for the following diabetes-related health maintenance standards: Foot Exam - to be completed today.   She voices recent hospital visits on 1/31 for hyperglycemia concerns. Patient was not in DKA.   She voices having hypoglycemia when she skips meals.   Patient's concerns today include glycemic control.   Patient medication regimen is as below.     CURRENT DM MEDICATIONS:    Metformin 1000 mg BID   Amaryl 4 mg with breakfast and lunch    Patient has failed the following Diabetes  "medications:    N/A       Labs Reviewed.       No results found for: CPEPTIDE  No results found for: GLUTAMICACID    Height: 4' 11" (149.9 cm)  //  Weight: 73.5 kg (162 lb 0.6 oz), Body mass index is 32.73 kg/m².  Her blood sugar in clinic today is:    Lab Results   Component Value Date    POCGLU 132 (A) 09/13/2018       Review of Systems   Constitutional: Negative for activity change, appetite change, chills and fever.   HENT: Negative for dental problem, mouth sores, nosebleeds, sore throat and trouble swallowing.    Eyes: Negative for pain and discharge.   Respiratory: Negative for shortness of breath, wheezing and stridor.    Cardiovascular: Negative for chest pain, palpitations and leg swelling.   Gastrointestinal: Negative for abdominal pain, diarrhea, nausea and vomiting.   Endocrine: Negative for polydipsia, polyphagia and polyuria.   Genitourinary: Negative for dysuria, frequency and urgency.   Musculoskeletal: Negative for joint swelling and myalgias.   Skin: Negative for rash and wound.   Neurological: Negative for dizziness, syncope, weakness and headaches.   Psychiatric/Behavioral: Negative for behavioral problems and dysphoric mood.         Diabetes Management Status  Statin: Taking  ACE/ARB: Taking    Screening or Prevention Patient's value Goal Complete/Controlled?   HgA1C Testing and Control   Lab Results   Component Value Date    HGBA1C 7.5 (H) 09/16/2019      Annually/Less than 8% Yes   Lipid profile : 09/16/2019 Annually Yes   LDL control Lab Results   Component Value Date    LDLCALC 94.6 09/16/2019    Annually/Less than 100 mg/dl  Yes   Nephropathy screening Lab Results   Component Value Date    MICALBCREAT 7.5 09/16/2019     Lab Results   Component Value Date    PROTEINUA SEE COMMENT 09/16/2019    Annually Yes   Blood pressure BP Readings from Last 1 Encounters:   02/12/20 128/85    Less than 140/90 Yes   Dilated retinal exam : 01/08/2020 Annually Yes    Foot exam   : 10/03/2018 Annually No "     ACTIVITY LEVEL: Sedentary. Discussed activities, benefits, methods, and precautions.  MEAL PLANNING: Patient reports number of meals per day to be 3 and number of snacks per day to be 2.   Patient is encouraged to carb count and consume no more than 30 - 45 grams of carbohydrates in each meal and 15 grams of carbohydrates in each snack.     Social History     Socioeconomic History    Marital status:      Spouse name: Not on file    Number of children: 3    Years of education: Not on file    Highest education level: Not on file   Occupational History    Not on file   Social Needs    Financial resource strain: Not on file    Food insecurity:     Worry: Not on file     Inability: Not on file    Transportation needs:     Medical: Not on file     Non-medical: Not on file   Tobacco Use    Smoking status: Never Smoker    Smokeless tobacco: Never Used   Substance and Sexual Activity    Alcohol use: No     Alcohol/week: 0.0 standard drinks    Drug use: No    Sexual activity: Yes     Partners: Male     Birth control/protection: None   Lifestyle    Physical activity:     Days per week: Not on file     Minutes per session: Not on file    Stress: Not on file   Relationships    Social connections:     Talks on phone: Not on file     Gets together: Not on file     Attends Synagogue service: Not on file     Active member of club or organization: Not on file     Attends meetings of clubs or organizations: Not on file     Relationship status: Not on file   Other Topics Concern    Not on file   Social History Narrative    , 3 kids.     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 She appears well-developed and  well-nourished. No distress.   HENT:   Head: Normocephalic and atraumatic.   Right Ear: External ear normal.   Left Ear: External ear normal.   Nose: Nose normal.   Eyes: Pupils are equal, round, and reactive to light. EOM are normal. Right eye exhibits no discharge. Left eye exhibits no discharge.   Neck: Normal range of motion. Neck supple.   Cardiovascular: Normal rate, regular rhythm, normal heart sounds and intact distal pulses.   Pulses:       Dorsalis pedis pulses are 2+ on the right side, and 2+ on the left side.        Posterior tibial pulses are 2+ on the right side, and 2+ on the left side.   Pulmonary/Chest: Effort normal and breath sounds normal.   Abdominal: Soft.   Musculoskeletal: Normal range of motion.        Right foot: There is normal range of motion and no deformity.        Left foot: There is normal range of motion and no deformity.   Feet:   Right Foot:   Protective Sensation: 6 sites tested. 5 sites sensed.   Skin Integrity: Positive for callus and dry skin. Negative for ulcer, blister, skin breakdown, erythema or warmth.   Left Foot:   Protective Sensation: 6 sites tested. 5 sites sensed.   Skin Integrity: Positive for callus and dry skin. Negative for ulcer, blister, skin breakdown, erythema or warmth.   Neurological: She is oriented to person, place, and time.   Skin: Skin is warm and dry. Capillary refill takes less than 2 seconds. She is not diaphoretic.   Psychiatric: She has a normal mood and affect. Her behavior is normal. Judgment and thought content normal.         Assessment / Plan:     Type 2 diabetes mellitus with stage 3 chronic kidney disease, without long-term current use of insulin  -     POCT Glucose, Hand-Held Device  -     empagliflozin (JARDIANCE) 25 mg Tab; Take one tablet by mouth once daily.  Dispense: 30 tablet; Refill: 11  -     Hemoglobin A1c; Standing    Hyperlipidemia associated with type 2 diabetes mellitus    Hypertension associated with  diabetes    Hypothyroidism, unspecified type    Morbid obesity with BMI of 45.0-49.9, adult      Additional Plan Details:    - POCT Glucose  - Encouraged continuation of lifestyle changes including regular exercise and limiting carbohydrates to 30-45 grams per meal threes times daily and 15 grams per snack with a limit of two daily.   - Patient is new to me today and will establish care with me for future visits.  - Encouraged continued monitoring of blood glucose with an increase to 3 times daily at Fasting, Before Dinner and After Dinner.   - Current DM Medication Regimen: Start Jardiance 25 mg daily. Continue Metformin and Amaryl. Discussed with patient that she may need GLP-1 or insulin, however she declines to start injections until she has failed oral options. .   - Health Maintenance standards addressed today: Foot Exam - completed today and documented in physical exam with feedback to patient about proper diabetic foot care and findings  - Patient needs new Rx for DM shoes - offered Podiatry referral and patient declined; plan to speak with PCP regarding Rx since PA not able to Rx in [place]  - Nursing Visit: Patient is age 79 or younger with an A1c of 7.5 or greater and will need nursing visit by Phone in 2 weeks for BG log review and non-insulin adjustment since patient unable or unwilling to come in for physical visit.   - Follow up in 6 weeks with A1c prior.       Blakeney McKnight, PA-C [facility]    A total of 30 minutes was spent in face to face time, of which over 50 % was spent in counseling patient on disease process, complications, treatment, and side effects of medications.

## 2020-02-13 LAB
ESTIMATED AVG GLUCOSE: 223 MG/DL (ref 68–131)
HBA1C MFR BLD HPLC: 9.4 % (ref 4–5.6)

## 2020-02-18 ENCOUNTER — PATIENT MESSAGE (OUTPATIENT)
Dept: DIABETES | Facility: CLINIC | Age: 55
End: 2020-02-18

## 2020-02-18 ENCOUNTER — PATIENT MESSAGE (OUTPATIENT)
Dept: GASTROENTEROLOGY | Facility: CLINIC | Age: 55
End: 2020-02-18

## 2020-02-20 ENCOUNTER — PATIENT OUTREACH (OUTPATIENT)
Dept: ADMINISTRATIVE | Facility: HOSPITAL | Age: 55
End: 2020-02-20

## 2020-02-20 ENCOUNTER — PATIENT OUTREACH (OUTPATIENT)
Dept: OTHER | Facility: OTHER | Age: 55
End: 2020-02-20

## 2020-02-20 NOTE — PROGRESS NOTES
ROSE MARYB on her at goal readings, recommend an increase in readings and see if she went to the [facility] to resume the diabetes DM program.

## 2020-02-26 ENCOUNTER — OFFICE VISIT (OUTPATIENT)
Dept: INTERNAL MEDICINE | Facility: CLINIC | Age: 55
End: 2020-02-26
Payer: MEDICARE

## 2020-02-26 VITALS
OXYGEN SATURATION: 96 % | WEIGHT: 160.25 LBS | HEIGHT: 59 IN | TEMPERATURE: 97 F | HEART RATE: 80 BPM | DIASTOLIC BLOOD PRESSURE: 80 MMHG | BODY MASS INDEX: 32.31 KG/M2 | SYSTOLIC BLOOD PRESSURE: 114 MMHG

## 2020-02-26 DIAGNOSIS — R30.0 DYSURIA: Primary | ICD-10-CM

## 2020-02-26 DIAGNOSIS — B37.31 YEAST VAGINITIS: ICD-10-CM

## 2020-02-26 DIAGNOSIS — N30.00 ACUTE CYSTITIS WITHOUT HEMATURIA: ICD-10-CM

## 2020-02-26 PROCEDURE — 99999 PR PBB SHADOW E&M-EST. PATIENT-LVL V: ICD-10-PCS | Mod: PBBFAC,,, | Performed by: PHYSICIAN ASSISTANT

## 2020-02-26 PROCEDURE — 99999 PR PBB SHADOW E&M-EST. PATIENT-LVL V: CPT | Mod: PBBFAC,,, | Performed by: PHYSICIAN ASSISTANT

## 2020-02-26 PROCEDURE — 99215 OFFICE O/P EST HI 40 MIN: CPT | Mod: PBBFAC | Performed by: PHYSICIAN ASSISTANT

## 2020-02-26 PROCEDURE — 99214 OFFICE O/P EST MOD 30 MIN: CPT | Mod: S$PBB,,, | Performed by: PHYSICIAN ASSISTANT

## 2020-02-26 PROCEDURE — 99214 PR OFFICE/OUTPT VISIT, EST, LEVL IV, 30-39 MIN: ICD-10-PCS | Mod: S$PBB,,, | Performed by: PHYSICIAN ASSISTANT

## 2020-02-26 RX ORDER — NITROFURANTOIN 25; 75 MG/1; MG/1
100 CAPSULE ORAL 2 TIMES DAILY
Qty: 14 CAPSULE | Refills: 0 | Status: SHIPPED | OUTPATIENT
Start: 2020-02-26 | End: 2020-02-28 | Stop reason: ALTCHOICE

## 2020-02-26 RX ORDER — ASPIRIN 325 MG
1 TABLET, DELAYED RELEASE (ENTERIC COATED) ORAL NIGHTLY
Qty: 45 G | Refills: 0 | Status: SHIPPED | OUTPATIENT
Start: 2020-02-26 | End: 2020-03-07

## 2020-02-26 RX ORDER — FLUCONAZOLE 150 MG/1
150 TABLET ORAL DAILY
Qty: 1 TABLET | Refills: 0 | Status: SHIPPED | OUTPATIENT
Start: 2020-02-26 | End: 2020-02-27

## 2020-02-26 NOTE — PROGRESS NOTES
Subjective:      Patient ID: Richelle Zaldivar is a 54 y.o. female.    Chief Complaint: Urinary Tract Infection    Dysuria    This is a new problem. The current episode started in the past 7 days. The problem has been gradually worsening. The quality of the pain is described as burning. There has been no fever. Associated symptoms include flank pain. Pertinent negatives include no behavior changes, chills, discharge, frequency, hematuria, hesitancy, nausea, possible pregnancy, sweats, urgency, vomiting, weight loss, bubble bath use, constipation, rash or withholding. She has tried nothing for the symptoms. The treatment provided no relief. There is no history of catheterization, diabetes insipidus, diabetes mellitus, genitourinary reflux, hypertension, kidney stones, recurrent UTIs, a single kidney, STD, urinary stasis or a urological procedure.   Admits to using cottonelle wipes.    Review of Systems   Constitutional: Negative for activity change, appetite change, chills, diaphoresis, fatigue, fever, unexpected weight change and weight loss.   HENT: Negative.  Negative for congestion, hearing loss, postnasal drip, rhinorrhea, sore throat, trouble swallowing and voice change.    Eyes: Negative.  Negative for visual disturbance.   Respiratory: Negative.  Negative for cough, choking, chest tightness and shortness of breath.    Cardiovascular: Negative for chest pain, palpitations and leg swelling.   Gastrointestinal: Negative for abdominal distention, abdominal pain, blood in stool, constipation, diarrhea, nausea and vomiting.   Endocrine: Negative for cold intolerance, heat intolerance, polydipsia and polyuria.   Genitourinary: Positive for difficulty urinating, dysuria, flank pain, genital sores, pelvic pain and vaginal pain. Negative for decreased urine volume, dyspareunia, enuresis, frequency, hematuria, hesitancy, menstrual problem, urgency, vaginal bleeding and vaginal discharge.   Musculoskeletal: Negative for  "arthralgias, back pain, gait problem, joint swelling and myalgias.   Skin: Negative for color change, pallor, rash and wound.   Neurological: Negative for dizziness, tremors, weakness, light-headedness, numbness and headaches.   Hematological: Negative for adenopathy.   Psychiatric/Behavioral: Negative for behavioral problems, confusion, self-injury, sleep disturbance and suicidal ideas. The patient is not nervous/anxious.      Objective:   /80 (BP Location: Right arm, Patient Position: Sitting, BP Method: Large (Manual))   Pulse 80   Temp 96.6 °F (35.9 °C) (Tympanic)   Ht 4' 11" (1.499 m)   Wt 72.7 kg (160 lb 4.4 oz)   SpO2 96%   BMI 32.37 kg/m²     Physical Exam   Constitutional: She is oriented to person, place, and time. She appears well-developed and well-nourished. No distress.   HENT:   Head: Normocephalic and atraumatic.   Right Ear: External ear normal.   Left Ear: External ear normal.   Nose: Nose normal.   Mouth/Throat: Oropharynx is clear and moist.   Eyes: Pupils are equal, round, and reactive to light. Conjunctivae and EOM are normal.   Neck: Normal range of motion. Neck supple.   Cardiovascular: Normal rate, regular rhythm and normal heart sounds. Exam reveals no gallop and no friction rub.   No murmur heard.  Pulmonary/Chest: Effort normal and breath sounds normal. No respiratory distress. She has no wheezes. She has no rales. She exhibits no tenderness.   Abdominal: Soft. She exhibits no distension. There is tenderness in the suprapubic area. There is no rigidity, no rebound, no guarding and no CVA tenderness.   Genitourinary:       There is tenderness on the right labia. There is tenderness on the left labia. There is erythema and tenderness in the vagina. No bleeding in the vagina. No foreign body in the vagina. No signs of injury around the vagina. Vaginal discharge found.   Musculoskeletal: Normal range of motion.   Lymphadenopathy:     She has no cervical adenopathy.   Neurological: " She is alert and oriented to person, place, and time.   Skin: Skin is warm and dry. She is not diaphoretic.   Psychiatric: She has a normal mood and affect. Her behavior is normal. Judgment and thought content normal.   Vitals reviewed.      Assessment:     1. Dysuria    2. Yeast vaginitis    3. Acute cystitis without hematuria      Plan:   Dysuria  -     Urinalysis; Future; Expected date: 02/26/2020  -     Urine culture; Future    Yeast vaginitis  -     fluconazole (DIFLUCAN) 150 MG Tab; Take 1 tablet (150 mg total) by mouth once daily. for 1 day  Dispense: 1 tablet; Refill: 0  -     clotrimazole (LOTRIMIN) 1 % Crea; Place 1 suppository (1 applicator total) vaginally every evening. for 10 days  Dispense: 45 g; Refill: 0    Acute cystitis without hematuria  -     nitrofurantoin, macrocrystal-monohydrate, (MACROBID) 100 MG capsule; Take 1 capsule (100 mg total) by mouth 2 (two) times daily. for 7 days  Dispense: 14 capsule; Refill: 0    -if no improvement in 1-2 days, will refer to GYN  -stop cottonelle wipes. Gentle cleaning only.     Follow up if symptoms worsen or fail to improve.

## 2020-02-26 NOTE — PATIENT INSTRUCTIONS
"  Dysuria     Painful urination (dysuria) is often caused by a problem in the urinary tract.   Dysuria is pain felt during urination. It is often described as a burning. Learn more about this problem and how it can be treated.  What causes dysuria?  Possible causes include:  · Infection with a bacteria or virus such as a urinary tract infection (UTI or a sexually transmitted infection (STI)  · Sensitivity or allergy to chemicals such as those found in lotions and other products  · Prostate or bladder problems  · Radiation therapy to the pelvic area  How is dysuria diagnosed?  Your healthcare provider will examine you. He or she will ask about your symptoms and health. After talking with you and doing a physical exam, your healthcare provider may know what is causing your dysuria. He or she will usually request  a sample of your urine. Tests of your urine, or a "urinalysis," are done. A urinalysis may include:  · Looking at the urine sample (visual exam)  · Checking for substances (chemical exam)  · Looking at a small amount under a microscope (microscopic exam)  Some parts of the urinalysis may be done in the provider's office and some in a lab. And, the urine sample may be checked for bacteria and yeast (urine culture). Your healthcare provider will tell you more about these tests if they are needed.  How is dysuria treated?  Treatment depends on the cause. If you have a bacterial infection, you may need antibiotics. You may be given medicines to make it easier for you to urinate and help relieve pain. Your healthcare provider can tell you more about your treatment options. Untreated, symptoms may get worse.  When to call your healthcare provider  Call the healthcare provider right away if you have any of the following:  · Fever of 100.4°F (38°C) or higher   · No improvement after three days of treatment  · Trouble urinating because of pain  · New or increased discharge from the vagina or penis  · Rash or joint " pain  · Increased back or abdominal pain  · Enlarged painful lymph nodes (lumps) in the groin   Date Last Reviewed: 1/1/2017  © 1842-4837 [employer]. [address] [place] [address], [place] [address]. All rights reserved. This information is not intended as a substitute for professional medical care. Always follow your healthcare professional's instructions.        Preventing Vaginitis     Use mild, unscented soap when you bathe or shower to avoid irritating your vagina.    Vaginitis is irritation or infection of the vagina or vulva (the outside opening of the vagina). Vaginitis can be caused by bacteria, viruses, parasites, or yeast. Chemicals (such as in perfumes or soaps or in spermicides) can sometimes be a cause. Vaginitis can be caused by hormone changes in pregnancy or with menopause. You can help prevent vaginitis. Follow the tips below. And see your healthcare provider if you have any symptoms.  Hygiene  · Avoid chemicals. Do not use vaginal sprays. Do not use scented toilet paper or tampons that are scented. Sprays and scents have chemicals that can irritate your vagina.  · Do not douche unless you are told to by your healthcare provider. Douching is rarely needed. And it upsets the normal balance in the vagina.  · Wash yourself well. Wash the outer vaginal area (vulva) every day with mild, unscented soap. Keep it as dry as possible.  · Wipe correctly. Make sure to wipe from front to back after a bowel movement. This helps keep from spreading bacteria from your anus to your vagina.  · Change your tampon often. During your period, make sure to change your tampon as often as directed on the package. This allows the normal flow of vaginal discharge and blood.  Lifestyle  · Limit your number of sexual partners. The more partners you have, the greater your risk of infection. Using condoms helps reduce your risk.  · Get enough sleep. Sleep helps keep your bodys immune system healthy. This helps you fight  infection.  · Lose weight, if needed. Excess weight can reduce air circulation around your vagina. This can increase your risk of infection.  · Exercise regularly. Regular activity helps keep your body healthy.  · Take antibiotics only as directed. Antibiotics can change the normal chemical balance in the vagina.    Clothing  · Dont sit in wet clothes. Yeast thrives when its warm and damp.  · Dont wear tight pants. And dont wear tights, leggings, or hose without a cotton crotch. These types of clothing trap warmth and moisture.  · Wear cotton underwear. Cotton lets air circulate around the vagina.  Symptoms of vaginitis  · Irritation, swelling, or itching of the genital area  · Vaginal discharge  · Bad vaginal odor  · Pain or burning during urination   Date Last Reviewed: 12/1/2016 © 2000-2017 [employer]. [address] [place] [address]. All rights reserved. This information is not intended as a substitute for professional medical care. Always follow your healthcare professional's instructions.        Vaginal Infection: Yeast (Candidiasis)  Yeast infection occurs when yeast in the vagina increase and attacks the vaginal tissues. Yeast is a type of fungus. These infections are often caused by a type of yeast called Candida albicans. Other species of yeast can also cause infections. Factors that may make infection more likely include recent antibiotic use, douching, or increased sex. Yeast infections are more common in women who have diabetes, or are obese or pregnant, or have a weak immune system.  Symptoms of yeast infection  · Clumpy or thin, white discharge, which may look like cottage cheese  · No odor or minimal odor  · Severe vaginal itching or burning  · Burning with urination  · Swelling, redness of vulva  · Pain during sex  Treating yeast infection  Yeast infection is treated with a vaginal antifungal cream. In some cases, antifungal pills are prescribed instead. During  treatment:  · Finish all of your medicine, even if your symptoms go away.  · Apply the cream before going to bed. Lie flat after applying so that it doesn't drip out.  · Do not douche or use tampons.  · Don't rely on a diaphragm or condoms, since the cream may weaken them.  · Avoid intercourse if advised by your healthcare provider.     Should I treat a yeast infection myself?  Discuss with your healthcare provider whether you should use over-the-counter medicines to treat a yeast infection. Self-treatment may depend on whether:  · You've had a yeast infection in the past.  · You're at risk for STDs.  Call your healthcare provider if symptoms do not go away or come back after treatment.   Date Last Reviewed: 3/1/2017  © 7968-7883 [employer]. [address] [place] [address], [place] [address]. All rights reserved. This information is not intended as a substitute for professional medical care. Always follow your healthcare professional's instructions.        Candida Vaginal Infection    You have a Candida vaginal infection. This is also known as a yeast infection. It is most often caused by a type of yeast (fungus) called Candida. Candida are normally found in the vagina. But if they increase in number, this can lead to infection and cause symptoms.  Symptoms of a yeast infection can include:  · Clumpy or thin, white discharge, which may look like cottage cheese  · Itching or burning  · Burning with urination  Certain factors can make a yeast infection more likely. These can include:  · Taking certain medicines, such as antibiotics or birth control pills  · Pregnancy  · Diabetes  · Weakened immune system  A yeast infection is most often treated with antifungal medicine. This may be given as a vaginal cream or pills you take by mouth. Treatment may last for about 1 to 7 days. Women with severe or recurrent infections may need longer courses of treatment.  Home care  · If youre prescribed medicine, be sure to use  it as directed. Finish all of the medicine, even if your symptoms go away. Note: Dont try to treat yourself using over-the-counter products without talking to your provider first. He or she will let you know if this is a good option for you.  · Ask your provider what steps you can take to help reduce your risk of having a yeast infection in the future.  Follow-up care  Follow up with your healthcare provider, or as directed.  When to seek medical advice  Call your healthcare provider right away if:  · You have a fever of 100.4ºF (38ºC) or higher, or as directed by your provider.  · Your symptoms worsen, or they dont go away within a few days of starting treatment.  · You have new pain in the lower belly or pelvic region.  · You have side effects that bother you or a reaction to the cream or pills youre prescribed.  · You or any partners you have sex with have new symptoms, such as a rash, joint pain, or sores.  Date Last Reviewed: 7/30/2015 © 2000-2017 [employer]. [address] [place] [address]. All rights reserved. This information is not intended as a substitute for professional medical care. Always follow your healthcare professional's instructions.        Dysuria with Uncertain Cause (Adult)    The urethra is the tube that allows urine to pass out of the body. In a woman, the urethra is the opening above the vagina. In men, the urethra is the opening on the tip of the penis. Dysuria is the feeling of pain or burning in the urethra when passing urine.  Dysuria can be caused by anything that irritates or inflames the urethra. An infection or chemical irritation can cause this reaction. A bladder infection is the most common cause of dysuria in adults. A urine test can diagnose this. A bladder infection needs antibiotic treatment.  Soaps, lotions, colognes and feminine hygiene products can cause dysuria. So can birth control jellies, creams, and foams. It will go away 1 to 3 days after  using these irritants.  Sexually transmitted diseases (STDs) such as chlamydia or gonorrhea can cause dysuria. Your healthcare provider may take a culture sample. Your provider may start you on antibiotic medicine before the culture test returns.  In women who have gone through menopause, dysuria can be from dryness in the lining of the urethra. This can be treated with hormones. Dysuria becomes long-term (chronic) when it lasts for weeks or months. You may need to see a specialist (urologist) to diagnose and treat chronic dysuria.  Home care  These home care tips may help:  · Don't use any chemicals or products that you think may be causing your symptoms.  · If you were given a prescription medicine, take as directed. Be sure to take it until it is all used up.  · If a culture was taken, don't have sex until you have been told that it is negative. This means you don't have an infection. Then follow your healthcare provider's advice to treat your condition.  If a culture was done and it is positive:  · Both you and your sexual partner may need to be treated. This is true even if your partner has no symptoms.  · Contact your healthcare provider or go to an urgent care clinic or the public health department to be looked at and treated.  · Don't have sex until both you and your partner(s) have finished all antibiotics and your healthcare provider says you are no longer contagious.  · Learn about and use safe sex practices. The safest sex is with a partner who has tested negative and only has sex with you. Condoms can prevent STDs from spreading, but they aren't a guarantee.  Follow-up care  Follow up with your healthcare provider, or as advised. If a culture was taken, you may call as directed for the results. If you have an STD, follow up with your provider or the public health department for a complete STD screening, including HIV testing. For more information, contact CDC-INFO at 122-817-4633.  When to seek medical  advice  Call your healthcare provider right away if any of these occur:  · You aren't better after 3 days of treatment  · Fever of 100.4ºF (38ºC) or higher, or as directed by your healthcare provider  · Back or belly pain that gets worse  · You can't urinate because of pain  · New discharge from the urethra, vagina, or penis  · Painful sores on the penis  · Rash or joint pain  · Painful lumps (lymph nodes) in the groin  · Testicle pain or swelling of the scrotum  Date Last Reviewed: 11/1/2016 © 2000-2017 [employer]. [address] [place], [place] [address]. All rights reserved. This information is not intended as a substitute for professional medical care. Always follow your healthcare professional's instructions.

## 2020-02-28 ENCOUNTER — OFFICE VISIT (OUTPATIENT)
Dept: OBSTETRICS AND GYNECOLOGY | Facility: CLINIC | Age: 55
End: 2020-02-28
Payer: MEDICARE

## 2020-02-28 ENCOUNTER — PATIENT OUTREACH (OUTPATIENT)
Dept: ADMINISTRATIVE | Facility: OTHER | Age: 55
End: 2020-02-28

## 2020-02-28 VITALS
BODY MASS INDEX: 31.85 KG/M2 | DIASTOLIC BLOOD PRESSURE: 72 MMHG | WEIGHT: 158 LBS | SYSTOLIC BLOOD PRESSURE: 118 MMHG | HEIGHT: 59 IN

## 2020-02-28 DIAGNOSIS — N30.00 ACUTE CYSTITIS WITHOUT HEMATURIA: Primary | ICD-10-CM

## 2020-02-28 DIAGNOSIS — N95.2 POSTMENOPAUSAL ATROPHIC VAGINITIS: Primary | ICD-10-CM

## 2020-02-28 DIAGNOSIS — N30.00 ACUTE CYSTITIS WITHOUT HEMATURIA: ICD-10-CM

## 2020-02-28 PROCEDURE — 99212 OFFICE O/P EST SF 10 MIN: CPT | Mod: PBBFAC | Performed by: OBSTETRICS & GYNECOLOGY

## 2020-02-28 PROCEDURE — 99213 OFFICE O/P EST LOW 20 MIN: CPT | Mod: S$PBB,,, | Performed by: OBSTETRICS & GYNECOLOGY

## 2020-02-28 PROCEDURE — 99999 PR PBB SHADOW E&M-EST. PATIENT-LVL II: ICD-10-PCS | Mod: PBBFAC,,, | Performed by: OBSTETRICS & GYNECOLOGY

## 2020-02-28 PROCEDURE — 99213 PR OFFICE/OUTPT VISIT, EST, LEVL III, 20-29 MIN: ICD-10-PCS | Mod: S$PBB,,, | Performed by: OBSTETRICS & GYNECOLOGY

## 2020-02-28 PROCEDURE — 99999 PR PBB SHADOW E&M-EST. PATIENT-LVL II: CPT | Mod: PBBFAC,,, | Performed by: OBSTETRICS & GYNECOLOGY

## 2020-02-28 RX ORDER — LEVOFLOXACIN 500 MG/1
500 TABLET, FILM COATED ORAL DAILY
Qty: 5 TABLET | Refills: 0 | Status: SHIPPED | OUTPATIENT
Start: 2020-02-28 | End: 2020-03-04

## 2020-02-28 RX ORDER — LEVOFLOXACIN 500 MG/1
500 TABLET, FILM COATED ORAL DAILY
Qty: 3 TABLET | Refills: 0 | Status: SHIPPED | OUTPATIENT
Start: 2020-02-28 | End: 2020-02-28

## 2020-02-28 RX ORDER — ESTRADIOL 0.1 MG/G
1 CREAM VAGINAL
Qty: 42.5 G | Refills: 1 | Status: SHIPPED | OUTPATIENT
Start: 2020-03-02 | End: 2021-08-19 | Stop reason: SDUPTHER

## 2020-02-29 NOTE — PROGRESS NOTES
HPI:  54 y.o. year old female patient  presents today for complaint of dysuria.  States she was diagnosed with UTI 2 days ago, but hasn't gotten any improvement with Macrobid.  Also c/o vaginal burning and irritation.  History of several prior UTI's over past 2 years.      Past Medical History:   Diagnosis Date    Acid reflux     Cataract     Diabetes mellitus, type 2     Glaucoma     HTN (hypertension)     Hyperlipidemia     Osteoarthritis of knee 3/11/2019    Recurrent iritis of left eye 2016       Past Surgical History:   Procedure Laterality Date    CATARACT EXTRACTION      COLONOSCOPY N/A 10/18/2019    Procedure: COLONOSCOPY;  Surgeon: Aster Krause MD;  Location: [facility];  Service: Endoscopy;  Laterality: N/A;    ESOPHAGOGASTRODUODENOSCOPY N/A 10/18/2019    Procedure: ESOPHAGOGASTRODUODENOSCOPY (EGD);  Surgeon: Aster Krause MD;  Location: [facility];  Service: Endoscopy;  Laterality: N/A;    GLAUCOMA SURGERY Left 2018    [facility]    GONIOTOMY Left 2016    HEMORRHOID SURGERY  2015    [facility]    PARTIAL HYSTERECTOMY      TONSILLECTOMY      WISDOM TOOTH EXTRACTION         Review of Systems:    Constitutional:  Negative for fatigue and unexpected weight change  Breasts:  Negative for masses, tenderness, or nipple discharge  Gastrointestinal:  Negative for nausea, appetite change, and abdominal distension  Genitourinary:  Negative for requency, or urgency  Gyn:  Negative for vaginal bleeding, or vaginal discharge         Physical Exam:    Constitutional:  She appears well developed and well nourished.  No distress  Abdominal:  Soft, no distension.  No tenderness.  No masses  Pelvic:  Vulva:  No lesions.  Normal female genitalia  Urethral Meatus:  Normal size and location.  No lesions.  No prolapse.  Urethra:  No masses, tenderness, prolapse, or scarring  Vagina:  No lesions or discharge.  No significant cystocele or rectocele, significant atrophic  change  Cervix:  Surgically absent  Uterus:  Surgically absent  Adnexa:  No masses or tenderness  Anus and Perineum:  No lesions.  No external hemorrhoids      Urine culture - positive for Klebsiella pnemoniae, intermediate sensitivity to Macrobid, sensitive to Levaqin.    ASSESSMENT:  1. Postmenopausal atrophic vaginitis     2. Acute cystitis without hematuria  levoFLOXacin (LEVAQUIN) 500 MG tablet   :      PLAN:  Levaquin Rx given  Estrace vaginal cream

## 2020-03-01 ENCOUNTER — PATIENT MESSAGE (OUTPATIENT)
Dept: DIABETES | Facility: CLINIC | Age: 55
End: 2020-03-01

## 2020-03-04 ENCOUNTER — PATIENT MESSAGE (OUTPATIENT)
Dept: DIABETES | Facility: CLINIC | Age: 55
End: 2020-03-04

## 2020-03-04 DIAGNOSIS — R35.0 URINE FREQUENCY: ICD-10-CM

## 2020-03-04 DIAGNOSIS — E11.22 TYPE 2 DIABETES MELLITUS WITH STAGE 3 CHRONIC KIDNEY DISEASE, WITHOUT LONG-TERM CURRENT USE OF INSULIN: Primary | ICD-10-CM

## 2020-03-04 DIAGNOSIS — N18.30 TYPE 2 DIABETES MELLITUS WITH STAGE 3 CHRONIC KIDNEY DISEASE, WITHOUT LONG-TERM CURRENT USE OF INSULIN: Primary | ICD-10-CM

## 2020-03-04 DIAGNOSIS — R30.0 DYSURIA: ICD-10-CM

## 2020-03-06 DIAGNOSIS — N18.30 TYPE 2 DIABETES MELLITUS WITH STAGE 3 CHRONIC KIDNEY DISEASE, WITHOUT LONG-TERM CURRENT USE OF INSULIN: ICD-10-CM

## 2020-03-06 DIAGNOSIS — E11.22 TYPE 2 DIABETES MELLITUS WITH STAGE 3 CHRONIC KIDNEY DISEASE, WITHOUT LONG-TERM CURRENT USE OF INSULIN: ICD-10-CM

## 2020-03-10 ENCOUNTER — LAB VISIT (OUTPATIENT)
Dept: LAB | Facility: HOSPITAL | Age: 55
End: 2020-03-10
Payer: MEDICARE

## 2020-03-10 DIAGNOSIS — N18.30 TYPE 2 DIABETES MELLITUS WITH STAGE 3 CHRONIC KIDNEY DISEASE, WITHOUT LONG-TERM CURRENT USE OF INSULIN: ICD-10-CM

## 2020-03-10 DIAGNOSIS — R30.0 DYSURIA: ICD-10-CM

## 2020-03-10 DIAGNOSIS — R35.0 URINE FREQUENCY: ICD-10-CM

## 2020-03-10 DIAGNOSIS — E11.22 TYPE 2 DIABETES MELLITUS WITH STAGE 3 CHRONIC KIDNEY DISEASE, WITHOUT LONG-TERM CURRENT USE OF INSULIN: ICD-10-CM

## 2020-03-10 LAB
BACTERIA #/AREA URNS HPF: NORMAL /HPF
BILIRUB UR QL STRIP: NEGATIVE
CLARITY UR: CLEAR
COLOR UR: ABNORMAL
GLUCOSE UR QL STRIP: ABNORMAL
HGB UR QL STRIP: NEGATIVE
KETONES UR QL STRIP: NEGATIVE
LEUKOCYTE ESTERASE UR QL STRIP: NEGATIVE
MICROSCOPIC COMMENT: NORMAL
NITRITE UR QL STRIP: NEGATIVE
PH UR STRIP: 6 [PH] (ref 5–8)
PROT UR QL STRIP: NEGATIVE
SP GR UR STRIP: 1.01 (ref 1–1.03)
URN SPEC COLLECT METH UR: ABNORMAL
YEAST URNS QL MICRO: NORMAL

## 2020-03-10 PROCEDURE — 81000 URINALYSIS NONAUTO W/SCOPE: CPT

## 2020-03-10 PROCEDURE — 87086 URINE CULTURE/COLONY COUNT: CPT

## 2020-03-11 LAB
BACTERIA UR CULT: NORMAL
BACTERIA UR CULT: NORMAL

## 2020-03-16 RX ORDER — LOTEPREDNOL ETABONATE 5 MG/G
1 GEL OPHTHALMIC 4 TIMES DAILY
Qty: 5 G | Refills: 6 | Status: CANCELLED | OUTPATIENT
Start: 2020-03-16

## 2020-03-17 ENCOUNTER — PATIENT MESSAGE (OUTPATIENT)
Dept: DIABETES | Facility: CLINIC | Age: 55
End: 2020-03-17

## 2020-03-23 ENCOUNTER — TELEPHONE (OUTPATIENT)
Dept: DIABETES | Facility: CLINIC | Age: 55
End: 2020-03-23

## 2020-03-23 ENCOUNTER — PATIENT OUTREACH (OUTPATIENT)
Dept: OTHER | Facility: OTHER | Age: 55
End: 2020-03-23

## 2020-03-23 RX ORDER — LOTEPREDNOL ETABONATE 5 MG/G
1 GEL OPHTHALMIC 4 TIMES DAILY
Qty: 5 G | Refills: 6 | Status: CANCELLED | OUTPATIENT
Start: 2020-03-16

## 2020-03-23 NOTE — TELEPHONE ENCOUNTER
Called patient to reschedule tomorrow's diabetes education visit as a virtual visit. Left recorded message with call back information.

## 2020-03-24 ENCOUNTER — TELEPHONE (OUTPATIENT)
Dept: DIABETES | Facility: CLINIC | Age: 55
End: 2020-03-24

## 2020-03-24 ENCOUNTER — PATIENT OUTREACH (OUTPATIENT)
Dept: ADMINISTRATIVE | Facility: OTHER | Age: 55
End: 2020-03-24

## 2020-03-24 RX ORDER — LOTEPREDNOL ETABONATE 5 MG/G
1 GEL OPHTHALMIC 4 TIMES DAILY
Qty: 5 G | Refills: 6 | Status: SHIPPED | OUTPATIENT
Start: 2020-03-24 | End: 2020-06-30 | Stop reason: SDUPTHER

## 2020-03-24 NOTE — TELEPHONE ENCOUNTER
Attempted to complete virtual visit with patient's daughter. She was not able to connect through My Chart mobile ney. Daughter states that patient is taking medications as prescribed and she is checking her mother's blood sugar twice a day. FB-115. PPBG: 160's. She requested educational materials be sent by email. No urgent concerns at this time.

## 2020-04-06 DIAGNOSIS — H40.1133 PRIMARY OPEN ANGLE GLAUCOMA OF BOTH EYES, SEVERE STAGE: ICD-10-CM

## 2020-04-06 RX ORDER — ATROPINE SULFATE 10 MG/ML
1 SOLUTION/ DROPS OPHTHALMIC 4 TIMES DAILY
Qty: 5 ML | Refills: 1 | Status: CANCELLED | OUTPATIENT
Start: 2020-04-06

## 2020-04-06 RX ORDER — ATROPINE SULFATE 10 MG/ML
1 SOLUTION/ DROPS OPHTHALMIC 4 TIMES DAILY
Qty: 5 ML | Refills: 1 | Status: SHIPPED | OUTPATIENT
Start: 2020-04-06 | End: 2020-06-02 | Stop reason: SDUPTHER

## 2020-04-17 ENCOUNTER — PATIENT OUTREACH (OUTPATIENT)
Dept: OTHER | Facility: OTHER | Age: 55
End: 2020-04-17

## 2020-04-17 NOTE — PROGRESS NOTES
4/17 spoke with pt briefly. Last readings was 4 weeks ago. She says she will try to take a reading soon. Will f/u in 1 week

## 2020-04-22 DIAGNOSIS — H40.1133 PRIMARY OPEN ANGLE GLAUCOMA OF BOTH EYES, SEVERE STAGE: ICD-10-CM

## 2020-04-23 RX ORDER — BRIMONIDINE TARTRATE 1 MG/ML
1 SOLUTION/ DROPS OPHTHALMIC 3 TIMES DAILY
Qty: 15 ML | Refills: 6 | Status: SHIPPED | OUTPATIENT
Start: 2020-04-23 | End: 2020-06-30 | Stop reason: SDUPTHER

## 2020-06-01 ENCOUNTER — PATIENT OUTREACH (OUTPATIENT)
Dept: ADMINISTRATIVE | Facility: OTHER | Age: 55
End: 2020-06-01

## 2020-06-02 ENCOUNTER — OFFICE VISIT (OUTPATIENT)
Dept: OPHTHALMOLOGY | Facility: CLINIC | Age: 55
End: 2020-06-02
Payer: MEDICARE

## 2020-06-02 DIAGNOSIS — H35.52 RETINITIS PIGMENTOSA OF BOTH EYES: ICD-10-CM

## 2020-06-02 DIAGNOSIS — H40.1133 PRIMARY OPEN ANGLE GLAUCOMA OF BOTH EYES, SEVERE STAGE: Primary | ICD-10-CM

## 2020-06-02 DIAGNOSIS — H04.123 DRY EYE SYNDROME, BILATERAL: ICD-10-CM

## 2020-06-02 DIAGNOSIS — E11.22 TYPE 2 DIABETES MELLITUS WITH STAGE 3 CHRONIC KIDNEY DISEASE, WITHOUT LONG-TERM CURRENT USE OF INSULIN: ICD-10-CM

## 2020-06-02 DIAGNOSIS — N18.30 TYPE 2 DIABETES MELLITUS WITH STAGE 3 CHRONIC KIDNEY DISEASE, WITHOUT LONG-TERM CURRENT USE OF INSULIN: ICD-10-CM

## 2020-06-02 DIAGNOSIS — H20.022 IRITIS, RECURRENT, LEFT: ICD-10-CM

## 2020-06-02 PROCEDURE — 99999 PR PBB SHADOW E&M-EST. PATIENT-LVL II: ICD-10-PCS | Mod: PBBFAC,,, | Performed by: OPHTHALMOLOGY

## 2020-06-02 PROCEDURE — 99212 OFFICE O/P EST SF 10 MIN: CPT | Mod: PBBFAC | Performed by: OPHTHALMOLOGY

## 2020-06-02 PROCEDURE — 99999 PR PBB SHADOW E&M-EST. PATIENT-LVL II: CPT | Mod: PBBFAC,,, | Performed by: OPHTHALMOLOGY

## 2020-06-02 PROCEDURE — 92012 PR EYE EXAM, EST PATIENT,INTERMED: ICD-10-PCS | Mod: S$PBB,,, | Performed by: OPHTHALMOLOGY

## 2020-06-02 PROCEDURE — 92012 INTRM OPH EXAM EST PATIENT: CPT | Mod: S$PBB,,, | Performed by: OPHTHALMOLOGY

## 2020-06-02 RX ORDER — ATROPINE SULFATE 10 MG/ML
1 SOLUTION/ DROPS OPHTHALMIC 4 TIMES DAILY
Qty: 5 ML | Refills: 1 | Status: SHIPPED | OUTPATIENT
Start: 2020-06-02 | End: 2020-06-30 | Stop reason: SDUPTHER

## 2020-06-02 NOTE — PROGRESS NOTES
Chart reviewed.   Immunizations: Triggered Imm Registry     Orders placed: n/a  Upcoming appts to satisfy HOANG topics: n/a

## 2020-06-02 NOTE — PROGRESS NOTES
HPI     Glaucoma     Comments: 5 month follow up              Comments     PT C/O: At times having a throbbing pain in OS that comes and goes some   days better then others along with minimal itching.        PCP: Dr. Miranda Ackerman      1. COAG (followed by Dr. Scott)  CP OS 8/27/15 - low to moderate flow, TDW 1100, aggressive dilation with   GV  Gonio Puncture OS 9/25/15   SLT OS 6/23/16, 6/23/15, 6-2-14  Goniotomy OS 11-17-16  2. DM  3. RP / VIT A PALMITATE 1500 (discontinued)  4. Ahmed Valve OS 8/2/18    OS: Lotemax TID  OS: latanoprost QHS   OS: Brimonidine TID  OS: Systane and genteal gel QHS  OD: pred acetate prn   OD: Atropine BID          Last edited by Toyin Casey, Patient Care Assistant on 6/2/2020 10:58   AM. (History)            Assessment /Plan     For exam results, see Encounter Report.      ICD-10-CM ICD-9-CM    1. Primary open angle glaucoma of both eyes, severe stage H40.1133 365.11 Doing well - intraocular pressure is within acceptable range relative to target pressure with no evidence of progression.   Continue current treatment.  Reviewed importance of continued compliance with treatment and follow up.        365.73    2. Iritis, recurrent, left H20.022 364.02 Continue lotemax, increase to qid OS if needed    3. Dry eye syndrome, bilateral H04.123 375.15    4. Uncontrolled type 2 diabetes mellitus without complication, without long-term current use of insulin E11.65 250.02    5. Retinitis pigmentosa of both eyes H35.52 362.74      OS: Lotemax TID-qid   OS: latanoprost QHS   OS: Brimonidine TID  OS: Systane and genteal gel QHS  OD: pred acetate prn   OD: Atropine BID     Return to clinic 4 months with IOP check     JL

## 2020-06-03 RX ORDER — GLIMEPIRIDE 4 MG/1
4 TABLET ORAL 2 TIMES DAILY
Qty: 180 TABLET | Refills: 0 | Status: SHIPPED | OUTPATIENT
Start: 2020-06-03 | End: 2020-06-30 | Stop reason: SDUPTHER

## 2020-06-19 DIAGNOSIS — H40.1133 PRIMARY OPEN ANGLE GLAUCOMA OF BOTH EYES, SEVERE STAGE: ICD-10-CM

## 2020-06-19 RX ORDER — PREDNISOLONE ACETATE 10 MG/ML
1 SUSPENSION/ DROPS OPHTHALMIC DAILY PRN
Qty: 5 ML | Refills: 0 | Status: SHIPPED | OUTPATIENT
Start: 2020-06-19 | End: 2020-07-31 | Stop reason: SDUPTHER

## 2020-06-24 ENCOUNTER — PATIENT OUTREACH (OUTPATIENT)
Dept: ADMINISTRATIVE | Facility: OTHER | Age: 55
End: 2020-06-24

## 2020-06-24 NOTE — PROGRESS NOTES
Requested updates within Care Everywhere.  Patient's chart was reviewed for overdue HOANG topics.  Immunizations reconciled.    Orders placed: screening mammogram  Tasked appts: mammogram  Labs Linked: n/a

## 2020-06-26 ENCOUNTER — TELEPHONE (OUTPATIENT)
Dept: OPHTHALMOLOGY | Facility: CLINIC | Age: 55
End: 2020-06-26

## 2020-06-26 RX ORDER — LOTEPREDNOL ETABONATE 5 MG/G
1 GEL OPHTHALMIC 4 TIMES DAILY
Qty: 5 G | Refills: 6 | Status: CANCELLED | OUTPATIENT
Start: 2020-06-26

## 2020-06-30 DIAGNOSIS — H40.1133 PRIMARY OPEN ANGLE GLAUCOMA OF BOTH EYES, SEVERE STAGE: Primary | ICD-10-CM

## 2020-06-30 DIAGNOSIS — N18.30 TYPE 2 DIABETES MELLITUS WITH STAGE 3 CHRONIC KIDNEY DISEASE, WITHOUT LONG-TERM CURRENT USE OF INSULIN: ICD-10-CM

## 2020-06-30 DIAGNOSIS — E11.22 TYPE 2 DIABETES MELLITUS WITH STAGE 3 CHRONIC KIDNEY DISEASE, WITHOUT LONG-TERM CURRENT USE OF INSULIN: ICD-10-CM

## 2020-06-30 RX ORDER — GLIMEPIRIDE 4 MG/1
4 TABLET ORAL 2 TIMES DAILY
Qty: 180 TABLET | Refills: 0 | Status: SHIPPED | OUTPATIENT
Start: 2020-06-30 | End: 2020-07-14 | Stop reason: SDUPTHER

## 2020-06-30 RX ORDER — ATROPINE SULFATE 10 MG/ML
1 SOLUTION/ DROPS OPHTHALMIC 4 TIMES DAILY
Qty: 5 ML | Refills: 1 | Status: SHIPPED | OUTPATIENT
Start: 2020-06-30 | End: 2021-03-03 | Stop reason: SDUPTHER

## 2020-06-30 RX ORDER — BRIMONIDINE TARTRATE 1 MG/ML
1 SOLUTION/ DROPS OPHTHALMIC 3 TIMES DAILY
Qty: 15 ML | Refills: 6 | Status: SHIPPED | OUTPATIENT
Start: 2020-06-30 | End: 2021-03-03 | Stop reason: SDUPTHER

## 2020-06-30 RX ORDER — LOTEPREDNOL ETABONATE 5 MG/G
1 GEL OPHTHALMIC 4 TIMES DAILY
Qty: 5 G | Refills: 6 | Status: SHIPPED | OUTPATIENT
Start: 2020-06-30 | End: 2020-09-28 | Stop reason: SDUPTHER

## 2020-06-30 RX ORDER — LATANOPROST 50 UG/ML
1 SOLUTION/ DROPS OPHTHALMIC NIGHTLY
Qty: 7.5 ML | Refills: 4 | Status: SHIPPED | OUTPATIENT
Start: 2020-06-30 | End: 2021-04-20 | Stop reason: SDUPTHER

## 2020-06-30 NOTE — TELEPHONE ENCOUNTER
----- Message from Stephanie Lee sent at 6/30/2020 11:14 AM CDT -----  Regarding: authorization on eye drop  Contact: pt  Caller is requesting a call back regarding authorization forloteprednol etabonate (LOTEMAX) 0.5 % DrpG to be filled   [facility]  [id number] [place] [address]  Phone: [phone] Fax: [phone]    Please call back [phone]. Thanks.

## 2020-06-30 NOTE — TELEPHONE ENCOUNTER
Refill for lotemax was already sent to [facility] pharmacy for refill. Pt's daughter requested refills for coag drops as well. Routing to [facility] to sign

## 2020-07-06 DIAGNOSIS — E11.59 HYPERTENSION ASSOCIATED WITH DIABETES: ICD-10-CM

## 2020-07-06 DIAGNOSIS — I15.2 HYPERTENSION ASSOCIATED WITH DIABETES: ICD-10-CM

## 2020-07-06 RX ORDER — LISINOPRIL AND HYDROCHLOROTHIAZIDE 20; 25 MG/1; MG/1
1 TABLET ORAL DAILY
Qty: 90 TABLET | Refills: 0 | Status: SHIPPED | OUTPATIENT
Start: 2020-07-06 | End: 2020-09-28 | Stop reason: SDUPTHER

## 2020-07-14 DIAGNOSIS — E11.22 TYPE 2 DIABETES MELLITUS WITH STAGE 3 CHRONIC KIDNEY DISEASE, WITHOUT LONG-TERM CURRENT USE OF INSULIN: ICD-10-CM

## 2020-07-14 DIAGNOSIS — N18.30 TYPE 2 DIABETES MELLITUS WITH STAGE 3 CHRONIC KIDNEY DISEASE, WITHOUT LONG-TERM CURRENT USE OF INSULIN: ICD-10-CM

## 2020-07-14 RX ORDER — GLIMEPIRIDE 4 MG/1
4 TABLET ORAL 2 TIMES DAILY
Qty: 60 TABLET | Refills: 1 | Status: SHIPPED | OUTPATIENT
Start: 2020-07-14 | End: 2020-10-21 | Stop reason: SDUPTHER

## 2020-07-17 DIAGNOSIS — Z71.89 COMPLEX CARE COORDINATION: ICD-10-CM

## 2020-07-22 RX ORDER — METFORMIN HYDROCHLORIDE 1000 MG/1
1000 TABLET ORAL 2 TIMES DAILY WITH MEALS
Qty: 180 TABLET | Refills: 0 | Status: SHIPPED | OUTPATIENT
Start: 2020-07-22 | End: 2020-11-04 | Stop reason: SDUPTHER

## 2020-07-23 DIAGNOSIS — H40.1133 PRIMARY OPEN ANGLE GLAUCOMA OF BOTH EYES, SEVERE STAGE: ICD-10-CM

## 2020-07-23 RX ORDER — PREDNISOLONE ACETATE 10 MG/ML
1 SUSPENSION/ DROPS OPHTHALMIC DAILY PRN
Qty: 5 ML | Refills: 0 | Status: CANCELLED | OUTPATIENT
Start: 2020-07-23

## 2020-07-29 RX ORDER — PREDNISOLONE ACETATE 10 MG/ML
SUSPENSION/ DROPS OPHTHALMIC
Qty: 5 ML | Refills: 2 | Status: SHIPPED | OUTPATIENT
Start: 2020-07-29 | End: 2020-10-07 | Stop reason: ALTCHOICE

## 2020-07-31 DIAGNOSIS — H40.1133 PRIMARY OPEN ANGLE GLAUCOMA OF BOTH EYES, SEVERE STAGE: ICD-10-CM

## 2020-07-31 RX ORDER — PREDNISOLONE ACETATE 10 MG/ML
1 SUSPENSION/ DROPS OPHTHALMIC DAILY PRN
Qty: 5 ML | Refills: 0 | Status: SHIPPED | OUTPATIENT
Start: 2020-07-31 | End: 2020-10-07 | Stop reason: ALTCHOICE

## 2020-09-03 ENCOUNTER — PATIENT OUTREACH (OUTPATIENT)
Dept: OTHER | Facility: OTHER | Age: 55
End: 2020-09-03

## 2020-09-03 NOTE — PROGRESS NOTES
LMFCB to assess if the patient wanted to remain within the program and if so, could increase her readings.

## 2020-09-28 DIAGNOSIS — E11.59 HYPERTENSION ASSOCIATED WITH DIABETES: ICD-10-CM

## 2020-09-28 DIAGNOSIS — I15.2 HYPERTENSION ASSOCIATED WITH DIABETES: ICD-10-CM

## 2020-09-28 RX ORDER — LISINOPRIL AND HYDROCHLOROTHIAZIDE 20; 25 MG/1; MG/1
1 TABLET ORAL DAILY
Qty: 30 TABLET | Refills: 0 | Status: SHIPPED | OUTPATIENT
Start: 2020-09-28 | End: 2020-10-21 | Stop reason: SDUPTHER

## 2020-09-28 RX ORDER — LOTEPREDNOL ETABONATE 5 MG/G
1 GEL OPHTHALMIC 4 TIMES DAILY
Qty: 5 G | Refills: 6 | Status: SHIPPED | OUTPATIENT
Start: 2020-09-28 | End: 2020-12-23 | Stop reason: SDUPTHER

## 2020-10-01 ENCOUNTER — PATIENT MESSAGE (OUTPATIENT)
Dept: OTHER | Facility: OTHER | Age: 55
End: 2020-10-01

## 2020-10-01 NOTE — PROGRESS NOTES
Digital Medicine: Clinician Follow-Up    Called patient's daughter to discuss her at lack of readings.    The history is provided by caregiver.      Review of patient's allergies indicates:  No Known Allergies  Follow-up reason(s): routine follow up.     Hypertension  Patient needs assistance troubleshooting: tech support needed.    Patient is not experiencing signs/symptoms of hypotension.  Patient is not experiencing signs/symptoms of hypertension.          Last 5 Patient Entered Readings                                      Current 30 Day Average:      Recent Readings 3/14/2020 3/14/2020 3/14/2020 1/25/2020 1/24/2020    SBP (mmHg) 146 100 144 130 99    DBP (mmHg) 100 74 87 94 64    Pulse 96 90 65 85 92                 Depression Screening  Did not address depression screening.    Sleep Apnea Screening    Did not address sleep apnea screening.     Medication Affordability Screening  Did not address medication affordability screening.     Medication Adherence-Medication adherence was assessed.          ASSESSMENT(S)Lack of readings.  Hypertension Plan  Additional monitoring needed. Resume readings after contacting our technical support staff for help.  Continue current therapy.  Patient's daughter did not want me to place a ticket since she needs to call our technical support team when she is with her mother and not at random times.     Addressed patient questions and patient has my contact information if needed prior to next outreach. Patient verbalizes understanding.                Topic    Hemoglobin A1C     Lipid (Cholesterol) Test      There are no preventive care reminders to display for this patient.    Hypertension Medications             amLODIPine (NORVASC) 5 MG tablet Take 1 tablet (5 mg total) by mouth once daily.    lisinopriL-hydrochlorothiazide (PRINZIDE,ZESTORETIC) 20-25 mg Tab Take 1 tablet by mouth once daily.

## 2020-10-07 ENCOUNTER — PATIENT OUTREACH (OUTPATIENT)
Dept: ADMINISTRATIVE | Facility: OTHER | Age: 55
End: 2020-10-07

## 2020-10-07 ENCOUNTER — OFFICE VISIT (OUTPATIENT)
Dept: OPHTHALMOLOGY | Facility: CLINIC | Age: 55
End: 2020-10-07
Payer: MEDICARE

## 2020-10-07 DIAGNOSIS — Z12.31 ENCOUNTER FOR SCREENING MAMMOGRAM FOR MALIGNANT NEOPLASM OF BREAST: Primary | ICD-10-CM

## 2020-10-07 DIAGNOSIS — H35.52 RETINITIS PIGMENTOSA OF BOTH EYES: ICD-10-CM

## 2020-10-07 DIAGNOSIS — H20.022 IRITIS, RECURRENT, LEFT: ICD-10-CM

## 2020-10-07 DIAGNOSIS — H40.1133 PRIMARY OPEN ANGLE GLAUCOMA OF BOTH EYES, SEVERE STAGE: Primary | ICD-10-CM

## 2020-10-07 DIAGNOSIS — Z12.31 BREAST CANCER SCREENING BY MAMMOGRAM: Primary | ICD-10-CM

## 2020-10-07 DIAGNOSIS — H04.123 DRY EYE SYNDROME, BILATERAL: ICD-10-CM

## 2020-10-07 PROCEDURE — 92012 PR EYE EXAM, EST PATIENT,INTERMED: ICD-10-PCS | Mod: S$PBB,,, | Performed by: OPHTHALMOLOGY

## 2020-10-07 PROCEDURE — 92133 CPTRZD OPH DX IMG PST SGM ON: CPT | Mod: PBBFAC | Performed by: OPHTHALMOLOGY

## 2020-10-07 PROCEDURE — 92012 INTRM OPH EXAM EST PATIENT: CPT | Mod: S$PBB,,, | Performed by: OPHTHALMOLOGY

## 2020-10-07 PROCEDURE — 92133 POSTERIOR SEGMENT OCT OPTIC NERVE(OCULAR COHERENCE TOMOGRAPHY) - OU - BOTH EYES: ICD-10-PCS | Mod: 26,S$PBB,, | Performed by: OPHTHALMOLOGY

## 2020-10-07 PROCEDURE — 99999 PR PBB SHADOW E&M-EST. PATIENT-LVL II: ICD-10-PCS | Mod: PBBFAC,,, | Performed by: OPHTHALMOLOGY

## 2020-10-07 PROCEDURE — 99999 PR PBB SHADOW E&M-EST. PATIENT-LVL II: CPT | Mod: PBBFAC,,, | Performed by: OPHTHALMOLOGY

## 2020-10-07 PROCEDURE — 99212 OFFICE O/P EST SF 10 MIN: CPT | Mod: PBBFAC | Performed by: OPHTHALMOLOGY

## 2020-10-07 NOTE — PROGRESS NOTES
Health Maintenance Due   Topic Date Due    HIV Screening  06[birth year]15/1980    Low Dose Statin  06/15/1984    Shingles Vaccine (1 of 2) 06/15/2013    Mammogram  06/27/2017    Influenza Vaccine (1) 08/01/2020    Hemoglobin A1c  08/12/2020    Lipid Panel  09/16/2020     Updates were requested from care everywhere.  Chart was reviewed for overdue Proactive [facility] Encounters (HOANG) topics (CRS, Breast Cancer Screening, Eye exam)  Health Maintenance has been updated.  LINKS immunization registry triggered.  Immunizations were reconciled.

## 2020-10-07 NOTE — PROGRESS NOTES
HPI     Concerns About Ocular Health     Comments: Retinitis pigmentosa              Comments     Here for 4 mos. IOP check    1. COAG (followed by Dr. Scott)  CP OS 8/27/15 - low to moderate flow, TDW 1100, aggressive dilation with   GV  Gonio Puncture OS 9/25/15   SLT OS 6/23/16, 6/23/15, 6-2-14  Goniotomy OS 11-17-16  2. DM  3. RP / VIT A PALMITATE 1500 (discontinued)  4. Ahmed Valve OS 8/2/18    OS: Lotemax TID-QID  OS: latanoprost QHS   OS: Brimonidine TID  OS: Systane and genteal gel QHS  OD: pred acetate prn   OD: Atropine BID          Last edited by Yuliya Pedersen on 10/7/2020 10:03 AM. (History)            Assessment /Plan     For exam results, see Encounter Report.      ICD-10-CM ICD-9-CM    1. Primary open angle glaucoma of both eyes, severe stage  H40.1133 365.11 Posterior Segment OCT Optic Nerve- Both eyes IOP stable os      365.73    2. Dry eye syndrome, bilateral  H04.123 375.15    3. Retinitis pigmentosa of both eyes  H35.52 362.74    4. Iritis, recurrent, left  H20.022 364.02 Deep and quiet today - follow on lotemax          Return to clinic 2 months DOA, SDP   OS: Lotemax BID to QID try as less as possible if she can  OS: latanoprost QHS   OS: Brimonidine TID  OS: Systane and genteal gel QHS  OD: pred acetate prn   OD: Atropine BID

## 2020-10-21 DIAGNOSIS — E11.22 TYPE 2 DIABETES MELLITUS WITH STAGE 3 CHRONIC KIDNEY DISEASE, WITHOUT LONG-TERM CURRENT USE OF INSULIN: ICD-10-CM

## 2020-10-21 DIAGNOSIS — E11.59 HYPERTENSION ASSOCIATED WITH DIABETES: ICD-10-CM

## 2020-10-21 DIAGNOSIS — N18.30 TYPE 2 DIABETES MELLITUS WITH STAGE 3 CHRONIC KIDNEY DISEASE, WITHOUT LONG-TERM CURRENT USE OF INSULIN: ICD-10-CM

## 2020-10-21 DIAGNOSIS — I15.2 HYPERTENSION ASSOCIATED WITH DIABETES: ICD-10-CM

## 2020-10-21 RX ORDER — LISINOPRIL AND HYDROCHLOROTHIAZIDE 20; 25 MG/1; MG/1
1 TABLET ORAL DAILY
Qty: 30 TABLET | Refills: 0 | Status: SHIPPED | OUTPATIENT
Start: 2020-10-21 | End: 2020-11-04 | Stop reason: SDUPTHER

## 2020-10-21 RX ORDER — GLIMEPIRIDE 4 MG/1
4 TABLET ORAL 2 TIMES DAILY
Qty: 60 TABLET | Refills: 0 | Status: SHIPPED | OUTPATIENT
Start: 2020-10-21 | End: 2020-11-04 | Stop reason: SDUPTHER

## 2020-11-04 ENCOUNTER — IMMUNIZATION (OUTPATIENT)
Dept: INTERNAL MEDICINE | Facility: CLINIC | Age: 55
End: 2020-11-04
Payer: MEDICARE

## 2020-11-04 ENCOUNTER — OFFICE VISIT (OUTPATIENT)
Dept: INTERNAL MEDICINE | Facility: CLINIC | Age: 55
End: 2020-11-04
Payer: MEDICARE

## 2020-11-04 VITALS
HEIGHT: 59 IN | BODY MASS INDEX: 31.34 KG/M2 | HEART RATE: 75 BPM | WEIGHT: 155.44 LBS | OXYGEN SATURATION: 99 % | DIASTOLIC BLOOD PRESSURE: 72 MMHG | RESPIRATION RATE: 16 BRPM | SYSTOLIC BLOOD PRESSURE: 124 MMHG | TEMPERATURE: 98 F

## 2020-11-04 DIAGNOSIS — E03.9 HYPOTHYROIDISM, UNSPECIFIED TYPE: ICD-10-CM

## 2020-11-04 DIAGNOSIS — I15.2 HYPERTENSION ASSOCIATED WITH DIABETES: Primary | ICD-10-CM

## 2020-11-04 DIAGNOSIS — E78.5 HYPERLIPIDEMIA ASSOCIATED WITH TYPE 2 DIABETES MELLITUS: ICD-10-CM

## 2020-11-04 DIAGNOSIS — M17.0 PRIMARY OSTEOARTHRITIS OF BOTH KNEES: ICD-10-CM

## 2020-11-04 DIAGNOSIS — K21.9 GASTROESOPHAGEAL REFLUX DISEASE, ESOPHAGITIS PRESENCE NOT SPECIFIED: ICD-10-CM

## 2020-11-04 DIAGNOSIS — E66.9 OBESITY (BMI 30.0-34.9): ICD-10-CM

## 2020-11-04 DIAGNOSIS — E55.9 VITAMIN D INSUFFICIENCY: ICD-10-CM

## 2020-11-04 DIAGNOSIS — E11.69 HYPERLIPIDEMIA ASSOCIATED WITH TYPE 2 DIABETES MELLITUS: ICD-10-CM

## 2020-11-04 DIAGNOSIS — K21.9 GASTROESOPHAGEAL REFLUX DISEASE, UNSPECIFIED WHETHER ESOPHAGITIS PRESENT: ICD-10-CM

## 2020-11-04 DIAGNOSIS — E11.22 TYPE 2 DIABETES MELLITUS WITH STAGE 3A CHRONIC KIDNEY DISEASE, WITHOUT LONG-TERM CURRENT USE OF INSULIN: ICD-10-CM

## 2020-11-04 DIAGNOSIS — E11.59 HYPERTENSION ASSOCIATED WITH DIABETES: Primary | ICD-10-CM

## 2020-11-04 DIAGNOSIS — N18.31 TYPE 2 DIABETES MELLITUS WITH STAGE 3A CHRONIC KIDNEY DISEASE, WITHOUT LONG-TERM CURRENT USE OF INSULIN: ICD-10-CM

## 2020-11-04 PROBLEM — E66.01 MORBID OBESITY WITH BMI OF 45.0-49.9, ADULT: Status: RESOLVED | Noted: 2019-03-11 | Resolved: 2020-11-04

## 2020-11-04 PROBLEM — E66.811 OBESITY (BMI 30.0-34.9): Status: ACTIVE | Noted: 2020-11-04

## 2020-11-04 PROCEDURE — 99214 OFFICE O/P EST MOD 30 MIN: CPT | Mod: S$PBB,,, | Performed by: FAMILY MEDICINE

## 2020-11-04 PROCEDURE — 99999 PR PBB SHADOW E&M-EST. PATIENT-LVL IV: ICD-10-PCS | Mod: PBBFAC,,, | Performed by: FAMILY MEDICINE

## 2020-11-04 PROCEDURE — 90686 IIV4 VACC NO PRSV 0.5 ML IM: CPT | Mod: PBBFAC

## 2020-11-04 PROCEDURE — 99214 PR OFFICE/OUTPT VISIT, EST, LEVL IV, 30-39 MIN: ICD-10-PCS | Mod: S$PBB,,, | Performed by: FAMILY MEDICINE

## 2020-11-04 PROCEDURE — 99214 OFFICE O/P EST MOD 30 MIN: CPT | Mod: PBBFAC | Performed by: FAMILY MEDICINE

## 2020-11-04 PROCEDURE — 99999 PR PBB SHADOW E&M-EST. PATIENT-LVL IV: CPT | Mod: PBBFAC,,, | Performed by: FAMILY MEDICINE

## 2020-11-04 RX ORDER — LISINOPRIL 20 MG/1
TABLET ORAL
COMMUNITY
End: 2020-11-04

## 2020-11-04 RX ORDER — METFORMIN HYDROCHLORIDE 1000 MG/1
1000 TABLET ORAL 2 TIMES DAILY WITH MEALS
Qty: 180 TABLET | Refills: 3 | Status: SHIPPED | OUTPATIENT
Start: 2020-11-04 | End: 2021-05-26 | Stop reason: SDUPTHER

## 2020-11-04 RX ORDER — LISINOPRIL AND HYDROCHLOROTHIAZIDE 20; 25 MG/1; MG/1
1 TABLET ORAL DAILY
Qty: 90 TABLET | Refills: 3 | Status: SHIPPED | OUTPATIENT
Start: 2020-11-04 | End: 2021-09-09 | Stop reason: SDUPTHER

## 2020-11-04 RX ORDER — LEVOTHYROXINE SODIUM 25 UG/1
25 TABLET ORAL DAILY
Qty: 90 TABLET | Refills: 3 | Status: SHIPPED | OUTPATIENT
Start: 2020-11-04 | End: 2021-11-08 | Stop reason: SDUPTHER

## 2020-11-04 RX ORDER — LANSOPRAZOLE 30 MG/1
30 CAPSULE, DELAYED RELEASE ORAL DAILY
Qty: 90 CAPSULE | Refills: 3 | Status: SHIPPED | OUTPATIENT
Start: 2020-11-04 | End: 2021-11-08 | Stop reason: SDUPTHER

## 2020-11-04 RX ORDER — ONDANSETRON 4 MG/1
4 TABLET, ORALLY DISINTEGRATING ORAL EVERY 8 HOURS PRN
COMMUNITY
Start: 2020-09-24 | End: 2021-01-20

## 2020-11-04 RX ORDER — LANSOPRAZOLE 30 MG/1
30 CAPSULE, DELAYED RELEASE ORAL DAILY
Qty: 30 CAPSULE | Refills: 11 | Status: CANCELLED | OUTPATIENT
Start: 2020-11-04 | End: 2021-11-04

## 2020-11-04 RX ORDER — AMLODIPINE BESYLATE 5 MG/1
5 TABLET ORAL DAILY
Qty: 90 TABLET | Refills: 3 | Status: SHIPPED | OUTPATIENT
Start: 2020-11-04 | End: 2021-11-08 | Stop reason: SDUPTHER

## 2020-11-04 RX ORDER — GLIMEPIRIDE 4 MG/1
4 TABLET ORAL 2 TIMES DAILY
Qty: 180 TABLET | Refills: 1 | Status: SHIPPED | OUTPATIENT
Start: 2020-11-04 | End: 2021-01-20 | Stop reason: SDUPTHER

## 2020-11-04 RX ORDER — ATORVASTATIN CALCIUM 40 MG/1
40 TABLET, FILM COATED ORAL DAILY
Qty: 90 TABLET | Refills: 3 | Status: SHIPPED | OUTPATIENT
Start: 2020-11-04 | End: 2021-11-08 | Stop reason: SDUPTHER

## 2020-11-04 RX ORDER — BENZONATATE 200 MG/1
200 CAPSULE ORAL 3 TIMES DAILY PRN
COMMUNITY
Start: 2020-09-24 | End: 2020-11-04

## 2020-11-04 NOTE — PROGRESS NOTES
Subjective:       Patient ID: Richelle Zaldivar is a 55 y.o. female.    Chief Complaint: Annual Exam and Medication Refill    55-year-old female patient accompanied by her daughter with Patient Active Problem List:     Hypertension associated with diabetes     Type 2 diabetes mellitus with stage 3 chronic kidney disease, without long-term current use of insulin     Hyperlipidemia associated with type 2 diabetes mellitus     Iron deficiency anemia     Vitamin D insufficiency     GERD (gastroesophageal reflux disease)     Retinitis pigmentosa     Recurrent iritis of left eye     Open-angle glaucoma, severe stage     Osteoarthritis of knee     Hypothyroidism  Here for follow-up on chronic medical conditions and requesting refills on her medications today.  Reports that she has not been taking fenofibrate but has been taking Lipitor and blood pressure medication regularly, blood glucose levels has been running in the range of 100-1 20s and occasionally has been dropping in 60s, taking her diabetes medication regularly.  Patient is planning to go for a wedding in [place] tomorrow and will plan to do her labs end of November  Denies any chest pain or difficulty breathing, palpitations nausea vomiting.  Currently has not been taking vitamin-D or iron supplements by prescription.  Denies any persistent fatigue and reports that she has been doing well    Review of Systems   Constitutional: Negative for fatigue.   Eyes: Negative for visual disturbance.   Respiratory: Negative for shortness of breath.    Cardiovascular: Negative for chest pain and leg swelling.   Gastrointestinal: Negative for abdominal pain, nausea and vomiting.   Endocrine: Negative for polydipsia, polyphagia and polyuria.   Musculoskeletal: Negative for myalgias.   Skin: Negative for rash.   Neurological: Negative for dizziness, weakness, light-headedness, numbness and headaches.   Psychiatric/Behavioral: Negative for sleep disturbance.         /72  "(BP Location: Right arm, Patient Position: Sitting, BP Method: Medium (Manual))   Pulse 75   Temp 97.6 °F (36.4 °C) (Temporal)   Resp 16   Ht 4' 11" (1.499 m)   Wt 70.5 kg (155 lb 6.8 oz)   SpO2 99%   BMI 31.39 kg/m²   Objective:      Physical Exam  Constitutional:       Appearance: She is well-developed.   HENT:      Head: Normocephalic and atraumatic.   Cardiovascular:      Rate and Rhythm: Normal rate and regular rhythm.      Heart sounds: Normal heart sounds. No murmur.   Pulmonary:      Effort: Pulmonary effort is normal.      Breath sounds: Normal breath sounds. No wheezing.   Abdominal:      General: Bowel sounds are normal.      Palpations: Abdomen is soft.      Tenderness: There is no abdominal tenderness.   Musculoskeletal:         General: No tenderness.   Skin:     General: Skin is warm and dry.      Findings: No rash.   Neurological:      Mental Status: She is alert and oriented to person, place, and time.   Psychiatric:         Mood and Affect: Mood normal.           Assessment/Plan:   1. Hypertension associated with diabetes  - amLODIPine (NORVASC) 5 MG tablet; Take 1 tablet (5 mg total) by mouth once daily.  Dispense: 90 tablet; Refill: 3  - lisinopriL-hydrochlorothiazide (PRINZIDE,ZESTORETIC) 20-25 mg Tab; Take 1 tablet by mouth once daily.  Dispense: 90 tablet; Refill: 3  - Comprehensive Metabolic Panel; Future  - Lipid Panel; Future  - TSH; Future  - Urinalysis; Future  - HIV 1/2 Ag/Ab (4th Gen); Future  Blood pressure is stable currently on amlodipine 5 mg and lisinopril hydrochlorothiazide 20/25 mg, refill given today  Restrict salt intake and eat low-fat and low-cholesterol diet and exercise 30 min daily  Will plan to get labs end of this month    2. Hyperlipidemia associated with type 2 diabetes mellitus  - atorvastatin (LIPITOR) 40 MG tablet; Take 1 tablet (40 mg total) by mouth once daily.  Dispense: 90 tablet; Refill: 3  - Lipid Panel; Future  Currently taking Lipitor 40 mg daily and " not fenofibrate  Will check fasting labs  Restrict carbohydrates and eat low-fat and low-cholesterol diet    3. Type 2 diabetes mellitus with stage 3a chronic kidney disease, without long-term current use of insulin  - metFORMIN (GLUCOPHAGE) 1000 MG tablet; Take 1 tablet (1,000 mg total) by mouth 2 (two) times daily with meals.  Dispense: 180 tablet; Refill: 3  - glimepiride (AMARYL) 4 MG tablet; Take 1 tablet (4 mg total) by mouth 2 (two) times daily.  Dispense: 180 tablet; Refill: 1  - Comprehensive Metabolic Panel; Future  - Lipid Panel; Future  - Hemoglobin A1C; Future  - Microalbumin/Creatinine Ratio, Urine; Future  Will check fasting labs to see if there is any improvement in A1c as it was severely uncontrolled  Currently taking metformin 1000 mg twice daily, Amaryl 4 mg twice a day and Jardiance 25 mg daily  If significant improvement noted in A1c will consider decreasing Amaryl to once daily secondary to occasionally low blood glucose levels  Advised to snack in between meals and continue monitoring blood glucose levels closely  Strict lifestyle changes recommended with 1800 ADA low-fat and low-cholesterol diet and exercise 30 min daily    4. Vitamin D insufficiency  - CBC Auto Differential; Future  - Vitamin D; Future  Will check vitamin-D levels as it was low in the past    5. Gastroesophageal reflux disease, unspecified whether esophagitis present  - lansoprazole (PREVACID) 30 MG capsule; Take 1 capsule (30 mg total) by mouth once daily.  Dispense: 90 capsule; Refill: 3  Stable on Prevacid 30 mg refill given today    6. Hypothyroidism, unspecified type  - levothyroxine (SYNTHROID) 25 MCG tablet; Take 1 tablet (25 mcg total) by mouth once daily.  Dispense: 90 tablet; Refill: 3  - TSH; Future  Currently taking levothyroxine 25 mcg p.o. daily    8. Primary osteoarthritis of both knees   Stable and asymptomatic  Graded exercise regimen recommended    Noted significant improvement with weight, continue  lifestyle changes to lose weight with BMI  31    Received flu shot in  flu clinic today  Advised to consider getting shingles vaccination at outside pharmacy

## 2020-11-27 DIAGNOSIS — E11.42 TYPE 2 DIABETES MELLITUS WITH DIABETIC POLYNEUROPATHY, WITHOUT LONG-TERM CURRENT USE OF INSULIN: ICD-10-CM

## 2020-11-27 RX ORDER — GABAPENTIN 300 MG/1
300 CAPSULE ORAL 2 TIMES DAILY
Qty: 180 CAPSULE | Refills: 3 | Status: SHIPPED | OUTPATIENT
Start: 2020-11-27 | End: 2021-11-08 | Stop reason: SDUPTHER

## 2020-12-09 ENCOUNTER — PATIENT OUTREACH (OUTPATIENT)
Dept: ADMINISTRATIVE | Facility: OTHER | Age: 55
End: 2020-12-09

## 2020-12-09 ENCOUNTER — OFFICE VISIT (OUTPATIENT)
Dept: OPHTHALMOLOGY | Facility: CLINIC | Age: 55
End: 2020-12-09
Payer: MEDICARE

## 2020-12-09 DIAGNOSIS — H35.52 RETINITIS PIGMENTOSA OF BOTH EYES: ICD-10-CM

## 2020-12-09 DIAGNOSIS — H40.1133 PRIMARY OPEN ANGLE GLAUCOMA OF BOTH EYES, SEVERE STAGE: Primary | ICD-10-CM

## 2020-12-09 DIAGNOSIS — H04.123 DRY EYE SYNDROME, BILATERAL: ICD-10-CM

## 2020-12-09 DIAGNOSIS — H20.022 IRITIS, RECURRENT, LEFT: ICD-10-CM

## 2020-12-09 PROCEDURE — 99999 PR PBB SHADOW E&M-EST. PATIENT-LVL III: ICD-10-PCS | Mod: PBBFAC,,, | Performed by: OPHTHALMOLOGY

## 2020-12-09 PROCEDURE — 92012 PR EYE EXAM, EST PATIENT,INTERMED: ICD-10-PCS | Mod: S$PBB,,, | Performed by: OPHTHALMOLOGY

## 2020-12-09 PROCEDURE — 92250 FUNDUS PHOTOGRAPHY W/I&R: CPT | Mod: PBBFAC | Performed by: OPHTHALMOLOGY

## 2020-12-09 PROCEDURE — 99213 OFFICE O/P EST LOW 20 MIN: CPT | Mod: PBBFAC,25 | Performed by: OPHTHALMOLOGY

## 2020-12-09 PROCEDURE — 99999 PR PBB SHADOW E&M-EST. PATIENT-LVL III: CPT | Mod: PBBFAC,,, | Performed by: OPHTHALMOLOGY

## 2020-12-09 PROCEDURE — 92250 COLOR FUNDUS PHOTOGRAPHY - OU - BOTH EYES: ICD-10-PCS | Mod: 26,S$PBB,, | Performed by: OPHTHALMOLOGY

## 2020-12-09 PROCEDURE — 92012 INTRM OPH EXAM EST PATIENT: CPT | Mod: S$PBB,,, | Performed by: OPHTHALMOLOGY

## 2020-12-09 RX ORDER — PREDNISOLONE ACETATE 10 MG/ML
1 SUSPENSION/ DROPS OPHTHALMIC 2 TIMES DAILY PRN
Qty: 5 ML | Refills: 1 | Status: SHIPPED | OUTPATIENT
Start: 2020-12-09 | End: 2021-01-04 | Stop reason: SDUPTHER

## 2020-12-09 RX ORDER — TIMOLOL MALEATE 5 MG/ML
1 SOLUTION/ DROPS OPHTHALMIC 2 TIMES DAILY
Qty: 10 ML | Refills: 6 | Status: SHIPPED | OUTPATIENT
Start: 2020-12-09 | End: 2021-04-20 | Stop reason: SDUPTHER

## 2020-12-09 NOTE — PROGRESS NOTES
Health Maintenance Due   Topic Date Due    HIV Screening  06/15/1980    Shingles Vaccine (1 of 2) 06/15/2013    Mammogram  06/27/2017    Hemoglobin A1c  08/12/2020    Lipid Panel  09/16/2020     Updates were requested from care everywhere.  Chart was reviewed for overdue Proactive [facility] Encounters ([facility]ANG) topics (CRS, Breast Cancer Screening, Eye exam)  Health Maintenance has been updated.  LINKS immunization registry triggered.  Immunizations were reconciled.

## 2020-12-09 NOTE — PROGRESS NOTES
HPI     Glaucoma     Comments: 2 MONTH   DOA/SDP              Comments     1. COAG (followed by Dr. Scott)  CP OS 8/27/15 - low to moderate flow, TDW 1100, aggressive dilation with   GV  Gonio Puncture OS 9/25/15   SLT OS 6/23/16, 6/23/15, 6-2-14  Goniotomy OS 11-17-16  2. DM  3. RP / VIT A PALMITATE 1500 (discontinued)  4. Ahmed Valve OS 8/2/18    OS: Lotemax TID-QID  OS: latanoprost QHS   OS: Brimonidine TID  OS: Systane and genteal gel QHS  OD: pred acetate prn   OD: Atropine BID          Last edited by Nadia Bravo on 12/9/2020  9:09 AM. (History)            Assessment /Plan     For exam results, see Encounter Report.      ICD-10-CM ICD-9-CM    1. Primary open angle glaucoma of both eyes, severe stage  H40.1133 365.11 timolol maleate 0.5% (TIMOPTIC) 0.5 % Drop  Would like IOP slightly lower   Ad timolol os only      365.73    2. Dry eye syndrome, bilateral  H04.123 375.15    3. Retinitis pigmentosa of both eyes  H35.52 362.74 Appears stable   4. Iritis, recurrent, left  H20.022 364.02 Doing well        OS- Timolol BID  OS: Lotemax TID-QID  OS: latanoprost QHS   OS: Brimonidine TID  OS: Systane and genteal gel QHS  OD: pred acetate prn   OD: Atropine BID      RETURN TO CLINIC 2 months

## 2020-12-10 ENCOUNTER — PATIENT OUTREACH (OUTPATIENT)
Dept: ADMINISTRATIVE | Facility: OTHER | Age: 55
End: 2020-12-10

## 2020-12-18 ENCOUNTER — PATIENT OUTREACH (OUTPATIENT)
Dept: ADMINISTRATIVE | Facility: HOSPITAL | Age: 55
End: 2020-12-18

## 2020-12-18 NOTE — PROGRESS NOTES
Working A1c Report:     Pt due for A1c. 02/2020. Spoke with pt, and scheduled fasting labs 01/06/2020 with Mammogram.

## 2020-12-23 DIAGNOSIS — N18.30 TYPE 2 DIABETES MELLITUS WITH STAGE 3 CHRONIC KIDNEY DISEASE, WITHOUT LONG-TERM CURRENT USE OF INSULIN: ICD-10-CM

## 2020-12-23 DIAGNOSIS — E11.22 TYPE 2 DIABETES MELLITUS WITH STAGE 3 CHRONIC KIDNEY DISEASE, WITHOUT LONG-TERM CURRENT USE OF INSULIN: ICD-10-CM

## 2020-12-23 RX ORDER — LOTEPREDNOL ETABONATE 5 MG/G
1 GEL OPHTHALMIC 4 TIMES DAILY
Qty: 5 G | Refills: 6 | Status: SHIPPED | OUTPATIENT
Start: 2020-12-23 | End: 2021-04-12 | Stop reason: SDUPTHER

## 2020-12-23 RX ORDER — EMPAGLIFLOZIN 25 MG/1
25 TABLET, FILM COATED ORAL DAILY
Qty: 30 TABLET | Refills: 2 | Status: SHIPPED | OUTPATIENT
Start: 2020-12-23 | End: 2021-04-22 | Stop reason: SDUPTHER

## 2020-12-24 ENCOUNTER — TELEPHONE (OUTPATIENT)
Dept: DIABETES | Facility: CLINIC | Age: 55
End: 2020-12-24

## 2021-01-04 ENCOUNTER — PATIENT MESSAGE (OUTPATIENT)
Dept: OPHTHALMOLOGY | Facility: CLINIC | Age: 56
End: 2021-01-04

## 2021-01-04 DIAGNOSIS — H40.1133 PRIMARY OPEN ANGLE GLAUCOMA (POAG) OF BOTH EYES, SEVERE STAGE: Primary | ICD-10-CM

## 2021-01-04 RX ORDER — PREDNISOLONE ACETATE 10 MG/ML
1 SUSPENSION/ DROPS OPHTHALMIC 2 TIMES DAILY PRN
Qty: 5 ML | Refills: 1 | Status: SHIPPED | OUTPATIENT
Start: 2021-01-04 | End: 2021-01-12 | Stop reason: SDUPTHER

## 2021-01-06 ENCOUNTER — HOSPITAL ENCOUNTER (OUTPATIENT)
Dept: RADIOLOGY | Facility: HOSPITAL | Age: 56
Discharge: HOME | End: 2021-01-06
Attending: FAMILY MEDICINE
Payer: MEDICARE

## 2021-01-06 VITALS — HEIGHT: 59 IN | WEIGHT: 155.44 LBS | BODY MASS INDEX: 31.34 KG/M2

## 2021-01-06 DIAGNOSIS — Z12.31 ENCOUNTER FOR SCREENING MAMMOGRAM FOR MALIGNANT NEOPLASM OF BREAST: ICD-10-CM

## 2021-01-06 PROCEDURE — 77063 MAMMO DIGITAL SCREENING BILAT WITH TOMO: ICD-10-PCS | Mod: 26,,, | Performed by: RADIOLOGY

## 2021-01-06 PROCEDURE — 77063 BREAST TOMOSYNTHESIS BI: CPT | Mod: 26,,, | Performed by: RADIOLOGY

## 2021-01-06 PROCEDURE — 77067 MAMMO DIGITAL SCREENING BILAT WITH TOMO: ICD-10-PCS | Mod: 26,,, | Performed by: RADIOLOGY

## 2021-01-06 PROCEDURE — 77067 SCR MAMMO BI INCL CAD: CPT | Mod: TC

## 2021-01-06 PROCEDURE — 77067 SCR MAMMO BI INCL CAD: CPT | Mod: 26,,, | Performed by: RADIOLOGY

## 2021-01-07 DIAGNOSIS — E78.5 HYPERLIPIDEMIA ASSOCIATED WITH TYPE 2 DIABETES MELLITUS: Primary | ICD-10-CM

## 2021-01-07 DIAGNOSIS — E55.9 VITAMIN D DEFICIENCY: ICD-10-CM

## 2021-01-07 DIAGNOSIS — E11.69 HYPERLIPIDEMIA ASSOCIATED WITH TYPE 2 DIABETES MELLITUS: Primary | ICD-10-CM

## 2021-01-07 RX ORDER — FENOFIBRATE 134 MG/1
134 CAPSULE ORAL
Qty: 90 CAPSULE | Refills: 3 | Status: SHIPPED | OUTPATIENT
Start: 2021-01-07 | End: 2021-11-08 | Stop reason: SDUPTHER

## 2021-01-07 RX ORDER — ERGOCALCIFEROL 1.25 MG/1
50000 CAPSULE ORAL
Qty: 10 CAPSULE | Refills: 0 | Status: SHIPPED | OUTPATIENT
Start: 2021-01-07 | End: 2021-05-14 | Stop reason: SDUPTHER

## 2021-01-08 ENCOUNTER — TELEPHONE (OUTPATIENT)
Dept: INTERNAL MEDICINE | Facility: CLINIC | Age: 56
End: 2021-01-08

## 2021-01-12 ENCOUNTER — OFFICE VISIT (OUTPATIENT)
Dept: OPHTHALMOLOGY | Facility: CLINIC | Age: 56
End: 2021-01-12
Payer: MEDICARE

## 2021-01-12 DIAGNOSIS — H04.123 DRY EYE SYNDROME, BILATERAL: ICD-10-CM

## 2021-01-12 DIAGNOSIS — H35.52 RETINITIS PIGMENTOSA OF BOTH EYES: ICD-10-CM

## 2021-01-12 DIAGNOSIS — H40.1133 PRIMARY OPEN ANGLE GLAUCOMA (POAG) OF BOTH EYES, SEVERE STAGE: Primary | ICD-10-CM

## 2021-01-12 PROCEDURE — 92012 INTRM OPH EXAM EST PATIENT: CPT | Mod: S$PBB,,, | Performed by: OPHTHALMOLOGY

## 2021-01-12 PROCEDURE — 99999 PR PBB SHADOW E&M-EST. PATIENT-LVL III: CPT | Mod: PBBFAC,,, | Performed by: OPHTHALMOLOGY

## 2021-01-12 PROCEDURE — 99213 OFFICE O/P EST LOW 20 MIN: CPT | Mod: PBBFAC | Performed by: OPHTHALMOLOGY

## 2021-01-12 PROCEDURE — 92012 PR EYE EXAM, EST PATIENT,INTERMED: ICD-10-PCS | Mod: S$PBB,,, | Performed by: OPHTHALMOLOGY

## 2021-01-12 PROCEDURE — 99999 PR PBB SHADOW E&M-EST. PATIENT-LVL III: ICD-10-PCS | Mod: PBBFAC,,, | Performed by: OPHTHALMOLOGY

## 2021-01-12 RX ORDER — PREDNISOLONE ACETATE 10 MG/ML
1 SUSPENSION/ DROPS OPHTHALMIC 4 TIMES DAILY
Qty: 5 ML | Refills: 1 | Status: SHIPPED | OUTPATIENT
Start: 2021-01-12 | End: 2021-02-03 | Stop reason: SDUPTHER

## 2021-01-20 ENCOUNTER — OFFICE VISIT (OUTPATIENT)
Dept: INTERNAL MEDICINE | Facility: CLINIC | Age: 56
End: 2021-01-20
Payer: MEDICARE

## 2021-01-20 VITALS
HEART RATE: 72 BPM | TEMPERATURE: 98 F | SYSTOLIC BLOOD PRESSURE: 108 MMHG | WEIGHT: 156.06 LBS | BODY MASS INDEX: 31.53 KG/M2 | OXYGEN SATURATION: 99 % | DIASTOLIC BLOOD PRESSURE: 72 MMHG

## 2021-01-20 DIAGNOSIS — E11.69 HYPERLIPIDEMIA ASSOCIATED WITH TYPE 2 DIABETES MELLITUS: ICD-10-CM

## 2021-01-20 DIAGNOSIS — E55.9 VITAMIN D INSUFFICIENCY: ICD-10-CM

## 2021-01-20 DIAGNOSIS — E03.9 HYPOTHYROIDISM, UNSPECIFIED TYPE: ICD-10-CM

## 2021-01-20 DIAGNOSIS — J06.9 URTI (ACUTE UPPER RESPIRATORY INFECTION): ICD-10-CM

## 2021-01-20 DIAGNOSIS — E66.9 OBESITY (BMI 30.0-34.9): ICD-10-CM

## 2021-01-20 DIAGNOSIS — E11.22 TYPE 2 DIABETES MELLITUS WITH STAGE 3A CHRONIC KIDNEY DISEASE, WITHOUT LONG-TERM CURRENT USE OF INSULIN: ICD-10-CM

## 2021-01-20 DIAGNOSIS — E11.59 HYPERTENSION ASSOCIATED WITH DIABETES: Primary | ICD-10-CM

## 2021-01-20 DIAGNOSIS — K21.9 GASTROESOPHAGEAL REFLUX DISEASE, UNSPECIFIED WHETHER ESOPHAGITIS PRESENT: ICD-10-CM

## 2021-01-20 DIAGNOSIS — M17.0 PRIMARY OSTEOARTHRITIS OF BOTH KNEES: ICD-10-CM

## 2021-01-20 DIAGNOSIS — N18.31 TYPE 2 DIABETES MELLITUS WITH STAGE 3A CHRONIC KIDNEY DISEASE, WITHOUT LONG-TERM CURRENT USE OF INSULIN: ICD-10-CM

## 2021-01-20 DIAGNOSIS — E78.5 HYPERLIPIDEMIA ASSOCIATED WITH TYPE 2 DIABETES MELLITUS: ICD-10-CM

## 2021-01-20 DIAGNOSIS — I15.2 HYPERTENSION ASSOCIATED WITH DIABETES: Primary | ICD-10-CM

## 2021-01-20 PROCEDURE — 99999 PR PBB SHADOW E&M-EST. PATIENT-LVL V: ICD-10-PCS | Mod: PBBFAC,,, | Performed by: FAMILY MEDICINE

## 2021-01-20 PROCEDURE — 99999 PR PBB SHADOW E&M-EST. PATIENT-LVL V: CPT | Mod: PBBFAC,,, | Performed by: FAMILY MEDICINE

## 2021-01-20 PROCEDURE — 99214 PR OFFICE/OUTPT VISIT, EST, LEVL IV, 30-39 MIN: ICD-10-PCS | Mod: S$PBB,,, | Performed by: FAMILY MEDICINE

## 2021-01-20 PROCEDURE — 99214 OFFICE O/P EST MOD 30 MIN: CPT | Mod: S$PBB,,, | Performed by: FAMILY MEDICINE

## 2021-01-20 PROCEDURE — 99215 OFFICE O/P EST HI 40 MIN: CPT | Mod: PBBFAC | Performed by: FAMILY MEDICINE

## 2021-01-20 RX ORDER — GLIMEPIRIDE 4 MG/1
4 TABLET ORAL 2 TIMES DAILY
Qty: 180 TABLET | Refills: 1 | Status: SHIPPED | OUTPATIENT
Start: 2021-01-20 | End: 2021-05-26 | Stop reason: SDUPTHER

## 2021-01-21 ENCOUNTER — OFFICE VISIT (OUTPATIENT)
Dept: DERMATOLOGY | Facility: CLINIC | Age: 56
End: 2021-01-21
Payer: MEDICARE

## 2021-01-21 DIAGNOSIS — L21.9 SEBORRHEIC DERMATITIS: Primary | ICD-10-CM

## 2021-01-21 PROCEDURE — 99213 OFFICE O/P EST LOW 20 MIN: CPT | Mod: PBBFAC | Performed by: DERMATOLOGY

## 2021-01-21 PROCEDURE — 99202 OFFICE O/P NEW SF 15 MIN: CPT | Mod: S$PBB,,, | Performed by: DERMATOLOGY

## 2021-01-21 PROCEDURE — 99999 PR PBB SHADOW E&M-EST. PATIENT-LVL III: CPT | Mod: PBBFAC,,, | Performed by: DERMATOLOGY

## 2021-01-21 PROCEDURE — 99999 PR PBB SHADOW E&M-EST. PATIENT-LVL III: ICD-10-PCS | Mod: PBBFAC,,, | Performed by: DERMATOLOGY

## 2021-01-21 PROCEDURE — 99202 PR OFFICE/OUTPT VISIT, NEW, LEVL II, 15-29 MIN: ICD-10-PCS | Mod: S$PBB,,, | Performed by: DERMATOLOGY

## 2021-01-21 RX ORDER — KETOCONAZOLE 20 MG/G
CREAM TOPICAL
Qty: 60 G | Refills: 3 | Status: SHIPPED | OUTPATIENT
Start: 2021-01-21 | End: 2023-12-06

## 2021-01-21 RX ORDER — TRIAMCINOLONE ACETONIDE 0.25 MG/G
CREAM TOPICAL 2 TIMES DAILY PRN
Qty: 30 G | Refills: 1 | Status: SHIPPED | OUTPATIENT
Start: 2021-01-21 | End: 2023-05-09

## 2021-02-03 DIAGNOSIS — H40.1133 PRIMARY OPEN ANGLE GLAUCOMA (POAG) OF BOTH EYES, SEVERE STAGE: ICD-10-CM

## 2021-02-03 RX ORDER — PREDNISOLONE ACETATE 10 MG/ML
1 SUSPENSION/ DROPS OPHTHALMIC 4 TIMES DAILY
Qty: 5 ML | Refills: 1 | Status: SHIPPED | OUTPATIENT
Start: 2021-02-03 | End: 2021-04-08 | Stop reason: SDUPTHER

## 2021-02-07 ENCOUNTER — PATIENT MESSAGE (OUTPATIENT)
Dept: OPHTHALMOLOGY | Facility: CLINIC | Age: 56
End: 2021-02-07

## 2021-02-28 ENCOUNTER — EXTERNAL CHRONIC CARE MANAGEMENT (OUTPATIENT)
Dept: PRIMARY CARE CLINIC | Facility: CLINIC | Age: 56
End: 2021-02-28
Payer: MEDICARE

## 2021-02-28 PROCEDURE — 99490 CHRNC CARE MGMT STAFF 1ST 20: CPT | Mod: S$PBB,,, | Performed by: FAMILY MEDICINE

## 2021-02-28 PROCEDURE — 99490 PR CHRONIC CARE MGMT, 1ST 20 MIN: ICD-10-PCS | Mod: S$PBB,,, | Performed by: FAMILY MEDICINE

## 2021-03-03 DIAGNOSIS — H40.1133 PRIMARY OPEN ANGLE GLAUCOMA OF BOTH EYES, SEVERE STAGE: ICD-10-CM

## 2021-03-03 RX ORDER — BRIMONIDINE TARTRATE 1 MG/ML
1 SOLUTION/ DROPS OPHTHALMIC 3 TIMES DAILY
Qty: 15 ML | Refills: 6 | Status: SHIPPED | OUTPATIENT
Start: 2021-03-03 | End: 2021-04-20 | Stop reason: SDUPTHER

## 2021-03-03 RX ORDER — ATROPINE SULFATE 10 MG/ML
1 SOLUTION/ DROPS OPHTHALMIC 4 TIMES DAILY
Qty: 5 ML | Refills: 1 | Status: SHIPPED | OUTPATIENT
Start: 2021-03-03 | End: 2021-04-20 | Stop reason: SDUPTHER

## 2021-03-19 ENCOUNTER — PATIENT MESSAGE (OUTPATIENT)
Dept: OPHTHALMOLOGY | Facility: CLINIC | Age: 56
End: 2021-03-19

## 2021-03-30 ENCOUNTER — PATIENT MESSAGE (OUTPATIENT)
Dept: OPHTHALMOLOGY | Facility: CLINIC | Age: 56
End: 2021-03-30

## 2021-03-31 ENCOUNTER — EXTERNAL CHRONIC CARE MANAGEMENT (OUTPATIENT)
Dept: PRIMARY CARE CLINIC | Facility: CLINIC | Age: 56
End: 2021-03-31
Payer: MEDICARE

## 2021-03-31 PROCEDURE — 99490 CHRNC CARE MGMT STAFF 1ST 20: CPT | Mod: S$PBB,,, | Performed by: FAMILY MEDICINE

## 2021-03-31 PROCEDURE — 99490 PR CHRONIC CARE MGMT, 1ST 20 MIN: ICD-10-PCS | Mod: S$PBB,,, | Performed by: FAMILY MEDICINE

## 2021-03-31 RX ORDER — LOTEPREDNOL ETABONATE 5 MG/G
1 GEL OPHTHALMIC 4 TIMES DAILY
Qty: 5 G | Refills: 6 | OUTPATIENT
Start: 2021-03-31

## 2021-04-01 ENCOUNTER — TELEPHONE (OUTPATIENT)
Dept: OPHTHALMOLOGY | Facility: CLINIC | Age: 56
End: 2021-04-01

## 2021-04-08 DIAGNOSIS — H40.1133 PRIMARY OPEN ANGLE GLAUCOMA (POAG) OF BOTH EYES, SEVERE STAGE: ICD-10-CM

## 2021-04-08 RX ORDER — PREDNISOLONE ACETATE 10 MG/ML
1 SUSPENSION/ DROPS OPHTHALMIC 4 TIMES DAILY
Qty: 5 ML | Refills: 1 | Status: SHIPPED | OUTPATIENT
Start: 2021-04-08 | End: 2021-04-20 | Stop reason: SDUPTHER

## 2021-04-08 RX ORDER — LOTEPREDNOL ETABONATE 5 MG/G
1 GEL OPHTHALMIC 4 TIMES DAILY
Qty: 5 G | Refills: 6 | OUTPATIENT
Start: 2021-04-08

## 2021-04-09 RX ORDER — LOTEPREDNOL ETABONATE 5 MG/G
1 GEL OPHTHALMIC 4 TIMES DAILY
Qty: 5 G | Refills: 6 | OUTPATIENT
Start: 2021-04-09

## 2021-04-12 DIAGNOSIS — H40.1133 PRIMARY OPEN ANGLE GLAUCOMA OF BOTH EYES, SEVERE STAGE: ICD-10-CM

## 2021-04-12 RX ORDER — LOTEPREDNOL ETABONATE 5 MG/G
1 GEL OPHTHALMIC 4 TIMES DAILY
Qty: 5 G | Refills: 6 | Status: SHIPPED | OUTPATIENT
Start: 2021-04-12 | End: 2021-04-20 | Stop reason: SDUPTHER

## 2021-04-12 RX ORDER — LATANOPROST 50 UG/ML
1 SOLUTION/ DROPS OPHTHALMIC NIGHTLY
Qty: 7.5 ML | Refills: 4 | Status: CANCELLED | OUTPATIENT
Start: 2021-04-12

## 2021-04-20 ENCOUNTER — OFFICE VISIT (OUTPATIENT)
Dept: OPHTHALMOLOGY | Facility: CLINIC | Age: 56
End: 2021-04-20
Payer: MEDICARE

## 2021-04-20 DIAGNOSIS — H40.1133 PRIMARY OPEN ANGLE GLAUCOMA OF BOTH EYES, SEVERE STAGE: ICD-10-CM

## 2021-04-20 DIAGNOSIS — H04.123 DRY EYE SYNDROME, BILATERAL: ICD-10-CM

## 2021-04-20 DIAGNOSIS — H20.022 IRITIS, RECURRENT, LEFT: ICD-10-CM

## 2021-04-20 DIAGNOSIS — H35.52 RETINITIS PIGMENTOSA OF BOTH EYES: ICD-10-CM

## 2021-04-20 DIAGNOSIS — H40.1133 PRIMARY OPEN ANGLE GLAUCOMA (POAG) OF BOTH EYES, SEVERE STAGE: Primary | ICD-10-CM

## 2021-04-20 PROCEDURE — 99999 PR PBB SHADOW E&M-EST. PATIENT-LVL III: ICD-10-PCS | Mod: PBBFAC,,, | Performed by: OPHTHALMOLOGY

## 2021-04-20 PROCEDURE — 99213 OFFICE O/P EST LOW 20 MIN: CPT | Mod: PBBFAC | Performed by: OPHTHALMOLOGY

## 2021-04-20 PROCEDURE — 99214 OFFICE O/P EST MOD 30 MIN: CPT | Mod: S$PBB,,, | Performed by: OPHTHALMOLOGY

## 2021-04-20 PROCEDURE — 99999 PR PBB SHADOW E&M-EST. PATIENT-LVL III: CPT | Mod: PBBFAC,,, | Performed by: OPHTHALMOLOGY

## 2021-04-20 PROCEDURE — 99214 PR OFFICE/OUTPT VISIT, EST, LEVL IV, 30-39 MIN: ICD-10-PCS | Mod: S$PBB,,, | Performed by: OPHTHALMOLOGY

## 2021-04-20 RX ORDER — LATANOPROST 50 UG/ML
1 SOLUTION/ DROPS OPHTHALMIC NIGHTLY
Qty: 7.5 ML | Refills: 4 | Status: SHIPPED | OUTPATIENT
Start: 2021-04-20 | End: 2021-09-28 | Stop reason: SDUPTHER

## 2021-04-20 RX ORDER — LOTEPREDNOL ETABONATE 5 MG/G
1 GEL OPHTHALMIC 4 TIMES DAILY
Qty: 5 G | Refills: 6 | Status: SHIPPED | OUTPATIENT
Start: 2021-04-20 | End: 2021-04-20 | Stop reason: SDUPTHER

## 2021-04-20 RX ORDER — PREDNISOLONE ACETATE 10 MG/ML
1 SUSPENSION/ DROPS OPHTHALMIC 2 TIMES DAILY
Qty: 5 ML | Refills: 1 | Status: SHIPPED | OUTPATIENT
Start: 2021-04-20 | End: 2021-09-28

## 2021-04-20 RX ORDER — BRIMONIDINE TARTRATE 1 MG/ML
1 SOLUTION/ DROPS OPHTHALMIC 3 TIMES DAILY
Qty: 15 ML | Refills: 6 | Status: SHIPPED | OUTPATIENT
Start: 2021-04-20 | End: 2021-08-24 | Stop reason: SDUPTHER

## 2021-04-20 RX ORDER — LOTEPREDNOL ETABONATE 5 MG/G
1 GEL OPHTHALMIC 4 TIMES DAILY
Qty: 5 G | Refills: 6 | Status: SHIPPED | OUTPATIENT
Start: 2021-04-20 | End: 2021-08-06 | Stop reason: SDUPTHER

## 2021-04-20 RX ORDER — ATROPINE SULFATE 10 MG/ML
1 SOLUTION/ DROPS OPHTHALMIC 4 TIMES DAILY
Qty: 5 ML | Refills: 1 | Status: SHIPPED | OUTPATIENT
Start: 2021-04-20 | End: 2021-09-28 | Stop reason: SDUPTHER

## 2021-04-20 RX ORDER — TIMOLOL MALEATE 5 MG/ML
1 SOLUTION/ DROPS OPHTHALMIC 2 TIMES DAILY
Qty: 10 ML | Refills: 6 | Status: SHIPPED | OUTPATIENT
Start: 2021-04-20 | End: 2021-06-08 | Stop reason: ALTCHOICE

## 2021-04-22 DIAGNOSIS — E11.22 TYPE 2 DIABETES MELLITUS WITH STAGE 3 CHRONIC KIDNEY DISEASE, WITHOUT LONG-TERM CURRENT USE OF INSULIN: ICD-10-CM

## 2021-04-22 DIAGNOSIS — N18.30 TYPE 2 DIABETES MELLITUS WITH STAGE 3 CHRONIC KIDNEY DISEASE, WITHOUT LONG-TERM CURRENT USE OF INSULIN: ICD-10-CM

## 2021-04-23 ENCOUNTER — TELEPHONE (OUTPATIENT)
Dept: DIABETES | Facility: CLINIC | Age: 56
End: 2021-04-23

## 2021-04-23 ENCOUNTER — PATIENT MESSAGE (OUTPATIENT)
Dept: DIABETES | Facility: CLINIC | Age: 56
End: 2021-04-23

## 2021-04-23 RX ORDER — EMPAGLIFLOZIN 25 MG/1
25 TABLET, FILM COATED ORAL DAILY
Qty: 30 TABLET | Refills: 0 | Status: SHIPPED | OUTPATIENT
Start: 2021-04-23 | End: 2021-05-21 | Stop reason: SDUPTHER

## 2021-04-30 ENCOUNTER — EXTERNAL CHRONIC CARE MANAGEMENT (OUTPATIENT)
Dept: PRIMARY CARE CLINIC | Facility: CLINIC | Age: 56
End: 2021-04-30
Payer: MEDICARE

## 2021-04-30 PROCEDURE — 99490 PR CHRONIC CARE MGMT, 1ST 20 MIN: ICD-10-PCS | Mod: S$PBB,,, | Performed by: FAMILY MEDICINE

## 2021-04-30 PROCEDURE — 99490 CHRNC CARE MGMT STAFF 1ST 20: CPT | Mod: S$PBB,,, | Performed by: FAMILY MEDICINE

## 2021-05-13 ENCOUNTER — LAB VISIT (OUTPATIENT)
Dept: LAB | Facility: HOSPITAL | Age: 56
End: 2021-05-13
Attending: FAMILY MEDICINE
Payer: MEDICARE

## 2021-05-13 ENCOUNTER — OFFICE VISIT (OUTPATIENT)
Dept: INTERNAL MEDICINE | Facility: CLINIC | Age: 56
End: 2021-05-13
Payer: MEDICARE

## 2021-05-13 VITALS
TEMPERATURE: 98 F | OXYGEN SATURATION: 98 % | SYSTOLIC BLOOD PRESSURE: 130 MMHG | HEART RATE: 77 BPM | BODY MASS INDEX: 31.26 KG/M2 | WEIGHT: 154.75 LBS | DIASTOLIC BLOOD PRESSURE: 80 MMHG

## 2021-05-13 DIAGNOSIS — E55.9 VITAMIN D INSUFFICIENCY: ICD-10-CM

## 2021-05-13 DIAGNOSIS — E03.9 HYPOTHYROIDISM, UNSPECIFIED TYPE: ICD-10-CM

## 2021-05-13 DIAGNOSIS — E11.59 HYPERTENSION ASSOCIATED WITH DIABETES: Primary | ICD-10-CM

## 2021-05-13 DIAGNOSIS — E78.5 HYPERLIPIDEMIA ASSOCIATED WITH TYPE 2 DIABETES MELLITUS: ICD-10-CM

## 2021-05-13 DIAGNOSIS — E11.22 TYPE 2 DIABETES MELLITUS WITH STAGE 3A CHRONIC KIDNEY DISEASE, WITHOUT LONG-TERM CURRENT USE OF INSULIN: ICD-10-CM

## 2021-05-13 DIAGNOSIS — E11.69 HYPERLIPIDEMIA ASSOCIATED WITH TYPE 2 DIABETES MELLITUS: ICD-10-CM

## 2021-05-13 DIAGNOSIS — I15.2 HYPERTENSION ASSOCIATED WITH DIABETES: Primary | ICD-10-CM

## 2021-05-13 DIAGNOSIS — E11.59 HYPERTENSION ASSOCIATED WITH DIABETES: ICD-10-CM

## 2021-05-13 DIAGNOSIS — K21.9 GASTROESOPHAGEAL REFLUX DISEASE, UNSPECIFIED WHETHER ESOPHAGITIS PRESENT: ICD-10-CM

## 2021-05-13 DIAGNOSIS — M79.672 LEFT FOOT PAIN: ICD-10-CM

## 2021-05-13 DIAGNOSIS — N18.31 TYPE 2 DIABETES MELLITUS WITH STAGE 3A CHRONIC KIDNEY DISEASE, WITHOUT LONG-TERM CURRENT USE OF INSULIN: ICD-10-CM

## 2021-05-13 DIAGNOSIS — E66.9 OBESITY (BMI 30.0-34.9): ICD-10-CM

## 2021-05-13 DIAGNOSIS — I15.2 HYPERTENSION ASSOCIATED WITH DIABETES: ICD-10-CM

## 2021-05-13 DIAGNOSIS — M17.0 PRIMARY OSTEOARTHRITIS OF BOTH KNEES: ICD-10-CM

## 2021-05-13 LAB
BASOPHILS # BLD AUTO: 0.06 K/UL (ref 0–0.2)
BASOPHILS NFR BLD: 0.8 % (ref 0–1.9)
DIFFERENTIAL METHOD: ABNORMAL
EOSINOPHIL # BLD AUTO: 0.5 K/UL (ref 0–0.5)
EOSINOPHIL NFR BLD: 6.4 % (ref 0–8)
ERYTHROCYTE [DISTWIDTH] IN BLOOD BY AUTOMATED COUNT: 11.9 % (ref 11.5–14.5)
HCT VFR BLD AUTO: 44.8 % (ref 37–48.5)
HGB BLD-MCNC: 14.4 G/DL (ref 12–16)
IMM GRANULOCYTES # BLD AUTO: 0.06 K/UL (ref 0–0.04)
IMM GRANULOCYTES NFR BLD AUTO: 0.8 % (ref 0–0.5)
LYMPHOCYTES # BLD AUTO: 2.1 K/UL (ref 1–4.8)
LYMPHOCYTES NFR BLD: 28.5 % (ref 18–48)
MCH RBC QN AUTO: 30.6 PG (ref 27–31)
MCHC RBC AUTO-ENTMCNC: 32.1 G/DL (ref 32–36)
MCV RBC AUTO: 95 FL (ref 82–98)
MONOCYTES # BLD AUTO: 0.4 K/UL (ref 0.3–1)
MONOCYTES NFR BLD: 5.8 % (ref 4–15)
NEUTROPHILS # BLD AUTO: 4.2 K/UL (ref 1.8–7.7)
NEUTROPHILS NFR BLD: 57.7 % (ref 38–73)
NRBC BLD-RTO: 0 /100 WBC
PLATELET # BLD AUTO: 191 K/UL (ref 150–450)
PMV BLD AUTO: 11.3 FL (ref 9.2–12.9)
RBC # BLD AUTO: 4.7 M/UL (ref 4–5.4)
WBC # BLD AUTO: 7.23 K/UL (ref 3.9–12.7)

## 2021-05-13 PROCEDURE — 82306 VITAMIN D 25 HYDROXY: CPT | Performed by: FAMILY MEDICINE

## 2021-05-13 PROCEDURE — 80053 COMPREHEN METABOLIC PANEL: CPT | Performed by: FAMILY MEDICINE

## 2021-05-13 PROCEDURE — 85025 COMPLETE CBC W/AUTO DIFF WBC: CPT | Performed by: FAMILY MEDICINE

## 2021-05-13 PROCEDURE — 99999 PR PBB SHADOW E&M-EST. PATIENT-LVL IV: CPT | Mod: PBBFAC,,, | Performed by: FAMILY MEDICINE

## 2021-05-13 PROCEDURE — 84550 ASSAY OF BLOOD/URIC ACID: CPT | Performed by: FAMILY MEDICINE

## 2021-05-13 PROCEDURE — 99214 PR OFFICE/OUTPT VISIT, EST, LEVL IV, 30-39 MIN: ICD-10-PCS | Mod: S$PBB,,, | Performed by: FAMILY MEDICINE

## 2021-05-13 PROCEDURE — 99214 OFFICE O/P EST MOD 30 MIN: CPT | Mod: S$PBB,,, | Performed by: FAMILY MEDICINE

## 2021-05-13 PROCEDURE — 99999 PR PBB SHADOW E&M-EST. PATIENT-LVL IV: ICD-10-PCS | Mod: PBBFAC,,, | Performed by: FAMILY MEDICINE

## 2021-05-13 PROCEDURE — 84443 ASSAY THYROID STIM HORMONE: CPT | Performed by: FAMILY MEDICINE

## 2021-05-13 PROCEDURE — 99214 OFFICE O/P EST MOD 30 MIN: CPT | Mod: PBBFAC | Performed by: FAMILY MEDICINE

## 2021-05-13 PROCEDURE — 36415 COLL VENOUS BLD VENIPUNCTURE: CPT | Performed by: FAMILY MEDICINE

## 2021-05-13 RX ORDER — MELOXICAM 15 MG/1
15 TABLET ORAL DAILY PRN
Qty: 30 TABLET | Refills: 0 | Status: SHIPPED | OUTPATIENT
Start: 2021-05-13

## 2021-05-14 DIAGNOSIS — E79.0 HYPERURICEMIA: Primary | ICD-10-CM

## 2021-05-14 DIAGNOSIS — E55.9 VITAMIN D DEFICIENCY: ICD-10-CM

## 2021-05-14 LAB
25(OH)D3+25(OH)D2 SERPL-MCNC: 14 NG/ML (ref 30–96)
ALBUMIN SERPL BCP-MCNC: 4 G/DL (ref 3.5–5.2)
ALP SERPL-CCNC: 62 U/L (ref 55–135)
ALT SERPL W/O P-5'-P-CCNC: 14 U/L (ref 10–44)
ANION GAP SERPL CALC-SCNC: 10 MMOL/L (ref 8–16)
AST SERPL-CCNC: 14 U/L (ref 10–40)
BILIRUB SERPL-MCNC: 0.3 MG/DL (ref 0.1–1)
BUN SERPL-MCNC: 13 MG/DL (ref 6–20)
CALCIUM SERPL-MCNC: 10.2 MG/DL (ref 8.7–10.5)
CHLORIDE SERPL-SCNC: 104 MMOL/L (ref 95–110)
CO2 SERPL-SCNC: 24 MMOL/L (ref 23–29)
CREAT SERPL-MCNC: 1.3 MG/DL (ref 0.5–1.4)
EST. GFR  (AFRICAN AMERICAN): 52.6 ML/MIN/1.73 M^2
EST. GFR  (NON AFRICAN AMERICAN): 45.6 ML/MIN/1.73 M^2
GLUCOSE SERPL-MCNC: 206 MG/DL (ref 70–110)
POTASSIUM SERPL-SCNC: 4.9 MMOL/L (ref 3.5–5.1)
PROT SERPL-MCNC: 7.4 G/DL (ref 6–8.4)
SODIUM SERPL-SCNC: 138 MMOL/L (ref 136–145)
TSH SERPL DL<=0.005 MIU/L-ACNC: 2.42 UIU/ML (ref 0.4–4)
URATE SERPL-MCNC: 8.5 MG/DL (ref 2.4–5.7)

## 2021-05-14 RX ORDER — ERGOCALCIFEROL 1.25 MG/1
50000 CAPSULE ORAL
Qty: 10 CAPSULE | Refills: 0 | Status: SHIPPED | OUTPATIENT
Start: 2021-05-14 | End: 2021-08-12

## 2021-05-14 RX ORDER — ALLOPURINOL 100 MG/1
100 TABLET ORAL DAILY
Qty: 30 TABLET | Refills: 3 | Status: SHIPPED | OUTPATIENT
Start: 2021-05-14 | End: 2021-08-12 | Stop reason: SDUPTHER

## 2021-05-18 ENCOUNTER — PATIENT MESSAGE (OUTPATIENT)
Dept: INTERNAL MEDICINE | Facility: CLINIC | Age: 56
End: 2021-05-18

## 2021-05-18 DIAGNOSIS — E11.42 TYPE 2 DIABETES MELLITUS WITH DIABETIC POLYNEUROPATHY, WITHOUT LONG-TERM CURRENT USE OF INSULIN: ICD-10-CM

## 2021-05-21 ENCOUNTER — PATIENT MESSAGE (OUTPATIENT)
Dept: DIABETES | Facility: CLINIC | Age: 56
End: 2021-05-21

## 2021-05-21 ENCOUNTER — PATIENT MESSAGE (OUTPATIENT)
Dept: INTERNAL MEDICINE | Facility: CLINIC | Age: 56
End: 2021-05-21

## 2021-05-21 DIAGNOSIS — E11.22 TYPE 2 DIABETES MELLITUS WITH STAGE 3 CHRONIC KIDNEY DISEASE, WITHOUT LONG-TERM CURRENT USE OF INSULIN: ICD-10-CM

## 2021-05-21 DIAGNOSIS — E11.42 TYPE 2 DIABETES MELLITUS WITH DIABETIC POLYNEUROPATHY, WITHOUT LONG-TERM CURRENT USE OF INSULIN: ICD-10-CM

## 2021-05-21 DIAGNOSIS — N18.30 TYPE 2 DIABETES MELLITUS WITH STAGE 3 CHRONIC KIDNEY DISEASE, WITHOUT LONG-TERM CURRENT USE OF INSULIN: ICD-10-CM

## 2021-05-21 RX ORDER — EMPAGLIFLOZIN 25 MG/1
25 TABLET, FILM COATED ORAL DAILY
Qty: 30 TABLET | Refills: 0 | Status: SHIPPED | OUTPATIENT
Start: 2021-05-21 | End: 2021-05-26 | Stop reason: SDUPTHER

## 2021-05-26 ENCOUNTER — OFFICE VISIT (OUTPATIENT)
Dept: DIABETES | Facility: CLINIC | Age: 56
End: 2021-05-26
Payer: MEDICARE

## 2021-05-26 VITALS
BODY MASS INDEX: 31.04 KG/M2 | DIASTOLIC BLOOD PRESSURE: 98 MMHG | HEART RATE: 85 BPM | WEIGHT: 153.69 LBS | RESPIRATION RATE: 18 BRPM | SYSTOLIC BLOOD PRESSURE: 137 MMHG

## 2021-05-26 DIAGNOSIS — E11.22 TYPE 2 DIABETES MELLITUS WITH STAGE 3 CHRONIC KIDNEY DISEASE, WITHOUT LONG-TERM CURRENT USE OF INSULIN, UNSPECIFIED WHETHER STAGE 3A OR 3B CKD: ICD-10-CM

## 2021-05-26 DIAGNOSIS — E78.5 HYPERLIPIDEMIA ASSOCIATED WITH TYPE 2 DIABETES MELLITUS: ICD-10-CM

## 2021-05-26 DIAGNOSIS — I15.2 HYPERTENSION ASSOCIATED WITH DIABETES: ICD-10-CM

## 2021-05-26 DIAGNOSIS — E11.22 TYPE 2 DIABETES MELLITUS WITH STAGE 3A CHRONIC KIDNEY DISEASE, WITHOUT LONG-TERM CURRENT USE OF INSULIN: ICD-10-CM

## 2021-05-26 DIAGNOSIS — E11.59 HYPERTENSION ASSOCIATED WITH DIABETES: ICD-10-CM

## 2021-05-26 DIAGNOSIS — E11.22 TYPE 2 DIABETES MELLITUS WITH STAGE 3 CHRONIC KIDNEY DISEASE, WITHOUT LONG-TERM CURRENT USE OF INSULIN, UNSPECIFIED WHETHER STAGE 3A OR 3B CKD: Primary | ICD-10-CM

## 2021-05-26 DIAGNOSIS — E03.9 HYPOTHYROIDISM, UNSPECIFIED TYPE: ICD-10-CM

## 2021-05-26 DIAGNOSIS — E11.22 TYPE 2 DIABETES MELLITUS WITH STAGE 3 CHRONIC KIDNEY DISEASE, WITHOUT LONG-TERM CURRENT USE OF INSULIN: ICD-10-CM

## 2021-05-26 DIAGNOSIS — N18.31 TYPE 2 DIABETES MELLITUS WITH STAGE 3A CHRONIC KIDNEY DISEASE, WITHOUT LONG-TERM CURRENT USE OF INSULIN: ICD-10-CM

## 2021-05-26 DIAGNOSIS — E66.01 MORBID OBESITY WITH BMI OF 45.0-49.9, ADULT: ICD-10-CM

## 2021-05-26 DIAGNOSIS — E11.69 HYPERLIPIDEMIA ASSOCIATED WITH TYPE 2 DIABETES MELLITUS: ICD-10-CM

## 2021-05-26 DIAGNOSIS — N18.30 TYPE 2 DIABETES MELLITUS WITH STAGE 3 CHRONIC KIDNEY DISEASE, WITHOUT LONG-TERM CURRENT USE OF INSULIN, UNSPECIFIED WHETHER STAGE 3A OR 3B CKD: ICD-10-CM

## 2021-05-26 DIAGNOSIS — N18.30 TYPE 2 DIABETES MELLITUS WITH STAGE 3 CHRONIC KIDNEY DISEASE, WITHOUT LONG-TERM CURRENT USE OF INSULIN, UNSPECIFIED WHETHER STAGE 3A OR 3B CKD: Primary | ICD-10-CM

## 2021-05-26 DIAGNOSIS — E11.42 TYPE 2 DIABETES MELLITUS WITH DIABETIC POLYNEUROPATHY, WITHOUT LONG-TERM CURRENT USE OF INSULIN: ICD-10-CM

## 2021-05-26 DIAGNOSIS — N18.30 TYPE 2 DIABETES MELLITUS WITH STAGE 3 CHRONIC KIDNEY DISEASE, WITHOUT LONG-TERM CURRENT USE OF INSULIN: ICD-10-CM

## 2021-05-26 LAB — GLUCOSE SERPL-MCNC: 248 MG/DL (ref 70–110)

## 2021-05-26 PROCEDURE — 99214 OFFICE O/P EST MOD 30 MIN: CPT | Mod: S$PBB,,, | Performed by: PHYSICIAN ASSISTANT

## 2021-05-26 PROCEDURE — 99214 PR OFFICE/OUTPT VISIT, EST, LEVL IV, 30-39 MIN: ICD-10-PCS | Mod: S$PBB,,, | Performed by: PHYSICIAN ASSISTANT

## 2021-05-26 PROCEDURE — 99214 OFFICE O/P EST MOD 30 MIN: CPT | Mod: PBBFAC | Performed by: PHYSICIAN ASSISTANT

## 2021-05-26 PROCEDURE — 99999 PR PBB SHADOW E&M-EST. PATIENT-LVL IV: CPT | Mod: PBBFAC,,, | Performed by: PHYSICIAN ASSISTANT

## 2021-05-26 PROCEDURE — 82962 GLUCOSE BLOOD TEST: CPT | Mod: PBBFAC | Performed by: PHYSICIAN ASSISTANT

## 2021-05-26 PROCEDURE — 99999 PR PBB SHADOW E&M-EST. PATIENT-LVL IV: ICD-10-PCS | Mod: PBBFAC,,, | Performed by: PHYSICIAN ASSISTANT

## 2021-05-26 RX ORDER — INDOMETHACIN 50 MG/1
50 CAPSULE ORAL 3 TIMES DAILY PRN
COMMUNITY
Start: 2021-05-19 | End: 2021-08-12

## 2021-05-26 RX ORDER — INSULIN PUMP SYRINGE, 3 ML
EACH MISCELLANEOUS
Qty: 1 EACH | Refills: 0 | Status: SHIPPED | OUTPATIENT
Start: 2021-05-26 | End: 2023-04-21 | Stop reason: SDUPTHER

## 2021-05-26 RX ORDER — LANCETS
1 EACH MISCELLANEOUS 4 TIMES DAILY
Qty: 300 EACH | Refills: 3 | Status: SHIPPED | OUTPATIENT
Start: 2021-05-26 | End: 2021-05-26 | Stop reason: SDUPTHER

## 2021-05-26 RX ORDER — EMPAGLIFLOZIN 25 MG/1
25 TABLET, FILM COATED ORAL DAILY
Qty: 90 TABLET | Refills: 3 | Status: SHIPPED | OUTPATIENT
Start: 2021-05-26 | End: 2021-09-09

## 2021-05-26 RX ORDER — INSULIN PUMP SYRINGE, 3 ML
EACH MISCELLANEOUS
Qty: 1 EACH | Refills: 0 | Status: SHIPPED | OUTPATIENT
Start: 2021-05-26 | End: 2021-05-26 | Stop reason: SDUPTHER

## 2021-05-26 RX ORDER — METFORMIN HYDROCHLORIDE 1000 MG/1
1000 TABLET ORAL 2 TIMES DAILY WITH MEALS
Qty: 180 TABLET | Refills: 3 | Status: SHIPPED | OUTPATIENT
Start: 2021-05-26 | End: 2021-09-09 | Stop reason: SDUPTHER

## 2021-05-26 RX ORDER — LANCETS
1 EACH MISCELLANEOUS 4 TIMES DAILY
Qty: 300 EACH | Refills: 3 | Status: SHIPPED | OUTPATIENT
Start: 2021-05-26 | End: 2022-12-30 | Stop reason: SDUPTHER

## 2021-05-26 RX ORDER — GLIMEPIRIDE 4 MG/1
4 TABLET ORAL 2 TIMES DAILY
Qty: 180 TABLET | Refills: 1 | Status: SHIPPED | OUTPATIENT
Start: 2021-05-26 | End: 2021-06-09 | Stop reason: ALTCHOICE

## 2021-05-31 ENCOUNTER — EXTERNAL CHRONIC CARE MANAGEMENT (OUTPATIENT)
Dept: PRIMARY CARE CLINIC | Facility: CLINIC | Age: 56
End: 2021-05-31
Payer: MEDICARE

## 2021-05-31 PROCEDURE — 99490 PR CHRONIC CARE MGMT, 1ST 20 MIN: ICD-10-PCS | Mod: S$PBB,,, | Performed by: FAMILY MEDICINE

## 2021-05-31 PROCEDURE — 99490 CHRNC CARE MGMT STAFF 1ST 20: CPT | Mod: S$PBB,,, | Performed by: FAMILY MEDICINE

## 2021-06-01 ENCOUNTER — TELEPHONE (OUTPATIENT)
Dept: DIABETES | Facility: CLINIC | Age: 56
End: 2021-06-01

## 2021-06-01 ENCOUNTER — PATIENT MESSAGE (OUTPATIENT)
Dept: GASTROENTEROLOGY | Facility: CLINIC | Age: 56
End: 2021-06-01

## 2021-06-01 ENCOUNTER — PATIENT MESSAGE (OUTPATIENT)
Dept: DIABETES | Facility: CLINIC | Age: 56
End: 2021-06-01

## 2021-06-04 ENCOUNTER — TELEPHONE (OUTPATIENT)
Dept: DIABETES | Facility: CLINIC | Age: 56
End: 2021-06-04

## 2021-06-05 ENCOUNTER — PATIENT MESSAGE (OUTPATIENT)
Dept: DIABETES | Facility: CLINIC | Age: 56
End: 2021-06-05

## 2021-06-08 ENCOUNTER — OFFICE VISIT (OUTPATIENT)
Dept: OPHTHALMOLOGY | Facility: CLINIC | Age: 56
End: 2021-06-08
Payer: MEDICARE

## 2021-06-08 DIAGNOSIS — H04.123 DRY EYE SYNDROME, BILATERAL: ICD-10-CM

## 2021-06-08 DIAGNOSIS — H35.52 RETINITIS PIGMENTOSA OF BOTH EYES: ICD-10-CM

## 2021-06-08 DIAGNOSIS — H40.1133 PRIMARY OPEN ANGLE GLAUCOMA (POAG) OF BOTH EYES, SEVERE STAGE: ICD-10-CM

## 2021-06-08 DIAGNOSIS — H20.022 IRITIS, RECURRENT, LEFT: Primary | ICD-10-CM

## 2021-06-08 PROCEDURE — 99999 PR PBB SHADOW E&M-EST. PATIENT-LVL III: ICD-10-PCS | Mod: PBBFAC,,, | Performed by: OPHTHALMOLOGY

## 2021-06-08 PROCEDURE — 99213 OFFICE O/P EST LOW 20 MIN: CPT | Mod: PBBFAC | Performed by: OPHTHALMOLOGY

## 2021-06-08 PROCEDURE — 99214 OFFICE O/P EST MOD 30 MIN: CPT | Mod: S$PBB,,, | Performed by: OPHTHALMOLOGY

## 2021-06-08 PROCEDURE — 99214 PR OFFICE/OUTPT VISIT, EST, LEVL IV, 30-39 MIN: ICD-10-PCS | Mod: S$PBB,,, | Performed by: OPHTHALMOLOGY

## 2021-06-08 PROCEDURE — 99999 PR PBB SHADOW E&M-EST. PATIENT-LVL III: CPT | Mod: PBBFAC,,, | Performed by: OPHTHALMOLOGY

## 2021-06-08 RX ORDER — DORZOLAMIDE HYDROCHLORIDE AND TIMOLOL MALEATE 20; 5 MG/ML; MG/ML
1 SOLUTION/ DROPS OPHTHALMIC 2 TIMES DAILY
Qty: 10 ML | Refills: 3 | Status: SHIPPED | OUTPATIENT
Start: 2021-06-08 | End: 2022-03-11 | Stop reason: SDUPTHER

## 2021-06-09 ENCOUNTER — PATIENT MESSAGE (OUTPATIENT)
Dept: DIABETES | Facility: CLINIC | Age: 56
End: 2021-06-09

## 2021-06-09 ENCOUNTER — OFFICE VISIT (OUTPATIENT)
Dept: DIABETES | Facility: CLINIC | Age: 56
End: 2021-06-09
Payer: MEDICARE

## 2021-06-09 DIAGNOSIS — E11.22 TYPE 2 DIABETES MELLITUS WITH STAGE 3 CHRONIC KIDNEY DISEASE, WITHOUT LONG-TERM CURRENT USE OF INSULIN, UNSPECIFIED WHETHER STAGE 3A OR 3B CKD: Primary | ICD-10-CM

## 2021-06-09 DIAGNOSIS — E66.01 MORBID OBESITY WITH BMI OF 45.0-49.9, ADULT: ICD-10-CM

## 2021-06-09 DIAGNOSIS — N18.30 TYPE 2 DIABETES MELLITUS WITH STAGE 3 CHRONIC KIDNEY DISEASE, WITHOUT LONG-TERM CURRENT USE OF INSULIN, UNSPECIFIED WHETHER STAGE 3A OR 3B CKD: Primary | ICD-10-CM

## 2021-06-09 DIAGNOSIS — E03.9 HYPOTHYROIDISM, UNSPECIFIED TYPE: ICD-10-CM

## 2021-06-09 DIAGNOSIS — I15.2 HYPERTENSION ASSOCIATED WITH DIABETES: ICD-10-CM

## 2021-06-09 DIAGNOSIS — E11.69 HYPERLIPIDEMIA ASSOCIATED WITH TYPE 2 DIABETES MELLITUS: ICD-10-CM

## 2021-06-09 DIAGNOSIS — E78.5 HYPERLIPIDEMIA ASSOCIATED WITH TYPE 2 DIABETES MELLITUS: ICD-10-CM

## 2021-06-09 DIAGNOSIS — E11.59 HYPERTENSION ASSOCIATED WITH DIABETES: ICD-10-CM

## 2021-06-09 PROCEDURE — 99441 PR PHYSICIAN TELEPHONE EVALUATION 5-10 MIN: CPT | Mod: 95,,, | Performed by: PHYSICIAN ASSISTANT

## 2021-06-09 PROCEDURE — 99441 PR PHYSICIAN TELEPHONE EVALUATION 5-10 MIN: ICD-10-PCS | Mod: 95,,, | Performed by: PHYSICIAN ASSISTANT

## 2021-06-09 RX ORDER — GLIPIZIDE 10 MG/1
10 TABLET ORAL
Qty: 270 TABLET | Refills: 3 | Status: SHIPPED | OUTPATIENT
Start: 2021-06-09 | End: 2021-09-09 | Stop reason: SDUPTHER

## 2021-06-10 ENCOUNTER — PATIENT MESSAGE (OUTPATIENT)
Dept: DIABETES | Facility: CLINIC | Age: 56
End: 2021-06-10

## 2021-06-10 ENCOUNTER — TELEPHONE (OUTPATIENT)
Dept: DIABETES | Facility: CLINIC | Age: 56
End: 2021-06-10

## 2021-06-10 DIAGNOSIS — N18.30 TYPE 2 DIABETES MELLITUS WITH STAGE 3 CHRONIC KIDNEY DISEASE, WITHOUT LONG-TERM CURRENT USE OF INSULIN, UNSPECIFIED WHETHER STAGE 3A OR 3B CKD: ICD-10-CM

## 2021-06-10 DIAGNOSIS — E11.22 TYPE 2 DIABETES MELLITUS WITH STAGE 3 CHRONIC KIDNEY DISEASE, WITHOUT LONG-TERM CURRENT USE OF INSULIN, UNSPECIFIED WHETHER STAGE 3A OR 3B CKD: ICD-10-CM

## 2021-06-11 ENCOUNTER — PES CALL (OUTPATIENT)
Dept: ADMINISTRATIVE | Facility: CLINIC | Age: 56
End: 2021-06-11

## 2021-06-28 ENCOUNTER — PATIENT OUTREACH (OUTPATIENT)
Dept: ADMINISTRATIVE | Facility: OTHER | Age: 56
End: 2021-06-28

## 2021-06-30 ENCOUNTER — EXTERNAL CHRONIC CARE MANAGEMENT (OUTPATIENT)
Dept: PRIMARY CARE CLINIC | Facility: CLINIC | Age: 56
End: 2021-06-30
Payer: MEDICARE

## 2021-06-30 ENCOUNTER — TELEPHONE (OUTPATIENT)
Dept: DIABETES | Facility: CLINIC | Age: 56
End: 2021-06-30

## 2021-06-30 PROCEDURE — 99490 CHRNC CARE MGMT STAFF 1ST 20: CPT | Mod: S$PBB,,, | Performed by: FAMILY MEDICINE

## 2021-06-30 PROCEDURE — 99490 PR CHRONIC CARE MGMT, 1ST 20 MIN: ICD-10-PCS | Mod: S$PBB,,, | Performed by: FAMILY MEDICINE

## 2021-07-31 ENCOUNTER — EXTERNAL CHRONIC CARE MANAGEMENT (OUTPATIENT)
Dept: PRIMARY CARE CLINIC | Facility: CLINIC | Age: 56
End: 2021-07-31
Payer: MEDICARE

## 2021-07-31 PROCEDURE — 99490 PR CHRONIC CARE MGMT, 1ST 20 MIN: ICD-10-PCS | Mod: S$PBB,,, | Performed by: FAMILY MEDICINE

## 2021-07-31 PROCEDURE — 99490 CHRNC CARE MGMT STAFF 1ST 20: CPT | Mod: S$PBB,,, | Performed by: FAMILY MEDICINE

## 2021-08-04 ENCOUNTER — PATIENT MESSAGE (OUTPATIENT)
Dept: ADMINISTRATIVE | Facility: HOSPITAL | Age: 56
End: 2021-08-04

## 2021-08-05 ENCOUNTER — PATIENT MESSAGE (OUTPATIENT)
Dept: OPHTHALMOLOGY | Facility: CLINIC | Age: 56
End: 2021-08-05

## 2021-08-06 ENCOUNTER — OFFICE VISIT (OUTPATIENT)
Dept: OPHTHALMOLOGY | Facility: CLINIC | Age: 56
End: 2021-08-06
Payer: MEDICARE

## 2021-08-06 DIAGNOSIS — H35.52 RETINITIS PIGMENTOSA OF BOTH EYES: ICD-10-CM

## 2021-08-06 DIAGNOSIS — H04.123 DRY EYE SYNDROME, BILATERAL: ICD-10-CM

## 2021-08-06 DIAGNOSIS — H40.1133 PRIMARY OPEN ANGLE GLAUCOMA OF BOTH EYES, SEVERE STAGE: ICD-10-CM

## 2021-08-06 DIAGNOSIS — H40.1133 PRIMARY OPEN ANGLE GLAUCOMA (POAG) OF BOTH EYES, SEVERE STAGE: ICD-10-CM

## 2021-08-06 DIAGNOSIS — H20.022 IRITIS, RECURRENT, LEFT: Primary | ICD-10-CM

## 2021-08-06 PROCEDURE — 99999 PR PBB SHADOW E&M-EST. PATIENT-LVL III: ICD-10-PCS | Mod: PBBFAC,,, | Performed by: OPHTHALMOLOGY

## 2021-08-06 PROCEDURE — 99999 PR PBB SHADOW E&M-EST. PATIENT-LVL III: CPT | Mod: PBBFAC,,, | Performed by: OPHTHALMOLOGY

## 2021-08-06 PROCEDURE — 99214 OFFICE O/P EST MOD 30 MIN: CPT | Mod: S$PBB,,, | Performed by: OPHTHALMOLOGY

## 2021-08-06 PROCEDURE — 99214 PR OFFICE/OUTPT VISIT, EST, LEVL IV, 30-39 MIN: ICD-10-PCS | Mod: S$PBB,,, | Performed by: OPHTHALMOLOGY

## 2021-08-06 PROCEDURE — 99213 OFFICE O/P EST LOW 20 MIN: CPT | Mod: PBBFAC | Performed by: OPHTHALMOLOGY

## 2021-08-06 RX ORDER — LOTEPREDNOL ETABONATE 5 MG/G
1 GEL OPHTHALMIC
Qty: 5 G | Refills: 6 | Status: SHIPPED | OUTPATIENT
Start: 2021-08-06 | End: 2021-09-28

## 2021-08-11 ENCOUNTER — TELEPHONE (OUTPATIENT)
Dept: INTERNAL MEDICINE | Facility: CLINIC | Age: 56
End: 2021-08-11

## 2021-08-12 ENCOUNTER — OFFICE VISIT (OUTPATIENT)
Dept: INTERNAL MEDICINE | Facility: CLINIC | Age: 56
End: 2021-08-12
Payer: MEDICARE

## 2021-08-12 VITALS
BODY MASS INDEX: 31.04 KG/M2 | WEIGHT: 153.69 LBS | SYSTOLIC BLOOD PRESSURE: 104 MMHG | OXYGEN SATURATION: 97 % | DIASTOLIC BLOOD PRESSURE: 70 MMHG | HEART RATE: 98 BPM | TEMPERATURE: 98 F

## 2021-08-12 DIAGNOSIS — E55.9 VITAMIN D DEFICIENCY: ICD-10-CM

## 2021-08-12 DIAGNOSIS — E11.69 HYPERLIPIDEMIA ASSOCIATED WITH TYPE 2 DIABETES MELLITUS: ICD-10-CM

## 2021-08-12 DIAGNOSIS — E78.5 HYPERLIPIDEMIA ASSOCIATED WITH TYPE 2 DIABETES MELLITUS: ICD-10-CM

## 2021-08-12 DIAGNOSIS — N18.31 TYPE 2 DIABETES MELLITUS WITH STAGE 3A CHRONIC KIDNEY DISEASE, WITHOUT LONG-TERM CURRENT USE OF INSULIN: ICD-10-CM

## 2021-08-12 DIAGNOSIS — M17.0 PRIMARY OSTEOARTHRITIS OF BOTH KNEES: ICD-10-CM

## 2021-08-12 DIAGNOSIS — I15.2 HYPERTENSION ASSOCIATED WITH DIABETES: Primary | ICD-10-CM

## 2021-08-12 DIAGNOSIS — E66.9 OBESITY (BMI 30.0-34.9): ICD-10-CM

## 2021-08-12 DIAGNOSIS — E11.22 TYPE 2 DIABETES MELLITUS WITH STAGE 3A CHRONIC KIDNEY DISEASE, WITHOUT LONG-TERM CURRENT USE OF INSULIN: ICD-10-CM

## 2021-08-12 DIAGNOSIS — E11.42 TYPE 2 DIABETES MELLITUS WITH DIABETIC POLYNEUROPATHY, WITHOUT LONG-TERM CURRENT USE OF INSULIN: ICD-10-CM

## 2021-08-12 DIAGNOSIS — E11.59 HYPERTENSION ASSOCIATED WITH DIABETES: Primary | ICD-10-CM

## 2021-08-12 DIAGNOSIS — E03.9 HYPOTHYROIDISM, UNSPECIFIED TYPE: ICD-10-CM

## 2021-08-12 DIAGNOSIS — E79.0 HYPERURICEMIA: ICD-10-CM

## 2021-08-12 PROCEDURE — 99215 OFFICE O/P EST HI 40 MIN: CPT | Mod: PBBFAC | Performed by: FAMILY MEDICINE

## 2021-08-12 PROCEDURE — 99999 PR PBB SHADOW E&M-EST. PATIENT-LVL V: CPT | Mod: PBBFAC,,, | Performed by: FAMILY MEDICINE

## 2021-08-12 PROCEDURE — 99214 OFFICE O/P EST MOD 30 MIN: CPT | Mod: S$PBB,,, | Performed by: FAMILY MEDICINE

## 2021-08-12 PROCEDURE — 99999 PR PBB SHADOW E&M-EST. PATIENT-LVL V: ICD-10-PCS | Mod: PBBFAC,,, | Performed by: FAMILY MEDICINE

## 2021-08-12 PROCEDURE — 99214 PR OFFICE/OUTPT VISIT, EST, LEVL IV, 30-39 MIN: ICD-10-PCS | Mod: S$PBB,,, | Performed by: FAMILY MEDICINE

## 2021-08-12 RX ORDER — ALLOPURINOL 100 MG/1
100 TABLET ORAL DAILY
Qty: 90 TABLET | Refills: 1 | Status: SHIPPED | OUTPATIENT
Start: 2021-08-12 | End: 2021-09-01 | Stop reason: DRUGHIGH

## 2021-08-19 ENCOUNTER — TELEPHONE (OUTPATIENT)
Dept: OBSTETRICS AND GYNECOLOGY | Facility: CLINIC | Age: 56
End: 2021-08-19

## 2021-08-19 ENCOUNTER — OFFICE VISIT (OUTPATIENT)
Dept: OBSTETRICS AND GYNECOLOGY | Facility: CLINIC | Age: 56
End: 2021-08-19
Payer: MEDICARE

## 2021-08-19 VITALS — BODY MASS INDEX: 31.02 KG/M2 | HEIGHT: 59 IN | WEIGHT: 153.88 LBS

## 2021-08-19 DIAGNOSIS — R39.15 URINARY URGENCY: ICD-10-CM

## 2021-08-19 DIAGNOSIS — Z12.31 ENCOUNTER FOR SCREENING MAMMOGRAM FOR BREAST CANCER: ICD-10-CM

## 2021-08-19 DIAGNOSIS — R30.0 DYSURIA: Primary | ICD-10-CM

## 2021-08-19 LAB
BACTERIA #/AREA URNS AUTO: ABNORMAL /HPF
BILIRUB SERPL-MCNC: NEGATIVE MG/DL
BILIRUB UR QL STRIP: NEGATIVE
BLOOD URINE, POC: ABNORMAL
CLARITY UR REFRACT.AUTO: ABNORMAL
CLARITY, POC UA: ABNORMAL
COLOR UR AUTO: ABNORMAL
COLOR, POC UA: ABNORMAL
GLUCOSE UR QL STRIP: 1000
GLUCOSE UR QL STRIP: ABNORMAL
HGB UR QL STRIP: ABNORMAL
KETONES UR QL STRIP: NEGATIVE
KETONES UR QL STRIP: NEGATIVE
LEUKOCYTE ESTERASE UR QL STRIP: ABNORMAL
LEUKOCYTE ESTERASE URINE, POC: ABNORMAL
MICROSCOPIC COMMENT: ABNORMAL
NITRITE UR QL STRIP: POSITIVE
NITRITE, POC UA: POSITIVE
PH UR STRIP: >8 [PH] (ref 5–8)
PH, POC UA: 9
PROT UR QL STRIP: NEGATIVE
PROTEIN, POC: ABNORMAL
RBC #/AREA URNS AUTO: 1 /HPF (ref 0–4)
SP GR UR STRIP: 1.01 (ref 1–1.03)
SPECIFIC GRAVITY, POC UA: 1
SQUAMOUS #/AREA URNS AUTO: 1 /HPF
URN SPEC COLLECT METH UR: ABNORMAL
UROBILINOGEN, POC UA: NORMAL
WBC #/AREA URNS AUTO: 11 /HPF (ref 0–5)
YEAST UR QL AUTO: ABNORMAL

## 2021-08-19 PROCEDURE — 87186 SC STD MICRODIL/AGAR DIL: CPT | Performed by: OBSTETRICS & GYNECOLOGY

## 2021-08-19 PROCEDURE — 99213 OFFICE O/P EST LOW 20 MIN: CPT | Mod: S$PBB,,, | Performed by: OBSTETRICS & GYNECOLOGY

## 2021-08-19 PROCEDURE — 87088 URINE BACTERIA CULTURE: CPT | Performed by: OBSTETRICS & GYNECOLOGY

## 2021-08-19 PROCEDURE — 87086 URINE CULTURE/COLONY COUNT: CPT | Performed by: OBSTETRICS & GYNECOLOGY

## 2021-08-19 PROCEDURE — 81001 URINALYSIS AUTO W/SCOPE: CPT | Performed by: OBSTETRICS & GYNECOLOGY

## 2021-08-19 PROCEDURE — 99213 OFFICE O/P EST LOW 20 MIN: CPT | Mod: PBBFAC,PN | Performed by: OBSTETRICS & GYNECOLOGY

## 2021-08-19 PROCEDURE — 99999 PR PBB SHADOW E&M-EST. PATIENT-LVL III: ICD-10-PCS | Mod: PBBFAC,,, | Performed by: OBSTETRICS & GYNECOLOGY

## 2021-08-19 PROCEDURE — 81002 URINALYSIS NONAUTO W/O SCOPE: CPT | Mod: 59,PBBFAC,PN | Performed by: OBSTETRICS & GYNECOLOGY

## 2021-08-19 PROCEDURE — 99213 PR OFFICE/OUTPT VISIT, EST, LEVL III, 20-29 MIN: ICD-10-PCS | Mod: S$PBB,,, | Performed by: OBSTETRICS & GYNECOLOGY

## 2021-08-19 PROCEDURE — 99999 PR PBB SHADOW E&M-EST. PATIENT-LVL III: CPT | Mod: PBBFAC,,, | Performed by: OBSTETRICS & GYNECOLOGY

## 2021-08-19 PROCEDURE — 87077 CULTURE AEROBIC IDENTIFY: CPT | Performed by: OBSTETRICS & GYNECOLOGY

## 2021-08-19 RX ORDER — ESTRADIOL 0.1 MG/G
1 CREAM VAGINAL
Qty: 42.5 G | Refills: 2 | Status: SHIPPED | OUTPATIENT
Start: 2021-08-19 | End: 2022-07-06 | Stop reason: SDUPTHER

## 2021-08-19 RX ORDER — PHENAZOPYRIDINE HYDROCHLORIDE 200 MG/1
200 TABLET, FILM COATED ORAL 3 TIMES DAILY PRN
Qty: 20 TABLET | Refills: 0 | Status: SHIPPED | OUTPATIENT
Start: 2021-08-19 | End: 2021-11-08

## 2021-08-19 RX ORDER — LEVOFLOXACIN 250 MG/1
250 TABLET ORAL DAILY
Qty: 3 TABLET | Refills: 0 | Status: SHIPPED | OUTPATIENT
Start: 2021-08-19 | End: 2021-08-22

## 2021-08-22 LAB — BACTERIA UR CULT: ABNORMAL

## 2021-08-24 DIAGNOSIS — H40.1133 PRIMARY OPEN ANGLE GLAUCOMA OF BOTH EYES, SEVERE STAGE: ICD-10-CM

## 2021-08-24 RX ORDER — BRIMONIDINE TARTRATE 1 MG/ML
1 SOLUTION/ DROPS OPHTHALMIC 3 TIMES DAILY
Qty: 15 ML | Refills: 6 | Status: SHIPPED | OUTPATIENT
Start: 2021-08-24 | End: 2021-09-28 | Stop reason: SDUPTHER

## 2021-08-31 ENCOUNTER — EXTERNAL CHRONIC CARE MANAGEMENT (OUTPATIENT)
Dept: PRIMARY CARE CLINIC | Facility: CLINIC | Age: 56
End: 2021-08-31
Payer: MEDICARE

## 2021-08-31 PROCEDURE — 99490 PR CHRONIC CARE MGMT, 1ST 20 MIN: ICD-10-PCS | Mod: S$PBB,,, | Performed by: FAMILY MEDICINE

## 2021-08-31 PROCEDURE — 99490 CHRNC CARE MGMT STAFF 1ST 20: CPT | Mod: PBBFAC | Performed by: FAMILY MEDICINE

## 2021-08-31 PROCEDURE — 99490 CHRNC CARE MGMT STAFF 1ST 20: CPT | Mod: S$PBB,,, | Performed by: FAMILY MEDICINE

## 2021-09-01 ENCOUNTER — HOSPITAL ENCOUNTER (OUTPATIENT)
Dept: RADIOLOGY | Facility: HOSPITAL | Age: 56
Discharge: HOME | End: 2021-09-01
Attending: OBSTETRICS & GYNECOLOGY
Payer: MEDICARE

## 2021-09-01 DIAGNOSIS — Z12.31 ENCOUNTER FOR SCREENING MAMMOGRAM FOR BREAST CANCER: ICD-10-CM

## 2021-09-09 ENCOUNTER — OFFICE VISIT (OUTPATIENT)
Dept: DIABETES | Facility: CLINIC | Age: 56
End: 2021-09-09
Payer: MEDICARE

## 2021-09-09 VITALS
HEART RATE: 76 BPM | BODY MASS INDEX: 31.3 KG/M2 | DIASTOLIC BLOOD PRESSURE: 85 MMHG | WEIGHT: 155 LBS | SYSTOLIC BLOOD PRESSURE: 124 MMHG

## 2021-09-09 DIAGNOSIS — E66.9 OBESITY (BMI 30-39.9): ICD-10-CM

## 2021-09-09 DIAGNOSIS — E11.59 HYPERTENSION ASSOCIATED WITH DIABETES: ICD-10-CM

## 2021-09-09 DIAGNOSIS — I15.2 HYPERTENSION ASSOCIATED WITH DIABETES: ICD-10-CM

## 2021-09-09 DIAGNOSIS — N18.30 TYPE 2 DIABETES MELLITUS WITH STAGE 3 CHRONIC KIDNEY DISEASE, WITHOUT LONG-TERM CURRENT USE OF INSULIN, UNSPECIFIED WHETHER STAGE 3A OR 3B CKD: Primary | ICD-10-CM

## 2021-09-09 DIAGNOSIS — E11.69 HYPERLIPIDEMIA ASSOCIATED WITH TYPE 2 DIABETES MELLITUS: ICD-10-CM

## 2021-09-09 DIAGNOSIS — E11.22 TYPE 2 DIABETES MELLITUS WITH STAGE 3 CHRONIC KIDNEY DISEASE, WITHOUT LONG-TERM CURRENT USE OF INSULIN, UNSPECIFIED WHETHER STAGE 3A OR 3B CKD: Primary | ICD-10-CM

## 2021-09-09 DIAGNOSIS — E78.5 HYPERLIPIDEMIA ASSOCIATED WITH TYPE 2 DIABETES MELLITUS: ICD-10-CM

## 2021-09-09 DIAGNOSIS — E03.9 HYPOTHYROIDISM, UNSPECIFIED TYPE: ICD-10-CM

## 2021-09-09 LAB — GLUCOSE SERPL-MCNC: 186 MG/DL (ref 70–110)

## 2021-09-09 PROCEDURE — 99214 PR OFFICE/OUTPT VISIT, EST, LEVL IV, 30-39 MIN: ICD-10-PCS | Mod: S$PBB,,, | Performed by: PHYSICIAN ASSISTANT

## 2021-09-09 PROCEDURE — 99999 PR PBB SHADOW E&M-EST. PATIENT-LVL IV: ICD-10-PCS | Mod: PBBFAC,,, | Performed by: PHYSICIAN ASSISTANT

## 2021-09-09 PROCEDURE — 99999 PR PBB SHADOW E&M-EST. PATIENT-LVL IV: CPT | Mod: PBBFAC,,, | Performed by: PHYSICIAN ASSISTANT

## 2021-09-09 PROCEDURE — 99214 OFFICE O/P EST MOD 30 MIN: CPT | Mod: PBBFAC | Performed by: PHYSICIAN ASSISTANT

## 2021-09-09 PROCEDURE — 82962 GLUCOSE BLOOD TEST: CPT | Mod: PBBFAC | Performed by: PHYSICIAN ASSISTANT

## 2021-09-09 PROCEDURE — 99214 OFFICE O/P EST MOD 30 MIN: CPT | Mod: S$PBB,,, | Performed by: PHYSICIAN ASSISTANT

## 2021-09-09 RX ORDER — LISINOPRIL AND HYDROCHLOROTHIAZIDE 20; 25 MG/1; MG/1
1 TABLET ORAL DAILY
Qty: 90 TABLET | Refills: 3 | Status: SHIPPED | OUTPATIENT
Start: 2021-09-09 | End: 2022-05-17

## 2021-09-09 RX ORDER — GLIPIZIDE 10 MG/1
10 TABLET ORAL
Qty: 270 TABLET | Refills: 3 | Status: SHIPPED | OUTPATIENT
Start: 2021-09-09 | End: 2022-08-17 | Stop reason: SDUPTHER

## 2021-09-09 RX ORDER — METFORMIN HYDROCHLORIDE 1000 MG/1
1000 TABLET ORAL 2 TIMES DAILY WITH MEALS
Qty: 180 TABLET | Refills: 3 | Status: SHIPPED | OUTPATIENT
Start: 2021-09-09 | End: 2022-08-17 | Stop reason: SDUPTHER

## 2021-09-09 RX ORDER — EMPAGLIFLOZIN 25 MG/1
25 TABLET, FILM COATED ORAL DAILY
Qty: 90 TABLET | Refills: 3 | Status: SHIPPED | OUTPATIENT
Start: 2021-09-09 | End: 2022-05-17 | Stop reason: ALTCHOICE

## 2021-09-28 ENCOUNTER — OFFICE VISIT (OUTPATIENT)
Dept: OPHTHALMOLOGY | Facility: CLINIC | Age: 56
End: 2021-09-28
Payer: MEDICARE

## 2021-09-28 DIAGNOSIS — Z91.199 NON COMPLIANCE WITH MEDICAL TREATMENT: ICD-10-CM

## 2021-09-28 DIAGNOSIS — H04.123 DRY EYE SYNDROME, BILATERAL: ICD-10-CM

## 2021-09-28 DIAGNOSIS — H35.52 RETINITIS PIGMENTOSA OF BOTH EYES: ICD-10-CM

## 2021-09-28 DIAGNOSIS — H20.022 IRITIS, RECURRENT, LEFT: ICD-10-CM

## 2021-09-28 DIAGNOSIS — H40.1133 PRIMARY OPEN ANGLE GLAUCOMA (POAG) OF BOTH EYES, SEVERE STAGE: ICD-10-CM

## 2021-09-28 DIAGNOSIS — H40.1133 PRIMARY OPEN ANGLE GLAUCOMA OF BOTH EYES, SEVERE STAGE: Primary | ICD-10-CM

## 2021-09-28 PROCEDURE — 99999 PR PBB SHADOW E&M-EST. PATIENT-LVL III: ICD-10-PCS | Mod: PBBFAC,,, | Performed by: OPHTHALMOLOGY

## 2021-09-28 PROCEDURE — 99999 PR PBB SHADOW E&M-EST. PATIENT-LVL III: CPT | Mod: PBBFAC,,, | Performed by: OPHTHALMOLOGY

## 2021-09-28 PROCEDURE — 99213 OFFICE O/P EST LOW 20 MIN: CPT | Mod: PBBFAC | Performed by: OPHTHALMOLOGY

## 2021-09-28 PROCEDURE — 99213 PR OFFICE/OUTPT VISIT, EST, LEVL III, 20-29 MIN: ICD-10-PCS | Mod: S$PBB,,, | Performed by: OPHTHALMOLOGY

## 2021-09-28 PROCEDURE — 99213 OFFICE O/P EST LOW 20 MIN: CPT | Mod: S$PBB,,, | Performed by: OPHTHALMOLOGY

## 2021-09-28 RX ORDER — LATANOPROST 50 UG/ML
1 SOLUTION/ DROPS OPHTHALMIC NIGHTLY
Qty: 7.5 ML | Refills: 4 | Status: SHIPPED | OUTPATIENT
Start: 2021-09-28 | End: 2022-03-11 | Stop reason: SDUPTHER

## 2021-09-28 RX ORDER — LOTEPREDNOL ETABONATE 5 MG/G
1 GEL OPHTHALMIC 4 TIMES DAILY
Qty: 10 G | Refills: 3 | Status: SHIPPED | OUTPATIENT
Start: 2021-09-28 | End: 2021-11-10

## 2021-09-28 RX ORDER — BRIMONIDINE TARTRATE 1 MG/ML
1 SOLUTION/ DROPS OPHTHALMIC 3 TIMES DAILY
Qty: 15 ML | Refills: 6 | Status: SHIPPED | OUTPATIENT
Start: 2021-09-28 | End: 2022-03-11 | Stop reason: SDUPTHER

## 2021-09-28 RX ORDER — PREDNISOLONE ACETATE 10 MG/ML
1 SUSPENSION/ DROPS OPHTHALMIC 4 TIMES DAILY
Qty: 15 ML | Refills: 3 | Status: SHIPPED | OUTPATIENT
Start: 2021-09-28 | End: 2021-11-10 | Stop reason: SDUPTHER

## 2021-09-28 RX ORDER — ATROPINE SULFATE 10 MG/ML
1 SOLUTION/ DROPS OPHTHALMIC 4 TIMES DAILY
Qty: 10 ML | Refills: 1 | Status: SHIPPED | OUTPATIENT
Start: 2021-09-28 | End: 2021-11-10 | Stop reason: SDUPTHER

## 2021-09-30 ENCOUNTER — EXTERNAL CHRONIC CARE MANAGEMENT (OUTPATIENT)
Dept: PRIMARY CARE CLINIC | Facility: CLINIC | Age: 56
End: 2021-09-30
Payer: MEDICARE

## 2021-09-30 PROCEDURE — 99490 CHRNC CARE MGMT STAFF 1ST 20: CPT | Mod: PBBFAC | Performed by: FAMILY MEDICINE

## 2021-09-30 PROCEDURE — 99490 PR CHRONIC CARE MGMT, 1ST 20 MIN: ICD-10-PCS | Mod: S$PBB,,, | Performed by: FAMILY MEDICINE

## 2021-09-30 PROCEDURE — 99490 CHRNC CARE MGMT STAFF 1ST 20: CPT | Mod: S$PBB,,, | Performed by: FAMILY MEDICINE

## 2021-10-08 ENCOUNTER — IMMUNIZATION (OUTPATIENT)
Dept: INTERNAL MEDICINE | Facility: CLINIC | Age: 56
End: 2021-10-08
Payer: MEDICARE

## 2021-10-08 PROCEDURE — 90686 IIV4 VACC NO PRSV 0.5 ML IM: CPT | Mod: PBBFAC

## 2021-10-18 ENCOUNTER — PATIENT MESSAGE (OUTPATIENT)
Dept: ADMINISTRATIVE | Facility: HOSPITAL | Age: 56
End: 2021-10-18
Payer: MEDICARE

## 2021-10-31 ENCOUNTER — EXTERNAL CHRONIC CARE MANAGEMENT (OUTPATIENT)
Dept: PRIMARY CARE CLINIC | Facility: CLINIC | Age: 56
End: 2021-10-31
Payer: MEDICARE

## 2021-10-31 PROCEDURE — 99490 PR CHRONIC CARE MGMT, 1ST 20 MIN: ICD-10-PCS | Mod: S$PBB,,, | Performed by: FAMILY MEDICINE

## 2021-10-31 PROCEDURE — 99490 CHRNC CARE MGMT STAFF 1ST 20: CPT | Mod: S$PBB,,, | Performed by: FAMILY MEDICINE

## 2021-10-31 PROCEDURE — 99490 CHRNC CARE MGMT STAFF 1ST 20: CPT | Mod: PBBFAC | Performed by: FAMILY MEDICINE

## 2021-11-08 ENCOUNTER — OFFICE VISIT (OUTPATIENT)
Dept: INTERNAL MEDICINE | Facility: CLINIC | Age: 56
End: 2021-11-08
Payer: MEDICARE

## 2021-11-08 VITALS
SYSTOLIC BLOOD PRESSURE: 128 MMHG | BODY MASS INDEX: 31.12 KG/M2 | OXYGEN SATURATION: 99 % | DIASTOLIC BLOOD PRESSURE: 76 MMHG | HEART RATE: 72 BPM | WEIGHT: 154.13 LBS

## 2021-11-08 DIAGNOSIS — M17.0 PRIMARY OSTEOARTHRITIS OF BOTH KNEES: ICD-10-CM

## 2021-11-08 DIAGNOSIS — E11.22 TYPE 2 DIABETES MELLITUS WITH STAGE 3B CHRONIC KIDNEY DISEASE, WITHOUT LONG-TERM CURRENT USE OF INSULIN: ICD-10-CM

## 2021-11-08 DIAGNOSIS — E11.69 HYPERLIPIDEMIA ASSOCIATED WITH TYPE 2 DIABETES MELLITUS: ICD-10-CM

## 2021-11-08 DIAGNOSIS — E11.59 HYPERTENSION ASSOCIATED WITH DIABETES: Primary | ICD-10-CM

## 2021-11-08 DIAGNOSIS — E78.5 HYPERLIPIDEMIA ASSOCIATED WITH TYPE 2 DIABETES MELLITUS: ICD-10-CM

## 2021-11-08 DIAGNOSIS — E03.9 HYPOTHYROIDISM, UNSPECIFIED TYPE: ICD-10-CM

## 2021-11-08 DIAGNOSIS — I15.2 HYPERTENSION ASSOCIATED WITH DIABETES: Primary | ICD-10-CM

## 2021-11-08 DIAGNOSIS — K21.9 GASTROESOPHAGEAL REFLUX DISEASE, UNSPECIFIED WHETHER ESOPHAGITIS PRESENT: ICD-10-CM

## 2021-11-08 DIAGNOSIS — E55.9 VITAMIN D DEFICIENCY: ICD-10-CM

## 2021-11-08 DIAGNOSIS — E66.9 OBESITY (BMI 30.0-34.9): ICD-10-CM

## 2021-11-08 DIAGNOSIS — E79.0 HYPERURICEMIA: ICD-10-CM

## 2021-11-08 DIAGNOSIS — D51.9 ANEMIA DUE TO VITAMIN B12 DEFICIENCY, UNSPECIFIED B12 DEFICIENCY TYPE: ICD-10-CM

## 2021-11-08 DIAGNOSIS — N18.32 TYPE 2 DIABETES MELLITUS WITH STAGE 3B CHRONIC KIDNEY DISEASE, WITHOUT LONG-TERM CURRENT USE OF INSULIN: ICD-10-CM

## 2021-11-08 DIAGNOSIS — E11.42 TYPE 2 DIABETES MELLITUS WITH DIABETIC POLYNEUROPATHY, WITHOUT LONG-TERM CURRENT USE OF INSULIN: ICD-10-CM

## 2021-11-08 PROCEDURE — 99999 PR PBB SHADOW E&M-EST. PATIENT-LVL IV: ICD-10-PCS | Mod: PBBFAC,,, | Performed by: FAMILY MEDICINE

## 2021-11-08 PROCEDURE — 99999 PR PBB SHADOW E&M-EST. PATIENT-LVL IV: CPT | Mod: PBBFAC,,, | Performed by: FAMILY MEDICINE

## 2021-11-08 PROCEDURE — 99214 PR OFFICE/OUTPT VISIT, EST, LEVL IV, 30-39 MIN: ICD-10-PCS | Mod: S$PBB,,, | Performed by: FAMILY MEDICINE

## 2021-11-08 PROCEDURE — 99214 OFFICE O/P EST MOD 30 MIN: CPT | Mod: S$PBB,,, | Performed by: FAMILY MEDICINE

## 2021-11-08 PROCEDURE — 99214 OFFICE O/P EST MOD 30 MIN: CPT | Mod: PBBFAC | Performed by: FAMILY MEDICINE

## 2021-11-08 RX ORDER — ALLOPURINOL 100 MG/1
200 TABLET ORAL DAILY
Qty: 180 TABLET | Refills: 3 | Status: SHIPPED | OUTPATIENT
Start: 2021-11-08 | End: 2022-08-17 | Stop reason: SDUPTHER

## 2021-11-08 RX ORDER — LEVOTHYROXINE SODIUM 25 UG/1
25 TABLET ORAL DAILY
Qty: 90 TABLET | Refills: 3 | Status: SHIPPED | OUTPATIENT
Start: 2021-11-08 | End: 2022-08-17 | Stop reason: SDUPTHER

## 2021-11-08 RX ORDER — ATORVASTATIN CALCIUM 40 MG/1
40 TABLET, FILM COATED ORAL DAILY
Qty: 90 TABLET | Refills: 3 | Status: SHIPPED | OUTPATIENT
Start: 2021-11-08 | End: 2022-08-17 | Stop reason: SDUPTHER

## 2021-11-08 RX ORDER — FENOFIBRATE 134 MG/1
134 CAPSULE ORAL
Qty: 90 CAPSULE | Refills: 3 | Status: SHIPPED | OUTPATIENT
Start: 2021-11-08 | End: 2022-08-17 | Stop reason: SDUPTHER

## 2021-11-08 RX ORDER — AMLODIPINE BESYLATE 5 MG/1
5 TABLET ORAL DAILY
Qty: 90 TABLET | Refills: 3 | Status: SHIPPED | OUTPATIENT
Start: 2021-11-08 | End: 2022-05-17

## 2021-11-08 RX ORDER — GABAPENTIN 300 MG/1
300 CAPSULE ORAL 2 TIMES DAILY
Qty: 180 CAPSULE | Refills: 3 | Status: SHIPPED | OUTPATIENT
Start: 2021-11-08 | End: 2022-08-17 | Stop reason: SDUPTHER

## 2021-11-08 RX ORDER — LANSOPRAZOLE 30 MG/1
30 CAPSULE, DELAYED RELEASE ORAL DAILY
Qty: 90 CAPSULE | Refills: 3 | Status: SHIPPED | OUTPATIENT
Start: 2021-11-08 | End: 2023-06-30

## 2021-11-09 ENCOUNTER — PATIENT OUTREACH (OUTPATIENT)
Dept: ADMINISTRATIVE | Facility: OTHER | Age: 56
End: 2021-11-09
Payer: MEDICARE

## 2021-11-09 DIAGNOSIS — Z12.31 ENCOUNTER FOR SCREENING MAMMOGRAM FOR BREAST CANCER: Primary | ICD-10-CM

## 2021-11-10 ENCOUNTER — OFFICE VISIT (OUTPATIENT)
Dept: OPHTHALMOLOGY | Facility: CLINIC | Age: 56
End: 2021-11-10
Payer: MEDICARE

## 2021-11-10 DIAGNOSIS — H04.123 DRY EYE SYNDROME, BILATERAL: ICD-10-CM

## 2021-11-10 DIAGNOSIS — H40.1133 PRIMARY OPEN ANGLE GLAUCOMA OF BOTH EYES, SEVERE STAGE: Primary | ICD-10-CM

## 2021-11-10 DIAGNOSIS — H35.52 RETINITIS PIGMENTOSA OF BOTH EYES: ICD-10-CM

## 2021-11-10 DIAGNOSIS — H40.1133 PRIMARY OPEN ANGLE GLAUCOMA (POAG) OF BOTH EYES, SEVERE STAGE: ICD-10-CM

## 2021-11-10 DIAGNOSIS — H20.022 IRITIS, RECURRENT, LEFT: ICD-10-CM

## 2021-11-10 PROCEDURE — 99214 PR OFFICE/OUTPT VISIT, EST, LEVL IV, 30-39 MIN: ICD-10-PCS | Mod: S$PBB,,, | Performed by: OPHTHALMOLOGY

## 2021-11-10 PROCEDURE — 99213 OFFICE O/P EST LOW 20 MIN: CPT | Mod: PBBFAC | Performed by: OPHTHALMOLOGY

## 2021-11-10 PROCEDURE — 99214 OFFICE O/P EST MOD 30 MIN: CPT | Mod: S$PBB,,, | Performed by: OPHTHALMOLOGY

## 2021-11-10 PROCEDURE — 99999 PR PBB SHADOW E&M-EST. PATIENT-LVL III: CPT | Mod: PBBFAC,,, | Performed by: OPHTHALMOLOGY

## 2021-11-10 PROCEDURE — 99999 PR PBB SHADOW E&M-EST. PATIENT-LVL III: ICD-10-PCS | Mod: PBBFAC,,, | Performed by: OPHTHALMOLOGY

## 2021-11-10 RX ORDER — ATROPINE SULFATE 10 MG/ML
1 SOLUTION/ DROPS OPHTHALMIC 2 TIMES DAILY
Qty: 10 ML | Refills: 6 | Status: SHIPPED | OUTPATIENT
Start: 2021-11-10 | End: 2022-03-11 | Stop reason: SDUPTHER

## 2021-11-10 RX ORDER — PREDNISOLONE ACETATE 10 MG/ML
1 SUSPENSION/ DROPS OPHTHALMIC 3 TIMES DAILY
Qty: 15 ML | Refills: 6 | Status: SHIPPED | OUTPATIENT
Start: 2021-11-10 | End: 2022-03-11 | Stop reason: SDUPTHER

## 2021-11-10 RX ORDER — LOTEPREDNOL ETABONATE 3.8 MG/G
1 GEL OPHTHALMIC 4 TIMES DAILY
Qty: 15 G | Refills: 3 | Status: SHIPPED | OUTPATIENT
Start: 2021-11-10 | End: 2022-03-11 | Stop reason: SDUPTHER

## 2021-11-30 ENCOUNTER — EXTERNAL CHRONIC CARE MANAGEMENT (OUTPATIENT)
Dept: PRIMARY CARE CLINIC | Facility: CLINIC | Age: 56
End: 2021-11-30
Payer: MEDICARE

## 2021-11-30 PROCEDURE — 99490 PR CHRONIC CARE MGMT, 1ST 20 MIN: ICD-10-PCS | Mod: S$PBB,,, | Performed by: FAMILY MEDICINE

## 2021-11-30 PROCEDURE — 99490 CHRNC CARE MGMT STAFF 1ST 20: CPT | Mod: S$PBB,,, | Performed by: FAMILY MEDICINE

## 2021-11-30 PROCEDURE — 99490 CHRNC CARE MGMT STAFF 1ST 20: CPT | Mod: PBBFAC | Performed by: FAMILY MEDICINE

## 2022-03-07 ENCOUNTER — NURSE TRIAGE (OUTPATIENT)
Dept: ADMINISTRATIVE | Facility: CLINIC | Age: 57
End: 2022-03-07
Payer: MEDICARE

## 2022-03-07 NOTE — TELEPHONE ENCOUNTER
163/126 and reporting chest pain that is present now and has been present since Friday. She was seen in urgent care and told to f/u with pcp. She states she did tell the urgent care she had chest pain. They did not do EKG. Triage nurse recommended ED now but she declines. She wants to be seen by pcp and this message to be sent to PCP office. Please contact caller with any further care advice.   Reason for Disposition   BP > 160 / 100 and any cardiac or neurologic symptoms (e.g., chest pain, difficulty breathing, unsteady gait, blurred vision)    Additional Information   Negative: Sounds like a life-threatening emergency to the triager   Negative: Pregnant and new hand or face swelling   Negative: Pregnant > 20 weeks and BP > 140/90    Protocols used: ST HIGH BLOOD PRESSURE-A-OH

## 2022-03-08 ENCOUNTER — PATIENT OUTREACH (OUTPATIENT)
Dept: ADMINISTRATIVE | Facility: OTHER | Age: 57
End: 2022-03-08
Payer: MEDICARE

## 2022-03-11 ENCOUNTER — OFFICE VISIT (OUTPATIENT)
Dept: OPHTHALMOLOGY | Facility: CLINIC | Age: 57
End: 2022-03-11
Payer: MEDICARE

## 2022-03-11 DIAGNOSIS — H40.1133 PRIMARY OPEN ANGLE GLAUCOMA OF BOTH EYES, SEVERE STAGE: Primary | ICD-10-CM

## 2022-03-11 DIAGNOSIS — H35.52 RETINITIS PIGMENTOSA OF BOTH EYES: ICD-10-CM

## 2022-03-11 DIAGNOSIS — H20.022 IRITIS, RECURRENT, LEFT: ICD-10-CM

## 2022-03-11 DIAGNOSIS — H40.1133 PRIMARY OPEN ANGLE GLAUCOMA (POAG) OF BOTH EYES, SEVERE STAGE: ICD-10-CM

## 2022-03-11 DIAGNOSIS — H04.123 DRY EYE SYNDROME, BILATERAL: ICD-10-CM

## 2022-03-11 DIAGNOSIS — I10 HYPERTENSION, UNSPECIFIED TYPE: ICD-10-CM

## 2022-03-11 PROCEDURE — 99213 OFFICE O/P EST LOW 20 MIN: CPT | Mod: PBBFAC | Performed by: OPHTHALMOLOGY

## 2022-03-11 PROCEDURE — 99214 OFFICE O/P EST MOD 30 MIN: CPT | Mod: S$PBB,,, | Performed by: OPHTHALMOLOGY

## 2022-03-11 PROCEDURE — 92133 CPTRZD OPH DX IMG PST SGM ON: CPT | Mod: PBBFAC | Performed by: OPHTHALMOLOGY

## 2022-03-11 PROCEDURE — 99999 PR PBB SHADOW E&M-EST. PATIENT-LVL III: CPT | Mod: PBBFAC,,, | Performed by: OPHTHALMOLOGY

## 2022-03-11 PROCEDURE — 92133 POSTERIOR SEGMENT OCT OPTIC NERVE(OCULAR COHERENCE TOMOGRAPHY) - OU - BOTH EYES: ICD-10-PCS | Mod: 26,S$PBB,, | Performed by: OPHTHALMOLOGY

## 2022-03-11 PROCEDURE — 99214 PR OFFICE/OUTPT VISIT, EST, LEVL IV, 30-39 MIN: ICD-10-PCS | Mod: S$PBB,,, | Performed by: OPHTHALMOLOGY

## 2022-03-11 PROCEDURE — 99999 PR PBB SHADOW E&M-EST. PATIENT-LVL III: ICD-10-PCS | Mod: PBBFAC,,, | Performed by: OPHTHALMOLOGY

## 2022-03-11 RX ORDER — PREDNISOLONE ACETATE 10 MG/ML
1 SUSPENSION/ DROPS OPHTHALMIC 3 TIMES DAILY
Qty: 15 ML | Refills: 6 | Status: SHIPPED | OUTPATIENT
Start: 2022-03-11 | End: 2022-07-26 | Stop reason: ALTCHOICE

## 2022-03-11 RX ORDER — LOTEPREDNOL ETABONATE 3.8 MG/G
1 GEL OPHTHALMIC 4 TIMES DAILY
Qty: 15 G | Refills: 3 | Status: SHIPPED | OUTPATIENT
Start: 2022-03-11 | End: 2022-07-26 | Stop reason: SDUPTHER

## 2022-03-11 RX ORDER — LATANOPROST 50 UG/ML
1 SOLUTION/ DROPS OPHTHALMIC NIGHTLY
Qty: 7.5 ML | Refills: 4 | Status: SHIPPED | OUTPATIENT
Start: 2022-03-11 | End: 2023-06-30

## 2022-03-11 RX ORDER — BRIMONIDINE TARTRATE 1 MG/ML
1 SOLUTION/ DROPS OPHTHALMIC 3 TIMES DAILY
Qty: 15 ML | Refills: 6 | Status: SHIPPED | OUTPATIENT
Start: 2022-03-11 | End: 2022-11-11 | Stop reason: SDUPTHER

## 2022-03-11 RX ORDER — TRAMADOL HYDROCHLORIDE 50 MG/1
50 TABLET ORAL EVERY 6 HOURS PRN
COMMUNITY
Start: 2022-03-06 | End: 2022-05-17 | Stop reason: SDUPTHER

## 2022-03-11 RX ORDER — DORZOLAMIDE HYDROCHLORIDE AND TIMOLOL MALEATE 20; 5 MG/ML; MG/ML
1 SOLUTION/ DROPS OPHTHALMIC 2 TIMES DAILY
Qty: 10 ML | Refills: 3 | Status: SHIPPED | OUTPATIENT
Start: 2022-03-11 | End: 2023-03-15

## 2022-03-11 RX ORDER — ATROPINE SULFATE 10 MG/ML
1 SOLUTION/ DROPS OPHTHALMIC 2 TIMES DAILY
Qty: 10 ML | Refills: 6 | Status: SHIPPED | OUTPATIENT
Start: 2022-03-11 | End: 2023-06-30 | Stop reason: SDUPTHER

## 2022-03-11 RX ORDER — INDOMETHACIN 75 MG/1
75 CAPSULE, EXTENDED RELEASE ORAL 2 TIMES DAILY PRN
COMMUNITY
Start: 2021-11-19 | End: 2022-08-17

## 2022-03-11 NOTE — PROGRESS NOTES
HPI     Glaucoma     Comments: Pt reports for 3m COAG with GOCT and dil exam. Noted some pain   in OS. 100% compliant with gtts. Defered Mrx except for dr. Scott.              Comments       1. COAG (followed by Dr. Scott)   Ahmed Valve OS 8/2/18   CP OS 8/27/15 - low to moderate flow, TDW 1100, aggressive dilation with   GV   Gonio Puncture OS 9/25/15   SLT OS 6/23/16, 6/23/15, 6-2-14   Goniotomy OS 11-17-16   2. DM   3. RP / VIT A PALMITATE 1500 (discontinued)   4. Retinitis Pigmentosa of both eyes   5. Dry Eye Syndrome bilateral   6. Iritis recurrent OS       OS: Lotemax TID (using QID)  OS: Dorz BID  OS: Timolol BID   OS: Latanoprost QHS   OS: Brimonidine TID   OS: Systane and genteal gel QHS   OD: Pred PRN  OD: Atropine BID            Last edited by Robert Craig on 3/11/2022 10:11 AM. (History)            Assessment /Plan     For exam results, see Encounter Report.      ICD-10-CM ICD-9-CM    1. Primary open angle glaucoma of both eyes, severe stage  H40.1133 365.11 Posterior Segment OCT Optic Nerve- Both eyes- performed today, OS: low findings. GCL: 18    Doing well - intraocular pressure is within acceptable range relative to target pressure with no evidence of progression.   Continue current treatment.  Reviewed importance of continued compliance with treatment and follow up.      365.73    2. Iritis, recurrent, left  H20.022 364.02 Increased iritis today, possibly due to HBP and dilation.    3. Retinitis pigmentosa of both eyes  H35.52 362.74 Stable to previous, monitor    4. Dry eye syndrome, bilateral  H04.123 375.15 Findings and symptoms consistent with mild dry eyes.   Recommend regular use of Artificial Tears. These should be thought of as Ocular Surface Moisturizers similar to skin moisturizers in that regular use is required to achieve maximum benefit.  Specifically, I recommend the following:    Systane Ultra or Refresh Optive Frandy 3 : 3-4 times a day.   The first dose upon awakening is most  important because we do not make tears at night.  Avoid generic products as they contain Benzalkonium Chloride as a preservative. This is a very irritating chemical and can make your eyes worse.   5. Hypertension, unspecified type  I10 401.9 C/o elevated blood pressure/chest pain over the last few weeks. Recommend she reach out to Dr. Bergman for consultation. States she was told by Dr Bergman's staff to proceed to ED due to chest pain, she has adamently declined and wishes to see Dr Bergman.     Dr. Bergman's office was contacted today to request urgent appointment, and was told again that she must proceed to ED for chest pain. Request to schedule appointment was denied. Again informed patient/patients daughter to proceed to ED for chest pain/high blood pressure, recommendation was again refused by patient/patients daughter. Advised if they choose not to proceed, to attempt again to make f/u visit with Dr Bergman via My Chart. They understand risks of not following up with ED.          Return to clinic 2 months for IOP check         OS: Lotemax SM QID   OS: Dorz BID  OS: Timolol BID   OS: Latanoprost QHS   OS: Brimonidine TID   OD: Pred PRN  OD: Atropine BID    OS: Systane and genteal gel QHS

## 2022-03-14 DIAGNOSIS — E11.22 TYPE 2 DIABETES MELLITUS WITH STAGE 3 CHRONIC KIDNEY DISEASE, WITHOUT LONG-TERM CURRENT USE OF INSULIN, UNSPECIFIED WHETHER STAGE 3A OR 3B CKD: ICD-10-CM

## 2022-03-14 DIAGNOSIS — N18.30 TYPE 2 DIABETES MELLITUS WITH STAGE 3 CHRONIC KIDNEY DISEASE, WITHOUT LONG-TERM CURRENT USE OF INSULIN, UNSPECIFIED WHETHER STAGE 3A OR 3B CKD: ICD-10-CM

## 2022-03-14 RX ORDER — GLIMEPIRIDE 4 MG/1
4 TABLET ORAL 2 TIMES DAILY
Qty: 180 TABLET | Refills: 1 | OUTPATIENT
Start: 2022-03-14 | End: 2023-03-14

## 2022-04-26 ENCOUNTER — PATIENT MESSAGE (OUTPATIENT)
Dept: ADMINISTRATIVE | Facility: HOSPITAL | Age: 57
End: 2022-04-26
Payer: MEDICARE

## 2022-05-12 ENCOUNTER — PATIENT OUTREACH (OUTPATIENT)
Dept: ADMINISTRATIVE | Facility: OTHER | Age: 57
End: 2022-05-12
Payer: MEDICARE

## 2022-05-12 NOTE — PROGRESS NOTES
Health Maintenance Due   Topic Date Due    Shingles Vaccine (1 of 2) Never done    Diabetes Urine Screening  01/06/2022    Mammogram  01/06/2022    Eye Exam  01/12/2022    COVID-19 Vaccine (4 - Booster for Pfizer series) 02/27/2022    Hemoglobin A1c  03/01/2022    Foot Exam  05/26/2022     Updates were requested from care everywhere.  Chart was reviewed for overdue Proactive [facility] Encounters ([facility]) topics (CRS, Breast Cancer Screening, Eye exam)  Health Maintenance has been updated.  LINKS immunization registry triggered.  Immunizations were reconciled.

## 2022-05-16 ENCOUNTER — TELEPHONE (OUTPATIENT)
Dept: DIABETES | Facility: CLINIC | Age: 57
End: 2022-05-16
Payer: MEDICARE

## 2022-05-16 NOTE — TELEPHONE ENCOUNTER
----- Message from Richi Brown PA-C sent at 5/16/2022 11:04 AM CDT -----  Contact: Sasha  Nothing sooner in person now - may be placed on waitlist. May schedule as virtual if she wants     ----- Message -----  From: Tawnay Crawford MA  Sent: 5/16/2022  10:34 AM CDT  To: Richi Brown PA-C    When is the first available appointment?6/15/2022      ----- Message -----  From: Santana Ray  Sent: 5/16/2022  10:07 AM CDT  To: [facility] Staff    Type:  Sooner Apoointment Request    Caller is requesting a sooner appointment.  Caller declined first available appointment listed below.  Caller will not accept being placed on the waitlist and is requesting a message be sent to doctor.  Name of Caller:Kristopher (daughter)  When is the first available appointment?6/15/2022  Symptoms: reschedule  Would the patient rather a call back or a response via [facility]? Call back  Best Call Back Number:[phone]  Additional Information:

## 2022-05-17 ENCOUNTER — TELEPHONE (OUTPATIENT)
Dept: INTERNAL MEDICINE | Facility: CLINIC | Age: 57
End: 2022-05-17

## 2022-05-17 ENCOUNTER — OFFICE VISIT (OUTPATIENT)
Dept: INTERNAL MEDICINE | Facility: CLINIC | Age: 57
End: 2022-05-17
Payer: MEDICARE

## 2022-05-17 VITALS
SYSTOLIC BLOOD PRESSURE: 112 MMHG | BODY MASS INDEX: 31.88 KG/M2 | WEIGHT: 157.88 LBS | OXYGEN SATURATION: 99 % | HEART RATE: 98 BPM | DIASTOLIC BLOOD PRESSURE: 78 MMHG

## 2022-05-17 DIAGNOSIS — I15.2 HYPERTENSION ASSOCIATED WITH DIABETES: Primary | ICD-10-CM

## 2022-05-17 DIAGNOSIS — E11.59 HYPERTENSION ASSOCIATED WITH DIABETES: Primary | ICD-10-CM

## 2022-05-17 DIAGNOSIS — N18.32 TYPE 2 DIABETES MELLITUS WITH STAGE 3B CHRONIC KIDNEY DISEASE, WITHOUT LONG-TERM CURRENT USE OF INSULIN: ICD-10-CM

## 2022-05-17 DIAGNOSIS — E66.9 OBESITY (BMI 30.0-34.9): ICD-10-CM

## 2022-05-17 DIAGNOSIS — E03.9 HYPOTHYROIDISM, UNSPECIFIED TYPE: ICD-10-CM

## 2022-05-17 DIAGNOSIS — E11.22 TYPE 2 DIABETES MELLITUS WITH STAGE 3B CHRONIC KIDNEY DISEASE, WITHOUT LONG-TERM CURRENT USE OF INSULIN: ICD-10-CM

## 2022-05-17 PROCEDURE — 99215 OFFICE O/P EST HI 40 MIN: CPT | Mod: PBBFAC | Performed by: FAMILY MEDICINE

## 2022-05-17 PROCEDURE — 99999 PR PBB SHADOW E&M-EST. PATIENT-LVL V: ICD-10-PCS | Mod: PBBFAC,,, | Performed by: FAMILY MEDICINE

## 2022-05-17 PROCEDURE — 99214 PR OFFICE/OUTPT VISIT, EST, LEVL IV, 30-39 MIN: ICD-10-PCS | Mod: S$PBB,,, | Performed by: FAMILY MEDICINE

## 2022-05-17 PROCEDURE — 99999 PR PBB SHADOW E&M-EST. PATIENT-LVL V: CPT | Mod: PBBFAC,,, | Performed by: FAMILY MEDICINE

## 2022-05-17 PROCEDURE — 99214 OFFICE O/P EST MOD 30 MIN: CPT | Mod: S$PBB,,, | Performed by: FAMILY MEDICINE

## 2022-05-17 RX ORDER — DAPAGLIFLOZIN 10 MG/1
TABLET, FILM COATED ORAL
Qty: 30 TABLET | Refills: 5 | Status: SHIPPED | OUTPATIENT
Start: 2022-05-17 | End: 2023-06-30

## 2022-05-17 RX ORDER — TRAMADOL HYDROCHLORIDE 50 MG/1
TABLET ORAL
COMMUNITY
Start: 2022-03-05 | End: 2022-10-20

## 2022-05-17 NOTE — PROGRESS NOTES
Subjective:       Patient ID: Richelle Zaldivar is a 56 y.o. female.    Chief Complaint: Follow-up    56-year-old  female patient accompanied by her daughter with Patient Active Problem List:     Hypertension associated with diabetes     Type 2 diabetes mellitus with stage 3 chronic kidney disease, without long-term current use of insulin     Hyperlipidemia associated with type 2 diabetes mellitus     Iron deficiency anemia     Vitamin D deficiency     GERD (gastroesophageal reflux disease)     Retinitis pigmentosa     Recurrent iritis of left eye     Open-angle glaucoma, severe stage     Osteoarthritis of knee     Hypothyroidism     Obesity (BMI 30.0-34.9)  Here for follow-up on blood pressure and blood glucose levels/reports that her blood pressures have been running fluctuant for which patient had seen another provider  including cardiologist Dr.Amit Zaldivar who has placed her on Avalide in place of lisinopril, and was advised to consider starting on Bystolic if blood pressure still remains and controlled but patient not start Bystolic at this time as a blood pressure has been stable.   Reports that her blood pressure has been erratic after coming back from [place]  Secondary to chronic kidney disease stage 3, patient was advised to consider changing Januvia to Farxiga but was unable to be covered by insurance and would like to try it here with [facility]  Blood glucose levels has been fluctuant lately but reports has been in a good range  Denies any chest pain or difficulty breathing with palpitations and denies any extreme fatigue     Review of Systems   Constitutional: Negative for fatigue.   Eyes: Negative for visual disturbance.   Respiratory: Negative for shortness of breath.    Cardiovascular: Negative for chest pain and leg swelling.   Gastrointestinal: Negative for abdominal pain, nausea and vomiting.   Musculoskeletal: Negative for myalgias.   Skin: Negative for rash.   Neurological: Negative for  light-headedness and headaches.   Psychiatric/Behavioral: Negative for sleep disturbance.         /78 (BP Location: Right arm, Patient Position: Sitting, BP Method: Large (Manual))   Pulse 98   Wt 71.6 kg (157 lb 13.6 oz)   SpO2 99%   BMI 31.88 kg/m²   Objective:      Physical Exam  Constitutional:       Appearance: She is well-developed.   HENT:      Head: Normocephalic and atraumatic.   Cardiovascular:      Rate and Rhythm: Normal rate and regular rhythm.      Heart sounds: Normal heart sounds. No murmur heard.  Pulmonary:      Effort: Pulmonary effort is normal.      Breath sounds: Normal breath sounds. No wheezing.   Abdominal:      General: Bowel sounds are normal.      Palpations: Abdomen is soft.      Tenderness: There is no abdominal tenderness.   Skin:     General: Skin is warm and dry.      Findings: No rash.   Neurological:      Mental Status: She is alert and oriented to person, place, and time.   Psychiatric:         Mood and Affect: Mood normal.           Assessment/Plan:   1. Hypertension associated with diabetes  Blood pressure is stable today currently on amlodipine 10 mg, Avalide 300/12.5 mg  Continue monitoring blood blood pressure and restrict salt intake  Patient has not started taking Bystolic as prescribed    Reviewed echocardiogram which has been done by [facility] cardiologist Dr. Zaldivar which will be scanned into our records    2. Type 2 diabetes mellitus with stage 3b chronic kidney disease, without long-term current use of insulin  - dapagliflozin (FARXIGA) 10 mg tablet; Take 1 tablet by mouth once daily  Dispense: 30 tablet; Refill: 5  Noted stable A1c  Secondary to stage 3 kidney disease, patient was advised to try taking Farxiga in place of Januvia and continue metformin 1000 mg twice a day and glipizide 10 mg 3 times daily  Will send the prescription to our [facility]  If not covered by insurance patient to continue Januvia  Strict lifestyle changes recommended with 1800 ADA  low-fat and low-cholesterol diet and exercise 30 minutes daily      3. Obesity (BMI 30.0-34.9)  Lifestyle modifications discussed to lose weight with BMI 31    4. Hypothyroidism, unspecified type   Stable on levothyroxine 25 mcg p.o. daily

## 2022-05-20 ENCOUNTER — TELEPHONE (OUTPATIENT)
Dept: PHARMACY | Facility: CLINIC | Age: 57
End: 2022-05-20
Payer: MEDICARE

## 2022-06-14 ENCOUNTER — OFFICE VISIT (OUTPATIENT)
Dept: OPHTHALMOLOGY | Facility: CLINIC | Age: 57
End: 2022-06-14
Payer: MEDICARE

## 2022-06-14 DIAGNOSIS — H20.022 IRITIS, RECURRENT, LEFT: ICD-10-CM

## 2022-06-14 DIAGNOSIS — H40.1133 PRIMARY OPEN ANGLE GLAUCOMA OF BOTH EYES, SEVERE STAGE: Primary | ICD-10-CM

## 2022-06-14 DIAGNOSIS — H04.123 DRY EYE SYNDROME, BILATERAL: ICD-10-CM

## 2022-06-14 DIAGNOSIS — H35.52 RETINITIS PIGMENTOSA OF BOTH EYES: ICD-10-CM

## 2022-06-14 DIAGNOSIS — Z91.199 NON COMPLIANCE WITH MEDICAL TREATMENT: ICD-10-CM

## 2022-06-14 PROCEDURE — 99999 PR PBB SHADOW E&M-EST. PATIENT-LVL II: ICD-10-PCS | Mod: PBBFAC,,, | Performed by: OPHTHALMOLOGY

## 2022-06-14 PROCEDURE — 99212 OFFICE O/P EST SF 10 MIN: CPT | Mod: PBBFAC | Performed by: OPHTHALMOLOGY

## 2022-06-14 PROCEDURE — 99214 OFFICE O/P EST MOD 30 MIN: CPT | Mod: S$PBB,,, | Performed by: OPHTHALMOLOGY

## 2022-06-14 PROCEDURE — 99999 PR PBB SHADOW E&M-EST. PATIENT-LVL II: CPT | Mod: PBBFAC,,, | Performed by: OPHTHALMOLOGY

## 2022-06-14 PROCEDURE — 99214 PR OFFICE/OUTPT VISIT, EST, LEVL IV, 30-39 MIN: ICD-10-PCS | Mod: S$PBB,,, | Performed by: OPHTHALMOLOGY

## 2022-06-14 NOTE — PROGRESS NOTES
HPI     Glaucoma     Comments: 2M IOP              Comments     Patient using 5 glaucoma drops OS and Pred/ Atropine OD - occas pains OU   - uses Systane / Genteal Gel prn - no va changes OU       PCP: Dr. Miranda Ackerman       1. COAG (followed by Dr. Scott)   Ahmed Valve OS 8/2/18   CP OS 8/27/15 - low to moderate flow, TDW 1100, aggressive dilation with   GV   Gonio Puncture OS 9/25/15   SLT OS 6/23/16, 6/23/15, 6-2-14   Goniotomy OS 11-17-16   2. DM   3. RP / VIT A PALMITATE 1500 (discontinued)   4. Retinitis Pigmentosa of both eyes   5. Dry Eye Syndrome bilateral   6. Iritis recurrent OS       OS: Lotemax SM QID   OS: Dorz BID  OS: Timolol BID   OS: Latanoprost QHS   OS: Brimonidine TID   OD: Pred PRN  OD: Atropine BID     OS: Systane and genteal gel QHS            Last edited by Adrienne Maxwell MA on 6/14/2022 10:09 AM. (History)            Assessment /Plan     For exam results, see Encounter Report.      ICD-10-CM ICD-9-CM    1. Primary open angle glaucoma of both eyes, severe stage  H40.1133 365.11 IOP OS improved today. Thin tissue around tube, discussed possibility of needing reinforcement in the future. Discussed heavily to not rub the area.    Will schedule wound revision with patch graft at next visit.      365.73    2. Iritis, recurrent, left  H20.022 364.02 Continue Lotemax QID    3. Retinitis pigmentosa of both eyes  H35.52 362.74 Monitor    4. Dry eye syndrome, bilateral  H04.123 375.15 Findings and symptoms consistent with mild dry eyes.   Recommend regular use of Artificial Tears. These should be thought of as Ocular Surface Moisturizers similar to skin moisturizers in that regular use is required to achieve maximum benefit.  Specifically, I recommend the following:    Systane Ultra or Refresh Optive Frandy 3 : 3-4 times a day.   The first dose upon awakening is most important because we do not make tears at night.  Avoid generic products as they contain Benzalkonium Chloride as a preservative. This  is a very irritating chemical and can make your eyes worse.    Omega 3 Fish Oils  1000 to 2000 mgs per day of Nordic Naturals or PRN Dry Eye formula Offerti       Return to clinic 6-7 weeks for dilation, book surgery at that time      OS: Lotemax SM QID   OS: Dorz BID  OS: Timolol BID   OS: Latanoprost QHS   OS: Brimonidine TID   OD: Pred PRN  OD: Atropine BID     OS: Systane and genteal gel QHS

## 2022-06-15 ENCOUNTER — OFFICE VISIT (OUTPATIENT)
Dept: DIABETES | Facility: CLINIC | Age: 57
End: 2022-06-15
Payer: MEDICARE

## 2022-06-15 VITALS
SYSTOLIC BLOOD PRESSURE: 156 MMHG | BODY MASS INDEX: 32.1 KG/M2 | HEART RATE: 65 BPM | WEIGHT: 158.94 LBS | DIASTOLIC BLOOD PRESSURE: 83 MMHG

## 2022-06-15 DIAGNOSIS — E11.69 HYPERLIPIDEMIA ASSOCIATED WITH TYPE 2 DIABETES MELLITUS: ICD-10-CM

## 2022-06-15 DIAGNOSIS — E11.59 HYPERTENSION ASSOCIATED WITH DIABETES: ICD-10-CM

## 2022-06-15 DIAGNOSIS — E11.22 TYPE 2 DIABETES MELLITUS WITH STAGE 3 CHRONIC KIDNEY DISEASE, WITHOUT LONG-TERM CURRENT USE OF INSULIN, UNSPECIFIED WHETHER STAGE 3A OR 3B CKD: Primary | ICD-10-CM

## 2022-06-15 DIAGNOSIS — E78.5 HYPERLIPIDEMIA ASSOCIATED WITH TYPE 2 DIABETES MELLITUS: ICD-10-CM

## 2022-06-15 DIAGNOSIS — N18.30 TYPE 2 DIABETES MELLITUS WITH STAGE 3 CHRONIC KIDNEY DISEASE, WITHOUT LONG-TERM CURRENT USE OF INSULIN, UNSPECIFIED WHETHER STAGE 3A OR 3B CKD: Primary | ICD-10-CM

## 2022-06-15 DIAGNOSIS — E66.9 OBESITY (BMI 30-39.9): ICD-10-CM

## 2022-06-15 DIAGNOSIS — I15.2 HYPERTENSION ASSOCIATED WITH DIABETES: ICD-10-CM

## 2022-06-15 DIAGNOSIS — E03.9 HYPOTHYROIDISM, UNSPECIFIED TYPE: ICD-10-CM

## 2022-06-15 LAB — GLUCOSE SERPL-MCNC: 165 MG/DL (ref 70–110)

## 2022-06-15 PROCEDURE — 99214 OFFICE O/P EST MOD 30 MIN: CPT | Mod: S$PBB,,, | Performed by: PHYSICIAN ASSISTANT

## 2022-06-15 PROCEDURE — 99213 OFFICE O/P EST LOW 20 MIN: CPT | Mod: PBBFAC | Performed by: PHYSICIAN ASSISTANT

## 2022-06-15 PROCEDURE — 99999 PR PBB SHADOW E&M-EST. PATIENT-LVL III: CPT | Mod: PBBFAC,,, | Performed by: PHYSICIAN ASSISTANT

## 2022-06-15 PROCEDURE — 99214 PR OFFICE/OUTPT VISIT, EST, LEVL IV, 30-39 MIN: ICD-10-PCS | Mod: S$PBB,,, | Performed by: PHYSICIAN ASSISTANT

## 2022-06-15 PROCEDURE — 82962 GLUCOSE BLOOD TEST: CPT | Mod: PBBFAC | Performed by: PHYSICIAN ASSISTANT

## 2022-06-15 PROCEDURE — 99999 PR PBB SHADOW E&M-EST. PATIENT-LVL III: ICD-10-PCS | Mod: PBBFAC,,, | Performed by: PHYSICIAN ASSISTANT

## 2022-06-15 RX ORDER — ORAL SEMAGLUTIDE 3 MG/1
3 TABLET ORAL DAILY
Qty: 30 TABLET | Refills: 0 | Status: SHIPPED | OUTPATIENT
Start: 2022-06-15 | End: 2022-08-17

## 2022-06-15 NOTE — PATIENT INSTRUCTIONS
CURRENT DM MEDICATIONS:   Metformin 1000 mg twice a day  Glipizide 10 mg before each meal  Jardiance 25 mg daily   Rybelsus 3 mg in morning on empty stomach - wait 30 mins to take medications, food, or drink

## 2022-06-15 NOTE — PROGRESS NOTES
PCP: Oneyda Bergman MD    Subjective:     Chief Complaint: Diabetes - Established Patient    HISTORY OF PRESENT ILLNESS: 57 y.o.   female presenting for diabetes management visit.   The patient's last visit with me was on 9/9/2021.  English is not patient's first language, however patient declines  and gives consent for daughter to translate.   Patient has had Type II diabetes since 10 or more years.  Pertinent to decision making is the following comorbidities: HTN, HLD, CKD III, Hypothyroidism and Obesity by BMI  Patient has the following Diabetes complications: with diabetic chronic kidney disease  She  has attended diabetes education in the past.     Patient's most recent A1c of 8.1% was completed 2 months ago.   Patient states since Her last A1c Her blood glucose levels have been high  after meals at times.  Patient monitors blood glucose 2 times per day with meter : Fasting and After Dinner.   Patient blood glucose monitoring device will not be uploaded into [employer] today  secondary to unable to upload successfully.   Per meter recall: Fasting 134 - 210.  Patient endorses the following diabetes related symptoms: None - up to date.   Patient is due today for the following diabetes-related health maintenance standards: Foot Exam , Eye Exam, Urine Microalbumin/creatinine ratio, COVID-19 Vaccine  and Mammogram.   She denies recent hospital visits/ emergency room visit.   She denies having hypoglycemia.  Patient's concerns today include glycemic control.      GLP-1 Therapy Initiation Considerations:   Family history of pancreatic cancer in first-degree relative: no  Personal history of pancreatitis: no  Family history of MTC/MEN/endocrine tumors: no  Personal history of Gastroparesis: no  Past use of GLP-1 Therapy with adverse effects: no  Past Bariatric Surgery: no  Personal history of Diabetic Retinopathy: no  Need for Cardiovascular Risk Reduction: yes  Need for Potential Weight Loss:  yes    Patient medication regimen is as below.     CURRENT DM MEDICATIONS:    Metformin 1000 mg BID   Glipizide 10 mg TID wm    Farxiga 10 mg daily     Patient has failed the following Diabetes medications:    N/A       Labs Reviewed.       No results found for: CPEPTIDE  No results found for: GLUTAMICACID       //  Weight: 72.1 kg (158 lb 15.2 oz), Body mass index is 32.1 kg/m².  Her blood sugar in clinic today is:    Lab Results   Component Value Date    POCGLU 165 (A) 06/15/2022       Review of Systems   Constitutional: Negative for activity change, appetite change, chills and fever.   HENT: Negative for dental problem, mouth sores, nosebleeds, sore throat and trouble swallowing.    Eyes: Negative for pain and discharge.   Respiratory: Negative for shortness of breath, wheezing and stridor.    Cardiovascular: Negative for chest pain, palpitations and leg swelling.   Gastrointestinal: Negative for abdominal pain, diarrhea, nausea and vomiting.   Endocrine: Negative for polydipsia, polyphagia and polyuria.   Genitourinary: Negative for dysuria, frequency and urgency.   Musculoskeletal: Negative for joint swelling and myalgias.   Skin: Negative for rash and wound.   Neurological: Negative for dizziness, syncope, weakness and headaches.   Psychiatric/Behavioral: Negative for behavioral problems and dysphoric mood.         Diabetes Management Status  Statin: Taking  ACE/ARB: Taking    Screening or Prevention Patient's value Goal Complete/Controlled?   HgA1C Testing and Control   Lab Results   Component Value Date    HGBA1C 6.7 (H) 09/01/2021      Annually/Less than 8% Yes   Lipid profile : 04/04/2022 Annually Yes   LDL control Lab Results   Component Value Date    LDLCALC 139.2 09/01/2021    Annually/Less than 100 mg/dl  Yes   Nephropathy screening Lab Results   Component Value Date    MICALBCREAT Unable to calculate 01/06/2021     Lab Results   Component Value Date    PROTEINUA Negative 08/19/2021    Annually Yes    Blood pressure BP Readings from Last 1 Encounters:   06/15/22 (!) 156/83    Less than 140/90 Yes   Dilated retinal exam : 01/12/2021 Annually Yes    Foot exam   : 05/26/2021 Annually No     ACTIVITY LEVEL: Sedentary. Discussed activities, benefits, methods, and precautions.  MEAL PLANNING: Patient reports number of meals per day to be 3 and number of snacks per day to be 2.   Patient is encouraged to carb count and consume no more than 30 - 45 grams of carbohydrates in each meal and 15 grams of carbohydrates in each snack.     Social History     Socioeconomic History    Marital status:     Number of children: 3   Tobacco Use    Smoking status: Never Smoker    Smokeless tobacco: Never Used   Substance and Sexual Activity    Alcohol use: No     Alcohol/week: 0.0 standard drinks    Drug use: No    Sexual activity: Not Currently     Partners: Male     Birth control/protection: None   Social History Narrative    , 3 kids.     Past Medical History:   Diagnosis Date    Acid reflux     Cataract     Diabetes mellitus, type 2     Glaucoma     HTN (hypertension)     Hyperlipidemia     Osteoarthritis of knee 3/11/2019    Recurrent iritis of left eye 4/13/2016       Objective:        Physical Exam  Constitutional:       General: She is not in acute distress.     Appearance: She is well-developed. She is not diaphoretic.   HENT:      Head: Normocephalic and atraumatic.      Right Ear: External ear normal.      Left Ear: External ear normal.      Nose: Nose normal.   Eyes:      General:         Right eye: No discharge.         Left eye: No discharge.      Pupils: Pupils are equal, round, and reactive to light.   Cardiovascular:      Rate and Rhythm: Normal rate and regular rhythm.      Pulses:           Dorsalis pedis pulses are 2+ on the right side and 2+ on the left side.        Posterior tibial pulses are 2+ on the right side and 2+ on the left side.      Heart sounds: Normal heart sounds.    Pulmonary:      Effort: Pulmonary effort is normal.      Breath sounds: Normal breath sounds.   Abdominal:      Palpations: Abdomen is soft.   Musculoskeletal:         General: Normal range of motion.      Cervical back: Normal range of motion and neck supple.      Right foot: Normal range of motion. No deformity.      Left foot: Normal range of motion. No deformity.   Feet:      Right foot:      Protective Sensation: 6 sites tested. 6 sites sensed.      Skin integrity: Dry skin present. No ulcer, blister, skin breakdown, erythema, warmth or callus.      Left foot:      Protective Sensation: 6 sites tested. 6 sites sensed.      Skin integrity: Dry skin present. No ulcer, blister, skin breakdown, erythema, warmth or callus.   Skin:     General: Skin is warm and dry.      Capillary Refill: Capillary refill takes less than 2 seconds.   Neurological:      Mental Status: She is alert and oriented to person, place, and time.      Motor: No abnormal muscle tone.      Coordination: Coordination normal.   Psychiatric:         Behavior: Behavior normal.         Thought Content: Thought content normal.         Judgment: Judgment normal.           Assessment / Plan:     Type 2 diabetes mellitus with stage 3 chronic kidney disease, without long-term current use of insulin, unspecified whether stage 3a or 3b CKD  -     POCT Glucose, Hand-Held Device  -     semaglutide (RYBELSUS) 3 mg tablet; Take 1 tablet (3 mg total) by mouth once daily.  Dispense: 30 tablet; Refill: 0  -     Microalbumin/Creatinine Ratio, Urine; Future; Expected date: 06/15/2022    Hypertension associated with diabetes    Hyperlipidemia associated with type 2 diabetes mellitus    Hypothyroidism, unspecified type    Obesity (BMI 30-39.9)      Additional Plan Details:    - POCT Glucose  - Encouraged continuation of lifestyle changes including regular exercise and limiting carbohydrates to 30-45 grams per meal threes times daily and 15 grams per snack with a  limit of two daily.   - Encouraged continued monitoring of blood glucose with maintenance of 3 times daily at Fasting, Before Dinner and After Dinner.   - Current DM Medication Regimen: Continue Jardiance 25 mg daily. Continue Metformin. Continue Glipizide 10 mg TID wm. Start Rybelsus 3 mg daily.   - Health Maintenance standards addressed today: Foot Exam - completed today and documented in physical exam with feedback to patient about proper diabetic foot care and findings, Eye Exam - will be completed within [facility] and scheduled today, Urine Microalbumin / Creatinine Ratio scheduled, COVID - 19 Vaccine - booster sched and Mammogram ROR  - Nursing Visit: Patient is below goal range for nursing visit for age group and will not need nursing visit at this time .   - Follow up in 2 months with A1c prior and call in 4 weeks      Blakeney McKnight, PA-C [facility]      A total of 30 minutes was spent in face to face time, of which over 50 % was spent in counseling patient on disease process, complications, treatment, and side effects of medications.

## 2022-07-06 RX ORDER — ESTRADIOL 0.1 MG/G
1 CREAM VAGINAL
Qty: 42.5 G | Refills: 2 | Status: SHIPPED | OUTPATIENT
Start: 2022-07-07 | End: 2022-10-20 | Stop reason: SDUPTHER

## 2022-07-07 ENCOUNTER — OFFICE VISIT (OUTPATIENT)
Dept: DIABETES | Facility: CLINIC | Age: 57
End: 2022-07-07
Payer: MEDICARE

## 2022-07-07 ENCOUNTER — PATIENT MESSAGE (OUTPATIENT)
Dept: DIABETES | Facility: CLINIC | Age: 57
End: 2022-07-07

## 2022-07-07 DIAGNOSIS — Z53.21 PATIENT LEFT WITHOUT BEING SEEN: Primary | ICD-10-CM

## 2022-07-07 PROCEDURE — 99499 NO LOS: ICD-10-PCS | Mod: 95,,, | Performed by: PHYSICIAN ASSISTANT

## 2022-07-07 PROCEDURE — 99499 UNLISTED E&M SERVICE: CPT | Mod: 95,,, | Performed by: PHYSICIAN ASSISTANT

## 2022-07-08 ENCOUNTER — TELEPHONE (OUTPATIENT)
Dept: DIABETES | Facility: CLINIC | Age: 57
End: 2022-07-08
Payer: MEDICARE

## 2022-07-08 DIAGNOSIS — N18.30 TYPE 2 DIABETES MELLITUS WITH STAGE 3 CHRONIC KIDNEY DISEASE, WITHOUT LONG-TERM CURRENT USE OF INSULIN, UNSPECIFIED WHETHER STAGE 3A OR 3B CKD: ICD-10-CM

## 2022-07-08 DIAGNOSIS — Z53.21 PATIENT LEFT WITHOUT BEING SEEN: Primary | ICD-10-CM

## 2022-07-08 DIAGNOSIS — E11.22 TYPE 2 DIABETES MELLITUS WITH STAGE 3 CHRONIC KIDNEY DISEASE, WITHOUT LONG-TERM CURRENT USE OF INSULIN, UNSPECIFIED WHETHER STAGE 3A OR 3B CKD: ICD-10-CM

## 2022-07-08 RX ORDER — EMPAGLIFLOZIN 25 MG/1
25 TABLET, FILM COATED ORAL DAILY
Qty: 90 TABLET | Refills: 3 | Status: SHIPPED | OUTPATIENT
Start: 2022-07-08 | End: 2023-06-30 | Stop reason: SDUPTHER

## 2022-07-08 NOTE — TELEPHONE ENCOUNTER
----- Message from Ally Crawford sent at 7/8/2022 10:57 AM CDT -----  States she would like to continue with the jardance medication. States she is not comfortable with the dapagliflozin (FARXIGA). States she would like to get the jardance sent to       [facility]  [address] [facility]  [place] [address]  Phone: [phone] Fax: [phone]    Please call pt [phone]

## 2022-07-08 NOTE — TELEPHONE ENCOUNTER
Pt would like to continue Jardiance she likes it better, she feels uncomfortable taking Farxiga. rx can be sent to [facility] downstairs.    Please advise

## 2022-07-12 ENCOUNTER — PATIENT OUTREACH (OUTPATIENT)
Dept: ADMINISTRATIVE | Facility: HOSPITAL | Age: 57
End: 2022-07-12
Payer: MEDICARE

## 2022-07-12 NOTE — LETTER
AUTHORIZATION FOR RELEASE OF   CONFIDENTIAL INFORMATION    Dear Dutch Bonilla MD,    We are seeing Richelle Zaldivar, date of birth 1965, in the clinic at [facility]. Oneyda Bergman MD is the patient's PCP. Richelle Zaldivar has an outstanding lab/procedure at the time we reviewed her chart. In order to help keep her health information updated, she has authorized us to request the following medical record(s):        (  )  MAMMOGRAM                                      (  )  COLONOSCOPY      (  )  PAP SMEAR                                          ( x )  OUTSIDE LAB RESULTS     (  )  DEXA SCAN                                          (  )  EYE EXAM            (  )  FOOT EXAM                                          (  )  ENTIRE RECORD     (  )  OUTSIDE IMMUNIZATIONS                 (  )  _______________         Please fax records to [facility], Shilpa Reddy, MD, [phone]     If you have any questions, please contact   Yokasta SAVAGE LPN   Care Coordination   [facility]  Phone [phone]      Patient Name: Richelle Zaldivar  : 1965  N [id number]  Patient Phone #: [phone]

## 2022-07-12 NOTE — PROGRESS NOTES
Working Diabetes Report.    Per  pt had A1C and Lipid completed in April.  Faxed request for copy of labs.

## 2022-07-26 ENCOUNTER — DOCUMENTATION ONLY (OUTPATIENT)
Dept: OPHTHALMOLOGY | Facility: CLINIC | Age: 57
End: 2022-07-26

## 2022-07-26 ENCOUNTER — OFFICE VISIT (OUTPATIENT)
Dept: OPHTHALMOLOGY | Facility: CLINIC | Age: 57
End: 2022-07-26
Payer: MEDICARE

## 2022-07-26 ENCOUNTER — LAB VISIT (OUTPATIENT)
Dept: LAB | Facility: HOSPITAL | Age: 57
End: 2022-07-26
Attending: PHYSICIAN ASSISTANT
Payer: MEDICARE

## 2022-07-26 DIAGNOSIS — H20.022 IRITIS, RECURRENT, LEFT: ICD-10-CM

## 2022-07-26 DIAGNOSIS — H04.123 DRY EYE SYNDROME, BILATERAL: ICD-10-CM

## 2022-07-26 DIAGNOSIS — Z91.199 NON COMPLIANCE WITH MEDICAL TREATMENT: ICD-10-CM

## 2022-07-26 DIAGNOSIS — N18.30 TYPE 2 DIABETES MELLITUS WITH STAGE 3 CHRONIC KIDNEY DISEASE, WITHOUT LONG-TERM CURRENT USE OF INSULIN, UNSPECIFIED WHETHER STAGE 3A OR 3B CKD: ICD-10-CM

## 2022-07-26 DIAGNOSIS — H40.1133 PRIMARY OPEN ANGLE GLAUCOMA OF BOTH EYES, SEVERE STAGE: Primary | ICD-10-CM

## 2022-07-26 DIAGNOSIS — E11.22 TYPE 2 DIABETES MELLITUS WITH STAGE 3 CHRONIC KIDNEY DISEASE, WITHOUT LONG-TERM CURRENT USE OF INSULIN, UNSPECIFIED WHETHER STAGE 3A OR 3B CKD: ICD-10-CM

## 2022-07-26 DIAGNOSIS — T81.30XA WOUND DEHISCENCE: ICD-10-CM

## 2022-07-26 DIAGNOSIS — H35.52 RETINITIS PIGMENTOSA OF BOTH EYES: ICD-10-CM

## 2022-07-26 LAB
ESTIMATED AVG GLUCOSE: 171 MG/DL (ref 68–131)
HBA1C MFR BLD: 7.6 % (ref 4–5.6)

## 2022-07-26 PROCEDURE — 83036 HEMOGLOBIN GLYCOSYLATED A1C: CPT | Performed by: PHYSICIAN ASSISTANT

## 2022-07-26 PROCEDURE — 36415 COLL VENOUS BLD VENIPUNCTURE: CPT | Performed by: PHYSICIAN ASSISTANT

## 2022-07-26 PROCEDURE — 99999 PR PBB SHADOW E&M-EST. PATIENT-LVL II: ICD-10-PCS | Mod: PBBFAC,,, | Performed by: OPHTHALMOLOGY

## 2022-07-26 PROCEDURE — 99214 PR OFFICE/OUTPT VISIT, EST, LEVL IV, 30-39 MIN: ICD-10-PCS | Mod: S$PBB,,, | Performed by: OPHTHALMOLOGY

## 2022-07-26 PROCEDURE — 99214 OFFICE O/P EST MOD 30 MIN: CPT | Mod: S$PBB,,, | Performed by: OPHTHALMOLOGY

## 2022-07-26 PROCEDURE — 99999 PR PBB SHADOW E&M-EST. PATIENT-LVL II: CPT | Mod: PBBFAC,,, | Performed by: OPHTHALMOLOGY

## 2022-07-26 PROCEDURE — 99212 OFFICE O/P EST SF 10 MIN: CPT | Mod: PBBFAC | Performed by: OPHTHALMOLOGY

## 2022-07-26 RX ORDER — LOTEPREDNOL ETABONATE 3.8 MG/G
1 GEL OPHTHALMIC 4 TIMES DAILY
Qty: 5 G | Refills: 6 | Status: SHIPPED | OUTPATIENT
Start: 2022-07-26 | End: 2023-06-30

## 2022-07-26 RX ORDER — POLYMYXIN B SULFATE AND TRIMETHOPRIM 1; 10000 MG/ML; [USP'U]/ML
1 SOLUTION OPHTHALMIC 4 TIMES DAILY
Qty: 10 ML | Refills: 1 | Status: SHIPPED | OUTPATIENT
Start: 2022-07-26 | End: 2023-05-10

## 2022-07-26 RX ORDER — KETOROLAC TROMETHAMINE 5 MG/ML
1 SOLUTION OPHTHALMIC 4 TIMES DAILY
Qty: 5 ML | Refills: 1 | Status: SHIPPED | OUTPATIENT
Start: 2022-07-26 | End: 2022-08-17 | Stop reason: SDUPTHER

## 2022-07-26 NOTE — PROGRESS NOTES
Short Stay Record    CC: Uncontrolled glaucoma left eye     HPI:  Richelle Zaldivar is a 57 y.o. female who presents for evaluation prior to ophthalmic surgery, left eye. Patient presents with uncontrolled glaucoma with intraocular pressure above target with significant risk of irreversible vision loss which requires surgical intervention.      Past Surgical History:   Procedure Laterality Date    CATARACT EXTRACTION      COLONOSCOPY N/A 10/18/2019    Procedure: COLONOSCOPY;  Surgeon: Aster Krause MD;  Location: [facility] ENDO;  Service: Endoscopy;  Laterality: N/A;    ESOPHAGOGASTRODUODENOSCOPY N/A 10/18/2019    Procedure: ESOPHAGOGASTRODUODENOSCOPY (EGD);  Surgeon: Aster Krause MD;  Location: [facility] ENDO;  Service: Endoscopy;  Laterality: N/A;    GLAUCOMA SURGERY Left 08/02/2018    [facility]    GONIOTOMY Left 11/17/2016    HEMORRHOID SURGERY  4/2015    Done in [facility]    PARTIAL HYSTERECTOMY      TONSILLECTOMY      WISDOM TOOTH EXTRACTION       Social History     Tobacco Use    Smoking status: Never Smoker    Smokeless tobacco: Never Used   Substance Use Topics    Alcohol use: No     Alcohol/week: 0.0 standard drinks     Family History   Problem Relation Age of Onset    Diabetes Mother     Hypertension Mother     Hyperlipidemia Mother     Stroke Mother     Amblyopia Mother     Diabetes Sister     Hypertension Sister     Hyperlipidemia Sister     Arthritis Sister     Amblyopia Brother     Amblyopia Maternal Uncle     Blindness Neg Hx     Cancer Neg Hx     Cataracts Neg Hx     Glaucoma Neg Hx     Macular degeneration Neg Hx     Retinal detachment Neg Hx     Strabismus Neg Hx     Thyroid disease Neg Hx      Review of patient's allergies indicates:  No Known Allergies      Current Outpatient Medications:     allopurinoL (ZYLOPRIM) 100 MG tablet, Take 2 tablets (200 mg total) by mouth once daily., Disp: 180 tablet, Rfl: 3    amLODIPine (NORVASC) 10 MG tablet, Take 1 tablet orally once  a day., Disp: 30 tablet, Rfl: 6    atorvastatin (LIPITOR) 40 MG tablet, Take 1 tablet (40 mg total) by mouth once daily., Disp: 90 tablet, Rfl: 3    atropine 1% (ISOPTO ATROPINE) 1 % Drop, Place 1 drop into the right eye 2 (two) times a day., Disp: 10 mL, Rfl: 6    blood-glucose meter kit, Use as instructed Insurance Preferred, Disp: 1 each, Rfl: 0    brimonidine 0.1% (ALPHAGAN P) 0.1 % Drop, Place 1 drop into both eyes 3 (three) times daily., Disp: 15 mL, Rfl: 6    dapagliflozin (FARXIGA) 10 mg tablet, Take 1 tablet by mouth once daily, Disp: 30 tablet, Rfl: 5    dorzolamide-timolol 2-0.5% (COSOPT) 22.3-6.8 mg/mL ophthalmic solution, Place 1 drop into both eyes 2 (two) times daily., Disp: 10 mL, Rfl: 3    empagliflozin (JARDIANCE) 25 mg tablet, Take 1 tablet (25 mg total) by mouth once daily., Disp: 90 tablet, Rfl: 3    ergocalciferol (ERGOCALCIFEROL) 50,000 unit Cap, Take 1 capsule (50,000 Units total) by mouth every 7 days., Disp: 10 capsule, Rfl: 0    estradioL (ESTRACE) 0.01 % (0.1 mg/gram) vaginal cream, Place 1 gram vaginally every night for 14 days then twice a week., Disp: 42.5 g, Rfl: 2    fenofibrate micronized (LOFIBRA) 134 MG Cap, Take 1 capsule (134 mg total) by mouth daily with breakfast., Disp: 90 capsule, Rfl: 3    gabapentin (NEURONTIN) 300 MG capsule, Take 1 capsule (300 mg total) by mouth 2 (two) times daily., Disp: 180 capsule, Rfl: 3    glipiZIDE (GLUCOTROL) 10 MG tablet, Take 1 tablet (10 mg total) by mouth 3 (three) times daily before meals., Disp: 270 tablet, Rfl: 3    indomethacin (INDOCIN SR) 75 mg CpSR CR capsule, Take 75 mg by mouth 2 (two) times daily as needed., Disp: , Rfl:     irbesartan-hydrochlorothiazide (AVALIDE) 300-12.5 mg per tablet, Take 1 tablet orally once a day., Disp: 30 tablet, Rfl: 6    ketoconazole (NIZORAL) 2 % cream, Apply to the affected area twice daily (Patient taking differently: Apply to the affected area twice daily), Disp: 60 g, Rfl: 3     ketorolac 0.5% (ACULAR) 0.5 % Drop, Place 1 drop into the left eye 4 (four) times daily., Disp: 5 mL, Rfl: 1    lancets Misc, 1 each by Misc.(Non-Drug; Combo Route) route 3 (three) times daily., Disp: 100 each, Rfl: 11    lancets Misc, 1 each by Misc.(Non-Drug; Combo Route) route 4 (four) times daily., Disp: 300 each, Rfl: 3    lansoprazole (PREVACID) 30 MG capsule, Take 1 capsule (30 mg total) by mouth once daily., Disp: 90 capsule, Rfl: 3    latanoprost 0.005 % ophthalmic solution, Place 1 drop into the left eye every evening., Disp: 7.5 mL, Rfl: 4    levothyroxine (SYNTHROID) 25 MCG tablet, Take 1 tablet (25 mcg total) by mouth once daily., Disp: 90 tablet, Rfl: 3    loteprednol etabonate (LOTEMAX SM) 0.38 % DrpG, Place 1 drop into both eyes 4 (four) times daily., Disp: 5 g, Rfl: 6    meloxicam (MOBIC) 15 MG tablet, Take 1 tablet (15 mg total) by mouth daily as needed., Disp: 30 tablet, Rfl: 0    metFORMIN (GLUCOPHAGE) 1000 MG tablet, Take 1 tablet (1,000 mg total) by mouth 2 (two) times daily with meals., Disp: 180 tablet, Rfl: 3    polymyxin B sulf-trimethoprim (POLYTRIM) 10,000 unit- 1 mg/mL Drop, Place 1 drop into the left eye 4 (four) times daily., Disp: 10 mL, Rfl: 1    semaglutide (RYBELSUS) 3 mg tablet, Take 1 tablet (3 mg total) by mouth once daily., Disp: 30 tablet, Rfl: 0    traMADoL (ULTRAM) 50 mg tablet, Ultram Take 1 Tablet (oral) every 6 hours PRN - Pain 20220305 tablet every 6 hours oral No set duration recorded No set duration amount recorded active 50 MG, Disp: , Rfl:     triamcinolone acetonide 0.025% (KENALOG) 0.025 % cream, Apply topically 2 (two) times daily as needed., Disp: 30 g, Rfl: 1    urea (CARMOL) 40 % Crea, Apply topically 2 (two) times daily. Apply to thickened areas of skin, Disp: 1 Tube, Rfl: 3    Review of Systems:  A comprehensive review of systems was negative.    Physical Exam:  General Appearance:    A&Ox3, no distress, appears stated age   Head:     Normocephalic, without obvious abnormality, atraumatic   Eyes:    PERRL, EOM's intact   Back:     Symmetric, no curvature   Lungs:     Respirations unlabored   Chest Wall:    No tenderness or deformity    Heart:  Abdomen:  Extremities:  Skin:    S1 and S2 present    Soft, non-tender    Extremities normal, atraumatic    Skin color, texture, turgor normal     Normal Normal      Right Left   Lids/Lashes Normal Normal   Conjunctiva/Sclera pre-phthisical Prev. Cp site at 2 o'clock with boggy conj, Superior-temporal Ahmed valve with 12 o'clock entry tube covered by thin conj   Cornea band keratopathy Punctate stain, Inferior 1+ Punctate epithelial erosions        Anterior Chamber Deep and quiet Cell: Rare   Iris updrawn pupil Round and reactive   Lens white cataract Posterior chamber intraocular lens 1+ Posterior capsular opacification   Vitreous No view Normal      Right Left   Disc No view Waxy pallor to disc, Advanced PPOLE georaphic atrophy, tilited disc   C/D Ratio  0.2   Macula  Atrophy, central island remains   Vessels  Vascular attenuation   Periphery  Bone spicules to arcades         Impression: Primary Open Angle Glaucoma SEVERE  stage : Left eye    Planned Procedure:    Revision of Wound OS   Lotemax, Poly and Keto   Pt denies taking any ASA or Blood thinners           Patient cleared for ophthalmic surgery.     Discharge Summary:    Admitting Diagnosis: Primary open angle glaucoma SEVERE  stage OS    Discharge Diagnosis: same    Procedure: s/p glaucoma surgery as per dictation    Complications: None  Discharge Condition: Stable    Discharge instructions: Follow post-operative discharge instruction sheet given by provider.    Follow-up: Return to clinic next day or as scheduled.

## 2022-07-26 NOTE — PROGRESS NOTES
HPI     Glaucoma     Comments: IOP check with dilation and book surgery OS    Patient states OU is doing well no changes in VA and states will need more   Lotemax but needs the drop and not the gel solution.              Comments       1. COAG (followed by Dr. Scott)   Ahmed Valve OS 8/2/18   CP OS 8/27/15 - low to moderate flow, TDW 1100, aggressive dilation with   GV   Gonio Puncture OS 9/25/15   SLT OS 6/23/16, 6/23/15, 6-2-14   Goniotomy OS 11-17-16   2. DM   3. RP / VIT A PALMITATE 1500 (discontinued)   4. Retinitis Pigmentosa of both eyes   5. Dry Eye Syndrome bilateral   6. Iritis recurrent OS       OS: Lotemax SM QID   OS: Dorz BID  OS: Timolol BID   OS: Latanoprost QHS   OS: Brimonidine TID   OD: Pred PRN  OD: Atropine BID     OS: Systane and genteal gel QHS          Last edited by Toyin Casey, Patient Care Assistant on 7/26/2022 10:17   AM. (History)            Assessment /Plan     For exam results, see Encounter Report.      ICD-10-CM ICD-9-CM    1. Primary open angle glaucoma of both eyes, severe stage  H40.1133 365.11 Doing well - intraocular pressure is within acceptable range relative to target pressure with no evidence of progression.   Continue current treatment.  Reviewed importance of continued compliance with treatment and follow up.      2.  Wound Dehiscence Left eye                  Conj is thin OS, needs wound revision OS due to eroded ahmed valve tube-- will use corneal patch graft     Book revision of Wound OS   Lotemax, Poly and Keto   Pt denies taking any ASA or Blood thinners     Continue Lotemax SM QID, will add Keto QID and Polytrim QID    3 Iritis, recurrent, left  H20.022 364.02 Controlled on Lotemax QID   4. Retinitis pigmentosa of both eyes  H35.52 362.74    5. Dry eye syndrome, bilateral  H04.123 375.15 Managing drops and condition well   6. Non compliance with medical treatment  Z91.19 V15.81 Doing well now         OS: Dorz BID  OS: Timolol BID   OS: Latanoprost QHS   OS:  Brimonidine TID   OD: Pred PRN  OD: Atropine BID    OS: Lotemax SM QID  OS: Keto QID  OS: Polytrim QID       OS: Systane and genteal gel QHS

## 2022-08-04 ENCOUNTER — OUTSIDE PLACE OF SERVICE (OUTPATIENT)
Dept: OPHTHALMOLOGY | Facility: CLINIC | Age: 57
End: 2022-08-04
Payer: MEDICARE

## 2022-08-04 PROCEDURE — 66185 PR REVISE EYE SHUNT: ICD-10-PCS | Mod: LT,,, | Performed by: OPHTHALMOLOGY

## 2022-08-04 PROCEDURE — 66185 REVISE AQUEOUS SHUNT EYE: CPT | Mod: LT,,, | Performed by: OPHTHALMOLOGY

## 2022-08-05 ENCOUNTER — OFFICE VISIT (OUTPATIENT)
Dept: OPHTHALMOLOGY | Facility: CLINIC | Age: 57
End: 2022-08-05
Payer: MEDICARE

## 2022-08-05 ENCOUNTER — TELEPHONE (OUTPATIENT)
Dept: PHARMACY | Facility: CLINIC | Age: 57
End: 2022-08-05
Payer: MEDICARE

## 2022-08-05 DIAGNOSIS — H40.1133 PRIMARY OPEN ANGLE GLAUCOMA OF BOTH EYES, SEVERE STAGE: ICD-10-CM

## 2022-08-05 DIAGNOSIS — Z98.890 POST-OPERATIVE STATE: Primary | ICD-10-CM

## 2022-08-05 PROCEDURE — 99024 PR POST-OP FOLLOW-UP VISIT: ICD-10-PCS | Mod: POP,,, | Performed by: OPHTHALMOLOGY

## 2022-08-05 PROCEDURE — 99999 PR PBB SHADOW E&M-EST. PATIENT-LVL III: ICD-10-PCS | Mod: PBBFAC,,, | Performed by: OPHTHALMOLOGY

## 2022-08-05 PROCEDURE — 99213 OFFICE O/P EST LOW 20 MIN: CPT | Mod: PBBFAC | Performed by: OPHTHALMOLOGY

## 2022-08-05 PROCEDURE — 99024 POSTOP FOLLOW-UP VISIT: CPT | Mod: POP,,, | Performed by: OPHTHALMOLOGY

## 2022-08-05 PROCEDURE — 99999 PR PBB SHADOW E&M-EST. PATIENT-LVL III: CPT | Mod: PBBFAC,,, | Performed by: OPHTHALMOLOGY

## 2022-08-05 RX ORDER — LOTEPREDNOL ETABONATE 5 MG/ML
SUSPENSION/ DROPS OPHTHALMIC
Qty: 15 ML | Refills: 3 | Status: SHIPPED | OUTPATIENT
Start: 2022-08-05 | End: 2022-11-11 | Stop reason: SDUPTHER

## 2022-08-05 NOTE — PROGRESS NOTES
HPI     Post-op Evaluation     Comments: Patient states some pain last night,along with some itching    but some improvement today epically since th patch has been removed.              Comments     1. COAG (followed by Dr. Scott)   Ahmed Valve OS 8/2/18   CP OS 8/27/15 - low to moderate flow, TDW 1100, aggressive dilation with   GV   Gonio Puncture OS 9/25/15   SLT OS 6/23/16, 6/23/15, 6-2-14   Goniotomy OS 11-17-16   2. DM   3. RP / VIT A PALMITATE 1500 (discontinued)   4. Retinitis Pigmentosa of both eyes   5. Dry Eye Syndrome bilateral   6. Iritis recurrent OS       OS: Lotemax SM QID   OS: Dorz/ Timolol BID   OS: Latanoprost QHS   OS: Brimonidine TID   OD: Pred PRN  OD: Atropine BID     OS: Systane and genteal gel QHS          Last edited by Toyin Casey, Patient Care Assistant on 8/5/2022  8:50   AM. (History)            Assessment /Plan     For exam results, see Encounter Report.      ICD-10-CM ICD-9-CM    1. Post-operative state  Z98.890 V45.89    2. Primary open angle glaucoma of both eyes, severe stage  H40.1133 365.11      365.73        S/p Revision of Wound Left Eye   1 day PO- Doing well     RETURN TO CLINIC 2 weeks     OS: Lotemax SM QID   OS: Dorz/Timolol  BID    OS: Latanoprost QHS   OS: Brimonidine TID   OD: Pred PRN  OD: Atropine BID     OS: Systane and genteal gel QHS

## 2022-08-09 ENCOUNTER — PATIENT MESSAGE (OUTPATIENT)
Dept: ADMINISTRATIVE | Facility: HOSPITAL | Age: 57
End: 2022-08-09
Payer: MEDICARE

## 2022-08-15 NOTE — PROGRESS NOTES
PCP: Oneyda Bergman MD    Subjective:     Chief Complaint: Diabetes - Established Patient    HISTORY OF PRESENT ILLNESS: 57 y.o.   female presenting for diabetes management visit.   The patient's last visit with me was on 7/7/2022.  English is not patient's first language, however patient declines  and gives consent for daughter to translate.   Patient has had Type II diabetes since 10 or more years.  Pertinent to decision making is the following comorbidities: HTN, HLD, CKD III, Hypothyroidism and Obesity by BMI  Patient has the following Diabetes complications: with diabetic chronic kidney disease  She  has attended diabetes education in the past.     Patient's most recent A1c of 7.6% was completed 1 months ago.   Patient states since Her last A1c Her blood glucose levels have been high  after meals at times.  Patient monitors blood glucose 2 times per day with meter : Fasting and After Dinner.   Patient blood glucose monitoring device will not be uploaded into [employer] today  secondary to unable to upload successfully.   Per meter recall: Fasting blood sugars 130 - 200. BG in clinic 274 which after breakfast. Of note, patient does endorse increased stress and healing from ophtho surgery.   Patient endorses the following diabetes related symptoms: None - up to date.   Patient is due today for the following diabetes-related health maintenance standards: Eye Exam, COVID-19 Vaccine  and Mammogram. Completed at [facility]. Counts include 234 beds at the [facility] for mammo in Oct.   She denies recent hospital visits/ emergency room visit.   She denies having hypoglycemia.  Patient's concerns today include glycemic control.    Patient medication regimen is as below.     CURRENT DM MEDICATIONS:    Metformin 1000 mg BID   Glipizide 10 mg TID wm    Jardiance 25 mg daily    Rybelsus 3 mg daily - patient has been off x 2 weeks; having issues keeping dosing schedule    Patient has failed the following Diabetes medications:    Farxiga       Labs Reviewed.       No results found for: CPEPTIDE  No results found for: GLUTAMICACID       //   , There is no height or weight on file to calculate BMI.  Her blood sugar in clinic today is:    Lab Results   Component Value Date    POCGLU 165 (A) 06/15/2022       Review of Systems   Constitutional: Negative for activity change, appetite change, chills and fever.   HENT: Negative for dental problem, mouth sores, nosebleeds, sore throat and trouble swallowing.    Eyes: Negative for pain and discharge.   Respiratory: Negative for shortness of breath, wheezing and stridor.    Cardiovascular: Negative for chest pain, palpitations and leg swelling.   Gastrointestinal: Negative for abdominal pain, diarrhea, nausea and vomiting.   Endocrine: Negative for polydipsia, polyphagia and polyuria.   Genitourinary: Negative for dysuria, frequency and urgency.   Musculoskeletal: Negative for joint swelling and myalgias.   Skin: Negative for rash and wound.   Neurological: Negative for dizziness, syncope, weakness and headaches.   Psychiatric/Behavioral: Negative for behavioral problems and dysphoric mood.         Diabetes Management Status  Statin: Taking  ACE/ARB: Taking    Screening or Prevention Patient's value Goal Complete/Controlled?   HgA1C Testing and Control   Lab Results   Component Value Date    HGBA1C 7.6 (H) 07/26/2022      Annually/Less than 8% Yes   Lipid profile : 04/04/2022 Annually Yes   LDL control Lab Results   Component Value Date    LDLCALC 139.2 09/01/2021    Annually/Less than 100 mg/dl  Yes   Nephropathy screening Lab Results   Component Value Date    MICALBCREAT Unable to calculate 07/26/2022     Lab Results   Component Value Date    PROTEINUA Negative 08/19/2021    Annually Yes   Blood pressure BP Readings from Last 1 Encounters:   06/15/22 (!) 156/83    Less than 140/90 Yes   Dilated retinal exam : 01/12/2021 Annually Yes    Foot exam   : 07/07/2022 Annually No     ACTIVITY LEVEL: Sedentary. Discussed  activities, benefits, methods, and precautions.  MEAL PLANNING: Patient reports number of meals per day to be 3 and number of snacks per day to be 2.   Patient is encouraged to carb count and consume no more than 30 - 45 grams of carbohydrates in each meal and 15 grams of carbohydrates in each snack.     Social History     Socioeconomic History    Marital status:     Number of children: 3   Tobacco Use    Smoking status: Never Smoker    Smokeless tobacco: Never Used   Substance and Sexual Activity    Alcohol use: No     Alcohol/week: 0.0 standard drinks    Drug use: No    Sexual activity: Not Currently     Partners: Male     Birth control/protection: None   Social History Narrative    , 3 kids.     Past Medical History:   Diagnosis Date    Acid reflux     Cataract     Diabetes mellitus, type 2     Glaucoma     HTN (hypertension)     Hyperlipidemia     Osteoarthritis of knee 3/11/2019    Recurrent iritis of left eye 4/13/2016       Objective:        Physical Exam  Constitutional:       General: She is not in acute distress.     Appearance: She is well-developed. She is not diaphoretic.   HENT:      Head: Normocephalic and atraumatic.      Right Ear: External ear normal.      Left Ear: External ear normal.      Nose: Nose normal.   Eyes:      General:         Right eye: No discharge.         Left eye: No discharge.      Pupils: Pupils are equal, round, and reactive to light.   Cardiovascular:      Rate and Rhythm: Normal rate and regular rhythm.      Heart sounds: Normal heart sounds.   Pulmonary:      Effort: Pulmonary effort is normal.      Breath sounds: Normal breath sounds.   Abdominal:      Palpations: Abdomen is soft.   Musculoskeletal:         General: Normal range of motion.      Cervical back: Normal range of motion and neck supple.   Skin:     General: Skin is warm and dry.      Capillary Refill: Capillary refill takes less than 2 seconds.   Neurological:      Mental Status: She  is alert and oriented to person, place, and time.      Motor: No abnormal muscle tone.      Coordination: Coordination normal.   Psychiatric:         Behavior: Behavior normal.         Thought Content: Thought content normal.         Judgment: Judgment normal.           Assessment / Plan:     Type 2 diabetes mellitus with stage 3 chronic kidney disease, without long-term current use of insulin, unspecified whether stage 3a or 3b CKD  -     POCT Glucose, Hand-Held Device  -     Discontinue: semaglutide (RYBELSUS) 7 mg tablet; Take 1 tablet (7 mg total) by mouth once daily.  Dispense: 90 tablet; Refill: 3  -     semaglutide (RYBELSUS) 7 mg tablet; Take 1 tablet (7 mg total) by mouth once daily.  Dispense: 30 tablet; Refill: 11    Hypertension associated with diabetes    Hyperlipidemia associated with type 2 diabetes mellitus    Hypothyroidism, unspecified type    Obesity (BMI 30-39.9)      Additional Plan Details:    - POCT Glucose  - Encouraged continuation of lifestyle changes including regular exercise and limiting carbohydrates to 30-45 grams per meal threes times daily and 15 grams per snack with a limit of two daily.   - Encouraged continued monitoring of blood glucose with maintenance of 3 times daily at Fasting, Before Dinner and After Dinner.   - Current DM Medication Regimen: Continue Jardiance 25 mg daily. Continue Metformin. Continue Glipizide 10 mg TID wm. Change Rybelsus 7 mg daily. Discussed if no improvement, will consider once weekly injectable GLP-1 vs. Insulin.   - Health Maintenance standards addressed today: Eye Exam - will be completed within [facility] and scheduled today, COVID - 19 Vaccine - booster sched and Mammogram - sched in Oct  - Nursing Visit: Patient is below goal range for nursing visit for age group and will not need nursing visit at this time .   - Follow up in 1 months with A1c prior.      Blakeney McKnight, PA-C [facility]      A total of 30 minutes was  spent in face to face time, of which over 50 % was spent in counseling patient on disease process, complications, treatment, and side effects of medications.

## 2022-08-17 ENCOUNTER — OFFICE VISIT (OUTPATIENT)
Dept: DIABETES | Facility: CLINIC | Age: 57
End: 2022-08-17
Payer: MEDICARE

## 2022-08-17 ENCOUNTER — OFFICE VISIT (OUTPATIENT)
Dept: INTERNAL MEDICINE | Facility: CLINIC | Age: 57
End: 2022-08-17
Payer: MEDICARE

## 2022-08-17 ENCOUNTER — OFFICE VISIT (OUTPATIENT)
Dept: OPHTHALMOLOGY | Facility: CLINIC | Age: 57
End: 2022-08-17
Payer: MEDICARE

## 2022-08-17 VITALS
DIASTOLIC BLOOD PRESSURE: 76 MMHG | HEART RATE: 77 BPM | HEIGHT: 59 IN | OXYGEN SATURATION: 99 % | RESPIRATION RATE: 18 BRPM | SYSTOLIC BLOOD PRESSURE: 118 MMHG | WEIGHT: 158.31 LBS | BODY MASS INDEX: 31.92 KG/M2

## 2022-08-17 VITALS
WEIGHT: 157.88 LBS | HEART RATE: 73 BPM | DIASTOLIC BLOOD PRESSURE: 84 MMHG | BODY MASS INDEX: 31.88 KG/M2 | SYSTOLIC BLOOD PRESSURE: 132 MMHG

## 2022-08-17 DIAGNOSIS — Z01.419 WELL WOMAN EXAM: ICD-10-CM

## 2022-08-17 DIAGNOSIS — E11.22 TYPE 2 DIABETES MELLITUS WITH STAGE 3 CHRONIC KIDNEY DISEASE, WITHOUT LONG-TERM CURRENT USE OF INSULIN, UNSPECIFIED WHETHER STAGE 3A OR 3B CKD: Primary | ICD-10-CM

## 2022-08-17 DIAGNOSIS — E11.22 TYPE 2 DIABETES MELLITUS WITH STAGE 3 CHRONIC KIDNEY DISEASE, WITHOUT LONG-TERM CURRENT USE OF INSULIN, UNSPECIFIED WHETHER STAGE 3A OR 3B CKD: ICD-10-CM

## 2022-08-17 DIAGNOSIS — E11.69 HYPERLIPIDEMIA ASSOCIATED WITH TYPE 2 DIABETES MELLITUS: ICD-10-CM

## 2022-08-17 DIAGNOSIS — K21.9 GASTROESOPHAGEAL REFLUX DISEASE, UNSPECIFIED WHETHER ESOPHAGITIS PRESENT: ICD-10-CM

## 2022-08-17 DIAGNOSIS — H40.1133 PRIMARY OPEN ANGLE GLAUCOMA OF BOTH EYES, SEVERE STAGE: ICD-10-CM

## 2022-08-17 DIAGNOSIS — E11.59 HYPERTENSION ASSOCIATED WITH DIABETES: ICD-10-CM

## 2022-08-17 DIAGNOSIS — E78.5 HYPERLIPIDEMIA ASSOCIATED WITH TYPE 2 DIABETES MELLITUS: ICD-10-CM

## 2022-08-17 DIAGNOSIS — E79.0 HYPERURICEMIA: ICD-10-CM

## 2022-08-17 DIAGNOSIS — M17.0 PRIMARY OSTEOARTHRITIS OF BOTH KNEES: ICD-10-CM

## 2022-08-17 DIAGNOSIS — E66.9 OBESITY (BMI 30-39.9): ICD-10-CM

## 2022-08-17 DIAGNOSIS — N18.30 TYPE 2 DIABETES MELLITUS WITH STAGE 3 CHRONIC KIDNEY DISEASE, WITHOUT LONG-TERM CURRENT USE OF INSULIN, UNSPECIFIED WHETHER STAGE 3A OR 3B CKD: ICD-10-CM

## 2022-08-17 DIAGNOSIS — E03.9 HYPOTHYROIDISM, UNSPECIFIED TYPE: ICD-10-CM

## 2022-08-17 DIAGNOSIS — I15.2 HYPERTENSION ASSOCIATED WITH DIABETES: ICD-10-CM

## 2022-08-17 DIAGNOSIS — Z98.890 POST-OPERATIVE STATE: Primary | ICD-10-CM

## 2022-08-17 DIAGNOSIS — E11.42 TYPE 2 DIABETES MELLITUS WITH DIABETIC POLYNEUROPATHY, WITHOUT LONG-TERM CURRENT USE OF INSULIN: ICD-10-CM

## 2022-08-17 DIAGNOSIS — N18.30 TYPE 2 DIABETES MELLITUS WITH STAGE 3 CHRONIC KIDNEY DISEASE, WITHOUT LONG-TERM CURRENT USE OF INSULIN, UNSPECIFIED WHETHER STAGE 3A OR 3B CKD: Primary | ICD-10-CM

## 2022-08-17 DIAGNOSIS — E11.59 HYPERTENSION ASSOCIATED WITH DIABETES: Primary | ICD-10-CM

## 2022-08-17 DIAGNOSIS — E55.9 VITAMIN D DEFICIENCY: ICD-10-CM

## 2022-08-17 DIAGNOSIS — E11.22 TYPE 2 DIABETES MELLITUS WITH STAGE 3B CHRONIC KIDNEY DISEASE, WITHOUT LONG-TERM CURRENT USE OF INSULIN: ICD-10-CM

## 2022-08-17 DIAGNOSIS — N18.32 TYPE 2 DIABETES MELLITUS WITH STAGE 3B CHRONIC KIDNEY DISEASE, WITHOUT LONG-TERM CURRENT USE OF INSULIN: ICD-10-CM

## 2022-08-17 DIAGNOSIS — E66.9 OBESITY (BMI 30.0-34.9): ICD-10-CM

## 2022-08-17 DIAGNOSIS — J06.9 URTI (ACUTE UPPER RESPIRATORY INFECTION): ICD-10-CM

## 2022-08-17 DIAGNOSIS — I15.2 HYPERTENSION ASSOCIATED WITH DIABETES: Primary | ICD-10-CM

## 2022-08-17 LAB
ALBUMIN SERPL BCP-MCNC: 4.1 G/DL (ref 3.5–5.2)
ALP SERPL-CCNC: 70 U/L (ref 55–135)
ALT SERPL W/O P-5'-P-CCNC: 16 U/L (ref 10–44)
ANION GAP SERPL CALC-SCNC: 13 MMOL/L (ref 8–16)
AST SERPL-CCNC: 17 U/L (ref 10–40)
BILIRUB SERPL-MCNC: 0.4 MG/DL (ref 0.1–1)
BUN SERPL-MCNC: 21 MG/DL (ref 6–20)
CALCIUM SERPL-MCNC: 9.6 MG/DL (ref 8.7–10.5)
CHLORIDE SERPL-SCNC: 102 MMOL/L (ref 95–110)
CHOLEST SERPL-MCNC: 278 MG/DL (ref 120–199)
CHOLEST/HDLC SERPL: 5.2 {RATIO} (ref 2–5)
CO2 SERPL-SCNC: 17 MMOL/L (ref 23–29)
CREAT SERPL-MCNC: 1.6 MG/DL (ref 0.5–1.4)
EST. GFR  (NO RACE VARIABLE): 36.9 ML/MIN/1.73 M^2
GLUCOSE SERPL-MCNC: 274 MG/DL (ref 70–110)
GLUCOSE SERPL-MCNC: 297 MG/DL (ref 70–110)
HDLC SERPL-MCNC: 53 MG/DL (ref 40–75)
HDLC SERPL: 19.1 % (ref 20–50)
LDLC SERPL CALC-MCNC: 173.8 MG/DL (ref 63–159)
NONHDLC SERPL-MCNC: 225 MG/DL
POTASSIUM SERPL-SCNC: 4.6 MMOL/L (ref 3.5–5.1)
PROT SERPL-MCNC: 7.6 G/DL (ref 6–8.4)
SODIUM SERPL-SCNC: 132 MMOL/L (ref 136–145)
TRIGL SERPL-MCNC: 256 MG/DL (ref 30–150)
TSH SERPL DL<=0.005 MIU/L-ACNC: 2.56 UIU/ML (ref 0.4–4)

## 2022-08-17 PROCEDURE — 99999 PR PBB SHADOW E&M-EST. PATIENT-LVL V: ICD-10-PCS | Mod: PBBFAC,,, | Performed by: PHYSICIAN ASSISTANT

## 2022-08-17 PROCEDURE — 99215 OFFICE O/P EST HI 40 MIN: CPT | Mod: PBBFAC,27 | Performed by: PHYSICIAN ASSISTANT

## 2022-08-17 PROCEDURE — 99999 PR PBB SHADOW E&M-EST. PATIENT-LVL III: CPT | Mod: PBBFAC,,, | Performed by: OPHTHALMOLOGY

## 2022-08-17 PROCEDURE — 99214 OFFICE O/P EST MOD 30 MIN: CPT | Mod: S$PBB,,, | Performed by: PHYSICIAN ASSISTANT

## 2022-08-17 PROCEDURE — 99215 OFFICE O/P EST HI 40 MIN: CPT | Mod: PBBFAC,27 | Performed by: FAMILY MEDICINE

## 2022-08-17 PROCEDURE — 99214 OFFICE O/P EST MOD 30 MIN: CPT | Mod: S$PBB,,, | Performed by: FAMILY MEDICINE

## 2022-08-17 PROCEDURE — 99999 PR PBB SHADOW E&M-EST. PATIENT-LVL V: CPT | Mod: PBBFAC,,, | Performed by: PHYSICIAN ASSISTANT

## 2022-08-17 PROCEDURE — 85025 COMPLETE CBC W/AUTO DIFF WBC: CPT | Performed by: FAMILY MEDICINE

## 2022-08-17 PROCEDURE — 99024 POSTOP FOLLOW-UP VISIT: CPT | Mod: POP,,, | Performed by: OPHTHALMOLOGY

## 2022-08-17 PROCEDURE — 99214 PR OFFICE/OUTPT VISIT, EST, LEVL IV, 30-39 MIN: ICD-10-PCS | Mod: S$PBB,,, | Performed by: FAMILY MEDICINE

## 2022-08-17 PROCEDURE — 99999 PR PBB SHADOW E&M-EST. PATIENT-LVL V: CPT | Mod: PBBFAC,,, | Performed by: FAMILY MEDICINE

## 2022-08-17 PROCEDURE — 82962 GLUCOSE BLOOD TEST: CPT | Mod: PBBFAC | Performed by: PHYSICIAN ASSISTANT

## 2022-08-17 PROCEDURE — 80061 LIPID PANEL: CPT | Performed by: FAMILY MEDICINE

## 2022-08-17 PROCEDURE — 99214 PR OFFICE/OUTPT VISIT, EST, LEVL IV, 30-39 MIN: ICD-10-PCS | Mod: S$PBB,,, | Performed by: PHYSICIAN ASSISTANT

## 2022-08-17 PROCEDURE — 99024 PR POST-OP FOLLOW-UP VISIT: ICD-10-PCS | Mod: POP,,, | Performed by: OPHTHALMOLOGY

## 2022-08-17 PROCEDURE — 80053 COMPREHEN METABOLIC PANEL: CPT | Performed by: FAMILY MEDICINE

## 2022-08-17 PROCEDURE — 99999 PR PBB SHADOW E&M-EST. PATIENT-LVL III: ICD-10-PCS | Mod: PBBFAC,,, | Performed by: OPHTHALMOLOGY

## 2022-08-17 PROCEDURE — 84443 ASSAY THYROID STIM HORMONE: CPT | Performed by: FAMILY MEDICINE

## 2022-08-17 PROCEDURE — 99213 OFFICE O/P EST LOW 20 MIN: CPT | Mod: PBBFAC | Performed by: OPHTHALMOLOGY

## 2022-08-17 PROCEDURE — 99999 PR PBB SHADOW E&M-EST. PATIENT-LVL V: ICD-10-PCS | Mod: PBBFAC,,, | Performed by: FAMILY MEDICINE

## 2022-08-17 RX ORDER — GLIPIZIDE 10 MG/1
10 TABLET ORAL
Qty: 270 TABLET | Refills: 3 | Status: CANCELLED | OUTPATIENT
Start: 2022-08-17 | End: 2023-08-17

## 2022-08-17 RX ORDER — FENOFIBRATE 134 MG/1
134 CAPSULE ORAL
Qty: 90 CAPSULE | Refills: 3 | Status: SHIPPED | OUTPATIENT
Start: 2022-08-17 | End: 2023-11-08 | Stop reason: SDUPTHER

## 2022-08-17 RX ORDER — KETOROLAC TROMETHAMINE 5 MG/ML
1 SOLUTION OPHTHALMIC 4 TIMES DAILY
Qty: 5 ML | Refills: 1 | Status: SHIPPED | OUTPATIENT
Start: 2022-08-17 | End: 2022-10-16 | Stop reason: SDUPTHER

## 2022-08-17 RX ORDER — AMLODIPINE BESYLATE 10 MG/1
TABLET ORAL
Qty: 90 TABLET | Refills: 3 | Status: SHIPPED | OUTPATIENT
Start: 2022-08-17 | End: 2023-06-30 | Stop reason: SDUPTHER

## 2022-08-17 RX ORDER — ORAL SEMAGLUTIDE 7 MG/1
7 TABLET ORAL DAILY
Qty: 90 TABLET | Refills: 3 | Status: SHIPPED | OUTPATIENT
Start: 2022-08-17 | End: 2022-08-17

## 2022-08-17 RX ORDER — GABAPENTIN 300 MG/1
300 CAPSULE ORAL 2 TIMES DAILY
Qty: 180 CAPSULE | Refills: 3 | Status: SHIPPED | OUTPATIENT
Start: 2022-08-17 | End: 2023-12-06 | Stop reason: SDUPTHER

## 2022-08-17 RX ORDER — ORAL SEMAGLUTIDE 7 MG/1
7 TABLET ORAL DAILY
Qty: 30 TABLET | Refills: 11 | Status: SHIPPED | OUTPATIENT
Start: 2022-08-17 | End: 2022-09-06

## 2022-08-17 RX ORDER — GLIPIZIDE 10 MG/1
10 TABLET ORAL
Qty: 270 TABLET | Refills: 3 | Status: SHIPPED | OUTPATIENT
Start: 2022-08-17 | End: 2023-08-10 | Stop reason: SDUPTHER

## 2022-08-17 RX ORDER — METFORMIN HYDROCHLORIDE 1000 MG/1
1000 TABLET ORAL 2 TIMES DAILY WITH MEALS
Qty: 180 TABLET | Refills: 3 | Status: SHIPPED | OUTPATIENT
Start: 2022-08-17 | End: 2023-08-10 | Stop reason: SDUPTHER

## 2022-08-17 RX ORDER — LISINOPRIL AND HYDROCHLOROTHIAZIDE 20; 25 MG/1; MG/1
1 TABLET ORAL DAILY
Qty: 90 TABLET | Refills: 3 | Status: SHIPPED | OUTPATIENT
Start: 2022-08-17 | End: 2023-04-28 | Stop reason: SDUPTHER

## 2022-08-17 RX ORDER — ALLOPURINOL 100 MG/1
200 TABLET ORAL DAILY
Qty: 180 TABLET | Refills: 3 | Status: SHIPPED | OUTPATIENT
Start: 2022-08-17 | End: 2023-06-30 | Stop reason: SDUPTHER

## 2022-08-17 RX ORDER — ATORVASTATIN CALCIUM 40 MG/1
40 TABLET, FILM COATED ORAL DAILY
Qty: 90 TABLET | Refills: 3 | Status: SHIPPED | OUTPATIENT
Start: 2022-08-17 | End: 2022-08-18 | Stop reason: ALTCHOICE

## 2022-08-17 RX ORDER — LEVOTHYROXINE SODIUM 25 UG/1
25 TABLET ORAL DAILY
Qty: 90 TABLET | Refills: 3 | Status: SHIPPED | OUTPATIENT
Start: 2022-08-17 | End: 2023-08-28 | Stop reason: SDUPTHER

## 2022-08-17 NOTE — PROGRESS NOTES
Subjective:       Patient ID: Richelle Zaldivar is a 57 y.o. female.    Chief Complaint: Follow-up    57-year-old female patient accompanied by her daughter with Patient Active Problem List:     Hypertension associated with diabetes     Type 2 diabetes mellitus with stage 3 chronic kidney disease, without long-term current use of insulin     Hyperlipidemia associated with type 2 diabetes mellitus     Iron deficiency anemia     Vitamin D deficiency     GERD (gastroesophageal reflux disease)     Retinitis pigmentosa     Recurrent iritis of left eye     Open-angle glaucoma, severe stage     Osteoarthritis of knee     Hypothyroidism     Obesity (BMI 30.0-34.9)  Here for follow-up on chronic medical conditions and reports that patient has been taking her medications regularly, had seen cardiologist Dr. Edouard Zaldivar who changed her blood pressure medication from lisinopril hydrochlorothiazide 20/25 mg to Avalide hydrochlorothiazide 300/12.5 mg daily, patient would like to go back on lisinopril hydrochlorothiazide as she has not been feeling better since taking Avalide.   Denies any chest pain or difficulty breathing with palpitations but has been having sore throat and left earache and would like to get checked  Due for well-woman exam  Denies any extreme fatigue and has been walking daily    Review of Systems   Constitutional: Negative for chills, fatigue and fever.   HENT: Positive for ear pain and sore throat.    Eyes: Negative for visual disturbance.   Respiratory: Negative for shortness of breath.    Cardiovascular: Negative for chest pain and leg swelling.   Gastrointestinal: Negative for abdominal pain, nausea and vomiting.   Musculoskeletal: Negative for myalgias.   Skin: Negative for rash.   Neurological: Negative for light-headedness and headaches.   Psychiatric/Behavioral: Negative for sleep disturbance.         /76 (BP Location: Right arm, Patient Position: Sitting, BP Method: Medium (Manual))   Pulse 77    "Resp 18   Ht 4' 11" (1.499 m)   Wt 71.8 kg (158 lb 4.6 oz)   SpO2 99%   BMI 31.97 kg/m²   Objective:      Physical Exam  Constitutional:       Appearance: She is well-developed.   HENT:      Head: Normocephalic and atraumatic.      Right Ear: Tympanic membrane, ear canal and external ear normal. There is no impacted cerumen.      Left Ear: Tympanic membrane, ear canal and external ear normal. There is no impacted cerumen.      Mouth/Throat:      Mouth: Mucous membranes are moist.      Pharynx: No oropharyngeal exudate or posterior oropharyngeal erythema.   Cardiovascular:      Rate and Rhythm: Normal rate and regular rhythm.      Heart sounds: Normal heart sounds. No murmur heard.  Pulmonary:      Effort: Pulmonary effort is normal.      Breath sounds: Normal breath sounds. No wheezing.   Abdominal:      General: Bowel sounds are normal.      Palpations: Abdomen is soft.      Tenderness: There is no abdominal tenderness.   Lymphadenopathy:      Cervical: No cervical adenopathy.   Skin:     General: Skin is warm and dry.      Findings: Bruising present. No rash.   Neurological:      Mental Status: She is alert and oriented to person, place, and time.   Psychiatric:         Mood and Affect: Mood normal.           Assessment/Plan:   1. Hypertension associated with diabetes  - lisinopriL-hydrochlorothiazide (PRINZIDE,ZESTORETIC) 20-25 mg Tab; Take 1 tablet by mouth once daily.  Dispense: 90 tablet; Refill: 3  - Comprehensive Metabolic Panel; Future  - Lipid Panel; Future  - TSH; Future  - amLODIPine (NORVASC) 10 MG tablet; Take 1 tablet orally once a day.  Dispense: 90 tablet; Refill: 3  - TSH  - Lipid Panel  - Comprehensive Metabolic Panel  Blood pressure is stable currently on amlodipine 10 mg and will change Avalide to lisinopril hydrochlorothiazide 20/25 mg daily  Restrict salt intake and eat low-fat and low-cholesterol diet and exercise 30 minutes daily    2. Type 2 diabetes mellitus with stage 3b chronic " kidney disease, without long-term current use of insulin  - Comprehensive Metabolic Panel; Future  - Lipid Panel; Future  - glipiZIDE (GLUCOTROL) 10 MG tablet; Take 1 tablet (10 mg total) by mouth 3 (three) times daily before meals.  Dispense: 270 tablet; Refill: 3  - metFORMIN (GLUCOPHAGE) 1000 MG tablet; Take 1 tablet (1,000 mg total) by mouth 2 (two) times daily with meals.  Dispense: 180 tablet; Refill: 3  - Lipid Panel  - Comprehensive Metabolic Panel  Currently taking glipizide 10 mg 3 times daily and metformin 1000 mg twice daily  Patient was advised to check with diabetes clinic to see if she is still taking Farxiga and Rybelsus    3. Hyperlipidemia associated with type 2 diabetes mellitus  - Lipid Panel; Future  - atorvastatin (LIPITOR) 40 MG tablet; Take 1 tablet (40 mg total) by mouth once daily.  Dispense: 90 tablet; Refill: 3  - fenofibrate micronized (LOFIBRA) 134 MG Cap; Take 1 capsule (134 mg total) by mouth daily with breakfast.  Dispense: 90 capsule; Refill: 3  - Lipid Panel  Stable on Lipitor 40 mg and fenofibrate 134 mg, refills given today    4. Hypothyroidism, unspecified type  - TSH; Future  - levothyroxine (SYNTHROID) 25 MCG tablet; Take 1 tablet (25 mcg total) by mouth once daily.  Dispense: 90 tablet; Refill: 3  - TSH  Currently taking levothyroxine 25 mcg p.o. daily    5. Gastroesophageal reflux disease, unspecified whether esophagitis present  Stable with diet changes    6. Primary osteoarthritis of both knees  Graded exercise regimen recommended    7. Well woman exam  - Ambulatory referral/consult to Gynecology; Future  Due for well-woman exam    8. URTI (acute upper respiratory infection)  - CBC Auto Differential; Future  - CBC Auto Differential  Advised to take Claritin and saltwater gargles recommended    9. Obesity (BMI 30.0-34.9)  Lifestyle modifications recommended to lose weight with BMI 31    10. Vitamin D deficiency  Taking vitamin-D by prescription weekly    11.  Hyperuricemia  - allopurinoL (ZYLOPRIM) 100 MG tablet; Take 2 tablets (200 mg total) by mouth once daily.  Dispense: 180 tablet; Refill: 3  Stable on allopurinol 200 mg daily    12. Type 2 diabetes mellitus with diabetic polyneuropathy, without long-term current use of insulin  - gabapentin (NEURONTIN) 300 MG capsule; Take 1 capsule (300 mg total) by mouth 2 (two) times daily.  Dispense: 180 capsule; Refill: 3  Neuropathy has been stable on gabapentin 300 mg twice daily

## 2022-08-17 NOTE — PROGRESS NOTES
HPI     Post-op Evaluation     Comments: Pt reports for 2wk post op wound revision. Having some pain in   OS. Va blurry. 100% compliant with gtts.               Comments     1. COAG (followed by Dr. Scott)   S/p Revision of Wound Left Eye 8/4/22  Ahmed Valve OS 8/2/18   CP OS 8/27/15 - low to moderate flow, TDW 1100, aggressive dilation with   GV   Gonio Puncture OS 9/25/15   Wound Revision OS 08/04/22  SLT OS 6/23/16, 6/23/15, 6-2-14   Goniotomy OS 11-17-16   2. DM   3. RP / VIT A PALMITATE 1500 (discontinued)   4. Retinitis Pigmentosa of both eyes   5. Dry Eye Syndrome bilateral   6. Iritis recurrent OS       OS: Lotemax SM QID   OS: Dorz/Timolol BID     OS: Latanoprost QHS   OS: Brimonidine TID   OD: Pred PRN  OD: Atropine BID     OS: Systane and genteal gel QHS            Last edited by Robert Craig on 8/17/2022  8:51 AM. (History)            Assessment /Plan     For exam results, see Encounter Report.      ICD-10-CM ICD-9-CM    1. Post-operative state  Z98.890 V45.89 S/p REVISION OF WOUND OS - POV #2 - DOING WELL    2. Primary open angle glaucoma of both eyes, severe stage  H40.1133 365.11      365.73            OS: Lotemax SM QID   OS: Dorz/Timolol BID     OS: Latanoprost QHS   OS: Brimonidine TID   OD: Pred PRN  OD: Atropine BID     OS: Systane and genteal gel QHS    Return to clinic 3 WEEKS

## 2022-08-18 DIAGNOSIS — E11.69 HYPERLIPIDEMIA ASSOCIATED WITH TYPE 2 DIABETES MELLITUS: Primary | ICD-10-CM

## 2022-08-18 DIAGNOSIS — E78.5 HYPERLIPIDEMIA ASSOCIATED WITH TYPE 2 DIABETES MELLITUS: Primary | ICD-10-CM

## 2022-08-18 LAB
BASOPHILS # BLD AUTO: 0.05 K/UL (ref 0–0.2)
BASOPHILS NFR BLD: 0.7 % (ref 0–1.9)
DIFFERENTIAL METHOD: ABNORMAL
EOSINOPHIL # BLD AUTO: 0.9 K/UL (ref 0–0.5)
EOSINOPHIL NFR BLD: 11.2 % (ref 0–8)
ERYTHROCYTE [DISTWIDTH] IN BLOOD BY AUTOMATED COUNT: 12.3 % (ref 11.5–14.5)
HCT VFR BLD AUTO: 45.4 % (ref 37–48.5)
HGB BLD-MCNC: 14 G/DL (ref 12–16)
IMM GRANULOCYTES # BLD AUTO: 0.04 K/UL (ref 0–0.04)
IMM GRANULOCYTES NFR BLD AUTO: 0.5 % (ref 0–0.5)
LYMPHOCYTES # BLD AUTO: 2.1 K/UL (ref 1–4.8)
LYMPHOCYTES NFR BLD: 27.2 % (ref 18–48)
MCH RBC QN AUTO: 28.7 PG (ref 27–31)
MCHC RBC AUTO-ENTMCNC: 30.8 G/DL (ref 32–36)
MCV RBC AUTO: 93 FL (ref 82–98)
MONOCYTES # BLD AUTO: 0.5 K/UL (ref 0.3–1)
MONOCYTES NFR BLD: 6.6 % (ref 4–15)
NEUTROPHILS # BLD AUTO: 4.1 K/UL (ref 1.8–7.7)
NEUTROPHILS NFR BLD: 53.8 % (ref 38–73)
NRBC BLD-RTO: 0 /100 WBC
PLATELET # BLD AUTO: 288 K/UL (ref 150–450)
PMV BLD AUTO: 10.5 FL (ref 9.2–12.9)
RBC # BLD AUTO: 4.88 M/UL (ref 4–5.4)
WBC # BLD AUTO: 7.68 K/UL (ref 3.9–12.7)

## 2022-08-18 RX ORDER — ROSUVASTATIN CALCIUM 40 MG/1
40 TABLET, COATED ORAL NIGHTLY
Qty: 90 TABLET | Refills: 3 | Status: SHIPPED | OUTPATIENT
Start: 2022-08-18 | End: 2023-11-08 | Stop reason: SDUPTHER

## 2022-08-23 ENCOUNTER — PATIENT MESSAGE (OUTPATIENT)
Dept: OPHTHALMOLOGY | Facility: CLINIC | Age: 57
End: 2022-08-23

## 2022-08-23 ENCOUNTER — OFFICE VISIT (OUTPATIENT)
Dept: OPHTHALMOLOGY | Facility: CLINIC | Age: 57
End: 2022-08-23
Payer: MEDICARE

## 2022-08-23 DIAGNOSIS — H20.022 IRITIS, RECURRENT, LEFT: ICD-10-CM

## 2022-08-23 DIAGNOSIS — Z98.890 POST-OPERATIVE STATE: Primary | ICD-10-CM

## 2022-08-23 DIAGNOSIS — H40.1133 PRIMARY OPEN ANGLE GLAUCOMA OF BOTH EYES, SEVERE STAGE: ICD-10-CM

## 2022-08-23 PROCEDURE — 99024 POSTOP FOLLOW-UP VISIT: CPT | Mod: POP,,, | Performed by: OPHTHALMOLOGY

## 2022-08-23 PROCEDURE — 99024 PR POST-OP FOLLOW-UP VISIT: ICD-10-PCS | Mod: POP,,, | Performed by: OPHTHALMOLOGY

## 2022-08-23 PROCEDURE — 99213 OFFICE O/P EST LOW 20 MIN: CPT | Mod: PBBFAC | Performed by: OPHTHALMOLOGY

## 2022-08-23 PROCEDURE — 99999 PR PBB SHADOW E&M-EST. PATIENT-LVL III: ICD-10-PCS | Mod: PBBFAC,,, | Performed by: OPHTHALMOLOGY

## 2022-08-23 PROCEDURE — 99999 PR PBB SHADOW E&M-EST. PATIENT-LVL III: CPT | Mod: PBBFAC,,, | Performed by: OPHTHALMOLOGY

## 2022-08-23 NOTE — PROGRESS NOTES
HPI     Post-op Evaluation     Comments: S/p Wound revision OS 8/4/22              Comments     Started having pain in the OS yesterday - feels scratchy and occas sharp   pain - has had a runny nose and increased sneezing x 2 days, as well -    using and tolerating OS: Lotemax SM QID / Cosopt BID/ Latan Qhs/ Brim TID   - OD Pred as needed/ Atropine BID  - along with Systane and genteal gel -   no va change OS      PCP: Dr. Miranda Ackerman       1. COAG (followed by Dr. Scott)   S/p Revision of Wound Left Eye 8/4/22  Ahmed Valve OS 8/2/18   CP OS 8/27/15 - low to moderate flow, TDW 1100, aggressive dilation with   GV   Gonio Puncture OS 9/25/15   Wound Revision OS 08/04/22  SLT OS 6/23/16, 6/23/15, 6-2-14   Goniotomy OS 11-17-16   2. DM   3. RP / VIT A PALMITATE 1500 (discontinued)   4. Retinitis Pigmentosa of both eyes   5. Dry Eye Syndrome bilateral   6. Iritis recurrent OS       OS: Lotemax SM QID   OS: Dorz/Timolol BID     OS: Latanoprost QHS   OS: Brimonidine TID   OD: Pred PRN  OD: Atropine BID     OS: Systane and genteal gel QHS            Last edited by Adirenne Maxwell MA on 8/23/2022  1:59 PM. (History)            Assessment /Plan     For exam results, see Encounter Report.      ICD-10-CM ICD-9-CM    1. Post-operative state  Z98.890 V45.89 Doing well - was concerned due to sneezing episodes. Reassured area is doing well, will not remove sutures at this time    2. Primary open angle glaucoma of both eyes, severe stage  H40.1133 365.11 See above     365.73    3. Iritis, recurrent, left  H20.022 364.02        Return to clinic as scheduled      OS: Lotemax SM QID   OS: Dorz/Timolol BID     OS: Latanoprost QHS   OS: Brimonidine TID   OD: Pred PRN  OD: Atropine BID

## 2022-09-02 NOTE — PROGRESS NOTES
PCP: Oneyda Bergman MD    Subjective:     Chief Complaint: Diabetes - Established Patient    Established Patient - Audio Only Telehealth Visit     The patient location is: Home  The chief complaint leading to consultation is: Diabetes follow up  Visit type: Virtual visit with audio only (telephone)  Total Time Spent with Patient: 9 minutes     The reason for the audio only service rather than synchronous audio and video virtual visit was related to technical difficulties or patient preference/necessity.     Each patient to whom I provide medical services by telemedicine is:  (1) informed of the relationship between the physician and patient and the respective role of any other health care provider with respect to management of the patient; and (2) notified that they may decline to receive medical services by telemedicine and may withdraw from such care at any time. Patient verbally consented to receive this service via voice-only telephone call.    This service was not originating from a related E/M service provided within the previous 7 days nor will  to an E/M service or procedure within the next 24 hours or my soonest available appointment.  Prevailing standard of care was able to be met in this audio-only visit.       HISTORY OF PRESENT ILLNESS: 57 y.o.   female presenting for diabetes management visit.   The patient's last visit with me was on 8/17/2022.  English is not patient's first language, however patient declines  and gives consent for daughter to translate.   Patient has had Type II diabetes since 10 or more years.  Pertinent to decision making is the following comorbidities: HTN, HLD, CKD III, Hypothyroidism and Obesity by BMI  Patient has the following Diabetes complications: with diabetic chronic kidney disease  She  has attended diabetes education in the past.     Patient's most recent A1c of 7.6% was completed 1 months ago.   Patient states since Her last A1c Her blood glucose  levels have been high  after meals at times.  Patient monitors blood glucose 2 times per day with meter : Fasting and After Dinner.   Patient blood glucose monitoring device will not be uploaded into [employer] today  secondary to unable to upload successfully.   Per meter recall: Fasting blood sugars 86 -186 and after dinner 153 - 226.   Patient endorses the following diabetes related symptoms:  None - up to date .   Patient is due today for the following diabetes-related health maintenance standards: Eye Exam, Influenza Vaccine, COVID-19 Vaccine , and Mammogram . Completed at [facility]. [facility] for mammo in Oct.   She denies recent hospital visits/ emergency room visit.   She denies having hypoglycemia.  Patient's concerns today include glycemic control.    Patient medication regimen is as below.     CURRENT DM MEDICATIONS:   Metformin 1000 mg BID  Glipizide 10 mg TID wm   Jardiance 25 mg daily   Rybelsus 7 mg daily     Patient has failed the following Diabetes medications:   Farxiga      Labs Reviewed.       No results found for: CPEPTIDE  No results found for: GLUTAMICACID       //   , There is no height or weight on file to calculate BMI.  Her blood sugar in clinic today is:    Lab Results   Component Value Date    POCGLU 274 (A) 08/17/2022       Review of Systems   Constitutional:  Negative for activity change, appetite change, chills and fever.   HENT:  Negative for dental problem, mouth sores, nosebleeds, sore throat and trouble swallowing.    Eyes:  Negative for pain and discharge.   Respiratory:  Negative for shortness of breath, wheezing and stridor.    Cardiovascular:  Negative for chest pain, palpitations and leg swelling.   Gastrointestinal:  Negative for abdominal pain, diarrhea, nausea and vomiting.   Endocrine: Negative for polydipsia, polyphagia and polyuria.   Genitourinary:  Negative for dysuria, frequency and urgency.   Musculoskeletal:  Negative for joint swelling and myalgias.   Skin:  Negative for  rash and wound.   Neurological:  Negative for dizziness, syncope, weakness and headaches.   Psychiatric/Behavioral:  Negative for behavioral problems and dysphoric mood.        Diabetes Management Status  Statin: Taking  ACE/ARB: Taking    Screening or Prevention Patient's value Goal Complete/Controlled?   HgA1C Testing and Control   Lab Results   Component Value Date    HGBA1C 7.6 (H) 07/26/2022      Annually/Less than 8% Yes   Lipid profile : 08/17/2022 Annually Yes   LDL control Lab Results   Component Value Date    LDLCALC 173.8 (H) 08/17/2022    Annually/Less than 100 mg/dl  Yes   Nephropathy screening Lab Results   Component Value Date    MICALBCREAT Unable to calculate 07/26/2022     Lab Results   Component Value Date    PROTEINUA Negative 08/19/2021    Annually Yes   Blood pressure BP Readings from Last 1 Encounters:   08/17/22 132/84    Less than 140/90 Yes   Dilated retinal exam : 01/12/2021 Annually Yes    Foot exam   : 07/07/2022 Annually No     ACTIVITY LEVEL: Sedentary. Discussed activities, benefits, methods, and precautions.  MEAL PLANNING: Patient reports number of meals per day to be 3 and number of snacks per day to be 2.   Patient is encouraged to carb count and consume no more than 30 - 45 grams of carbohydrates in each meal and 15 grams of carbohydrates in each snack.     Social History     Socioeconomic History    Marital status:     Number of children: 3   Tobacco Use    Smoking status: Never    Smokeless tobacco: Never   Substance and Sexual Activity    Alcohol use: No     Alcohol/week: 0.0 standard drinks    Drug use: No    Sexual activity: Not Currently     Partners: Male     Birth control/protection: None   Social History Narrative    , 3 kids.     Past Medical History:   Diagnosis Date    Acid reflux     Cataract     Diabetes mellitus, type 2     Glaucoma     HTN (hypertension)     Hyperlipidemia     Osteoarthritis of knee 3/11/2019    Recurrent iritis of left eye 4/13/2016        Objective:        Physical Exam  Neurological:      Mental Status: She is alert and oriented to person, place, and time. Mental status is at baseline.   Psychiatric:         Mood and Affect: Mood normal.         Behavior: Behavior normal.         Thought Content: Thought content normal.         Judgment: Judgment normal.         Assessment / Plan:     Type 2 diabetes mellitus with stage 3 chronic kidney disease, without long-term current use of insulin, unspecified whether stage 3a or 3b CKD  -     semaglutide (RYBELSUS) 14 mg tablet; Take 1 tablet (14 mg total) by mouth once daily.  Dispense: 90 tablet; Refill: 3    Hypertension associated with diabetes    Hyperlipidemia associated with type 2 diabetes mellitus    Hypothyroidism, unspecified type    Obesity (BMI 30-39.9)    Additional Plan Details:    - POCT Glucose  - Encouraged continuation of lifestyle changes including regular exercise and limiting carbohydrates to 30-45 grams per meal threes times daily and 15 grams per snack with a limit of two daily.   - Encouraged continued monitoring of blood glucose with maintenance of 3 times daily at Fasting, Before Dinner and After Dinner.   - Current DM Medication Regimen: Continue Jardiance 25 mg daily. Continue Metformin. Continue Glipizide 10 mg TID wm. Change Rybelsus 14 mg daily. Discussed if no improvement, will consider once weekly injectable GLP-1 vs. Insulin.   - Health Maintenance standards addressed today: Eye Exam - will be completed within [facility] and scheduled today, Flu Shot - patient would like to complete outside of [facility], COVID - 19 Vaccine - booster sched, and Mammogram - sched in Oct  - Nursing Visit: Patient is below goal range for nursing visit for age group and will not need nursing visit at this time .   - Follow up in 1 months with A1c prior.      Blakeney McKnight, PA-C [facility]

## 2022-09-05 ENCOUNTER — PATIENT MESSAGE (OUTPATIENT)
Dept: OPHTHALMOLOGY | Facility: CLINIC | Age: 57
End: 2022-09-05
Payer: MEDICARE

## 2022-09-06 ENCOUNTER — OFFICE VISIT (OUTPATIENT)
Dept: DIABETES | Facility: CLINIC | Age: 57
End: 2022-09-06
Payer: MEDICARE

## 2022-09-06 ENCOUNTER — OFFICE VISIT (OUTPATIENT)
Dept: OPHTHALMOLOGY | Facility: CLINIC | Age: 57
End: 2022-09-06
Payer: MEDICARE

## 2022-09-06 DIAGNOSIS — E11.22 TYPE 2 DIABETES MELLITUS WITH STAGE 3 CHRONIC KIDNEY DISEASE, WITHOUT LONG-TERM CURRENT USE OF INSULIN, UNSPECIFIED WHETHER STAGE 3A OR 3B CKD: Primary | ICD-10-CM

## 2022-09-06 DIAGNOSIS — I15.2 HYPERTENSION ASSOCIATED WITH DIABETES: ICD-10-CM

## 2022-09-06 DIAGNOSIS — E78.5 HYPERLIPIDEMIA ASSOCIATED WITH TYPE 2 DIABETES MELLITUS: ICD-10-CM

## 2022-09-06 DIAGNOSIS — Z98.890 POST-OPERATIVE STATE: Primary | ICD-10-CM

## 2022-09-06 DIAGNOSIS — E11.59 HYPERTENSION ASSOCIATED WITH DIABETES: ICD-10-CM

## 2022-09-06 DIAGNOSIS — E66.9 OBESITY (BMI 30-39.9): ICD-10-CM

## 2022-09-06 DIAGNOSIS — H18.20 CORNEAL EDEMA OF LEFT EYE: ICD-10-CM

## 2022-09-06 DIAGNOSIS — N18.30 TYPE 2 DIABETES MELLITUS WITH STAGE 3 CHRONIC KIDNEY DISEASE, WITHOUT LONG-TERM CURRENT USE OF INSULIN, UNSPECIFIED WHETHER STAGE 3A OR 3B CKD: Primary | ICD-10-CM

## 2022-09-06 DIAGNOSIS — E03.9 HYPOTHYROIDISM, UNSPECIFIED TYPE: ICD-10-CM

## 2022-09-06 DIAGNOSIS — E11.69 HYPERLIPIDEMIA ASSOCIATED WITH TYPE 2 DIABETES MELLITUS: ICD-10-CM

## 2022-09-06 PROCEDURE — 99441 PR PHYSICIAN TELEPHONE EVALUATION 5-10 MIN: CPT | Mod: 95,,, | Performed by: PHYSICIAN ASSISTANT

## 2022-09-06 PROCEDURE — 99213 OFFICE O/P EST LOW 20 MIN: CPT | Mod: PBBFAC | Performed by: OPHTHALMOLOGY

## 2022-09-06 PROCEDURE — 99024 PR POST-OP FOLLOW-UP VISIT: ICD-10-PCS | Mod: POP,,, | Performed by: OPHTHALMOLOGY

## 2022-09-06 PROCEDURE — 99441 PR PHYSICIAN TELEPHONE EVALUATION 5-10 MIN: ICD-10-PCS | Mod: 95,,, | Performed by: PHYSICIAN ASSISTANT

## 2022-09-06 PROCEDURE — 99999 PR PBB SHADOW E&M-EST. PATIENT-LVL III: ICD-10-PCS | Mod: PBBFAC,,, | Performed by: OPHTHALMOLOGY

## 2022-09-06 PROCEDURE — 99999 PR PBB SHADOW E&M-EST. PATIENT-LVL III: CPT | Mod: PBBFAC,,, | Performed by: OPHTHALMOLOGY

## 2022-09-06 PROCEDURE — 99024 POSTOP FOLLOW-UP VISIT: CPT | Mod: POP,,, | Performed by: OPHTHALMOLOGY

## 2022-09-06 RX ORDER — DIFLUPREDNATE OPHTHALMIC 0.5 MG/ML
1 EMULSION OPHTHALMIC 4 TIMES DAILY
Qty: 5 ML | Refills: 1 | Status: SHIPPED | OUTPATIENT
Start: 2022-09-06 | End: 2022-09-20 | Stop reason: SDUPTHER

## 2022-09-06 NOTE — PROGRESS NOTES
HPI     Post-op Evaluation            Comments: S/p Wound revision 8/4/22 OS          Comments    Patient states that va OS started to get worse on Saturday - no longer   able to see TV - occas fb sensation and soreness - - OS: Lotemax SM QID/   Alphagan TID/ Latan QHS/ Dorz-karan BID / Keto QID         PCP: Dr. Miranda Ackerman       1. COAG (followed by Dr. Scott)   S/p Revision of Wound Left Eye 8/4/22  Ahmed Valve OS 8/2/18   CP OS 8/27/15 - low to moderate flow, TDW 1100, aggressive dilation with   GV   Gonio Puncture OS 9/25/15   Wound Revision OS 08/04/22  SLT OS 6/23/16, 6/23/15, 6-2-14   Goniotomy OS 11-17-16   2. DM   3. RP / VIT A PALMITATE 1500 (discontinued)   4. Retinitis Pigmentosa of both eyes   5. Dry Eye Syndrome bilateral   6. Iritis recurrent OS       OS: Lotemax SM QID   OS: Dorz/Timolol BID     OS: Latanoprost QHS   OS: Brimonidine TID   OD: Pred PRN  OD: Atropine BID     OS: Systane and genteal gel QHS            Last edited by Adrienne Maxwell MA on 9/6/2022  2:53 PM.            Assessment /Plan     For exam results, see Encounter Report.      ICD-10-CM ICD-9-CM    1. Post-operative state  Z98.890 V45.89       2. Corneal edema of left eye  H18.20 371.20           S/p wound vision OS  K edema present OS   Sutures removed behind slit lamp today  Stop Lotemax and change to Durezol QID OS       Return to clinic 10-12 days       OS: Durezol QID   OS: Dorz/Timolol BID     OS: Latanoprost QHS   OS: Brimonidine TID   OD: Pred PRN  OD: Atropine BID

## 2022-09-20 ENCOUNTER — OFFICE VISIT (OUTPATIENT)
Dept: OPHTHALMOLOGY | Facility: CLINIC | Age: 57
End: 2022-09-20
Payer: MEDICARE

## 2022-09-20 DIAGNOSIS — Z98.890 POST-OPERATIVE STATE: Primary | ICD-10-CM

## 2022-09-20 DIAGNOSIS — H18.20 CORNEAL EDEMA OF LEFT EYE: ICD-10-CM

## 2022-09-20 DIAGNOSIS — H40.1133 PRIMARY OPEN ANGLE GLAUCOMA OF BOTH EYES, SEVERE STAGE: ICD-10-CM

## 2022-09-20 PROCEDURE — 99999 PR PBB SHADOW E&M-EST. PATIENT-LVL III: CPT | Mod: PBBFAC,,, | Performed by: OPHTHALMOLOGY

## 2022-09-20 PROCEDURE — 99213 OFFICE O/P EST LOW 20 MIN: CPT | Mod: PBBFAC | Performed by: OPHTHALMOLOGY

## 2022-09-20 PROCEDURE — 99024 PR POST-OP FOLLOW-UP VISIT: ICD-10-PCS | Mod: POP,,, | Performed by: OPHTHALMOLOGY

## 2022-09-20 PROCEDURE — 99024 POSTOP FOLLOW-UP VISIT: CPT | Mod: POP,,, | Performed by: OPHTHALMOLOGY

## 2022-09-20 PROCEDURE — 99999 PR PBB SHADOW E&M-EST. PATIENT-LVL III: ICD-10-PCS | Mod: PBBFAC,,, | Performed by: OPHTHALMOLOGY

## 2022-09-20 RX ORDER — DIFLUPREDNATE OPHTHALMIC 0.5 MG/ML
1 EMULSION OPHTHALMIC 3 TIMES DAILY
Qty: 5 ML | Refills: 1 | Status: SHIPPED | OUTPATIENT
Start: 2022-09-20 | End: 2022-12-07 | Stop reason: SDUPTHER

## 2022-09-20 NOTE — PROGRESS NOTES
HPI     Post-op Evaluation            Comments: S/p OS wound revision 8/4/22          Comments    PCP: Dr. Miranda Ackerman       1. COAG (followed by Dr. Scott)   S/p Revision of Wound Left Eye 8/4/22  Ahmed Valve OS 8/2/18   CP OS 8/27/15 - low to moderate flow, TDW 1100, aggressive dilation with   GV   Gonio Puncture OS 9/25/15   Wound Revision OS 08/04/22  SLT OS 6/23/16, 6/23/15, 6-2-14   Goniotomy OS 11-17-16   2. DM   3. RP / VIT A PALMITATE 1500 (discontinued)   4. Retinitis Pigmentosa of both eyes   5. Dry Eye Syndrome bilateral   6. Iritis recurrent OS         OS: Durezol QID   OS: Dorz/Timolol BID  Patient states that she is using Durezol QID /  Keto QID OS - as directed   along with Glauc drops OD and Atropine BID - va seems better today - occas   pain/ discomfort OS        OS: Latanoprost QHS   OS: Brimonidine TID   OD: Pred PRN  OD: Atropine BID            Last edited by Adrienne Maxwell MA on 9/20/2022 11:30 AM.            Assessment /Plan     For exam results, see Encounter Report.      ICD-10-CM ICD-9-CM    1. Post-operative state  Z98.890 V45.89       2. Corneal edema of left eye  H18.20 371.20       3. Primary open angle glaucoma of both eyes, severe stage  H40.1133 365.11      365.73           S/p wound revision OS 8/4/22  Striae resolved OS with increased Durezol use   Taper Durezol TID x 2 weeks, then BID x next visit      Return to clinic 3 weeks      OS: Durezol TID   OS: Dorz/Timolol BID  OS: Keto QID     OS: Latanoprost QHS   OS: Brimonidine TID   OD: Pred PRN  OD: Atropine BID

## 2022-10-10 ENCOUNTER — HOSPITAL ENCOUNTER (OUTPATIENT)
Dept: RADIOLOGY | Facility: HOSPITAL | Age: 57
Discharge: HOME | End: 2022-10-10
Attending: FAMILY MEDICINE
Payer: MEDICARE

## 2022-10-10 VITALS — BODY MASS INDEX: 31.65 KG/M2 | WEIGHT: 157 LBS | HEIGHT: 59 IN

## 2022-10-10 DIAGNOSIS — Z12.31 ENCOUNTER FOR SCREENING MAMMOGRAM FOR BREAST CANCER: ICD-10-CM

## 2022-10-10 PROCEDURE — 77063 BREAST TOMOSYNTHESIS BI: CPT | Mod: TC

## 2022-10-10 PROCEDURE — 77067 SCR MAMMO BI INCL CAD: CPT | Mod: 26,,, | Performed by: RADIOLOGY

## 2022-10-10 PROCEDURE — 77063 BREAST TOMOSYNTHESIS BI: CPT | Mod: 26,,, | Performed by: RADIOLOGY

## 2022-10-10 PROCEDURE — 77067 SCR MAMMO BI INCL CAD: CPT | Mod: TC

## 2022-10-10 PROCEDURE — 77067 MAMMO DIGITAL SCREENING BILAT WITH TOMO: ICD-10-PCS | Mod: 26,,, | Performed by: RADIOLOGY

## 2022-10-10 PROCEDURE — 77063 MAMMO DIGITAL SCREENING BILAT WITH TOMO: ICD-10-PCS | Mod: 26,,, | Performed by: RADIOLOGY

## 2022-10-12 ENCOUNTER — OFFICE VISIT (OUTPATIENT)
Dept: OPHTHALMOLOGY | Facility: CLINIC | Age: 57
End: 2022-10-12
Payer: MEDICARE

## 2022-10-12 DIAGNOSIS — Z98.890 POST-OPERATIVE STATE: Primary | ICD-10-CM

## 2022-10-12 DIAGNOSIS — H40.1133 PRIMARY OPEN ANGLE GLAUCOMA OF BOTH EYES, SEVERE STAGE: ICD-10-CM

## 2022-10-12 PROCEDURE — 99212 OFFICE O/P EST SF 10 MIN: CPT | Mod: PBBFAC | Performed by: OPHTHALMOLOGY

## 2022-10-12 PROCEDURE — 99024 PR POST-OP FOLLOW-UP VISIT: ICD-10-PCS | Mod: POP,,, | Performed by: OPHTHALMOLOGY

## 2022-10-12 PROCEDURE — 99999 PR PBB SHADOW E&M-EST. PATIENT-LVL II: CPT | Mod: PBBFAC,,, | Performed by: OPHTHALMOLOGY

## 2022-10-12 PROCEDURE — 99024 POSTOP FOLLOW-UP VISIT: CPT | Mod: POP,,, | Performed by: OPHTHALMOLOGY

## 2022-10-12 PROCEDURE — 99999 PR PBB SHADOW E&M-EST. PATIENT-LVL II: ICD-10-PCS | Mod: PBBFAC,,, | Performed by: OPHTHALMOLOGY

## 2022-10-12 RX ORDER — PREDNISOLONE ACETATE 10 MG/ML
1 SUSPENSION/ DROPS OPHTHALMIC 2 TIMES DAILY
Qty: 5 ML | Refills: 3 | Status: SHIPPED | OUTPATIENT
Start: 2022-10-12 | End: 2024-02-06 | Stop reason: SDUPTHER

## 2022-10-12 NOTE — PROGRESS NOTES
HPI     Post-op Evaluation            Comments: Patient reports for 3 week IOP. Using gtts as advised. Denies   pain or irritation. Va stable.          Comments    PCP: Dr. Miranda Ackerman       1. COAG (followed by Dr. Scott)   S/p Revision of Wound Left Eye 8/4/22  Ahmed Valve OS 8/2/18   CP OS 8/27/15 - low to moderate flow, TDW 1100, aggressive dilation with   GV   Gonio Puncture OS 9/25/15   Wound Revision OS 08/04/22  SLT OS 6/23/16, 6/23/15, 6-2-14   Goniotomy OS 11-17-16   2. DM   3. RP / VIT A PALMITATE 1500 (discontinued)   4. Retinitis Pigmentosa of both eyes   5. Dry Eye Syndrome bilateral   6. Iritis recurrent OS         OS: Durezol BID   OS: Dorz/Timolol BID  OS: Keto QID     OS: Latanoprost QHS   OS: Brimonidine TID   OD: Pred PRN  OD: Atropine BID          Last edited by Robert Scott MD on 10/12/2022 11:04 AM.            Assessment /Plan     For exam results, see Encounter Report.      ICD-10-CM ICD-9-CM    1. Post-operative state  Z98.890 V45.89       2. Primary open angle glaucoma of both eyes, severe stage  H40.1133 365.11      365.73           S/p wound revision OS 8/4/22  Doing well  Switch from Durezol to Pred (due to back order)      Return to clinic 1 month      OS: Pred BID   OS: Dorz/Timolol BID  OS: Keto QID     OS: Latanoprost QHS   OS: Brimonidine TID   OD: Pred PRN  OD: Atropine BID

## 2022-10-16 DIAGNOSIS — H40.1133 PRIMARY OPEN ANGLE GLAUCOMA OF BOTH EYES, SEVERE STAGE: ICD-10-CM

## 2022-10-17 RX ORDER — KETOROLAC TROMETHAMINE 5 MG/ML
1 SOLUTION OPHTHALMIC 4 TIMES DAILY
Qty: 5 ML | Refills: 4 | Status: SHIPPED | OUTPATIENT
Start: 2022-10-17 | End: 2023-10-17

## 2022-10-20 ENCOUNTER — OFFICE VISIT (OUTPATIENT)
Dept: OBSTETRICS AND GYNECOLOGY | Facility: CLINIC | Age: 57
End: 2022-10-20
Payer: MEDICARE

## 2022-10-20 VITALS
HEIGHT: 59 IN | WEIGHT: 151.38 LBS | BODY MASS INDEX: 30.52 KG/M2 | DIASTOLIC BLOOD PRESSURE: 80 MMHG | SYSTOLIC BLOOD PRESSURE: 150 MMHG

## 2022-10-20 DIAGNOSIS — E11.65 POORLY CONTROLLED DIABETES MELLITUS: ICD-10-CM

## 2022-10-20 DIAGNOSIS — Z01.419 WELL WOMAN EXAM: ICD-10-CM

## 2022-10-20 DIAGNOSIS — N95.1 VAGINAL DRYNESS, MENOPAUSAL: Primary | ICD-10-CM

## 2022-10-20 PROCEDURE — 99214 OFFICE O/P EST MOD 30 MIN: CPT | Mod: PBBFAC,PN | Performed by: OBSTETRICS & GYNECOLOGY

## 2022-10-20 PROCEDURE — 99999 PR PBB SHADOW E&M-EST. PATIENT-LVL IV: CPT | Mod: PBBFAC,,, | Performed by: OBSTETRICS & GYNECOLOGY

## 2022-10-20 PROCEDURE — G0101 CA SCREEN;PELVIC/BREAST EXAM: HCPCS | Mod: S$PBB,,, | Performed by: OBSTETRICS & GYNECOLOGY

## 2022-10-20 PROCEDURE — 99999 PR PBB SHADOW E&M-EST. PATIENT-LVL IV: ICD-10-PCS | Mod: PBBFAC,,, | Performed by: OBSTETRICS & GYNECOLOGY

## 2022-10-20 PROCEDURE — G0101 PR CA SCREEN;PELVIC/BREAST EXAM: ICD-10-PCS | Mod: S$PBB,,, | Performed by: OBSTETRICS & GYNECOLOGY

## 2022-10-20 RX ORDER — ESTRADIOL 0.1 MG/G
1 CREAM VAGINAL
Qty: 42.5 G | Refills: 2 | Status: SHIPPED | OUTPATIENT
Start: 2022-10-20 | End: 2023-06-09

## 2022-10-20 NOTE — PROGRESS NOTES
CC: Well woman exam    Richelle Zaldivar is a 57 y.o. female  presents for a well woman exam.  C/o occ vaginal burning/irritation, improves w/ vaginal estrace. Needs refills    Past Medical History:   Diagnosis Date    Acid reflux     Cataract     Diabetes mellitus, type 2     Glaucoma     HTN (hypertension)     Hyperlipidemia     Osteoarthritis of knee 3/11/2019    Recurrent iritis of left eye 2016     Past Surgical History:   Procedure Laterality Date    CATARACT EXTRACTION      COLONOSCOPY N/A 10/18/2019    Procedure: COLONOSCOPY;  Surgeon: Aster Krause MD;  Location: [facility] ENDO;  Service: Endoscopy;  Laterality: N/A;    ESOPHAGOGASTRODUODENOSCOPY N/A 10/18/2019    Procedure: ESOPHAGOGASTRODUODENOSCOPY (EGD);  Surgeon: Aster Krause MD;  Location: [facility];  Service: Endoscopy;  Laterality: N/A;    GLAUCOMA SURGERY Left 2018    Ahmed    GONIOTOMY Left 2016    HEMORRHOID SURGERY  2015    Done in [facility]    PARTIAL HYSTERECTOMY      TONSILLECTOMY      WISDOM TOOTH EXTRACTION       Family History   Problem Relation Age of Onset    Diabetes Mother     Hypertension Mother     Hyperlipidemia Mother     Stroke Mother     Amblyopia Mother     Diabetes Sister     Hypertension Sister     Hyperlipidemia Sister     Arthritis Sister     Amblyopia Brother     Amblyopia Maternal Uncle     Blindness Neg Hx     Cancer Neg Hx     Cataracts Neg Hx     Glaucoma Neg Hx     Macular degeneration Neg Hx     Retinal detachment Neg Hx     Strabismus Neg Hx     Thyroid disease Neg Hx      Social History     Tobacco Use    Smoking status: Never    Smokeless tobacco: Never   Substance Use Topics    Alcohol use: No     Alcohol/week: 0.0 standard drinks    Drug use: No     OB History          8    Para   3    Term   3       0    AB   5    Living   3         SAB   5    IAB        Ectopic        Multiple        Live Births                     Current Outpatient Medications:     allopurinoL  (ZYLOPRIM) 100 MG tablet, Take 2 tablets (200 mg total) by mouth once daily., Disp: 180 tablet, Rfl: 3    amLODIPine (NORVASC) 10 MG tablet, Take 1 tablet orally once a day., Disp: 90 tablet, Rfl: 3    atropine 1% (ISOPTO ATROPINE) 1 % Drop, Place 1 drop into the right eye 2 (two) times a day., Disp: 10 mL, Rfl: 6    blood-glucose meter kit, Use as instructed Insurance Preferred, Disp: 1 each, Rfl: 0    brimonidine 0.1% (ALPHAGAN P) 0.1 % Drop, Place 1 drop into both eyes 3 (three) times daily., Disp: 15 mL, Rfl: 6    dapagliflozin (FARXIGA) 10 mg tablet, Take 1 tablet by mouth once daily, Disp: 30 tablet, Rfl: 5    difluprednate (DUREZOL) 0.05 % Drop ophthalmic solution, Place 1 drop into the left eye 3 (three) times daily., Disp: 5 mL, Rfl: 1    dorzolamide-timolol 2-0.5% (COSOPT) 22.3-6.8 mg/mL ophthalmic solution, Place 1 drop into both eyes 2 (two) times daily., Disp: 10 mL, Rfl: 3    empagliflozin (JARDIANCE) 25 mg tablet, Take 1 tablet (25 mg total) by mouth once daily., Disp: 90 tablet, Rfl: 3    ergocalciferol (ERGOCALCIFEROL) 50,000 unit Cap, Take 1 capsule (50,000 Units total) by mouth every 7 days., Disp: 10 capsule, Rfl: 0    fenofibrate micronized (LOFIBRA) 134 MG Cap, Take 1 capsule (134 mg total) by mouth daily with breakfast., Disp: 90 capsule, Rfl: 3    gabapentin (NEURONTIN) 300 MG capsule, Take 1 capsule (300 mg total) by mouth 2 (two) times daily., Disp: 180 capsule, Rfl: 3    glipiZIDE (GLUCOTROL) 10 MG tablet, Take 1 tablet (10 mg total) by mouth 3 (three) times daily before meals., Disp: 270 tablet, Rfl: 3    ketoconazole (NIZORAL) 2 % cream, Apply to the affected area twice daily (Patient taking differently: Apply to the affected area twice daily), Disp: 60 g, Rfl: 3    ketorolac 0.5% (ACULAR) 0.5 % Drop, Place 1 drop into the left eye 4 (four) times daily., Disp: 5 mL, Rfl: 4    lancets Misc, 1 each by Misc.(Non-Drug; Combo Route) route 3 (three) times daily., Disp: 100 each, Rfl: 11     lancets Misc, 1 each by Misc.(Non-Drug; Combo Route) route 4 (four) times daily., Disp: 300 each, Rfl: 3    lansoprazole (PREVACID) 30 MG capsule, Take 1 capsule (30 mg total) by mouth once daily., Disp: 90 capsule, Rfl: 3    latanoprost 0.005 % ophthalmic solution, Place 1 drop into the left eye every evening., Disp: 7.5 mL, Rfl: 4    levothyroxine (SYNTHROID) 25 MCG tablet, Take 1 tablet (25 mcg total) by mouth once daily., Disp: 90 tablet, Rfl: 3    lisinopriL-hydrochlorothiazide (PRINZIDE,ZESTORETIC) 20-25 mg Tab, Take 1 tablet by mouth once daily., Disp: 90 tablet, Rfl: 3    loteprednol (LOTEMAX) 0.5 % ophthalmic suspension, Place 1 drop into both eyes four times daily, Disp: 15 mL, Rfl: 3    loteprednol etabonate (LOTEMAX SM) 0.38 % DrpG, Place 1 drop into both eyes 4 (four) times daily., Disp: 5 g, Rfl: 6    meloxicam (MOBIC) 15 MG tablet, Take 1 tablet (15 mg total) by mouth daily as needed., Disp: 30 tablet, Rfl: 0    metFORMIN (GLUCOPHAGE) 1000 MG tablet, Take 1 tablet (1,000 mg total) by mouth 2 (two) times daily with meals., Disp: 180 tablet, Rfl: 3    polymyxin B sulf-trimethoprim (POLYTRIM) 10,000 unit- 1 mg/mL Drop, Place 1 drop into the left eye 4 (four) times daily., Disp: 10 mL, Rfl: 1    prednisoLONE acetate (PRED FORTE) 1 % DrpS, Place 1 drop into the left eye 2 (two) times daily., Disp: 5 mL, Rfl: 3    rosuvastatin (CRESTOR) 40 MG Tab, Take 1 tablet (40 mg total) by mouth every evening., Disp: 90 tablet, Rfl: 3    semaglutide (RYBELSUS) 14 mg tablet, Take 1 tablet (14 mg total) by mouth once daily., Disp: 90 tablet, Rfl: 3    triamcinolone acetonide 0.025% (KENALOG) 0.025 % cream, Apply topically 2 (two) times daily as needed., Disp: 30 g, Rfl: 1    urea (CARMOL) 40 % Crea, Apply topically 2 (two) times daily. Apply to thickened areas of skin, Disp: 1 Tube, Rfl: 3    estradioL (ESTRACE) 0.01 % (0.1 mg/gram) vaginal cream, Place 1 gram vaginally every night for 14 days then twice a week., Disp:  "42.5 g, Rfl: 2    GYNECOLOGY HISTORY:  No abnormal pap/std    DATA REVIEWED:  Last pap: normal history   Last mmg: normal Date: 2022    BP (!) 150/80   Ht 4' 11" (1.499 m)   Wt 68.6 kg (151 lb 5.5 oz)   BMI 30.57 kg/m²     ROS:  GENERAL: Denies weight gain or weight loss. Feeling well overall.   SKIN: Denies rash or lesions.   HEAD: Denies head injury or headache.   NODES: Denies enlarged lymph nodes.   CHEST: Denies chest pain or shortness of breath.   CARDIOVASCULAR: Denies palpitations or left sided chest pain.   ABDOMEN: No abdominal pain, constipation, diarrhea, nausea, vomiting or rectal bleeding.   URINARY: No frequency, dysuria, hematuria, or burning on urination.  REPRODUCTIVE: See HPI.   BREASTS: The patient denies pain, lumps, or nipple discharge.   HEMATOLOGIC: No easy bruisability or excessive bleeding.   MUSCULOSKELETAL: Denies joint pain or swelling.   NEUROLOGIC: Denies syncope or weakness.   PSYCHIATRIC: Denies depression, anxiety or mood swings.    PE:   APPEARANCE: Well nourished, well developed, in no acute distress.  AFFECT: WNL, alert and oriented x 3.  SKIN: No acne or hirsutism.  NECK: Neck symmetric without masses or thyromegaly.  NODES: No inguinal, cervical, axillary or femoral lymph node enlargement.  CHEST: Good respiratory effort.   ABDOMEN: Soft. No tenderness or masses. No hepatosplenomegaly. No hernias.  BREASTS: deferred  PELVIC: Normal external female genitalia without lesions. Normal hair distribution. Adequate perineal body, normal urethral meatus. Vagina atrophic without lesions or discharge. No significant cystocele or rectocele. Bimanual exam shows uterus and cervix to be surgically absent. Adnexa without masses or tenderness.  EXTREMITIES: No edema.    Vaginal dryness, menopausal    Well woman exam  -     Ambulatory referral/consult to Gynecology    Poorly controlled diabetes mellitus    Other orders  -     estradioL (ESTRACE) 0.01 % (0.1 mg/gram) vaginal cream; Place 1 " gram vaginally every night for 14 days then twice a week.  Dispense: 42.5 g; Refill: 2  Counseled that DM can also cause frequent UTIs & vaginitis symptoms    Patient was counseled today on A.C.S. Pap guidelines (none needed) and recommendations for yearly pelvic exams, yearly mammograms starting age 40, and clinical breast exams; to see her PCP for other health maintenance.

## 2022-10-26 RX ORDER — TRAMADOL HYDROCHLORIDE 50 MG/1
50 TABLET ORAL EVERY 12 HOURS PRN
Qty: 30 TABLET | Refills: 0 | Status: SHIPPED | OUTPATIENT
Start: 2022-10-26

## 2022-11-09 ENCOUNTER — OFFICE VISIT (OUTPATIENT)
Dept: OPHTHALMOLOGY | Facility: CLINIC | Age: 57
End: 2022-11-09
Payer: MEDICARE

## 2022-11-09 ENCOUNTER — IMMUNIZATION (OUTPATIENT)
Dept: INTERNAL MEDICINE | Facility: CLINIC | Age: 57
End: 2022-11-09
Payer: MEDICARE

## 2022-11-09 DIAGNOSIS — H40.1133 PRIMARY OPEN ANGLE GLAUCOMA OF BOTH EYES, SEVERE STAGE: Primary | ICD-10-CM

## 2022-11-09 DIAGNOSIS — H20.022 IRITIS, RECURRENT, LEFT: ICD-10-CM

## 2022-11-09 PROCEDURE — 99214 OFFICE O/P EST MOD 30 MIN: CPT | Mod: PBBFAC | Performed by: OPHTHALMOLOGY

## 2022-11-09 PROCEDURE — G0008 ADMIN INFLUENZA VIRUS VAC: HCPCS | Mod: PBBFAC

## 2022-11-09 PROCEDURE — 99999 PR PBB SHADOW E&M-EST. PATIENT-LVL IV: CPT | Mod: PBBFAC,,, | Performed by: OPHTHALMOLOGY

## 2022-11-09 PROCEDURE — 99214 OFFICE O/P EST MOD 30 MIN: CPT | Mod: S$PBB,,, | Performed by: OPHTHALMOLOGY

## 2022-11-09 PROCEDURE — 99999 PR PBB SHADOW E&M-EST. PATIENT-LVL IV: ICD-10-PCS | Mod: PBBFAC,,, | Performed by: OPHTHALMOLOGY

## 2022-11-09 PROCEDURE — 99214 PR OFFICE/OUTPT VISIT, EST, LEVL IV, 30-39 MIN: ICD-10-PCS | Mod: S$PBB,,, | Performed by: OPHTHALMOLOGY

## 2022-11-09 RX ORDER — KETOROLAC TROMETHAMINE 5 MG/ML
1 SOLUTION OPHTHALMIC 4 TIMES DAILY
Qty: 5 ML | Refills: 6 | Status: SHIPPED | OUTPATIENT
Start: 2022-11-09 | End: 2023-06-30 | Stop reason: SDUPTHER

## 2022-11-09 RX ORDER — LATANOPROST 50 UG/ML
1 SOLUTION/ DROPS OPHTHALMIC DAILY
Qty: 7.5 ML | Refills: 6 | Status: SHIPPED | OUTPATIENT
Start: 2022-11-09 | End: 2023-03-15 | Stop reason: SDUPTHER

## 2022-11-09 RX ORDER — ATROPINE SULFATE 10 MG/ML
1 SOLUTION/ DROPS OPHTHALMIC 2 TIMES DAILY
Qty: 5 ML | Refills: 6 | Status: SHIPPED | OUTPATIENT
Start: 2022-11-09 | End: 2023-03-15 | Stop reason: SDUPTHER

## 2022-11-09 RX ORDER — DORZOLAMIDE HYDROCHLORIDE AND TIMOLOL MALEATE 20; 5 MG/ML; MG/ML
1 SOLUTION/ DROPS OPHTHALMIC 2 TIMES DAILY
Qty: 10 ML | Refills: 6 | Status: SHIPPED | OUTPATIENT
Start: 2022-11-09 | End: 2023-06-30 | Stop reason: SDUPTHER

## 2022-11-09 RX ORDER — LOTEPREDNOL ETABONATE 5 MG/ML
1 SUSPENSION/ DROPS OPHTHALMIC 2 TIMES DAILY
Qty: 5 ML | Refills: 6 | Status: SHIPPED | OUTPATIENT
Start: 2022-11-09 | End: 2023-06-30

## 2022-11-09 RX ORDER — PREDNISOLONE ACETATE 10 MG/ML
1 SUSPENSION/ DROPS OPHTHALMIC 4 TIMES DAILY
Qty: 5 ML | Refills: 6 | Status: SHIPPED | OUTPATIENT
Start: 2022-11-09 | End: 2023-06-30 | Stop reason: SDUPTHER

## 2022-11-09 RX ORDER — BRIMONIDINE TARTRATE 2 MG/ML
1 SOLUTION/ DROPS OPHTHALMIC 3 TIMES DAILY
Qty: 15 ML | Refills: 6 | Status: SHIPPED | OUTPATIENT
Start: 2022-11-09 | End: 2022-11-11

## 2022-11-09 NOTE — PROGRESS NOTES
HPI     Glaucoma            Comments: 1 month COAG follow-up with IOP check. VA is stable. Pain in   Left eyes has pain sometimes. Complaint with gtts.          Comments    PCP: Dr. Miranda Ackerman       1. COAG (followed by Dr. Scott)   S/p Revision of Wound Left Eye 8/4/22  Ahmed Valve OS 8/2/18   CP OS 8/27/15 - low to moderate flow, TDW 1100, aggressive dilation with   GV   Gonio Puncture OS 9/25/15   Wound Revision OS 08/04/22  SLT OS 6/23/16, 6/23/15, 6-2-14   Goniotomy OS 11-17-16   2. DM   3. RP / VIT A PALMITATE 1500 (discontinued)   4. Retinitis Pigmentosa of both eyes   5. Dry Eye Syndrome bilateral   6. Iritis recurrent OS       OS: Pred BID   OS: Dorz/Timolol BID  OS: Keto QID     OS: Latanoprost QHS   OS: Brimonidine TID   OD: Pred PRN  OD: Atropine BID            Last edited by dEvin Randhawa on 11/9/2022  9:28 AM.            Assessment /Plan     For exam results, see Encounter Report.      ICD-10-CM ICD-9-CM    1. Primary open angle glaucoma of both eyes, severe stage  H40.1133 365.11 Doing well - intraocular pressure is within acceptable range relative to target pressure with no evidence of progression.   Continue current treatment.  Reviewed importance of continued compliance with treatment and follow up.      365.73       2. Iritis, recurrent, left  H20.022 364.02 Patient symptomatic on exam, little cell/flare OS. Was given Polymyxin by pharmacy in place of Durezol, likely cause for symptoms. Will resume Lotemax TID OS (will give written Rx for Pred A PRN)          Return to clinic 2-3 months (pt going to [facility] and will not return until Feb 2023)        OS: Lotemax TID  OS: Dorz/Timolol BID  OS: Keto QID     OS: Latanoprost QHS   OS: Brimonidine TID   OD: Pred PRN  OD: Atropine BID

## 2022-11-11 DIAGNOSIS — H40.1133 PRIMARY OPEN ANGLE GLAUCOMA OF BOTH EYES, SEVERE STAGE: ICD-10-CM

## 2022-11-11 RX ORDER — BRIMONIDINE TARTRATE 1 MG/ML
1 SOLUTION/ DROPS OPHTHALMIC 3 TIMES DAILY
Qty: 15 ML | Refills: 6 | Status: SHIPPED | OUTPATIENT
Start: 2022-11-11 | End: 2023-03-15 | Stop reason: SDUPTHER

## 2022-11-11 RX ORDER — LOTEPREDNOL ETABONATE 5 MG/ML
1 SUSPENSION/ DROPS OPHTHALMIC 3 TIMES DAILY
Qty: 15 ML | Refills: 6 | Status: SHIPPED | OUTPATIENT
Start: 2022-11-11 | End: 2023-06-30

## 2022-11-14 ENCOUNTER — PATIENT MESSAGE (OUTPATIENT)
Dept: INTERNAL MEDICINE | Facility: CLINIC | Age: 57
End: 2022-11-14
Payer: MEDICARE

## 2022-11-14 DIAGNOSIS — R30.0 DYSURIA: Primary | ICD-10-CM

## 2022-11-14 RX ORDER — PHENAZOPYRIDINE HYDROCHLORIDE 200 MG/1
200 TABLET, FILM COATED ORAL 3 TIMES DAILY PRN
Qty: 10 TABLET | Refills: 0 | Status: SHIPPED | OUTPATIENT
Start: 2022-11-14 | End: 2022-11-24

## 2022-11-15 RX ORDER — DIFLUPREDNATE OPHTHALMIC 0.5 MG/ML
1 EMULSION OPHTHALMIC 3 TIMES DAILY
Qty: 18 ML | Refills: 0 | Status: SHIPPED | OUTPATIENT
Start: 2022-11-15 | End: 2022-11-22 | Stop reason: SDUPTHER

## 2022-11-22 DIAGNOSIS — E55.9 VITAMIN D DEFICIENCY: ICD-10-CM

## 2022-11-22 RX ORDER — ERGOCALCIFEROL 1.25 MG/1
50000 CAPSULE ORAL
Qty: 10 CAPSULE | Refills: 0 | Status: SHIPPED | OUTPATIENT
Start: 2022-11-22 | End: 2023-06-30 | Stop reason: SDUPTHER

## 2022-11-22 NOTE — TELEPHONE ENCOUNTER
Refill Routing Note   Medication(s) are not appropriate for processing by [facility] for the following reason(s):      - Outside of protocol    [facility] action(s):  Route          Medication reconciliation completed: No     Appointments  past 12m or future 3m with PCP    Date Provider   Last Visit   8/17/2022 Oneyda Bergman MD   Next Visit   2/27/2023 Oneyda Bergman MD   ED visits in past 90 days: 0        Note composed:11:27 AM 11/22/2022

## 2022-12-07 ENCOUNTER — PATIENT MESSAGE (OUTPATIENT)
Dept: OPHTHALMOLOGY | Facility: CLINIC | Age: 57
End: 2022-12-07
Payer: MEDICARE

## 2022-12-07 DIAGNOSIS — H40.1133 PRIMARY OPEN ANGLE GLAUCOMA OF BOTH EYES, SEVERE STAGE: ICD-10-CM

## 2022-12-07 RX ORDER — DIFLUPREDNATE OPHTHALMIC 0.5 MG/ML
1 EMULSION OPHTHALMIC 3 TIMES DAILY
Qty: 5 ML | Refills: 1 | Status: SHIPPED | OUTPATIENT
Start: 2022-12-07 | End: 2023-08-21

## 2022-12-21 ENCOUNTER — PATIENT MESSAGE (OUTPATIENT)
Dept: OPHTHALMOLOGY | Facility: CLINIC | Age: 57
End: 2022-12-21
Payer: MEDICARE

## 2022-12-21 DIAGNOSIS — H40.1133 PRIMARY OPEN ANGLE GLAUCOMA OF BOTH EYES, SEVERE STAGE: ICD-10-CM

## 2022-12-21 RX ORDER — DIFLUPREDNATE OPHTHALMIC 0.5 MG/ML
1 EMULSION OPHTHALMIC 3 TIMES DAILY
Qty: 18 ML | Refills: 0 | Status: SHIPPED | OUTPATIENT
Start: 2022-12-21 | End: 2022-12-21 | Stop reason: SDUPTHER

## 2022-12-21 RX ORDER — DIFLUPREDNATE OPHTHALMIC 0.5 MG/ML
1 EMULSION OPHTHALMIC 3 TIMES DAILY
Qty: 15 ML | Refills: 0 | Status: SHIPPED | OUTPATIENT
Start: 2022-12-21 | End: 2023-03-15 | Stop reason: SDUPTHER

## 2022-12-30 DIAGNOSIS — N18.30 TYPE 2 DIABETES MELLITUS WITH STAGE 3 CHRONIC KIDNEY DISEASE, WITHOUT LONG-TERM CURRENT USE OF INSULIN, UNSPECIFIED WHETHER STAGE 3A OR 3B CKD: ICD-10-CM

## 2022-12-30 DIAGNOSIS — E11.22 TYPE 2 DIABETES MELLITUS WITH STAGE 3 CHRONIC KIDNEY DISEASE, WITHOUT LONG-TERM CURRENT USE OF INSULIN, UNSPECIFIED WHETHER STAGE 3A OR 3B CKD: ICD-10-CM

## 2022-12-30 RX ORDER — LANCETS
1 EACH MISCELLANEOUS 4 TIMES DAILY
Qty: 300 EACH | Refills: 3 | Status: SHIPPED | OUTPATIENT
Start: 2022-12-30 | End: 2023-04-12 | Stop reason: SDUPTHER

## 2022-12-30 RX ORDER — BLOOD SUGAR DIAGNOSTIC
STRIP MISCELLANEOUS
Qty: 100 STRIP | Refills: 11 | Status: SHIPPED | OUTPATIENT
Start: 2022-12-30 | End: 2023-03-16

## 2023-01-10 ENCOUNTER — TELEPHONE (OUTPATIENT)
Dept: DIABETES | Facility: CLINIC | Age: 58
End: 2023-01-10
Payer: MEDICARE

## 2023-01-10 NOTE — TELEPHONE ENCOUNTER
Called [facility] and spoke with Dakota and gave her the ICD-10 code ----- Message from Dee Hughes sent at 1/10/2023 12:55 PM CST -----  Contact: Dakota/ Stephanie Duncan with [facility] is calling to speak with the nurse regarding ICD Code. Reports missing and not indication on the form the ICD 1- code. Please give patient a call back at [phone] ext [phone]

## 2023-02-08 DIAGNOSIS — E11.9 TYPE 2 DIABETES MELLITUS WITHOUT COMPLICATION: ICD-10-CM

## 2023-02-17 ENCOUNTER — PATIENT MESSAGE (OUTPATIENT)
Dept: INTERNAL MEDICINE | Facility: CLINIC | Age: 58
End: 2023-02-17
Payer: MEDICARE

## 2023-02-17 DIAGNOSIS — N18.32 TYPE 2 DIABETES MELLITUS WITH STAGE 3B CHRONIC KIDNEY DISEASE, WITHOUT LONG-TERM CURRENT USE OF INSULIN: Primary | ICD-10-CM

## 2023-02-17 DIAGNOSIS — E11.69 HYPERLIPIDEMIA ASSOCIATED WITH TYPE 2 DIABETES MELLITUS: ICD-10-CM

## 2023-02-17 DIAGNOSIS — E78.5 HYPERLIPIDEMIA ASSOCIATED WITH TYPE 2 DIABETES MELLITUS: ICD-10-CM

## 2023-02-17 DIAGNOSIS — I15.2 HYPERTENSION ASSOCIATED WITH DIABETES: ICD-10-CM

## 2023-02-17 DIAGNOSIS — E11.59 HYPERTENSION ASSOCIATED WITH DIABETES: ICD-10-CM

## 2023-02-17 DIAGNOSIS — E11.22 TYPE 2 DIABETES MELLITUS WITH STAGE 3B CHRONIC KIDNEY DISEASE, WITHOUT LONG-TERM CURRENT USE OF INSULIN: Primary | ICD-10-CM

## 2023-02-17 DIAGNOSIS — E03.9 HYPOTHYROIDISM, UNSPECIFIED TYPE: ICD-10-CM

## 2023-03-07 ENCOUNTER — TELEPHONE (OUTPATIENT)
Dept: OPHTHALMOLOGY | Facility: CLINIC | Age: 58
End: 2023-03-07
Payer: MEDICARE

## 2023-03-07 NOTE — TELEPHONE ENCOUNTER
Returned call, no answer. Left vm    ----- Message from Majo Borjas sent at 3/7/2023  8:03 AM CST -----  Pt would  like a call back at [phone], Regards to getting a sooner appointment with Dr. Scott she is already scheduled for 3/15 next week but would like to speak with staff .

## 2023-03-15 ENCOUNTER — OFFICE VISIT (OUTPATIENT)
Dept: OPHTHALMOLOGY | Facility: CLINIC | Age: 58
End: 2023-03-15
Payer: MEDICARE

## 2023-03-15 DIAGNOSIS — H20.022 IRITIS, RECURRENT, LEFT: ICD-10-CM

## 2023-03-15 DIAGNOSIS — H18.20 CORNEAL EDEMA OF LEFT EYE: ICD-10-CM

## 2023-03-15 DIAGNOSIS — H40.1133 PRIMARY OPEN ANGLE GLAUCOMA OF BOTH EYES, SEVERE STAGE: Primary | ICD-10-CM

## 2023-03-15 DIAGNOSIS — H04.123 DRY EYE SYNDROME, BILATERAL: ICD-10-CM

## 2023-03-15 DIAGNOSIS — H35.52 RETINITIS PIGMENTOSA OF BOTH EYES: ICD-10-CM

## 2023-03-15 PROCEDURE — 99999 PR PBB SHADOW E&M-EST. PATIENT-LVL III: CPT | Mod: PBBFAC,,, | Performed by: OPHTHALMOLOGY

## 2023-03-15 PROCEDURE — 99213 OFFICE O/P EST LOW 20 MIN: CPT | Mod: PBBFAC | Performed by: OPHTHALMOLOGY

## 2023-03-15 PROCEDURE — 99214 OFFICE O/P EST MOD 30 MIN: CPT | Mod: S$PBB,,, | Performed by: OPHTHALMOLOGY

## 2023-03-15 PROCEDURE — 99214 PR OFFICE/OUTPT VISIT, EST, LEVL IV, 30-39 MIN: ICD-10-PCS | Mod: S$PBB,,, | Performed by: OPHTHALMOLOGY

## 2023-03-15 PROCEDURE — 99999 PR PBB SHADOW E&M-EST. PATIENT-LVL III: ICD-10-PCS | Mod: PBBFAC,,, | Performed by: OPHTHALMOLOGY

## 2023-03-15 RX ORDER — ATROPINE SULFATE 10 MG/ML
1 SOLUTION/ DROPS OPHTHALMIC 2 TIMES DAILY
Qty: 5 ML | Refills: 6 | Status: SHIPPED | OUTPATIENT
Start: 2023-03-15 | End: 2024-02-07 | Stop reason: SDUPTHER

## 2023-03-15 RX ORDER — BRIMONIDINE TARTRATE 1 MG/ML
1 SOLUTION/ DROPS OPHTHALMIC 3 TIMES DAILY
Qty: 15 ML | Refills: 6 | Status: SHIPPED | OUTPATIENT
Start: 2023-03-15 | End: 2024-01-08 | Stop reason: SDUPTHER

## 2023-03-15 RX ORDER — DORZOLAMIDE HYDROCHLORIDE AND TIMOLOL MALEATE 20; 5 MG/ML; MG/ML
1 SOLUTION/ DROPS OPHTHALMIC 2 TIMES DAILY
Qty: 10 ML | Refills: 3 | Status: SHIPPED | OUTPATIENT
Start: 2023-03-15 | End: 2023-08-16 | Stop reason: SDUPTHER

## 2023-03-15 RX ORDER — LATANOPROST 50 UG/ML
1 SOLUTION/ DROPS OPHTHALMIC DAILY
Qty: 7.5 ML | Refills: 6 | Status: SHIPPED | OUTPATIENT
Start: 2023-03-15 | End: 2023-08-16 | Stop reason: SDUPTHER

## 2023-03-15 RX ORDER — BROMFENAC SODIUM 0.7 MG/ML
1 SOLUTION/ DROPS OPHTHALMIC DAILY
Qty: 3 ML | Refills: 6 | Status: SHIPPED | OUTPATIENT
Start: 2023-03-15 | End: 2023-05-10 | Stop reason: SDUPTHER

## 2023-03-15 RX ORDER — DIFLUPREDNATE OPHTHALMIC 0.5 MG/ML
1 EMULSION OPHTHALMIC 2 TIMES DAILY
Qty: 15 ML | Refills: 0 | Status: SHIPPED | OUTPATIENT
Start: 2023-03-15 | End: 2023-05-10 | Stop reason: SDUPTHER

## 2023-03-15 NOTE — PROGRESS NOTES
HPI     Glaucoma            Comments: 4 month IOP check     Patient states very minimal pain in OU that comes and goes but not often,   also [place] coutn[place] and needs drop   refills on all drops in a 90 day supply and is completely out of Keto.          Comments    1. COAG (followed by Dr. Scott)   S/p Revision of Wound Left Eye 8/4/22  Ahmed Valve OS 8/2/18   CP OS 8/27/15 - low to moderate flow, TDW 1100, aggressive dilation with   GV   Gonio Puncture OS 9/25/15   Wound Revision OS 08/04/22  SLT OS 6/23/16, 6/23/15, 6-2-14   Goniotomy OS 11-17-16   2. DM   3. RP / VIT A PALMITATE 1500 (discontinued)   4. Retinitis Pigmentosa of both eyes   5. Dry Eye Syndrome bilateral   6. Iritis recurrent OS       OS: Lotemax TID  OS: Dorz/Timolol BID  OS: Keto QID     OS: Latanoprost QHS   OS: Brimonidine TID   OD: Pred PRN  OD: Atropine BID          Last edited by Toyin Casey, Patient Care Assistant on 3/15/2023 10:47   AM.            Assessment /Plan     For exam results, see Encounter Report.      ICD-10-CM ICD-9-CM    1. Primary open angle glaucoma of both eyes, severe stage  H40.1133 365.11 Doing well - intraocular pressure is within acceptable range relative to target pressure with no evidence of progression.   Continue current treatment.  Reviewed importance of continued compliance with treatment and follow up.        365.73       2. Iritis, recurrent, left  H20.022 364.02 FOLLOW       3. Corneal edema of left eye  H18.20 371.20 STABLE       4. Retinitis pigmentosa of both eyes  H35.52 362.74       5. Dry eye syndrome, bilateral  H04.123 375.15           STOP kETO SINCE PT IS OUT AND START PROLENSA QD OS   RETURN TO CLINIC 8 WEEKS IOP   OS: Lotemax TID  OS: Dorz/Timolol BID  OS: Keto QID     OS: Latanoprost QHS   OS: Brimonidine TID   OD: Pred PRN  OD: Atropine BID

## 2023-03-16 ENCOUNTER — TELEPHONE (OUTPATIENT)
Dept: DIABETES | Facility: CLINIC | Age: 58
End: 2023-03-16
Payer: MEDICARE

## 2023-03-16 DIAGNOSIS — E11.22 TYPE 2 DIABETES MELLITUS WITH STAGE 3 CHRONIC KIDNEY DISEASE, WITHOUT LONG-TERM CURRENT USE OF INSULIN, UNSPECIFIED WHETHER STAGE 3A OR 3B CKD: Primary | ICD-10-CM

## 2023-03-16 DIAGNOSIS — N18.30 TYPE 2 DIABETES MELLITUS WITH STAGE 3 CHRONIC KIDNEY DISEASE, WITHOUT LONG-TERM CURRENT USE OF INSULIN, UNSPECIFIED WHETHER STAGE 3A OR 3B CKD: Primary | ICD-10-CM

## 2023-03-16 NOTE — TELEPHONE ENCOUNTER
Left message for daughter letting her know that her strips were sent [facility]

## 2023-03-16 NOTE — TELEPHONE ENCOUNTER
Strips sent to [facility] for pt. Please let pt daughter know.     Richi Brown PA-C  Diabetes Management

## 2023-04-19 ENCOUNTER — PATIENT MESSAGE (OUTPATIENT)
Dept: ADMINISTRATIVE | Facility: HOSPITAL | Age: 58
End: 2023-04-19
Payer: MEDICARE

## 2023-04-21 ENCOUNTER — PATIENT MESSAGE (OUTPATIENT)
Dept: DIABETES | Facility: CLINIC | Age: 58
End: 2023-04-21
Payer: MEDICARE

## 2023-04-21 DIAGNOSIS — N18.30 TYPE 2 DIABETES MELLITUS WITH STAGE 3 CHRONIC KIDNEY DISEASE, WITHOUT LONG-TERM CURRENT USE OF INSULIN, UNSPECIFIED WHETHER STAGE 3A OR 3B CKD: ICD-10-CM

## 2023-04-21 DIAGNOSIS — E11.22 TYPE 2 DIABETES MELLITUS WITH STAGE 3 CHRONIC KIDNEY DISEASE, WITHOUT LONG-TERM CURRENT USE OF INSULIN, UNSPECIFIED WHETHER STAGE 3A OR 3B CKD: ICD-10-CM

## 2023-04-21 RX ORDER — INSULIN PUMP SYRINGE, 3 ML
EACH MISCELLANEOUS
Qty: 1 EACH | Refills: 0 | Status: SHIPPED | OUTPATIENT
Start: 2023-04-21 | End: 2023-05-04 | Stop reason: SDUPTHER

## 2023-04-26 ENCOUNTER — PATIENT MESSAGE (OUTPATIENT)
Dept: ADMINISTRATIVE | Facility: HOSPITAL | Age: 58
End: 2023-04-26
Payer: MEDICARE

## 2023-04-28 DIAGNOSIS — E11.59 HYPERTENSION ASSOCIATED WITH DIABETES: ICD-10-CM

## 2023-04-28 DIAGNOSIS — I15.2 HYPERTENSION ASSOCIATED WITH DIABETES: ICD-10-CM

## 2023-04-28 RX ORDER — LISINOPRIL AND HYDROCHLOROTHIAZIDE 20; 25 MG/1; MG/1
1 TABLET ORAL DAILY
Qty: 90 TABLET | Refills: 3 | Status: SHIPPED | OUTPATIENT
Start: 2023-04-28 | End: 2024-03-22 | Stop reason: SDUPTHER

## 2023-04-28 NOTE — TELEPHONE ENCOUNTER
Care Due:                  Date            Visit Type   Department     Provider  --------------------------------------------------------------------------------                                EP -                              PRIMARY      [facility] INTER[facility]  Oneyda Mainampati  Last Visit: 08-      [facility]   MEDICINE       [place]  Next Visit: None Scheduled  None         None Found                                                            Last  Test          Frequency    Reason                     Performed    Due Date  --------------------------------------------------------------------------------    HBA1C.......  6 months...  dapagliflozin, glipiZIDE,   07- 01-                             metFORMIN................    Uric Acid...  12 months..  allopurinoL..............  09- 08-    [facility] Embedded Care Due Messages. Reference number: [id number].   4/28/2023 9:38:04 AM CDT

## 2023-04-28 NOTE — TELEPHONE ENCOUNTER
Refill Routing Note   Medication(s) are not appropriate for processing by [facility] for the following reason(s):      Required labs abnormal  Required vitals abnormal    [facility] action(s):  Defer Labs due            Appointments  past 12m or future 3m with PCP    Date Provider   Last Visit   8/17/2022 Oneyda Bergman MD   Next Visit   Visit date not found Oneyda Bergman MD   ED visits in past 90 days: 0        Note composed:11:07 AM 04/28/2023

## 2023-05-03 ENCOUNTER — PATIENT MESSAGE (OUTPATIENT)
Dept: INTERNAL MEDICINE | Facility: CLINIC | Age: 58
End: 2023-05-03
Payer: MEDICARE

## 2023-05-03 DIAGNOSIS — E11.22 TYPE 2 DIABETES MELLITUS WITH STAGE 3 CHRONIC KIDNEY DISEASE, WITHOUT LONG-TERM CURRENT USE OF INSULIN, UNSPECIFIED WHETHER STAGE 3A OR 3B CKD: ICD-10-CM

## 2023-05-03 DIAGNOSIS — N18.30 TYPE 2 DIABETES MELLITUS WITH STAGE 3 CHRONIC KIDNEY DISEASE, WITHOUT LONG-TERM CURRENT USE OF INSULIN, UNSPECIFIED WHETHER STAGE 3A OR 3B CKD: ICD-10-CM

## 2023-05-03 DIAGNOSIS — E11.42 TYPE 2 DIABETES MELLITUS WITH DIABETIC POLYNEUROPATHY, WITHOUT LONG-TERM CURRENT USE OF INSULIN: ICD-10-CM

## 2023-05-04 RX ORDER — LANCETS
1 EACH MISCELLANEOUS DAILY
Qty: 100 EACH | Refills: 11 | Status: SHIPPED | OUTPATIENT
Start: 2023-05-04 | End: 2023-09-07

## 2023-05-04 RX ORDER — INSULIN PUMP SYRINGE, 3 ML
EACH MISCELLANEOUS
Qty: 1 EACH | Refills: 0 | Status: SHIPPED | OUTPATIENT
Start: 2023-05-04 | End: 2023-09-07

## 2023-05-09 ENCOUNTER — PATIENT MESSAGE (OUTPATIENT)
Dept: DIABETES | Facility: CLINIC | Age: 58
End: 2023-05-09
Payer: MEDICARE

## 2023-05-09 ENCOUNTER — OFFICE VISIT (OUTPATIENT)
Dept: OBSTETRICS AND GYNECOLOGY | Facility: CLINIC | Age: 58
End: 2023-05-09
Payer: MEDICARE

## 2023-05-09 ENCOUNTER — PATIENT MESSAGE (OUTPATIENT)
Dept: INTERNAL MEDICINE | Facility: CLINIC | Age: 58
End: 2023-05-09
Payer: MEDICARE

## 2023-05-09 VITALS
SYSTOLIC BLOOD PRESSURE: 126 MMHG | BODY MASS INDEX: 31.43 KG/M2 | DIASTOLIC BLOOD PRESSURE: 72 MMHG | HEIGHT: 59 IN | WEIGHT: 155.88 LBS

## 2023-05-09 DIAGNOSIS — N90.89 VULVAR IRRITATION: Primary | ICD-10-CM

## 2023-05-09 PROCEDURE — 99214 OFFICE O/P EST MOD 30 MIN: CPT | Mod: PBBFAC | Performed by: OBSTETRICS & GYNECOLOGY

## 2023-05-09 PROCEDURE — 99999 PR PBB SHADOW E&M-EST. PATIENT-LVL IV: CPT | Mod: PBBFAC,,, | Performed by: OBSTETRICS & GYNECOLOGY

## 2023-05-09 PROCEDURE — 87210 SMEAR WET MOUNT SALINE/INK: CPT | Mod: PBBFAC | Performed by: OBSTETRICS & GYNECOLOGY

## 2023-05-09 PROCEDURE — 99213 OFFICE O/P EST LOW 20 MIN: CPT | Mod: S$PBB,,, | Performed by: OBSTETRICS & GYNECOLOGY

## 2023-05-09 PROCEDURE — 99213 PR OFFICE/OUTPT VISIT, EST, LEVL III, 20-29 MIN: ICD-10-PCS | Mod: S$PBB,,, | Performed by: OBSTETRICS & GYNECOLOGY

## 2023-05-09 PROCEDURE — 99999 PR PBB SHADOW E&M-EST. PATIENT-LVL IV: ICD-10-PCS | Mod: PBBFAC,,, | Performed by: OBSTETRICS & GYNECOLOGY

## 2023-05-09 RX ORDER — TRIAMCINOLONE ACETONIDE 1 MG/G
CREAM TOPICAL 2 TIMES DAILY
Qty: 80 G | Refills: 1 | Status: SHIPPED | OUTPATIENT
Start: 2023-05-09 | End: 2023-12-06

## 2023-05-09 NOTE — PROGRESS NOTES
Subjective:      Patient ID: Richelle Zaldivar is a 57 y.o. female.    Chief Complaint:  Vaginitis      History of Present Illness  HPI  Pt complains of vulvar irritation by her clitoris for the past 15 days.  Denies any vaginal discharge, rash, or bleeding.  Is concerned she may have an infection.  Is currently using Estrace daily for atrophy and reports improvement on this medications.  Feels that this issue is separate.  Daughter was present for entire visit (helped translate for pt at pt request).    GYN & OB History  No LMP recorded. Patient has had a hysterectomy.   Date of Last Pap: No result found    OB History    Para Term  AB Living   8 3 3 0 5 3   SAB IAB Ectopic Multiple Live Births   5              # Outcome Date GA Lbr Pascual/2nd Weight Sex Delivery Anes PTL Lv   8 Term            7 Term            6 Term            5 SAB  25w0d          4 SAB            3 SAB            2 SAB            1 SAB                Review of Systems  Review of Systems   Constitutional:  Negative for activity change, appetite change, chills, fatigue, fever and unexpected weight change.   Respiratory:  Negative for shortness of breath.    Cardiovascular:  Negative for chest pain, palpitations and leg swelling.   Gastrointestinal:  Negative for abdominal pain, bloating, blood in stool, constipation, diarrhea, nausea and vomiting.   Genitourinary:  Positive for genital sores. Negative for dysuria, flank pain, frequency, hematuria, pelvic pain, urgency, vaginal bleeding, vaginal discharge, vaginal pain, urinary incontinence, vaginal dryness and vaginal odor.   Musculoskeletal:  Negative for back pain.   Neurological:  Negative for syncope and headaches.        Objective:     Physical Exam:   Constitutional: She is oriented to person, place, and time. She appears well-developed and well-nourished. No distress.       Cardiovascular:  Normal rate, regular rhythm and normal heart sounds.             Pulmonary/Chest: Effort  normal and breath sounds normal.        Abdominal: Soft. Bowel sounds are normal. She exhibits no distension. There is no abdominal tenderness.     Genitourinary:    Vagina and left adnexa normal.            Pelvic exam was performed with patient supine.   There is no rash, tenderness, lesion or injury on the right labia. There is no rash, tenderness, lesion or injury on the left labia. Right adnexum displays no mass, no tenderness and no fullness. Left adnexum displays no mass, no tenderness and no fullness. Vaginal cuff normal.  No erythema,  no vaginal discharge, tenderness or bleeding in the vagina.    No foreign body in the vagina.      No signs of injury in the vagina.   Cervix is absent.Uterus is absent.    Genitourinary Comments: Wet prep: negative for trichomonas, yeast, or clue cells             Musculoskeletal: Normal range of motion and moves all extremeties. No tenderness or edema.       Neurological: She is alert and oriented to person, place, and time.    Skin: Skin is warm and dry.    Psychiatric: She has a normal mood and affect. Her behavior is normal. Thought content normal.       Assessment:     1. Vulvar irritation             Plan:     Vulvar irritation  -     triamcinolone acetonide 0.1% (KENALOG) 0.1 % cream; Apply topically 2 (two) times daily.  Dispense: 45 g; Refill: 1  -     POCT Wet Prep  -     Pt doing well with Estrace otherwise.  No evidence of infection.  Pt and daughter reassured.    Follow up if symptoms worsen or fail to improve.

## 2023-05-10 ENCOUNTER — OFFICE VISIT (OUTPATIENT)
Dept: OPHTHALMOLOGY | Facility: CLINIC | Age: 58
End: 2023-05-10
Payer: MEDICARE

## 2023-05-10 DIAGNOSIS — H04.123 DRY EYE SYNDROME, BILATERAL: ICD-10-CM

## 2023-05-10 DIAGNOSIS — H35.52 RETINITIS PIGMENTOSA OF BOTH EYES: ICD-10-CM

## 2023-05-10 DIAGNOSIS — H18.20 CORNEAL EDEMA OF LEFT EYE: ICD-10-CM

## 2023-05-10 DIAGNOSIS — H20.022 IRITIS, RECURRENT, LEFT: ICD-10-CM

## 2023-05-10 DIAGNOSIS — H40.1133 PRIMARY OPEN ANGLE GLAUCOMA OF BOTH EYES, SEVERE STAGE: Primary | ICD-10-CM

## 2023-05-10 PROCEDURE — 99999 PR PBB SHADOW E&M-EST. PATIENT-LVL III: ICD-10-PCS | Mod: PBBFAC,,, | Performed by: OPHTHALMOLOGY

## 2023-05-10 PROCEDURE — 99214 PR OFFICE/OUTPT VISIT, EST, LEVL IV, 30-39 MIN: ICD-10-PCS | Mod: S$PBB,,, | Performed by: OPHTHALMOLOGY

## 2023-05-10 PROCEDURE — 99214 OFFICE O/P EST MOD 30 MIN: CPT | Mod: S$PBB,,, | Performed by: OPHTHALMOLOGY

## 2023-05-10 PROCEDURE — 99999 PR PBB SHADOW E&M-EST. PATIENT-LVL III: CPT | Mod: PBBFAC,,, | Performed by: OPHTHALMOLOGY

## 2023-05-10 PROCEDURE — 99213 OFFICE O/P EST LOW 20 MIN: CPT | Mod: PBBFAC | Performed by: OPHTHALMOLOGY

## 2023-05-10 RX ORDER — BROMFENAC SODIUM 0.7 MG/ML
1 SOLUTION/ DROPS OPHTHALMIC DAILY
Qty: 3 ML | Refills: 6 | Status: SHIPPED | OUTPATIENT
Start: 2023-05-10 | End: 2023-11-02 | Stop reason: SDUPTHER

## 2023-05-10 RX ORDER — DIFLUPREDNATE OPHTHALMIC 0.5 MG/ML
1 EMULSION OPHTHALMIC 2 TIMES DAILY
Qty: 5 ML | Refills: 1 | Status: SHIPPED | OUTPATIENT
Start: 2023-05-10 | End: 2023-09-14

## 2023-05-10 NOTE — PROGRESS NOTES
HPI     Glaucoma            Comments: Patient states she not noticed any sudden vision changes.   Patient states she is using her eye drops as directed. Patient denies any   pain or irritation at this time.          Comments    1. COAG (followed by Dr. Scott)   S/p Revision of Wound Left Eye 8/4/22  Ahmed Valve OS 8/2/18   CP OS 8/27/15 - low to moderate flow, TDW 1100, aggressive dilation with   GV   Gonio Puncture OS 9/25/15   Wound Revision OS 08/04/22  SLT OS 6/23/16, 6/23/15, 6-2-14   Goniotomy OS 11-17-16   2. DM   3. RP / VIT A PALMITATE 1500 (discontinued)   4. Retinitis Pigmentosa of both eyes   5. Dry Eye Syndrome bilateral   6. Iritis recurrent OS         OS: Durezol TID  OS: Dorz/Timolol BID  OS: Keto QID     OS: Latanoprost QHS   OS: Brimonidine TID   OD: Pred PRN  OD: Atropine BID            Last edited by Robert Scott MD on 5/10/2023 10:11 AM.            Assessment /Plan     For exam results, see Encounter Report.      ICD-10-CM ICD-9-CM    1. Primary open angle glaucoma of both eyes, severe stage  H40.1133 365.11 Iop stable at this time      365.73       2. Iritis, recurrent, left  H20.022 364.02 Stable       3. Corneal edema of left eye  H18.20 371.20 Doing well       4. Retinitis pigmentosa of both eyes  H35.52 362.74 Stable       5. Dry eye syndrome, bilateral  H04.123 375.15 Continue tears           RETURN TO CLINIC 2-3 month IOP     Try to taper Durezol in the future if IOP rises any - only use BID- pt can try to use QD and see how eye feels   Pt stopped lotemax in the past and states Durezol made her eye feel better       OS: Durezol BID   OS: Dorz/Timolol BID  OS: Keto QID     OS: Latanoprost QHS   OS: Brimonidine TID   OD: Pred PRN  OD: Atropine BID

## 2023-05-29 ENCOUNTER — TELEPHONE (OUTPATIENT)
Dept: OPHTHALMOLOGY | Facility: CLINIC | Age: 58
End: 2023-05-29
Payer: MEDICARE

## 2023-05-29 NOTE — TELEPHONE ENCOUNTER
Returned call, no answer. Unable to leave vm, phone continued to ring    ----- Message from Arlette Rai sent at 5/29/2023  9:34 AM CDT -----  Contact: Mukti/Daughter  Pt daughter is calling in regards to the pt eyes hurting and blurry vision.Please call back at [phone]        Thanks  CONNOR

## 2023-06-09 ENCOUNTER — OFFICE VISIT (OUTPATIENT)
Dept: OBSTETRICS AND GYNECOLOGY | Facility: CLINIC | Age: 58
End: 2023-06-09
Payer: MEDICARE

## 2023-06-09 VITALS
BODY MASS INDEX: 31.2 KG/M2 | WEIGHT: 154.75 LBS | HEIGHT: 59 IN | DIASTOLIC BLOOD PRESSURE: 80 MMHG | SYSTOLIC BLOOD PRESSURE: 120 MMHG

## 2023-06-09 DIAGNOSIS — N95.2 VAGINAL ATROPHY: ICD-10-CM

## 2023-06-09 DIAGNOSIS — N89.8 VAGINAL IRRITATION: Primary | ICD-10-CM

## 2023-06-09 PROCEDURE — 99999 PR PBB SHADOW E&M-EST. PATIENT-LVL IV: ICD-10-PCS | Mod: PBBFAC,,, | Performed by: NURSE PRACTITIONER

## 2023-06-09 PROCEDURE — 99999 PR PBB SHADOW E&M-EST. PATIENT-LVL IV: CPT | Mod: PBBFAC,,, | Performed by: NURSE PRACTITIONER

## 2023-06-09 PROCEDURE — 99213 OFFICE O/P EST LOW 20 MIN: CPT | Mod: S$PBB,,, | Performed by: NURSE PRACTITIONER

## 2023-06-09 PROCEDURE — 99213 PR OFFICE/OUTPT VISIT, EST, LEVL III, 20-29 MIN: ICD-10-PCS | Mod: S$PBB,,, | Performed by: NURSE PRACTITIONER

## 2023-06-09 PROCEDURE — 81514 NFCT DS BV&VAGINITIS DNA ALG: CPT | Performed by: NURSE PRACTITIONER

## 2023-06-09 PROCEDURE — 99214 OFFICE O/P EST MOD 30 MIN: CPT | Mod: PBBFAC | Performed by: NURSE PRACTITIONER

## 2023-06-09 RX ORDER — ESTRADIOL 0.1 MG/G
CREAM VAGINAL
Qty: 42.5 G | Refills: 11 | Status: SHIPPED | OUTPATIENT
Start: 2023-06-09

## 2023-06-09 RX ORDER — NYSTATIN 100000 U/G
OINTMENT TOPICAL 2 TIMES DAILY
Qty: 30 G | Refills: 1 | Status: SHIPPED | OUTPATIENT
Start: 2023-06-09 | End: 2023-12-06

## 2023-06-09 RX ORDER — FLUCONAZOLE 150 MG/1
TABLET ORAL
Qty: 2 TABLET | Refills: 0 | Status: SHIPPED | OUTPATIENT
Start: 2023-06-09 | End: 2023-06-30

## 2023-06-09 NOTE — PROGRESS NOTES
Subjective:       Patient ID: Richelle Zaldivar is a 57 y.o. female.    Chief Complaint:  Vaginal Itching    No LMP recorded. Patient has had a hysterectomy.  History of Present Illness  Patient presents to clinic with complaints of vaginal irritation and itching for 1.5 months.  Patient previously seen by Dr. Morales and started triamcinolone cream with no improvement in symptoms. Reports itching has worsened.  Denies any significant vaginal discharge.  Previously using vaginal estrogen cream although discontinued while using triamcinolone. Has not resumed use.     OB History    Para Term  AB Living   8 3 3 0 5 3   SAB IAB Ectopic Multiple Live Births   5              # Outcome Date GA Lbr Pascual/2nd Weight Sex Delivery Anes PTL Lv   8 Term            7 Term            6 Term            5 SAB  25w0d          4 SAB            3 SAB            2 SAB            1 SAB                Review of Systems  Review of Systems   Constitutional:  Negative for appetite change, fatigue and fever.   Gastrointestinal:  Negative for abdominal pain, bloating, constipation, diarrhea, nausea and vomiting.   Genitourinary:  Positive for vaginal pain. Negative for bladder incontinence, dysmenorrhea, dyspareunia, dysuria, flank pain, frequency, genital sores, menorrhagia, menstrual problem, pelvic pain, urgency, vaginal bleeding, vaginal discharge, postcoital bleeding, vaginal dryness and vaginal odor.   All other systems reviewed and are negative.         Objective:      Physical Exam:   Constitutional: She is oriented to person, place, and time. She appears well-developed and well-nourished.    HENT:   Head: Normocephalic and atraumatic.   Nose: Nose normal.    Eyes: Pupils are equal, round, and reactive to light. Conjunctivae and EOM are normal.     Cardiovascular:  Normal rate and regular rhythm.             Pulmonary/Chest: Effort normal.        Abdominal: Soft. She exhibits no distension. There is no abdominal tenderness.  Hernia confirmed negative in the right inguinal area and confirmed negative in the left inguinal area.     Genitourinary:    Inguinal canal and rectum normal.      Pelvic exam was performed with patient supine.   Genitalia hair distrobution normal .   Labial bartholins normal.There is rash on the right labia. There is no tenderness, lesion or injury on the right labia. There is rash on the left labia. There is no tenderness, lesion or injury on the left labia. Right adnexum displays no mass, no tenderness and no fullness. Left adnexum displays no mass, no tenderness and no fullness. Vaginal cuff normal.  There is erythema and vaginal discharge in the vagina. No rectocele or cystocele in the vagina. Vaginal atrophy noted. Cervix is absent.Uterus is absent.           Musculoskeletal: Normal range of motion and moves all extremeties.      Lymphadenopathy: No inguinal adenopathy noted on the right or left side.    Neurological: She is alert and oriented to person, place, and time.    Skin: Skin is warm and dry. She is not diaphoretic.    Psychiatric: She has a normal mood and affect. Her behavior is normal. Judgment and thought content normal.          Assessment:     1. Vaginal irritation    2. Vaginal atrophy              Plan:   Richelle was seen today for vaginal itching.    Diagnoses and all orders for this visit:    Vaginal irritation  -     fluconazole (DIFLUCAN) 150 MG Tab; Take 1 tablet today and then take 1 tablet in 72 hours.  -     nystatin (MYCOSTATIN) ointment; Apply topically 2 (two) times daily.  -     Vaginosis Screen by DNA Probe    Vaginal atrophy  -     estradioL (ESTRACE) 0.01 % (0.1 mg/gram) vaginal cream; Place 1/4 applicator intravaginally, nightly for 1-2 weeks then decrease to 3-4 times per week.      Will treat empirically for yeast based on presentation in symptoms.  Discontinue triamcinolone.  Diflucan by mouth. Nystatin ointment externally.   Resume vaginal estrogen cream.    Will follow  vaginosis screen and treat additionally if indicated.

## 2023-06-12 LAB
BACTERIAL VAGINOSIS DNA: NEGATIVE
CANDIDA GLABRATA DNA: POSITIVE
CANDIDA KRUSEI DNA: NEGATIVE
CANDIDA RRNA VAG QL PROBE: POSITIVE
T VAGINALIS RRNA GENITAL QL PROBE: NEGATIVE

## 2023-06-12 RX ORDER — TERCONAZOLE 4 MG/G
1 CREAM VAGINAL NIGHTLY
Qty: 45 G | Refills: 0 | Status: SHIPPED | OUTPATIENT
Start: 2023-06-12 | End: 2023-06-30

## 2023-06-23 ENCOUNTER — PATIENT MESSAGE (OUTPATIENT)
Dept: OPHTHALMOLOGY | Facility: CLINIC | Age: 58
End: 2023-06-23
Payer: MEDICARE

## 2023-06-30 ENCOUNTER — OFFICE VISIT (OUTPATIENT)
Dept: INTERNAL MEDICINE | Facility: CLINIC | Age: 58
End: 2023-06-30
Payer: MEDICARE

## 2023-06-30 VITALS
BODY MASS INDEX: 31.28 KG/M2 | WEIGHT: 155.19 LBS | DIASTOLIC BLOOD PRESSURE: 70 MMHG | SYSTOLIC BLOOD PRESSURE: 100 MMHG | HEART RATE: 85 BPM | OXYGEN SATURATION: 98 % | RESPIRATION RATE: 18 BRPM | HEIGHT: 59 IN

## 2023-06-30 DIAGNOSIS — E11.69 HYPERLIPIDEMIA ASSOCIATED WITH TYPE 2 DIABETES MELLITUS: ICD-10-CM

## 2023-06-30 DIAGNOSIS — E78.5 HYPERLIPIDEMIA ASSOCIATED WITH TYPE 2 DIABETES MELLITUS: ICD-10-CM

## 2023-06-30 DIAGNOSIS — E03.9 HYPOTHYROIDISM, UNSPECIFIED TYPE: ICD-10-CM

## 2023-06-30 DIAGNOSIS — N18.32 TYPE 2 DIABETES MELLITUS WITH STAGE 3B CHRONIC KIDNEY DISEASE, WITHOUT LONG-TERM CURRENT USE OF INSULIN: ICD-10-CM

## 2023-06-30 DIAGNOSIS — K21.9 GASTROESOPHAGEAL REFLUX DISEASE, UNSPECIFIED WHETHER ESOPHAGITIS PRESENT: ICD-10-CM

## 2023-06-30 DIAGNOSIS — D50.0 IRON DEFICIENCY ANEMIA DUE TO CHRONIC BLOOD LOSS: ICD-10-CM

## 2023-06-30 DIAGNOSIS — E66.9 OBESITY (BMI 30.0-34.9): ICD-10-CM

## 2023-06-30 DIAGNOSIS — I15.2 HYPERTENSION ASSOCIATED WITH DIABETES: Primary | ICD-10-CM

## 2023-06-30 DIAGNOSIS — E11.59 HYPERTENSION ASSOCIATED WITH DIABETES: Primary | ICD-10-CM

## 2023-06-30 DIAGNOSIS — E55.9 VITAMIN D DEFICIENCY: ICD-10-CM

## 2023-06-30 DIAGNOSIS — E79.0 HYPERURICEMIA: ICD-10-CM

## 2023-06-30 DIAGNOSIS — E11.22 TYPE 2 DIABETES MELLITUS WITH STAGE 3B CHRONIC KIDNEY DISEASE, WITHOUT LONG-TERM CURRENT USE OF INSULIN: ICD-10-CM

## 2023-06-30 PROCEDURE — 99999 PR PBB SHADOW E&M-EST. PATIENT-LVL V: CPT | Mod: PBBFAC,,, | Performed by: FAMILY MEDICINE

## 2023-06-30 PROCEDURE — 99214 PR OFFICE/OUTPT VISIT, EST, LEVL IV, 30-39 MIN: ICD-10-PCS | Mod: S$PBB,,, | Performed by: FAMILY MEDICINE

## 2023-06-30 PROCEDURE — 99214 OFFICE O/P EST MOD 30 MIN: CPT | Mod: S$PBB,,, | Performed by: FAMILY MEDICINE

## 2023-06-30 PROCEDURE — 99215 OFFICE O/P EST HI 40 MIN: CPT | Mod: PBBFAC | Performed by: FAMILY MEDICINE

## 2023-06-30 PROCEDURE — 99999 PR PBB SHADOW E&M-EST. PATIENT-LVL V: ICD-10-PCS | Mod: PBBFAC,,, | Performed by: FAMILY MEDICINE

## 2023-06-30 RX ORDER — ALLOPURINOL 100 MG/1
200 TABLET ORAL DAILY
Qty: 180 TABLET | Refills: 3 | Status: SHIPPED | OUTPATIENT
Start: 2023-06-30

## 2023-06-30 RX ORDER — TERCONAZOLE 4 MG/G
1 CREAM VAGINAL NIGHTLY
COMMUNITY
End: 2023-12-06

## 2023-06-30 RX ORDER — ERGOCALCIFEROL 1.25 MG/1
50000 CAPSULE ORAL
Qty: 10 CAPSULE | Refills: 0 | Status: SHIPPED | OUTPATIENT
Start: 2023-06-30 | End: 2023-12-06 | Stop reason: SDUPTHER

## 2023-06-30 RX ORDER — AMLODIPINE BESYLATE 10 MG/1
TABLET ORAL
Qty: 90 TABLET | Refills: 3 | Status: SHIPPED | OUTPATIENT
Start: 2023-06-30 | End: 2024-02-06 | Stop reason: SDUPTHER

## 2023-06-30 NOTE — PROGRESS NOTES
"Subjective:       Patient ID: Richelle Zaldivar is a 58 y.o. female.    Chief Complaint: Follow-up    58-year-old female patient with Patient Active Problem List:     Hypertension associated with diabetes     Type 2 diabetes mellitus with stage 3 chronic kidney disease, without long-term current use of insulin     Hyperlipidemia associated with type 2 diabetes mellitus     Iron deficiency anemia     Vitamin D deficiency     GERD (gastroesophageal reflux disease)     Retinitis pigmentosa     Recurrent iritis of left eye     Open-angle glaucoma, severe stage     Osteoarthritis of knee     Hypothyroidism     Obesity (BMI 30.0-34.9)  Here for follow-up on chronic medical conditions  Patient has been taking her medications regularly and requesting refills on her medications today  Reports that she is been monitoring her blood glucose levels which has been stable  Denies any chest pain or difficulty breathing with palpitations or tingling or numbness sensation to extremities  Denies any gout exacerbations  Reports that she is been taking gabapentin 300 mg in the evening    Review of Systems   Constitutional:  Negative for fatigue.   Eyes:  Negative for visual disturbance.   Respiratory:  Negative for shortness of breath.    Cardiovascular:  Negative for chest pain and leg swelling.   Gastrointestinal:  Negative for abdominal pain, nausea and vomiting.   Endocrine: Negative for polydipsia and polyuria.   Musculoskeletal:  Negative for myalgias.   Skin:  Negative for rash.   Neurological:  Negative for weakness, light-headedness, numbness and headaches.   Psychiatric/Behavioral:  Negative for sleep disturbance.        /70 (BP Location: Right arm, Patient Position: Sitting, BP Method: Large (Manual))   Pulse 85   Resp 18   Ht 4' 11" (1.499 m)   Wt 70.4 kg (155 lb 3.3 oz)   SpO2 98%   BMI 31.35 kg/m²   Objective:      Physical Exam  Constitutional:       Appearance: She is well-developed.   HENT:      Head: " Normocephalic and atraumatic.   Cardiovascular:      Rate and Rhythm: Normal rate and regular rhythm.      Pulses:           Dorsalis pedis pulses are 2+ on the right side and 2+ on the left side.      Heart sounds: Normal heart sounds. No murmur heard.  Pulmonary:      Effort: Pulmonary effort is normal.      Breath sounds: Normal breath sounds. No wheezing.   Abdominal:      General: Bowel sounds are normal.      Palpations: Abdomen is soft.      Tenderness: There is no abdominal tenderness.   Feet:      Right foot:      Protective Sensation: 10 sites tested.  10 sites sensed.      Left foot:      Protective Sensation: 10 sites tested.  10 sites sensed.   Skin:     General: Skin is warm and dry.      Findings: No rash.   Neurological:      Mental Status: She is alert and oriented to person, place, and time.   Psychiatric:         Mood and Affect: Mood normal.         Assessment/Plan:   1. Hypertension associated with diabetes  -     Urinalysis, Reflex to Urine Culture Urine, Clean Catch; Future; Expected date: 06/30/2023  -     TSH; Future; Expected date: 06/30/2023  -     Lipid Panel; Future; Expected date: 06/30/2023  -     Comprehensive Metabolic Panel; Future; Expected date: 06/30/2023  -     amLODIPine (NORVASC) 10 MG tablet; Take 1 tablet orally once a day.  Dispense: 90 tablet; Refill: 3  Blood pressure is stable currently on lisinopril hydrochlorothiazide 20/25 mg and amlodipine 10 mg daily    2. Hyperlipidemia associated with type 2 diabetes mellitus  -     Lipid Panel; Future; Expected date: 06/30/2023  Currently taking fenofibrate 134 mg and Crestor 40 mg daily    3. Type 2 diabetes mellitus with stage 3b chronic kidney disease, without long-term current use of insulin  -     Hemoglobin A1C; Future; Expected date: 06/30/2023  -     Microalbumin/Creatinine Ratio, Urine; Future; Expected date: 06/30/2023  -     empagliflozin (JARDIANCE) 25 mg tablet; Take 1 tablet (25 mg total) by mouth once daily.   Dispense: 90 tablet; Refill: 3  Currently on metformin 1000 mg twice daily, glipizide 10 mg 3 times daily and Jardiance 25 mg daily  Encouraged to drink adequate fluids and avoid over-the-counter NSAIDs  Strict lifestyle changes recommended with 1800 ADA low-fat and low-cholesterol diet and exercise 30 minutes daily      4. Vitamin D deficiency  -     CBC Auto Differential; Future; Expected date: 06/30/2023  -     Vitamin D; Future; Expected date: 06/30/2023  -     ergocalciferol (VITAMIN D2) 50,000 unit Cap; Take 1 capsule (50,000 Units total) by mouth every 7 days.  Dispense: 10 capsule; Refill: 0  Refill given on vitamin-D by prescription weekly    5. Hypothyroidism, unspecified type  -     TSH; Future; Expected date: 06/30/2023  Currently taking levothyroxine 25 mcg p.o. daily    6. Gastroesophageal reflux disease, unspecified whether esophagitis present  Stable with diet changes    7. Iron deficiency anemia due to chronic blood loss  -     CBC Auto Differential; Future; Expected date: 06/30/2023  Will recheck labs    8. Obesity (BMI 30.0-34.9)  Lifestyle modifications discussed to lose weight with BMI 31    9. Hyperuricemia  -     allopurinoL (ZYLOPRIM) 100 MG tablet; Take 2 tablets (200 mg total) by mouth once daily.  Dispense: 180 tablet; Refill: 3  -     Uric Acid; Future; Expected date: 06/30/2023  Currently taking allopurinol 200 mg daily

## 2023-07-03 ENCOUNTER — PATIENT OUTREACH (OUTPATIENT)
Dept: ADMINISTRATIVE | Facility: HOSPITAL | Age: 58
End: 2023-07-03
Payer: MEDICARE

## 2023-07-05 ENCOUNTER — LAB VISIT (OUTPATIENT)
Dept: LAB | Facility: HOSPITAL | Age: 58
End: 2023-07-05
Attending: FAMILY MEDICINE
Payer: MEDICARE

## 2023-07-05 DIAGNOSIS — E11.59 HYPERTENSION ASSOCIATED WITH DIABETES: ICD-10-CM

## 2023-07-05 DIAGNOSIS — E79.0 HYPERURICEMIA: ICD-10-CM

## 2023-07-05 DIAGNOSIS — E55.9 VITAMIN D DEFICIENCY: ICD-10-CM

## 2023-07-05 DIAGNOSIS — D50.0 IRON DEFICIENCY ANEMIA DUE TO CHRONIC BLOOD LOSS: ICD-10-CM

## 2023-07-05 DIAGNOSIS — E11.22 TYPE 2 DIABETES MELLITUS WITH STAGE 3B CHRONIC KIDNEY DISEASE, WITHOUT LONG-TERM CURRENT USE OF INSULIN: ICD-10-CM

## 2023-07-05 DIAGNOSIS — N18.32 TYPE 2 DIABETES MELLITUS WITH STAGE 3B CHRONIC KIDNEY DISEASE, WITHOUT LONG-TERM CURRENT USE OF INSULIN: ICD-10-CM

## 2023-07-05 DIAGNOSIS — E11.69 HYPERLIPIDEMIA ASSOCIATED WITH TYPE 2 DIABETES MELLITUS: ICD-10-CM

## 2023-07-05 DIAGNOSIS — E78.5 HYPERLIPIDEMIA ASSOCIATED WITH TYPE 2 DIABETES MELLITUS: ICD-10-CM

## 2023-07-05 DIAGNOSIS — E03.9 HYPOTHYROIDISM, UNSPECIFIED TYPE: ICD-10-CM

## 2023-07-05 DIAGNOSIS — I15.2 HYPERTENSION ASSOCIATED WITH DIABETES: ICD-10-CM

## 2023-07-05 LAB
25(OH)D3+25(OH)D2 SERPL-MCNC: 12 NG/ML (ref 30–96)
ALBUMIN SERPL BCP-MCNC: 3.8 G/DL (ref 3.5–5.2)
ALP SERPL-CCNC: 67 U/L (ref 55–135)
ALT SERPL W/O P-5'-P-CCNC: 10 U/L (ref 10–44)
ANION GAP SERPL CALC-SCNC: 10 MMOL/L (ref 8–16)
AST SERPL-CCNC: 12 U/L (ref 10–40)
BASOPHILS # BLD AUTO: 0.04 K/UL (ref 0–0.2)
BASOPHILS NFR BLD: 0.6 % (ref 0–1.9)
BILIRUB SERPL-MCNC: 0.4 MG/DL (ref 0.1–1)
BUN SERPL-MCNC: 14 MG/DL (ref 6–20)
CALCIUM SERPL-MCNC: 9.6 MG/DL (ref 8.7–10.5)
CHLORIDE SERPL-SCNC: 107 MMOL/L (ref 95–110)
CHOLEST SERPL-MCNC: 286 MG/DL (ref 120–199)
CHOLEST/HDLC SERPL: 6.4 {RATIO} (ref 2–5)
CO2 SERPL-SCNC: 22 MMOL/L (ref 23–29)
CREAT SERPL-MCNC: 1.3 MG/DL (ref 0.5–1.4)
DIFFERENTIAL METHOD: NORMAL
EOSINOPHIL # BLD AUTO: 0.5 K/UL (ref 0–0.5)
EOSINOPHIL NFR BLD: 7.7 % (ref 0–8)
ERYTHROCYTE [DISTWIDTH] IN BLOOD BY AUTOMATED COUNT: 13.7 % (ref 11.5–14.5)
EST. GFR  (NO RACE VARIABLE): 47.1 ML/MIN/1.73 M^2
ESTIMATED AVG GLUCOSE: 174 MG/DL (ref 68–131)
GLUCOSE SERPL-MCNC: 130 MG/DL (ref 70–110)
HBA1C MFR BLD: 7.7 % (ref 4–5.6)
HCT VFR BLD AUTO: 40.3 % (ref 37–48.5)
HDLC SERPL-MCNC: 45 MG/DL (ref 40–75)
HDLC SERPL: 15.7 % (ref 20–50)
HGB BLD-MCNC: 12.9 G/DL (ref 12–16)
IMM GRANULOCYTES # BLD AUTO: 0.02 K/UL (ref 0–0.04)
IMM GRANULOCYTES NFR BLD AUTO: 0.3 % (ref 0–0.5)
LDLC SERPL CALC-MCNC: 187.8 MG/DL (ref 63–159)
LYMPHOCYTES # BLD AUTO: 3 K/UL (ref 1–4.8)
LYMPHOCYTES NFR BLD: 46.2 % (ref 18–48)
MCH RBC QN AUTO: 28.6 PG (ref 27–31)
MCHC RBC AUTO-ENTMCNC: 32 G/DL (ref 32–36)
MCV RBC AUTO: 89 FL (ref 82–98)
MONOCYTES # BLD AUTO: 0.4 K/UL (ref 0.3–1)
MONOCYTES NFR BLD: 5.8 % (ref 4–15)
NEUTROPHILS # BLD AUTO: 2.6 K/UL (ref 1.8–7.7)
NEUTROPHILS NFR BLD: 39.4 % (ref 38–73)
NONHDLC SERPL-MCNC: 241 MG/DL
NRBC BLD-RTO: 0 /100 WBC
PLATELET # BLD AUTO: 247 K/UL (ref 150–450)
PMV BLD AUTO: 9.7 FL (ref 9.2–12.9)
POTASSIUM SERPL-SCNC: 4.8 MMOL/L (ref 3.5–5.1)
PROT SERPL-MCNC: 6.9 G/DL (ref 6–8.4)
RBC # BLD AUTO: 4.51 M/UL (ref 4–5.4)
SODIUM SERPL-SCNC: 139 MMOL/L (ref 136–145)
TRIGL SERPL-MCNC: 266 MG/DL (ref 30–150)
TSH SERPL DL<=0.005 MIU/L-ACNC: 2.12 UIU/ML (ref 0.4–4)
URATE SERPL-MCNC: 8.5 MG/DL (ref 2.4–5.7)
WBC # BLD AUTO: 6.53 K/UL (ref 3.9–12.7)

## 2023-07-05 PROCEDURE — 36415 COLL VENOUS BLD VENIPUNCTURE: CPT | Performed by: FAMILY MEDICINE

## 2023-07-05 PROCEDURE — 85025 COMPLETE CBC W/AUTO DIFF WBC: CPT | Performed by: FAMILY MEDICINE

## 2023-07-05 PROCEDURE — 80053 COMPREHEN METABOLIC PANEL: CPT | Performed by: FAMILY MEDICINE

## 2023-07-05 PROCEDURE — 84443 ASSAY THYROID STIM HORMONE: CPT | Performed by: FAMILY MEDICINE

## 2023-07-05 PROCEDURE — 84550 ASSAY OF BLOOD/URIC ACID: CPT | Performed by: FAMILY MEDICINE

## 2023-07-05 PROCEDURE — 83036 HEMOGLOBIN GLYCOSYLATED A1C: CPT | Performed by: FAMILY MEDICINE

## 2023-07-05 PROCEDURE — 82306 VITAMIN D 25 HYDROXY: CPT | Performed by: FAMILY MEDICINE

## 2023-07-05 PROCEDURE — 80061 LIPID PANEL: CPT | Performed by: FAMILY MEDICINE

## 2023-07-05 RX ORDER — EZETIMIBE 10 MG/1
10 TABLET ORAL NIGHTLY
Qty: 90 TABLET | Refills: 3 | Status: SHIPPED | OUTPATIENT
Start: 2023-07-05 | End: 2024-07-04

## 2023-07-06 DIAGNOSIS — B37.9 YEAST INFECTION: Primary | ICD-10-CM

## 2023-07-06 RX ORDER — FLUCONAZOLE 150 MG/1
150 TABLET ORAL DAILY
Qty: 1 TABLET | Refills: 0 | Status: SHIPPED | OUTPATIENT
Start: 2023-07-06 | End: 2023-07-08

## 2023-08-10 ENCOUNTER — PATIENT MESSAGE (OUTPATIENT)
Dept: DIABETES | Facility: CLINIC | Age: 58
End: 2023-08-10

## 2023-08-10 ENCOUNTER — CLINICAL SUPPORT (OUTPATIENT)
Dept: DIABETES | Facility: CLINIC | Age: 58
End: 2023-08-10
Payer: MEDICARE

## 2023-08-10 ENCOUNTER — OFFICE VISIT (OUTPATIENT)
Dept: DIABETES | Facility: CLINIC | Age: 58
End: 2023-08-10
Payer: MEDICARE

## 2023-08-10 VITALS
WEIGHT: 155.88 LBS | HEART RATE: 84 BPM | DIASTOLIC BLOOD PRESSURE: 84 MMHG | BODY MASS INDEX: 31.48 KG/M2 | SYSTOLIC BLOOD PRESSURE: 116 MMHG

## 2023-08-10 DIAGNOSIS — E66.9 OBESITY (BMI 30-39.9): ICD-10-CM

## 2023-08-10 DIAGNOSIS — L84 CORN OF FOOT: ICD-10-CM

## 2023-08-10 DIAGNOSIS — E11.22 TYPE 2 DIABETES MELLITUS WITH STAGE 3 CHRONIC KIDNEY DISEASE, WITHOUT LONG-TERM CURRENT USE OF INSULIN, UNSPECIFIED WHETHER STAGE 3A OR 3B CKD: Primary | ICD-10-CM

## 2023-08-10 DIAGNOSIS — N18.30 TYPE 2 DIABETES MELLITUS WITH STAGE 3 CHRONIC KIDNEY DISEASE, WITHOUT LONG-TERM CURRENT USE OF INSULIN, UNSPECIFIED WHETHER STAGE 3A OR 3B CKD: Primary | ICD-10-CM

## 2023-08-10 DIAGNOSIS — Z12.31 SCREENING MAMMOGRAM FOR BREAST CANCER: ICD-10-CM

## 2023-08-10 DIAGNOSIS — E11.69 HYPERLIPIDEMIA ASSOCIATED WITH TYPE 2 DIABETES MELLITUS: ICD-10-CM

## 2023-08-10 DIAGNOSIS — E78.5 HYPERLIPIDEMIA ASSOCIATED WITH TYPE 2 DIABETES MELLITUS: ICD-10-CM

## 2023-08-10 DIAGNOSIS — I15.2 HYPERTENSION ASSOCIATED WITH DIABETES: ICD-10-CM

## 2023-08-10 DIAGNOSIS — E11.59 HYPERTENSION ASSOCIATED WITH DIABETES: ICD-10-CM

## 2023-08-10 DIAGNOSIS — E03.9 HYPOTHYROIDISM, UNSPECIFIED TYPE: ICD-10-CM

## 2023-08-10 LAB — GLUCOSE SERPL-MCNC: 229 MG/DL (ref 70–110)

## 2023-08-10 PROCEDURE — 99999 PR PBB SHADOW E&M-EST. PATIENT-LVL V: CPT | Mod: PBBFAC,,, | Performed by: PHYSICIAN ASSISTANT

## 2023-08-10 PROCEDURE — 99999PBSHW POCT GLUCOSE, HAND-HELD DEVICE: Mod: PBBFAC,,,

## 2023-08-10 PROCEDURE — 99214 OFFICE O/P EST MOD 30 MIN: CPT | Mod: S$PBB,,, | Performed by: PHYSICIAN ASSISTANT

## 2023-08-10 PROCEDURE — 99215 OFFICE O/P EST HI 40 MIN: CPT | Mod: PBBFAC | Performed by: PHYSICIAN ASSISTANT

## 2023-08-10 PROCEDURE — 99214 PR OFFICE/OUTPT VISIT, EST, LEVL IV, 30-39 MIN: ICD-10-PCS | Mod: S$PBB,,, | Performed by: PHYSICIAN ASSISTANT

## 2023-08-10 PROCEDURE — 82962 GLUCOSE BLOOD TEST: CPT | Mod: PBBFAC | Performed by: PHYSICIAN ASSISTANT

## 2023-08-10 PROCEDURE — 99999 PR PBB SHADOW E&M-EST. PATIENT-LVL V: ICD-10-PCS | Mod: PBBFAC,,, | Performed by: PHYSICIAN ASSISTANT

## 2023-08-10 PROCEDURE — 99999PBSHW POCT GLUCOSE, HAND-HELD DEVICE: ICD-10-PCS | Mod: PBBFAC,,,

## 2023-08-10 RX ORDER — METFORMIN HYDROCHLORIDE 1000 MG/1
1000 TABLET ORAL 2 TIMES DAILY WITH MEALS
Qty: 180 TABLET | Refills: 3 | Status: SHIPPED | OUTPATIENT
Start: 2023-08-10

## 2023-08-10 RX ORDER — GLIPIZIDE 10 MG/1
10 TABLET ORAL
Qty: 270 TABLET | Refills: 3 | Status: SHIPPED | OUTPATIENT
Start: 2023-08-10 | End: 2023-10-12

## 2023-08-10 RX ORDER — DULAGLUTIDE 0.75 MG/.5ML
0.75 INJECTION, SOLUTION SUBCUTANEOUS WEEKLY
Qty: 4 PEN | Refills: 11 | Status: SHIPPED | OUTPATIENT
Start: 2023-08-10 | End: 2024-08-09

## 2023-08-10 NOTE — PROGRESS NOTES
"PCP: Oneyda Bergman MD    Subjective:     Chief Complaint: Diabetes - Established Patient    HISTORY OF PRESENT ILLNESS: 58 y.o.   female presenting for diabetes management visit.   The patient's last visit with me was on 9/6/2022.   English is not patient's first language, however patient declines  and gives consent for daughter to translate.   Patient has had Type II diabetes since 10 or more years.  Pertinent to decision making is the following comorbidities: HTN, HLD, CKD III, Hypothyroidism and Obesity by BMI  Patient has the following Diabetes complications: with diabetic chronic kidney disease  She  has attended diabetes education in the past.     Patient's most recent A1c of 7.7% was completed 1 months ago.   Patient states since Her last A1c Her blood glucose levels have been high  after meals at times.  Patient monitors blood glucose 2 times per day with meter : Fasting and After Dinner.   Patient blood glucose monitoring device will not be uploaded into [employer] today  secondary to unable to upload successfully.   Per meter recall: Fasting blood sugars 62 - 16 and after breakfast today 229.  Patient endorses the following diabetes related symptoms: None.   Patient is due today for the following diabetes-related health maintenance standards: Foot Exam , Eye Exam, COVID-19 Vaccine , and Mammogram . Eye sched next month. Due for mammo in Oct.   She denies recent hospital visits/ emergency room visit.   She denies having hypoglycemia.  Patient's concerns today include glycemic control.    Patient medication regimen is as below.     CURRENT DM MEDICATIONS:   Metformin 1000 mg BID  Glipizide 10 mg TID wm   Jardiance 25 mg daily   Rybelsus - d/c due to constipation     Patient has failed the following Diabetes medications:   Farxiga      Labs Reviewed.       No results found for: "CPEPTIDE"  No results found for: "GLUTAMICACID"       //   , There is no height or weight on file to calculate " BMI.  Her blood sugar in clinic today is:    Lab Results   Component Value Date    POCGLU 274 (A) 08/17/2022       Review of Systems   Constitutional:  Negative for activity change, appetite change, chills and fever.   HENT:  Negative for dental problem, mouth sores, nosebleeds, sore throat and trouble swallowing.    Eyes:  Negative for pain and discharge.   Respiratory:  Negative for shortness of breath, wheezing and stridor.    Cardiovascular:  Negative for chest pain, palpitations and leg swelling.   Gastrointestinal:  Negative for abdominal pain, diarrhea, nausea and vomiting.   Endocrine: Negative for polydipsia, polyphagia and polyuria.   Genitourinary:  Negative for dysuria, frequency and urgency.   Musculoskeletal:  Negative for joint swelling and myalgias.   Skin:  Negative for rash and wound.   Neurological:  Negative for dizziness, syncope, weakness and headaches.   Psychiatric/Behavioral:  Negative for behavioral problems and dysphoric mood.          Diabetes Management Status  Statin: Taking  ACE/ARB: Taking    Screening or Prevention Patient's value Goal Complete/Controlled?   HgA1C Testing and Control   Lab Results   Component Value Date    HGBA1C 7.7 (H) 07/05/2023      Annually/Less than 8% Yes   Lipid profile : 07/05/2023 Annually Yes   LDL control Lab Results   Component Value Date    LDLCALC 187.8 (H) 07/05/2023    Annually/Less than 100 mg/dl  Yes   Nephropathy screening Lab Results   Component Value Date    MICALBCREAT 6.4 07/05/2023     Lab Results   Component Value Date    PROTEINUA Negative 07/05/2023    Annually Yes   Blood pressure BP Readings from Last 1 Encounters:   06/30/23 100/70    Less than 140/90 Yes   Dilated retinal exam : 01/12/2021 Annually Yes    Foot exam   : 07/07/2022 Annually No     ACTIVITY LEVEL: Sedentary. Discussed activities, benefits, methods, and precautions.  MEAL PLANNING: Patient reports number of meals per day to be 3 and number of snacks per day to be 2.    Patient is encouraged to carb count and consume no more than 30 - 45 grams of carbohydrates in each meal and 15 grams of carbohydrates in each snack.     Social History     Socioeconomic History    Marital status:     Number of children: 3   Tobacco Use    Smoking status: Never    Smokeless tobacco: Never   Substance and Sexual Activity    Alcohol use: No     Alcohol/week: 0.0 standard drinks of alcohol    Drug use: No    Sexual activity: Not Currently     Partners: Male     Birth control/protection: None   Social History Narrative    , 3 kids.     Past Medical History:   Diagnosis Date    Acid reflux     Cataract     Diabetes mellitus, type 2     Glaucoma     HTN (hypertension)     Hyperlipidemia     Osteoarthritis of knee 3/11/2019    Recurrent iritis of left eye 4/13/2016       Objective:        Physical Exam  Constitutional:       General: She is not in acute distress.     Appearance: She is well-developed. She is not diaphoretic.   HENT:      Head: Normocephalic and atraumatic.      Right Ear: External ear normal.      Left Ear: External ear normal.      Nose: Nose normal.   Eyes:      General:         Right eye: No discharge.         Left eye: No discharge.      Pupils: Pupils are equal, round, and reactive to light.   Cardiovascular:      Rate and Rhythm: Normal rate and regular rhythm.      Pulses:           Dorsalis pedis pulses are 2+ on the right side and 2+ on the left side.        Posterior tibial pulses are 2+ on the right side and 2+ on the left side.      Heart sounds: Normal heart sounds.   Pulmonary:      Effort: Pulmonary effort is normal.      Breath sounds: Normal breath sounds.   Abdominal:      Palpations: Abdomen is soft.   Musculoskeletal:         General: Normal range of motion.      Cervical back: Normal range of motion and neck supple.      Right foot: Normal range of motion. No deformity.      Left foot: Normal range of motion. No deformity.        Feet:    Feet:      Right  foot:      Protective Sensation: 6 sites tested.  6 sites sensed.      Skin integrity: No ulcer, blister, skin breakdown, erythema, warmth, callus or dry skin.      Left foot:      Protective Sensation: 6 sites tested.  6 sites sensed.      Skin integrity: No ulcer, blister, skin breakdown, erythema, warmth, callus or dry skin.   Skin:     General: Skin is warm and dry.      Capillary Refill: Capillary refill takes less than 2 seconds.   Neurological:      Mental Status: She is alert and oriented to person, place, and time. Mental status is at baseline.      Motor: No abnormal muscle tone.      Coordination: Coordination normal.   Psychiatric:         Mood and Affect: Mood normal.         Behavior: Behavior normal.         Thought Content: Thought content normal.         Judgment: Judgment normal.           Assessment / Plan:     Type 2 diabetes mellitus with stage 3 chronic kidney disease, without long-term current use of insulin, unspecified whether stage 3a or 3b CKD  -     POCT Glucose, Hand-Held Device  -     dulaglutide (TRULICITY) 0.75 mg/0.5 mL pen injector; Inject 0.75 mg into the skin once a week.  Dispense: 4 pen ; Refill: 11  -     glipiZIDE (GLUCOTROL) 10 MG tablet; Take 1 tablet (10 mg total) by mouth 3 (three) times daily before meals.  Dispense: 270 tablet; Refill: 3  -     metFORMIN (GLUCOPHAGE) 1000 MG tablet; Take 1 tablet (1,000 mg total) by mouth 2 (two) times daily with meals.  Dispense: 180 tablet; Refill: 3  -     Ambulatory referral/consult to Podiatry; Future; Expected date: 08/17/2023    Hypertension associated with diabetes    Hyperlipidemia associated with type 2 diabetes mellitus    Hypothyroidism, unspecified type    Obesity (BMI 30-39.9)    Screening mammogram for breast cancer    [place] of foot  -     Ambulatory referral/consult to Podiatry; Future; Expected date: 08/17/2023      Additional Plan Details:    - POCT Glucose  - Encouraged continuation of lifestyle changes including  regular exercise and limiting carbohydrates to 30-45 grams per meal threes times daily and 15 grams per snack with a limit of two daily.   - Encouraged continued monitoring of blood glucose with maintenance of 3 times daily at Fasting, Before Dinner and After Dinner.   - Current DM Medication Regimen: Continue Jardiance 25 mg daily. Continue Metformin. Continue Glipizide 10 mg TID wm. Start Trulicity 0.75 mg weekly - reviewed potential side effect profile including constipation seen with Rybelsus. Patient understood and after discussion on once weekly injectable GLP-1 vs. Meal Insulin still opted to try once weekly GLP-1. Nurse visit for pen training scheduled for following visit.   - Referral to Podiatry - [place] of left foot; corn pad for time being  - Health Maintenance standards addressed today: Foot Exam - completed today and documented in physical exam with feedback to patient about proper diabetic foot care and findings, Eye Exam - will be completed within [facility] system and scheduled today, COVID - 19 Vaccine - booster sched, and Mammogram - unable to sched until Oct  - Nursing Visit: Patient is below goal range for nursing visit for age group and will not need nursing visit at this time .   - Follow up in 2 months with A1c prior. Check in message to deliver in 4 weeks.       Richi Brown PA-C  [facility]    A total of 30 minutes was spent in face to face time, of which over 50 % was spent in counseling patient on disease process, complications, treatment, and side effects of medications.

## 2023-08-10 NOTE — PATIENT INSTRUCTIONS
CURRENT DM MEDICATIONS:   Metformin 1000 mg BID  Glipizide 10 mg TID wm   Jardiance 25 mg daily   Trulicity 0.75 mg weekly      corn pads for foot

## 2023-08-16 ENCOUNTER — OFFICE VISIT (OUTPATIENT)
Dept: OPHTHALMOLOGY | Facility: CLINIC | Age: 58
End: 2023-08-16
Payer: MEDICARE

## 2023-08-16 DIAGNOSIS — H20.022 IRITIS, RECURRENT, LEFT: ICD-10-CM

## 2023-08-16 DIAGNOSIS — H40.1133 PRIMARY OPEN ANGLE GLAUCOMA OF BOTH EYES, SEVERE STAGE: Primary | ICD-10-CM

## 2023-08-16 DIAGNOSIS — H35.52 RETINITIS PIGMENTOSA OF BOTH EYES: ICD-10-CM

## 2023-08-16 DIAGNOSIS — H18.20 CORNEAL EDEMA OF LEFT EYE: ICD-10-CM

## 2023-08-16 DIAGNOSIS — H02.055 TRICHIASIS OF LEFT LOWER EYELID: ICD-10-CM

## 2023-08-16 DIAGNOSIS — H26.492 PCO (POSTERIOR CAPSULAR OPACIFICATION), LEFT: ICD-10-CM

## 2023-08-16 PROCEDURE — 99214 OFFICE O/P EST MOD 30 MIN: CPT | Mod: 25,S$PBB,, | Performed by: OPHTHALMOLOGY

## 2023-08-16 PROCEDURE — 66821 AFTER CATARACT LASER SURGERY: CPT | Mod: PBBFAC,LT | Performed by: OPHTHALMOLOGY

## 2023-08-16 PROCEDURE — 99999 PR PBB SHADOW E&M-EST. PATIENT-LVL III: ICD-10-PCS | Mod: PBBFAC,,, | Performed by: OPHTHALMOLOGY

## 2023-08-16 PROCEDURE — 99213 OFFICE O/P EST LOW 20 MIN: CPT | Mod: PBBFAC | Performed by: OPHTHALMOLOGY

## 2023-08-16 PROCEDURE — 66821 YAG CAPSULOTOMY - OS - LEFT EYE: ICD-10-PCS | Mod: S$PBB,LT,, | Performed by: OPHTHALMOLOGY

## 2023-08-16 PROCEDURE — 99999 PR PBB SHADOW E&M-EST. PATIENT-LVL III: CPT | Mod: PBBFAC,,, | Performed by: OPHTHALMOLOGY

## 2023-08-16 PROCEDURE — 99214 PR OFFICE/OUTPT VISIT, EST, LEVL IV, 30-39 MIN: ICD-10-PCS | Mod: 25,S$PBB,, | Performed by: OPHTHALMOLOGY

## 2023-08-16 RX ORDER — LATANOPROST 50 UG/ML
1 SOLUTION/ DROPS OPHTHALMIC DAILY
Qty: 7.5 ML | Refills: 6 | Status: SHIPPED | OUTPATIENT
Start: 2023-08-16 | End: 2023-09-08 | Stop reason: SDUPTHER

## 2023-08-16 RX ORDER — DIFLUPREDNATE OPHTHALMIC 0.5 MG/ML
1 EMULSION OPHTHALMIC 3 TIMES DAILY
Qty: 15 ML | Refills: 4 | Status: SHIPPED | OUTPATIENT
Start: 2023-08-16 | End: 2023-09-08 | Stop reason: SDUPTHER

## 2023-08-16 RX ORDER — DORZOLAMIDE HYDROCHLORIDE AND TIMOLOL MALEATE 20; 5 MG/ML; MG/ML
1 SOLUTION/ DROPS OPHTHALMIC 2 TIMES DAILY
Qty: 10 ML | Refills: 3 | Status: SHIPPED | OUTPATIENT
Start: 2023-08-16 | End: 2023-09-08 | Stop reason: SDUPTHER

## 2023-08-16 NOTE — PROGRESS NOTES
pcoHPI     Glaucoma            Comments: Pt reports for 2-3m IOP check. Denies any pain or irritation.   Va stable. 100% compliant with gtts.           Comments    1. COAG (followed by Dr. Scott)   S/p Revision of Wound Left Eye 8/4/22  Ahmed Valve OS 8/2/18   CP OS 8/27/15 - low to moderate flow, TDW 1100, aggressive dilation with   GV   Gonio Puncture OS 9/25/15   Wound Revision OS 08/04/22  SLT OS 6/23/16, 6/23/15, 6-2-14   Goniotomy OS 11-17-16   2. DM   3. RP / VIT A PALMITATE 1500 (discontinued)   4. Retinitis Pigmentosa of both eyes   5. Dry Eye Syndrome bilateral   6. Iritis recurrent OS         OS: Durezol BID   OS: Dorz/Timolol BID  OS: Keto QID     OS: Latanoprost QHS   OS: Brimonidine TID   OD: Pred PRN  OD: Atropine BID            Last edited by Robert Craig on 8/16/2023  9:35 AM.            Assessment /Plan     For exam results, see Encounter Report.      ICD-10-CM ICD-9-CM    1. Primary open angle glaucoma of both eyes, severe stage  H40.1133 365.11 Doing well - intraocular pressure is within acceptable range relative to target pressure with no evidence of progression.   Continue current treatment.  Reviewed importance of continued compliance with treatment and follow up.      365.73       2. PCO (posterior capsular opacification), left  H26.492 366.50 Yag Operative Procedure Note    58 y.o. year old patient experiencing a symptomatic decrease in vision OS with inability to perform activities of daily living including reading.      SLE: Posterior intraocular lens implant with capsular fibrosis     Risks, benefits and alternatives of Yag Laser Capsulotomy discussed. Including risks of retinal detachment (1-3%), macular edema, dislocated implant, pain, inflammation elevated intraocular pressure and vision loss. Consent signed.  Time out procedure form completed prior to laser.    Medications given:  Alphagan 0.15%  Tetracaine    Laser energy settings:    2.1  energy per shot  34  bursts  1 to 1  ratio per shot     Central capsular opening created without difficulty      3. Iritis, recurrent, left  H20.022 364.02 Doing well on Durezol      4. Corneal edema of left eye  H18.20 371.20 As above       5. Retinitis pigmentosa of both eyes  H35.52 362.74 Monitor with DFE      6. Trichiasis of left lower eyelid  H02.055 374.05 Pt has symptomatic trichiasis of the OS lower eyelid(s).  Risk, benefits and alternatives to cilia removal by forceps was reviewed and pt requested that lashes be manually removed.  AkTaine gtts introduced into both eyes and cilia forceps, under slit lamp imagnification,  were used for epilation, removing the following number of cilia:  RUL: 0  MONICA: 0  RLL: 0   LLL: 1                   OS: Durezol BID-TID  OS: Dorz/Timolol BID  OS: Prolensa qd   OS: Latanoprost QHS   OS: Brimonidine TID   OD: Pred PRN  OD: Atropine BID        RETURN TO CLINIC 3 weeks

## 2023-08-22 ENCOUNTER — PES CALL (OUTPATIENT)
Dept: ADMINISTRATIVE | Facility: CLINIC | Age: 58
End: 2023-08-22
Payer: MEDICARE

## 2023-08-28 ENCOUNTER — OFFICE VISIT (OUTPATIENT)
Dept: PODIATRY | Facility: CLINIC | Age: 58
End: 2023-08-28
Payer: MEDICARE

## 2023-08-28 ENCOUNTER — PATIENT MESSAGE (OUTPATIENT)
Dept: PODIATRY | Facility: CLINIC | Age: 58
End: 2023-08-28

## 2023-08-28 DIAGNOSIS — E11.22 TYPE 2 DIABETES MELLITUS WITH STAGE 3 CHRONIC KIDNEY DISEASE, WITHOUT LONG-TERM CURRENT USE OF INSULIN, UNSPECIFIED WHETHER STAGE 3A OR 3B CKD: Primary | ICD-10-CM

## 2023-08-28 DIAGNOSIS — M79.672 LEFT FOOT PAIN: ICD-10-CM

## 2023-08-28 DIAGNOSIS — N18.30 TYPE 2 DIABETES MELLITUS WITH STAGE 3 CHRONIC KIDNEY DISEASE, WITHOUT LONG-TERM CURRENT USE OF INSULIN, UNSPECIFIED WHETHER STAGE 3A OR 3B CKD: Primary | ICD-10-CM

## 2023-08-28 DIAGNOSIS — E03.9 HYPOTHYROIDISM, UNSPECIFIED TYPE: ICD-10-CM

## 2023-08-28 DIAGNOSIS — L84 CALLUS OF FOOT: ICD-10-CM

## 2023-08-28 PROCEDURE — 99999 PR PBB SHADOW E&M-EST. PATIENT-LVL IV: ICD-10-PCS | Mod: PBBFAC,,, | Performed by: PODIATRIST

## 2023-08-28 PROCEDURE — 99214 OFFICE O/P EST MOD 30 MIN: CPT | Mod: PBBFAC | Performed by: PODIATRIST

## 2023-08-28 PROCEDURE — 11055 PARING/CUTG B9 HYPRKER LES 1: CPT | Mod: Q9,PBBFAC | Performed by: PODIATRIST

## 2023-08-28 PROCEDURE — 99999 PR PBB SHADOW E&M-EST. PATIENT-LVL IV: CPT | Mod: PBBFAC,,, | Performed by: PODIATRIST

## 2023-08-28 PROCEDURE — 99214 OFFICE O/P EST MOD 30 MIN: CPT | Mod: 25,S$PBB,, | Performed by: PODIATRIST

## 2023-08-28 PROCEDURE — 11055 PR TRIM HYPERKERATOTIC SKIN LESION, ONE: ICD-10-PCS | Mod: Q9,S$PBB,, | Performed by: PODIATRIST

## 2023-08-28 PROCEDURE — 11055 PARING/CUTG B9 HYPRKER LES 1: CPT | Mod: Q9,S$PBB,, | Performed by: PODIATRIST

## 2023-08-28 PROCEDURE — 99214 PR OFFICE/OUTPT VISIT, EST, LEVL IV, 30-39 MIN: ICD-10-PCS | Mod: 25,S$PBB,, | Performed by: PODIATRIST

## 2023-08-28 RX ORDER — MUPIROCIN 20 MG/G
OINTMENT TOPICAL 3 TIMES DAILY
Qty: 22 G | Refills: 0 | Status: SHIPPED | OUTPATIENT
Start: 2023-08-28 | End: 2024-01-31 | Stop reason: SDUPTHER

## 2023-08-28 RX ORDER — LEVOTHYROXINE SODIUM 25 UG/1
25 TABLET ORAL DAILY
Qty: 90 TABLET | Refills: 3 | Status: SHIPPED | OUTPATIENT
Start: 2023-08-28 | End: 2024-08-27

## 2023-08-28 NOTE — TELEPHONE ENCOUNTER
No care due was identified.  Health [facility] Embedded Care Due Messages. Reference number: [id number].   8/28/2023 10:51:20 AM CDT

## 2023-08-28 NOTE — PROGRESS NOTES
Subjective:       Patient ID: Richelle Zaldivar is a 58 y.o. female.    Chief Complaint: Callouses (Pt c/o callous left foot, rates pain 6, pt is diabetic , last seen Oneyda Bergman MD at 6/30/2023 , pt wears tennis shoes and socks)      HPI: Richelle Zaldivar presents to the office today, under referral by , Oneyda Bergman MD, for her annual diabetic foot assessment and risk evaluation.  Patient is a DMII.  States pain to the left foot due to callus formation.  States 6/10 pain.  Denies any puncture wound or injury.  Denies walking barefoot.  Presents to clinic today with her daughter present who is able provide interpretation from Gujarati to English. Denies video . This patient last saw his/her internal/family medicine physician on 6/30/2023.     Hemoglobin A1C   Date Value Ref Range Status   07/05/2023 7.7 (H) 4.0 - 5.6 % Final     Comment:     ADA Screening Guidelines:  5.7-6.4%  Consistent with prediabetes  >or=6.5%  Consistent with diabetes    High levels of fetal hemoglobin interfere with the HbA1C  assay. Heterozygous hemoglobin variants (HbS, HgC, etc)do  not significantly interfere with this assay.   However, presence of multiple variants may affect accuracy.     07/26/2022 7.6 (H) 4.0 - 5.6 % Final     Comment:     ADA Screening Guidelines:  5.7-6.4%  Consistent with prediabetes  >or=6.5%  Consistent with diabetes    High levels of fetal hemoglobin interfere with the HbA1C  assay. Heterozygous hemoglobin variants (HbS, HgC, etc)do  not significantly interfere with this assay.   However, presence of multiple variants may affect accuracy.     09/01/2021 6.7 (H) 4.0 - 5.6 % Final     Comment:     ADA Screening Guidelines:  5.7-6.4%  Consistent with prediabetes  >or=6.5%  Consistent with diabetes    High levels of fetal hemoglobin interfere with the HbA1C  assay. Heterozygous hemoglobin variants (HbS, HgC, etc)do  not significantly interfere with this assay.   However, presence of multiple variants  may affect accuracy.     .    Review of patient's allergies indicates:  No Known Allergies    Past Medical History:   Diagnosis Date    Acid reflux     Cataract     Diabetes mellitus, type 2     Glaucoma     HTN (hypertension)     Hyperlipidemia     Osteoarthritis of knee 3/11/2019    Recurrent iritis of left eye 4/13/2016       Family History   Problem Relation Age of Onset    Diabetes Mother     Hypertension Mother     Hyperlipidemia Mother     Stroke Mother     Amblyopia Mother     Diabetes Sister     Hypertension Sister     Hyperlipidemia Sister     Arthritis Sister     Amblyopia Brother     Amblyopia Maternal Uncle     Blindness Neg Hx     Cancer Neg Hx     Cataracts Neg Hx     Glaucoma Neg Hx     Macular degeneration Neg Hx     Retinal detachment Neg Hx     Strabismus Neg Hx     Thyroid disease Neg Hx        Social History     Socioeconomic History    Marital status:     Number of children: 3   Tobacco Use    Smoking status: Never    Smokeless tobacco: Never   Substance and Sexual Activity    Alcohol use: No     Alcohol/week: 0.0 standard drinks of alcohol    Drug use: No    Sexual activity: Not Currently     Partners: Male     Birth control/protection: None   Social History Narrative    , 3 kids.       Past Surgical History:   Procedure Laterality Date    CATARACT EXTRACTION      COLONOSCOPY N/A 10/18/2019    Procedure: COLONOSCOPY;  Surgeon: Aster Krause MD;  Location: [facility];  Service: Endoscopy;  Laterality: N/A;    ESOPHAGOGASTRODUODENOSCOPY N/A 10/18/2019    Procedure: ESOPHAGOGASTRODUODENOSCOPY (EGD);  Surgeon: Aster Krause MD;  Location: [facility];  Service: Endoscopy;  Laterality: N/A;    GLAUCOMA SURGERY Left 08/02/2018    [facility]med    GONIOTOMY Left 11/17/2016    HEMORRHOID SURGERY  4/2015    Done in [place]    PARTIAL HYSTERECTOMY      TONSILLECTOMY      WISDOM TOOTH EXTRACTION         Review of Systems        Objective:   There were no vitals taken for this  visit.    Physical Exam  LOWER EXTREMITY PHYSICAL EXAMINATION    ORTHOPEDIC:  Pain on palpation of the left heel were lesion is noted.  No gross or structural anatomic deformity.  Full range of motion of the forefoot, hindfoot, ankle joint.  Ambulating with a minimally antalgic gait.  Manual muscle strength testing intact.    VASCULAR:  The right dorsalis pedis pulse 2/4 and the right posterior tibial pulse 2/4.  The left dorsalis pedis pulse 2/4 and posterior tibial pulse on the left is 2/4.  Capillary refill is intact.  Pedal hair growth intact    NEUROLOGY:  Sharp/dull, light touch, proprioception all intact and equal bilaterally.  Vibratory sensation is intact.  Protective sensation intact    DERMATOLOGY: Skin is supple and moist.  There is a lesion present to posterior medial aspect of the left heel.  Centralize lesion/core is noted.  Upon removal of the hyperkeratotic tissue, there is a small area of pinpoint bleeding.  There is no signs disruptive lesion associated with verruca plantaris.  Appears the lesion may be associated with a portal of entry or previous portal entry, however no foreign body was identified.    Assessment:     1. Type 2 diabetes mellitus with stage 3 chronic kidney disease, without long-term current use of insulin, unspecified whether stage 3a or 3b CKD    2. Left foot pain    3. Callus of foot        Plan:     Type 2 diabetes mellitus with stage 3 chronic kidney disease, without long-term current use of insulin, unspecified whether stage 3a or 3b CKD  -     Ambulatory referral/consult to Podiatry    Left foot pain    Callus of foot  -     Ambulatory referral/consult to Podiatry    Other orders  -     mupirocin (BACTROBAN) 2 % ointment; Apply topically 3 (three) times daily.  Dispense: 22 g; Refill: 0      I counseled the patient on his/her Diabetic Mellitus regarding today's clinical examination and objection findings. We did also discuss recent medication changes, pertinent labs and  imaging evaluations and other medical consultation notes and progress notes. Greater than 50% of this visit was spent on counseling and coordination of care. Greater than 20 minutes of this appt. was spent on education about the diabetic foot, in relation to PVD and/or neuropathy, and the prevention of limb loss.     Shoe gear is inspected and wear and proper fit/type. Patient is reminded of the importance of good nutrition and blood sugar control to help prevent podiatric complications of diabetes. Patient instructed on proper foot hygeine. We discussed wearing proper shoe gear, daily foot inspections, never walking without protective shoe gear, never putting sharp instruments to feet.  Patient  will continue to monitor the areas daily, inspect feet, wear protective shoe gear when ambulatory, moisturizer to maintain skin integrity.     Patient's DMI/DMII is managed by Internal/Family Medicine Physician and/or Endocrinology Advanced Practice Provider.    Hypertrophic skin formation, as outlined within the examination portion of this note, is surgically debrided with sharp #10/#15 blade, to alleviate discomfort with weight bearing and ambulation, and to lessen the possibility of skin complications, e.g., ulceration due to pressure.  It appeared that there was likely a portal of entry for foreign body but no foreign body was visualized.  Will have patient utilize antibiotic cream and cover with a Band-Aid.  Continue to assess the area daily for acute signs of infection.    Patient's daughter, who is able to provide interpretation from Gujarati to English was present.

## 2023-08-29 NOTE — TELEPHONE ENCOUNTER
Refill Decision Note   Richelle Zaldivar  is requesting a refill authorization.  Brief Assessment and Rationale for Refill:  Approve     Medication Therapy Plan:         Comments:     Note composed:11:03 PM 08/28/2023

## 2023-09-06 ENCOUNTER — PATIENT MESSAGE (OUTPATIENT)
Dept: DIABETES | Facility: CLINIC | Age: 58
End: 2023-09-06
Payer: MEDICARE

## 2023-09-07 ENCOUNTER — TELEPHONE (OUTPATIENT)
Dept: DIABETES | Facility: CLINIC | Age: 58
End: 2023-09-07
Payer: MEDICARE

## 2023-09-07 DIAGNOSIS — E11.22 TYPE 2 DIABETES MELLITUS WITH STAGE 3 CHRONIC KIDNEY DISEASE, WITHOUT LONG-TERM CURRENT USE OF INSULIN, UNSPECIFIED WHETHER STAGE 3A OR 3B CKD: Primary | ICD-10-CM

## 2023-09-07 DIAGNOSIS — N18.30 TYPE 2 DIABETES MELLITUS WITH STAGE 3 CHRONIC KIDNEY DISEASE, WITHOUT LONG-TERM CURRENT USE OF INSULIN, UNSPECIFIED WHETHER STAGE 3A OR 3B CKD: Primary | ICD-10-CM

## 2023-09-07 RX ORDER — LANCETS
EACH MISCELLANEOUS
Qty: 100 EACH | Refills: 3 | Status: SHIPPED | OUTPATIENT
Start: 2023-09-07 | End: 2023-09-08 | Stop reason: SDUPTHER

## 2023-09-07 RX ORDER — INSULIN PUMP SYRINGE, 3 ML
EACH MISCELLANEOUS
Qty: 1 EACH | Refills: 0 | Status: SHIPPED | OUTPATIENT
Start: 2023-09-07 | End: 2023-09-08 | Stop reason: SDUPTHER

## 2023-09-08 ENCOUNTER — OFFICE VISIT (OUTPATIENT)
Dept: OPHTHALMOLOGY | Facility: CLINIC | Age: 58
End: 2023-09-08
Payer: MEDICARE

## 2023-09-08 ENCOUNTER — TELEPHONE (OUTPATIENT)
Dept: DIABETES | Facility: CLINIC | Age: 58
End: 2023-09-08
Payer: MEDICARE

## 2023-09-08 DIAGNOSIS — Z98.890 S/P YAG CAPSULOTOMY, LEFT: ICD-10-CM

## 2023-09-08 DIAGNOSIS — H20.022 IRITIS, RECURRENT, LEFT: ICD-10-CM

## 2023-09-08 DIAGNOSIS — Z98.890 POST-OPERATIVE STATE: Primary | ICD-10-CM

## 2023-09-08 DIAGNOSIS — N18.30 TYPE 2 DIABETES MELLITUS WITH STAGE 3 CHRONIC KIDNEY DISEASE, WITHOUT LONG-TERM CURRENT USE OF INSULIN, UNSPECIFIED WHETHER STAGE 3A OR 3B CKD: ICD-10-CM

## 2023-09-08 DIAGNOSIS — E11.22 TYPE 2 DIABETES MELLITUS WITH STAGE 3 CHRONIC KIDNEY DISEASE, WITHOUT LONG-TERM CURRENT USE OF INSULIN, UNSPECIFIED WHETHER STAGE 3A OR 3B CKD: ICD-10-CM

## 2023-09-08 DIAGNOSIS — H40.1133 PRIMARY OPEN ANGLE GLAUCOMA OF BOTH EYES, SEVERE STAGE: ICD-10-CM

## 2023-09-08 PROCEDURE — 99024 POSTOP FOLLOW-UP VISIT: CPT | Mod: POP,,, | Performed by: OPHTHALMOLOGY

## 2023-09-08 PROCEDURE — 99999 PR PBB SHADOW E&M-EST. PATIENT-LVL III: ICD-10-PCS | Mod: PBBFAC,,, | Performed by: OPHTHALMOLOGY

## 2023-09-08 PROCEDURE — 99024 PR POST-OP FOLLOW-UP VISIT: ICD-10-PCS | Mod: POP,,, | Performed by: OPHTHALMOLOGY

## 2023-09-08 PROCEDURE — 99213 OFFICE O/P EST LOW 20 MIN: CPT | Mod: PBBFAC | Performed by: OPHTHALMOLOGY

## 2023-09-08 PROCEDURE — 99999 PR PBB SHADOW E&M-EST. PATIENT-LVL III: CPT | Mod: PBBFAC,,, | Performed by: OPHTHALMOLOGY

## 2023-09-08 RX ORDER — LATANOPROST 50 UG/ML
1 SOLUTION/ DROPS OPHTHALMIC DAILY
Qty: 7.5 ML | Refills: 6 | Status: SHIPPED | OUTPATIENT
Start: 2023-09-08 | End: 2024-09-07

## 2023-09-08 RX ORDER — INSULIN PUMP SYRINGE, 3 ML
EACH MISCELLANEOUS
Qty: 1 EACH | Refills: 0 | Status: SHIPPED | OUTPATIENT
Start: 2023-09-08 | End: 2023-10-12 | Stop reason: SDUPTHER

## 2023-09-08 RX ORDER — DIFLUPREDNATE OPHTHALMIC 0.5 MG/ML
1 EMULSION OPHTHALMIC 3 TIMES DAILY
Qty: 15 ML | Refills: 4 | Status: SHIPPED | OUTPATIENT
Start: 2023-09-08 | End: 2024-02-06 | Stop reason: SDUPTHER

## 2023-09-08 RX ORDER — LANCETS
EACH MISCELLANEOUS
Qty: 100 EACH | Refills: 3 | Status: SHIPPED | OUTPATIENT
Start: 2023-09-08 | End: 2023-10-12 | Stop reason: SDUPTHER

## 2023-09-08 RX ORDER — DORZOLAMIDE HYDROCHLORIDE AND TIMOLOL MALEATE 20; 5 MG/ML; MG/ML
1 SOLUTION/ DROPS OPHTHALMIC 2 TIMES DAILY
Qty: 10 ML | Refills: 3 | Status: SHIPPED | OUTPATIENT
Start: 2023-09-08 | End: 2023-12-26

## 2023-09-08 NOTE — PROGRESS NOTES
HPI     Post-op Evaluation            Comments: Pt reports for 3-4wk YAG OS post op. Denies any pain or   irritation. Va stable. 100% compliant with gtts.           Comments      1. COAG (followed by Dr. Scott)   S/p Revision of Wound Left Eye 8/4/22  Ahmed Valve OS 8/2/18   CP OS 8/27/15 - low to moderate flow, TDW 1100, aggressive dilation with   GV   Gonio Puncture OS 9/25/15   Wound Revision OS 08/04/22  SLT OS 6/23/16, 6/23/15, 6-2-14   Goniotomy OS 11-17-16   2. DM   3. RP / VIT A PALMITATE 1500 (discontinued)   4. Retinitis Pigmentosa of both eyes   5. Dry Eye Syndrome bilateral   6. Iritis recurrent OS       OS: Durezol BID-TID  OS: Dorz/Timolol BID  OS: Prolensa qd   OS: Latanoprost QHS   OS: Brimonidine TID   OD: Pred PRN  OD: Atropine BID            Last edited by Robert Craig on 9/8/2023  8:07 AM.            Assessment /Plan     For exam results, see Encounter Report.      ICD-10-CM ICD-9-CM    1. Post-operative state  Z98.890 V45.89 Doing well post YAG      2. S/P YAG capsulotomy, left  Z98.890 V45.89 See above       3. Primary open angle glaucoma of both eyes, severe stage  H40.1133 365.11 Doing well - intraocular pressure is within acceptable range relative to target pressure with no evidence of progression.   Continue current treatment.  Reviewed importance of continued compliance with treatment and follow up.      365.73       4. Iritis, recurrent, left  H20.022 364.02 Doing well on Durezol, continue at BID          Return to clinic 6-8 weeks         OS: Durezol BID-TID  OS: Dorz/Timolol BID  OS: Prolensa qd   OS: Latanoprost QHS   OS: Brimonidine TID   OD: Pred PRN  OD: Atropine BID

## 2023-10-12 ENCOUNTER — IMMUNIZATION (OUTPATIENT)
Dept: INTERNAL MEDICINE | Facility: CLINIC | Age: 58
End: 2023-10-12
Payer: MEDICARE

## 2023-10-12 ENCOUNTER — OFFICE VISIT (OUTPATIENT)
Dept: DIABETES | Facility: CLINIC | Age: 58
End: 2023-10-12
Payer: MEDICARE

## 2023-10-12 VITALS
HEIGHT: 59 IN | DIASTOLIC BLOOD PRESSURE: 79 MMHG | WEIGHT: 155.63 LBS | SYSTOLIC BLOOD PRESSURE: 114 MMHG | BODY MASS INDEX: 31.37 KG/M2 | HEART RATE: 104 BPM

## 2023-10-12 DIAGNOSIS — N18.30 TYPE 2 DIABETES MELLITUS WITH STAGE 3 CHRONIC KIDNEY DISEASE, WITHOUT LONG-TERM CURRENT USE OF INSULIN, UNSPECIFIED WHETHER STAGE 3A OR 3B CKD: Primary | ICD-10-CM

## 2023-10-12 DIAGNOSIS — E11.59 HYPERTENSION ASSOCIATED WITH DIABETES: ICD-10-CM

## 2023-10-12 DIAGNOSIS — I15.2 HYPERTENSION ASSOCIATED WITH DIABETES: ICD-10-CM

## 2023-10-12 DIAGNOSIS — E78.5 HYPERLIPIDEMIA ASSOCIATED WITH TYPE 2 DIABETES MELLITUS: ICD-10-CM

## 2023-10-12 DIAGNOSIS — E03.9 HYPOTHYROIDISM, UNSPECIFIED TYPE: ICD-10-CM

## 2023-10-12 DIAGNOSIS — Z12.31 SCREENING MAMMOGRAM FOR BREAST CANCER: ICD-10-CM

## 2023-10-12 DIAGNOSIS — E11.22 TYPE 2 DIABETES MELLITUS WITH STAGE 3 CHRONIC KIDNEY DISEASE, WITHOUT LONG-TERM CURRENT USE OF INSULIN, UNSPECIFIED WHETHER STAGE 3A OR 3B CKD: Primary | ICD-10-CM

## 2023-10-12 DIAGNOSIS — E11.69 HYPERLIPIDEMIA ASSOCIATED WITH TYPE 2 DIABETES MELLITUS: ICD-10-CM

## 2023-10-12 DIAGNOSIS — E66.9 OBESITY (BMI 30-39.9): ICD-10-CM

## 2023-10-12 LAB — GLUCOSE SERPL-MCNC: 152 MG/DL (ref 70–110)

## 2023-10-12 PROCEDURE — 99999PBSHW FLU VACCINE (QUAD) GREATER THAN OR EQUAL TO 3YO PRESERVATIVE FREE IM: ICD-10-PCS | Mod: PBBFAC,,,

## 2023-10-12 PROCEDURE — 99214 PR OFFICE/OUTPT VISIT, EST, LEVL IV, 30-39 MIN: ICD-10-PCS | Mod: S$PBB,,, | Performed by: PHYSICIAN ASSISTANT

## 2023-10-12 PROCEDURE — 99214 OFFICE O/P EST MOD 30 MIN: CPT | Mod: S$PBB,,, | Performed by: PHYSICIAN ASSISTANT

## 2023-10-12 PROCEDURE — G0008 ADMIN INFLUENZA VIRUS VAC: HCPCS | Mod: PBBFAC

## 2023-10-12 PROCEDURE — 99999 PR PBB SHADOW E&M-EST. PATIENT-LVL V: ICD-10-PCS | Mod: PBBFAC,,, | Performed by: PHYSICIAN ASSISTANT

## 2023-10-12 PROCEDURE — 99999PBSHW FLU VACCINE (QUAD) GREATER THAN OR EQUAL TO 3YO PRESERVATIVE FREE IM: Mod: PBBFAC,,,

## 2023-10-12 PROCEDURE — 99215 OFFICE O/P EST HI 40 MIN: CPT | Mod: PBBFAC | Performed by: PHYSICIAN ASSISTANT

## 2023-10-12 PROCEDURE — 99999 PR PBB SHADOW E&M-EST. PATIENT-LVL V: CPT | Mod: PBBFAC,,, | Performed by: PHYSICIAN ASSISTANT

## 2023-10-12 PROCEDURE — 82962 GLUCOSE BLOOD TEST: CPT | Mod: PBBFAC | Performed by: PHYSICIAN ASSISTANT

## 2023-10-12 PROCEDURE — 99999PBSHW POCT GLUCOSE, HAND-HELD DEVICE: ICD-10-PCS | Mod: PBBFAC,,,

## 2023-10-12 PROCEDURE — 99999PBSHW POCT GLUCOSE, HAND-HELD DEVICE: Mod: PBBFAC,,,

## 2023-10-12 RX ORDER — INSULIN PUMP SYRINGE, 3 ML
EACH MISCELLANEOUS
Qty: 1 EACH | Refills: 0 | Status: SHIPPED | OUTPATIENT
Start: 2023-10-12 | End: 2024-10-11

## 2023-10-12 RX ORDER — GLIPIZIDE 5 MG/1
5 TABLET ORAL
Qty: 90 TABLET | Refills: 11 | Status: SHIPPED | OUTPATIENT
Start: 2023-10-12 | End: 2024-02-14 | Stop reason: SDUPTHER

## 2023-10-12 RX ORDER — LANCETS
EACH MISCELLANEOUS
Qty: 100 EACH | Refills: 3 | Status: SHIPPED | OUTPATIENT
Start: 2023-10-12

## 2023-10-12 NOTE — PROGRESS NOTES
PCP: Oneyda Bergman MD    Subjective:     Chief Complaint: Diabetes - Established Patient    HISTORY OF PRESENT ILLNESS: 58 y.o.   female presenting for diabetes management visit.   The patient's last visit with me was on 8/10/2023.   English is not patient's first language, however patient declines  and gives consent for daughter to translate.   Patient has had Type II diabetes since 10 or more years.  Pertinent to decision making is the following comorbidities: HTN, HLD, CKD III, Hypothyroidism and Obesity by BMI  Patient has the following Diabetes complications: with diabetic chronic kidney disease  She  has attended diabetes education in the past.     Patient's most recent A1c of 7.7% was completed 3 months ago.   Patient states since Her last A1c Her blood glucose levels have been high  after meals at times.  Patient monitors blood glucose 2 times per day with meter : Fasting and After Dinner.   Patient blood glucose monitoring device will not be uploaded into [employer] today  secondary to unable to upload successfully.   Per meter recall: Fasting blood sugars 54 - 95 and after breakfast today 152, before lunch 58-62, and after dinner 106 - 215.  Patient endorses the following diabetes related symptoms: Nausea and Painful lesion on foot - possible corn . Nausea present with trulicity but improving. No vomiting.   Patient is due today for the following diabetes-related health maintenance standards: Foot Exam , Eye Exam, COVID-19 Vaccine , and Mammogram . Eye sched next month. Due for mammo in Oct.   She denies recent hospital visits/ emergency room visit.   She denies having hypoglycemia.  Patient's concerns today include glycemic control.    Patient medication regimen is as below.     CURRENT DM MEDICATIONS:   Metformin 1000 mg BID  Glipizide 10 mg TID wm   Jardiance 25 mg daily   Trulicity 0.75 mg weekly     Patient has failed the following Diabetes medications:   Farxiga      Labs  "Reviewed.       No results found for: "CPEPTIDE"  No results found for: "GLUTAMICACID"    Height: 4' 11" (149.9 cm)  //  Weight: 70.6 kg (155 lb 10.3 oz), Body mass index is 31.44 kg/m².  Her blood sugar in clinic today is:    Lab Results   Component Value Date    POCGLU 152 (A) 10/12/2023       Review of Systems   Constitutional:  Negative for activity change, appetite change, chills and fever.   HENT:  Negative for dental problem, mouth sores, nosebleeds, sore throat and trouble swallowing.    Eyes:  Negative for pain and discharge.   Respiratory:  Negative for shortness of breath, wheezing and stridor.    Cardiovascular:  Negative for chest pain, palpitations and leg swelling.   Gastrointestinal:  Negative for abdominal pain, diarrhea, nausea and vomiting.   Endocrine: Negative for polydipsia, polyphagia and polyuria.   Genitourinary:  Negative for dysuria, frequency and urgency.   Musculoskeletal:  Negative for joint swelling and myalgias.   Skin:  Negative for rash and wound.   Neurological:  Negative for dizziness, syncope, weakness and headaches.   Psychiatric/Behavioral:  Negative for behavioral problems and dysphoric mood.          Diabetes Management Status  Statin: Taking  ACE/ARB: Taking    Screening or Prevention Patient's value Goal Complete/Controlled?   HgA1C Testing and Control   Lab Results   Component Value Date    HGBA1C 7.7 (H) 07/05/2023      Annually/Less than 8% Yes   Lipid profile : 07/05/2023 Annually Yes   LDL control Lab Results   Component Value Date    LDLCALC 187.8 (H) 07/05/2023    Annually/Less than 100 mg/dl  Yes   Nephropathy screening Lab Results   Component Value Date    MICALBCREAT 6.4 07/05/2023     Lab Results   Component Value Date    PROTEINUA Negative 07/05/2023    Annually Yes   Blood pressure BP Readings from Last 1 Encounters:   10/12/23 114/79    Less than 140/90 Yes   Dilated retinal exam : 01/12/2021 Annually Yes    Foot exam   : 08/28/2023 Annually No     ACTIVITY " LEVEL: Sedentary. Discussed activities, benefits, methods, and precautions.  MEAL PLANNING: Patient reports number of meals per day to be 3 and number of snacks per day to be 2.   Patient is encouraged to carb count and consume no more than 30 - 45 grams of carbohydrates in each meal and 15 grams of carbohydrates in each snack.     Social History     Socioeconomic History    Marital status:     Number of children: 3   Tobacco Use    Smoking status: Never    Smokeless tobacco: Never   Substance and Sexual Activity    Alcohol use: No     Alcohol/week: 0.0 standard drinks of alcohol    Drug use: No    Sexual activity: Not Currently     Partners: Male     Birth control/protection: None   Social History Narrative    , 3 kids.     Past Medical History:   Diagnosis Date    Acid reflux     Cataract     Diabetes mellitus, type 2     Glaucoma     HTN (hypertension)     Hyperlipidemia     Osteoarthritis of knee 3/11/2019    Recurrent iritis of left eye 4/13/2016       Objective:        Physical Exam  Constitutional:       General: She is not in acute distress.     Appearance: She is well-developed. She is not diaphoretic.   HENT:      Head: Normocephalic and atraumatic.      Right Ear: External ear normal.      Left Ear: External ear normal.      Nose: Nose normal.   Eyes:      General:         Right eye: No discharge.         Left eye: No discharge.      Pupils: Pupils are equal, round, and reactive to light.   Cardiovascular:      Rate and Rhythm: Normal rate and regular rhythm.      Heart sounds: Normal heart sounds.   Pulmonary:      Effort: Pulmonary effort is normal.      Breath sounds: Normal breath sounds.   Abdominal:      Palpations: Abdomen is soft.   Musculoskeletal:         General: Normal range of motion.      Cervical back: Normal range of motion and neck supple.   Skin:     General: Skin is warm and dry.      Capillary Refill: Capillary refill takes less than 2 seconds.   Neurological:      Mental  Status: She is alert and oriented to person, place, and time.      Motor: No abnormal muscle tone.      Coordination: Coordination normal.   Psychiatric:         Behavior: Behavior normal.         Thought Content: Thought content normal.         Judgment: Judgment normal.           Assessment / Plan:     Type 2 diabetes mellitus with stage 3 chronic kidney disease, without long-term current use of insulin, unspecified whether stage 3a or 3b CKD  -     POCT Glucose, Hand-Held Device  -     blood-glucose meter kit; To check BG 2 times daily, to use with insurance preferred meter  Dispense: 1 each; Refill: 0  -     blood sugar diagnostic Strp; To check blood glucose 2 times daily  Dispense: 100 each; Refill: 3  -     lancets Misc; To check BG 2 times daily, to use with insurance preferred meter  Dispense: 100 each; Refill: 3  -     glipiZIDE (GLUCOTROL) 5 MG tablet; Take 1 tablet (5 mg total) by mouth 3 (three) times daily before meals.  Dispense: 90 tablet; Refill: 11  -     Hemoglobin A1C; Standing    Hypertension associated with diabetes    Hyperlipidemia associated with type 2 diabetes mellitus    Hypothyroidism, unspecified type    Obesity (BMI 30-39.9)    Screening mammogram for breast cancer  -     Mammo Digital Screening Bilat w/ Niraj; Future; Expected date: 10/12/2023      Additional Plan Details:    - POCT Glucose  - Encouraged continuation of lifestyle changes including regular exercise and limiting carbohydrates to 30-45 grams per meal threes times daily and 15 grams per snack with a limit of two daily.   - Encouraged continued monitoring of blood glucose with maintenance of 3 times daily at Fasting, Before Dinner and After Dinner. Insurance preferred Meter and supplies Rx today.   - Current DM Medication Regimen: Continue Jardiance 25 mg daily. Continue Metformin. Change Glipizide 5 mg TID wm. Continue Trulicity 0.75 mg weekly. Monitor for improvement on GI symptoms.   - F/u podiatry - possible corn of  foot  - Health Maintenance standards addressed today: Eye Exam - will be completed within [facility] and scheduled today, Flu Shot - to be scheduled today within [facility], COVID - 19 Vaccine - patient will schedule outside of [facility] , COVID - 19 Vaccine - booster sched, and Mammogram - order placed and sched  - Nursing Visit: Patient is below goal range for nursing visit for age group and will not need nursing visit at this time .   - Follow up in 6 wees with A1c prior.       Richi Brown PA-C  [facility]    A total of 30 minutes was spent in face to face time, of which over 50 % was spent in counseling patient on disease process, complications, treatment, and side effects of medications.

## 2023-10-12 NOTE — PATIENT INSTRUCTIONS
CURRENT DM MEDICATIONS:   Metformin 1000 mg BID  Glipizide 5 mg TID wm   Jardiance 25 mg daily   Trulicity 0.75 mg weekly

## 2023-10-25 ENCOUNTER — LAB VISIT (OUTPATIENT)
Dept: LAB | Facility: HOSPITAL | Age: 58
End: 2023-10-25
Attending: PHYSICIAN ASSISTANT
Payer: MEDICARE

## 2023-10-25 ENCOUNTER — OFFICE VISIT (OUTPATIENT)
Dept: OPHTHALMOLOGY | Facility: CLINIC | Age: 58
End: 2023-10-25
Payer: MEDICARE

## 2023-10-25 DIAGNOSIS — Z98.890 S/P YAG CAPSULOTOMY, LEFT: ICD-10-CM

## 2023-10-25 DIAGNOSIS — H20.022 IRITIS, RECURRENT, LEFT: ICD-10-CM

## 2023-10-25 DIAGNOSIS — E11.22 TYPE 2 DIABETES MELLITUS WITH STAGE 3 CHRONIC KIDNEY DISEASE, WITHOUT LONG-TERM CURRENT USE OF INSULIN, UNSPECIFIED WHETHER STAGE 3A OR 3B CKD: ICD-10-CM

## 2023-10-25 DIAGNOSIS — H40.1133 PRIMARY OPEN ANGLE GLAUCOMA OF BOTH EYES, SEVERE STAGE: Primary | ICD-10-CM

## 2023-10-25 DIAGNOSIS — N18.30 TYPE 2 DIABETES MELLITUS WITH STAGE 3 CHRONIC KIDNEY DISEASE, WITHOUT LONG-TERM CURRENT USE OF INSULIN, UNSPECIFIED WHETHER STAGE 3A OR 3B CKD: ICD-10-CM

## 2023-10-25 LAB
ESTIMATED AVG GLUCOSE: 148 MG/DL (ref 68–131)
HBA1C MFR BLD: 6.8 % (ref 4–5.6)

## 2023-10-25 PROCEDURE — 99214 PR OFFICE/OUTPT VISIT, EST, LEVL IV, 30-39 MIN: ICD-10-PCS | Mod: S$PBB,24,, | Performed by: OPHTHALMOLOGY

## 2023-10-25 PROCEDURE — 83036 HEMOGLOBIN GLYCOSYLATED A1C: CPT | Performed by: PHYSICIAN ASSISTANT

## 2023-10-25 PROCEDURE — 99999 PR PBB SHADOW E&M-EST. PATIENT-LVL III: ICD-10-PCS | Mod: PBBFAC,,, | Performed by: OPHTHALMOLOGY

## 2023-10-25 PROCEDURE — 99213 OFFICE O/P EST LOW 20 MIN: CPT | Mod: PBBFAC | Performed by: OPHTHALMOLOGY

## 2023-10-25 PROCEDURE — 99999 PR PBB SHADOW E&M-EST. PATIENT-LVL III: CPT | Mod: PBBFAC,,, | Performed by: OPHTHALMOLOGY

## 2023-10-25 PROCEDURE — 36415 COLL VENOUS BLD VENIPUNCTURE: CPT | Performed by: PHYSICIAN ASSISTANT

## 2023-10-25 PROCEDURE — 99214 OFFICE O/P EST MOD 30 MIN: CPT | Mod: S$PBB,24,, | Performed by: OPHTHALMOLOGY

## 2023-10-25 NOTE — PROGRESS NOTES
HPI    6-8 weeks glaucoma check    Occasional pain in her left eye (6-7 on the pain scale) eyedrops help with   the pain    1. COAG (followed by Dr. Scott)   S/p Revision of Wound Left Eye 8/4/22  Ahmed Valve OS 8/2/18   CP OS 8/27/15 - low to moderate flow, TDW 1100, aggressive dilation with   GV   Gonio Puncture OS 9/25/15   Wound Revision OS 08/04/22  SLT OS 6/23/16, 6/23/15, 6-2-14   Goniotomy OS 11-17-16   2. DM   3. RP / VIT A PALMITATE 1500 (discontinued)   4. Retinitis Pigmentosa of both eyes   5. Dry Eye Syndrome bilateral   6. Iritis recurrent OS       OS: Durezol BID-TID  OS: Dorz/Timolol BID  OS: Prolensa qd - BID  OS: Latanoprost QHS   OS: Brimonidine TID   OD: Pred PRN  OD: Atropine BID  Last edited by Charlene Croft MA on 10/25/2023 11:00 AM.            Assessment /Plan     For exam results, see Encounter Report.      ICD-10-CM ICD-9-CM    1. Primary open angle glaucoma of both eyes, severe stage  H40.1133 365.11 Doing well - intraocular pressure is within acceptable range relative to target pressure with no evidence of progression.   Continue current treatment.  Reviewed importance of continued compliance with treatment and follow up.        365.73       2. S/P YAG capsulotomy, left  Z98.890 V45.89       3. Iritis, recurrent, left  H20.022 364.02 Stable           RETURN TO CLINIC 2  months DOA     OS: Durezol BID-TID  OS: Dorz/Timolol BID  OS: Prolensa qd - BID  OS: Latanoprost QHS   OS: Brimonidine TID   OD: Pred PRN  OD: Atropine BID

## 2023-11-02 ENCOUNTER — OFFICE VISIT (OUTPATIENT)
Dept: PODIATRY | Facility: CLINIC | Age: 58
End: 2023-11-02
Payer: MEDICARE

## 2023-11-02 VITALS — BODY MASS INDEX: 31.37 KG/M2 | HEIGHT: 59 IN | WEIGHT: 155.63 LBS

## 2023-11-02 DIAGNOSIS — N18.30 TYPE 2 DIABETES MELLITUS WITH STAGE 3 CHRONIC KIDNEY DISEASE, WITHOUT LONG-TERM CURRENT USE OF INSULIN, UNSPECIFIED WHETHER STAGE 3A OR 3B CKD: Primary | ICD-10-CM

## 2023-11-02 DIAGNOSIS — E11.22 TYPE 2 DIABETES MELLITUS WITH STAGE 3 CHRONIC KIDNEY DISEASE, WITHOUT LONG-TERM CURRENT USE OF INSULIN, UNSPECIFIED WHETHER STAGE 3A OR 3B CKD: Primary | ICD-10-CM

## 2023-11-02 DIAGNOSIS — L84 CALLUS OF FOOT: ICD-10-CM

## 2023-11-02 DIAGNOSIS — H20.022 IRITIS, RECURRENT, LEFT: ICD-10-CM

## 2023-11-02 PROCEDURE — 11055 PARING/CUTG B9 HYPRKER LES 1: CPT | Mod: Q9,PBBFAC,LT | Performed by: PODIATRIST

## 2023-11-02 PROCEDURE — 11055 PARING/CUTG B9 HYPRKER LES 1: CPT | Mod: Q9,S$PBB,, | Performed by: PODIATRIST

## 2023-11-02 PROCEDURE — 99999 PR PBB SHADOW E&M-EST. PATIENT-LVL IV: CPT | Mod: PBBFAC,,, | Performed by: PODIATRIST

## 2023-11-02 PROCEDURE — 99499 NO LOS: ICD-10-PCS | Mod: S$PBB,,, | Performed by: PODIATRIST

## 2023-11-02 PROCEDURE — 99214 OFFICE O/P EST MOD 30 MIN: CPT | Mod: PBBFAC | Performed by: PODIATRIST

## 2023-11-02 PROCEDURE — 11055 PR TRIM HYPERKERATOTIC SKIN LESION, ONE: ICD-10-PCS | Mod: Q9,S$PBB,, | Performed by: PODIATRIST

## 2023-11-02 PROCEDURE — 99499 UNLISTED E&M SERVICE: CPT | Mod: S$PBB,,, | Performed by: PODIATRIST

## 2023-11-02 PROCEDURE — 99999 PR PBB SHADOW E&M-EST. PATIENT-LVL IV: ICD-10-PCS | Mod: PBBFAC,,, | Performed by: PODIATRIST

## 2023-11-02 RX ORDER — BROMFENAC SODIUM 0.7 MG/ML
1 SOLUTION/ DROPS OPHTHALMIC DAILY
Qty: 3 ML | Refills: 6 | Status: SHIPPED | OUTPATIENT
Start: 2023-11-02 | End: 2024-05-03

## 2023-11-02 NOTE — PROGRESS NOTES
Subjective:     Patient ID: Richelle Zaldivar is a 58 y.o. female.    Chief Complaint: Callouses (Pt c/o left heel /callous pain 4 /10, diabetic pt wears tennis shoes, PCP Dr. Bergman last seen 6-30-23/)    Richelle is a 58 y.o. female who presents to the clinic for evaluation and treatment of high risk feet. Richelle has a past medical history of Acid reflux, Cataract, Diabetes mellitus, type 2, Glaucoma, HTN (hypertension), Hyperlipidemia, Osteoarthritis of knee (3/11/2019), and Recurrent iritis of left eye (4/13/2016). The patient's chief complaint is left heel callus pain. Patient rates pain 4/10. This patient has documented high risk feet requiring routine maintenance secondary to diabetes mellitis and those secondary complications of diabetes, as mentioned..    PCP: Oneyda Bergman MD    Date Last Seen by PCP: 06/30/2023    Current shoe gear:  Affected Foot: Tennis shoes     Unaffected Foot: Tennis shoes    Hemoglobin A1C   Date Value Ref Range Status   10/25/2023 6.8 (H) 4.0 - 5.6 % Final     Comment:     ADA Screening Guidelines:  5.7-6.4%  Consistent with prediabetes  >or=6.5%  Consistent with diabetes    High levels of fetal hemoglobin interfere with the HbA1C  assay. Heterozygous hemoglobin variants (HbS, HgC, etc)do  not significantly interfere with this assay.   However, presence of multiple variants may affect accuracy.     07/05/2023 7.7 (H) 4.0 - 5.6 % Final     Comment:     ADA Screening Guidelines:  5.7-6.4%  Consistent with prediabetes  >or=6.5%  Consistent with diabetes    High levels of fetal hemoglobin interfere with the HbA1C  assay. Heterozygous hemoglobin variants (HbS, HgC, etc)do  not significantly interfere with this assay.   However, presence of multiple variants may affect accuracy.     07/26/2022 7.6 (H) 4.0 - 5.6 % Final     Comment:     ADA Screening Guidelines:  5.7-6.4%  Consistent with prediabetes  >or=6.5%  Consistent with diabetes    High levels of fetal hemoglobin interfere with the  HbA1C  assay. Heterozygous hemoglobin variants (HbS, HgC, etc)do  not significantly interfere with this assay.   However, presence of multiple variants may affect accuracy.         Patient Active Problem List   Diagnosis    Hypertension associated with diabetes    Type 2 diabetes mellitus with stage 3 chronic kidney disease, without long-term current use of insulin    Hyperlipidemia associated with type 2 diabetes mellitus    Iron deficiency anemia    Vitamin D deficiency    GERD (gastroesophageal reflux disease)    Retinitis pigmentosa    Recurrent iritis of left eye    Open-angle glaucoma, severe stage    Osteoarthritis of knee    Hypothyroidism    Obesity (BMI 30.0-34.9)       Medication List with Changes/Refills   Current Medications    ALLOPURINOL (ZYLOPRIM) 100 MG TABLET    Take 2 tablets (200 mg total) by mouth once daily.    AMLODIPINE (NORVASC) 10 MG TABLET    Take 1 tablet orally once a day.    ATROPINE 1% (ISOPTO ATROPINE) 1 % DROP    Place 1 drop into the right eye 2 (two) times a day.    BLOOD SUGAR DIAGNOSTIC STRP    To check blood glucose 2 times daily    BLOOD-GLUCOSE METER KIT    To check BG 2 times daily, to use with insurance preferred meter    BRIMONIDINE 0.1% (ALPHAGAN P) 0.1 % DROP    Place 1 drop into both eyes 3 (three) times daily.    DIFLUPREDNATE (DUREZOL) 0.05 % DROP OPHTHALMIC SOLUTION    Place 1 drop into both eyes 3 (three) times daily.    DORZOLAMIDE-TIMOLOL 2-0.5% (COSOPT) 22.3-6.8 MG/ML OPHTHALMIC SOLUTION    Place 1 drop into the left eye 2 (two) times daily.    DULAGLUTIDE (TRULICITY) 0.75 MG/0.5 ML PEN INJECTOR    Inject 0.75 mg into the skin once a week.    EMPAGLIFLOZIN (JARDIANCE) 25 MG TABLET    Take 1 tablet (25 mg total) by mouth once daily.    ERGOCALCIFEROL (VITAMIN D2) 50,000 UNIT CAP    Take 1 capsule (50,000 Units total) by mouth every 7 days.    ESTRADIOL (ESTRACE) 0.01 % (0.1 MG/GRAM) VAGINAL CREAM    Place ¼ applicatorful intravaginally nightly for 1 to 2 weeks,  THEN decrease to 3 to 4 times per week.    EZETIMIBE (ZETIA) 10 MG TABLET    Take 1 tablet (10 mg total) by mouth every evening.    GABAPENTIN (NEURONTIN) 300 MG CAPSULE    Take 1 capsule (300 mg total) by mouth 2 (two) times daily.    GLIPIZIDE (GLUCOTROL) 5 MG TABLET    Take 1 tablet (5 mg total) by mouth 3 (three) times daily before meals.    KETOCONAZOLE (NIZORAL) 2 % CREAM    Apply to the affected area twice daily    LANCETS MISC    To check BG 2 times daily, to use with insurance preferred meter    LATANOPROST (XALATAN) 0.005 % OPHTHALMIC SOLUTION    Place 1 drop into the left eye once daily.    LEVOTHYROXINE (SYNTHROID) 25 MCG TABLET    Take 1 tablet (25 mcg total) by mouth once daily.    LISINOPRIL-HYDROCHLOROTHIAZIDE (PRINZIDE,ZESTORETIC) 20-25 MG TAB    Take 1 tablet by mouth once daily.    MELOXICAM (MOBIC) 15 MG TABLET    Take 1 tablet (15 mg total) by mouth daily as needed.    METFORMIN (GLUCOPHAGE) 1000 MG TABLET    Take 1 tablet (1,000 mg total) by mouth 2 (two) times daily with meals.    MUPIROCIN (BACTROBAN) 2 % OINTMENT    Apply topically 3 (three) times daily.    NYSTATIN (MYCOSTATIN) OINTMENT    Apply topically 2 (two) times daily.    OPW TEST CLAIM - DO NOT FILL    Inject 0.5 tablets into the muscle. OPW test claim. Do not fill.    PREDNISOLONE ACETATE (PRED FORTE) 1 % DRPS    Place 1 drop into the left eye 2 (two) times daily.    TERCONAZOLE (TERAZOL 7) 0.4 % CREA    Place 1 applicator vaginally every evening.    TRAMADOL (ULTRAM) 50 MG TABLET    Take 1 tablet (50 mg total) by mouth every 12 (twelve) hours as needed for Pain.    TRIAMCINOLONE ACETONIDE 0.1% (KENALOG) 0.1 % CREAM    Apply topically 2 (two) times daily.    UREA (CARMOL) 40 % CREA    Apply topically 2 (two) times daily. Apply to thickened areas of skin   Changed and/or Refilled Medications    Modified Medication Previous Medication    BROMFENAC (PROLENSA) 0.07 % DROP bromfenac (PROLENSA) 0.07 % Drop       Apply 1 drop to eye once  daily.    Apply 1 drop to eye once daily.    FENOFIBRATE MICRONIZED (LOFIBRA) 134 MG CAP fenofibrate micronized (LOFIBRA) 134 MG Cap       Take 1 capsule (134 mg total) by mouth daily with breakfast.    Take 1 capsule (134 mg total) by mouth daily with breakfast.    ROSUVASTATIN (CRESTOR) 40 MG TAB rosuvastatin (CRESTOR) 40 MG Tab       Take 1 tablet (40 mg total) by mouth every evening.    Take 1 tablet (40 mg total) by mouth every evening.       Review of patient's allergies indicates:  No Known Allergies    Past Surgical History:   Procedure Laterality Date    CATARACT EXTRACTION      COLONOSCOPY N/A 10/18/2019    Procedure: COLONOSCOPY;  Surgeon: Aster Krause MD;  Location: [facility] ENDO;  Service: Endoscopy;  Laterality: N/A;    ESOPHAGOGASTRODUODENOSCOPY N/A 10/18/2019    Procedure: ESOPHAGOGASTRODUODENOSCOPY (EGD);  Surgeon: Aster Krause MD;  Location: [facility];  Service: Endoscopy;  Laterality: N/A;    GLAUCOMA SURGERY Left 08/02/2018    Ahmed    GONIOTOMY Left 11/17/2016    HEMORRHOID SURGERY  4/2015    Done in [facility]    PARTIAL HYSTERECTOMY      TONSILLECTOMY      WISDOM TOOTH EXTRACTION         Family History   Problem Relation Age of Onset    Diabetes Mother     Hypertension Mother     Hyperlipidemia Mother     Stroke Mother     Amblyopia Mother     Diabetes Sister     Hypertension Sister     Hyperlipidemia Sister     Arthritis Sister     Amblyopia Brother     Amblyopia Maternal Uncle     Blindness Neg Hx     Cancer Neg Hx     Cataracts Neg Hx     Glaucoma Neg Hx     Macular degeneration Neg Hx     Retinal detachment Neg Hx     Strabismus Neg Hx     Thyroid disease Neg Hx        Social History     Socioeconomic History    Marital status:     Number of children: 3   Tobacco Use    Smoking status: Never    Smokeless tobacco: Never   Substance and Sexual Activity    Alcohol use: No     Alcohol/week: 0.0 standard drinks of alcohol    Drug use: No    Sexual activity: Not Currently      "Partners: Male     Birth control/protection: None   Social History Narrative    , 3 kids.       Vitals:    11/02/23 1105   Weight: 70.6 kg (155 lb 10.3 oz)   Height: 4' 11" (1.499 m)   PainSc:   4       Hemoglobin A1C   Date Value Ref Range Status   10/25/2023 6.8 (H) 4.0 - 5.6 % Final     Comment:     ADA Screening Guidelines:  5.7-6.4%  Consistent with prediabetes  >or=6.5%  Consistent with diabetes    High levels of fetal hemoglobin interfere with the HbA1C  assay. Heterozygous hemoglobin variants (HbS, HgC, etc)do  not significantly interfere with this assay.   However, presence of multiple variants may affect accuracy.     07/05/2023 7.7 (H) 4.0 - 5.6 % Final     Comment:     ADA Screening Guidelines:  5.7-6.4%  Consistent with prediabetes  >or=6.5%  Consistent with diabetes    High levels of fetal hemoglobin interfere with the HbA1C  assay. Heterozygous hemoglobin variants (HbS, HgC, etc)do  not significantly interfere with this assay.   However, presence of multiple variants may affect accuracy.     07/26/2022 7.6 (H) 4.0 - 5.6 % Final     Comment:     ADA Screening Guidelines:  5.7-6.4%  Consistent with prediabetes  >or=6.5%  Consistent with diabetes    High levels of fetal hemoglobin interfere with the HbA1C  assay. Heterozygous hemoglobin variants (HbS, HgC, etc)do  not significantly interfere with this assay.   However, presence of multiple variants may affect accuracy.         Review of Systems   Constitutional:  Negative for chills and fever.   Respiratory:  Negative for shortness of breath.    Cardiovascular:  Negative for chest pain, palpitations, orthopnea, claudication and leg swelling.   Gastrointestinal:  Negative for diarrhea, nausea and vomiting.   Musculoskeletal:  Negative for joint pain.   Skin:  Negative for rash.   Neurological:  Negative for dizziness, tingling, sensory change, focal weakness and weakness.   Psychiatric/Behavioral: Negative.           Objective:      PHYSICAL EXAM: " Apperance: Alert and orient in no distress,well developed, and with good attention to grooming and body habits  Patient presents ambulating in tennis shoes.   LOWER EXTREMITY EXAM:  VASCULAR: Dorsalis pedis pulses 2/4 bilateral and Posterior Tibial pulses 2/4 bilateral. Capillary fill time <blanched bilateral. Mild edema observed bilateral. Varicosities present bilateral. Skin temperature of the lower extremities is warm to warm, proximal to distal. Hair growth dim bilateral.  DERMATOLOGICAL: No skin rashes, subcutaneous nodules, lesions, or ulcers observed bilateral. Nails 1,2,3,4,5 bilateral normal length. Webspaces 1,2,3,4 bilateral clean, dry and without evidence of break in skin integrity. Mild hyperkeratotic tissue noted left plantar foot.   NEUROLOGICAL: Light touch, sharp-dull, proprioception all present and equal bilaterally.  Vibratory sensation diminished at bilateral hallux. Protective sensation absent at toe sites as tested with a [employer] 5.07 monofilament.   MUSCULOSKELETAL: Muscle strength is 5/5 for foot inverters, everters, plantarflexors, and dorsiflexors. Muscle tone is normal. Pain on palpation of left foot callus.         Assessment:       ICD-10-CM ICD-9-CM   1. Type 2 diabetes mellitus with stage 3 chronic kidney disease, without long-term current use of insulin, unspecified whether stage 3a or 3b CKD  E11.22 250.40    N18.30 585.3   2. Callus of foot  L84 700       Plan:   Type 2 diabetes mellitus with stage 3 chronic kidney disease, without long-term current use of insulin, unspecified whether stage 3a or 3b CKD    Callus of foot      I counseled the patient on her conditions, regarding findings of my examination, my impressions, and usual treatment plan.   Appointment spent on education about the diabetic foot, neuropathy, and prevention of limb loss.  Shoe inspection. Diabetic Foot Education. Patient reminded of the importance of good nutrition and blood sugar control to help  prevent podiatric complications of diabetes. Patient instructed on proper foot hygeine. We discussed wearing proper shoe gear, daily foot inspections, never walking without protective shoe gear, never putting sharp instruments to feet.    With patient's permission, left callus trimmed in thickness with #15 blade in thickness without incident.   Patient  will continue to monitor the areas daily, inspect feet, wear protective shoe gear when ambulatory, moisturizer to maintain skin integrity. Patient reminded of the importance of good nutrition and blood sugar control to help prevent podiatric complications of diabetes.  Patient to return 2 months or sooner if needed.              Paty Mclean DPM  [facility]

## 2023-11-08 DIAGNOSIS — E78.5 HYPERLIPIDEMIA ASSOCIATED WITH TYPE 2 DIABETES MELLITUS: ICD-10-CM

## 2023-11-08 DIAGNOSIS — E11.69 HYPERLIPIDEMIA ASSOCIATED WITH TYPE 2 DIABETES MELLITUS: ICD-10-CM

## 2023-11-08 RX ORDER — ROSUVASTATIN CALCIUM 40 MG/1
40 TABLET, COATED ORAL NIGHTLY
Qty: 90 TABLET | Refills: 2 | Status: SHIPPED | OUTPATIENT
Start: 2023-11-08

## 2023-11-08 NOTE — TELEPHONE ENCOUNTER
Refill Routing Note   Medication(s) are not appropriate for processing by [facility] for the following reason(s):      Drug-disease interaction: CKD    [facility] action(s):  Defer  Approve Care Due:  None identified        Pharmacist review requested: Yes     Appointments  past 12m or future 3m with PCP    Date Provider   Last Visit   6/30/2023 Oneyda Bergman MD   Next Visit   12/6/2023 Oneyda Bergman MD   ED visits in past 90 days: 0        Note composed:5:29 PM 11/08/2023

## 2023-11-08 NOTE — TELEPHONE ENCOUNTER
Refill Routing Note   Medication(s) are not appropriate for processing by [facility] for the following reason(s):      Drug-disease interaction    [facility] action(s):  Defer  Approve Care Due:  None identified     Medication Therapy Plan: Drug-Disease: fenofibrate micronized and Iron deficiency anemia; Drug-Disease: fenofibrate micronized and Type 2 diabetes mellitus with stage 3 chronic kidney disease, without long-term current use of insulin; Type 2 diabetes mellitus with stage 3 chronic kidney disease, without long-term current use of insulin, unspecified whether stage 3a or 3b CKD      Pharmacist review requested: Yes     Appointments  past 12m or future 3m with PCP    Date Provider   Last Visit   6/30/2023 Oneyda Bergman MD   Next Visit   12/6/2023 Oneyda Bergman MD   ED visits in past 90 days: 0        Note composed:4:46 PM 11/08/2023

## 2023-11-08 NOTE — TELEPHONE ENCOUNTER
No care due was identified.  [facility] Embedded Care Due Messages. Reference number: [id number].   11/08/2023 1:31:16 PM CST

## 2023-11-09 RX ORDER — FENOFIBRATE 134 MG/1
134 CAPSULE ORAL
Qty: 90 CAPSULE | Refills: 1 | Status: SHIPPED | OUTPATIENT
Start: 2023-11-09

## 2023-11-17 ENCOUNTER — TELEPHONE (OUTPATIENT)
Dept: DIABETES | Facility: CLINIC | Age: 58
End: 2023-11-17
Payer: MEDICARE

## 2023-11-17 NOTE — TELEPHONE ENCOUNTER
Faxed PA for glucose test strips, lancets, and meter from [employer] has been received and emailed to Ricih for signature.

## 2023-12-03 ENCOUNTER — PATIENT MESSAGE (OUTPATIENT)
Dept: PODIATRY | Facility: CLINIC | Age: 58
End: 2023-12-03
Payer: MEDICARE

## 2023-12-06 ENCOUNTER — OFFICE VISIT (OUTPATIENT)
Dept: INTERNAL MEDICINE | Facility: CLINIC | Age: 58
End: 2023-12-06
Payer: MEDICARE

## 2023-12-06 VITALS
HEIGHT: 59 IN | BODY MASS INDEX: 31.28 KG/M2 | WEIGHT: 155.19 LBS | OXYGEN SATURATION: 99 % | RESPIRATION RATE: 18 BRPM | HEART RATE: 89 BPM | DIASTOLIC BLOOD PRESSURE: 78 MMHG | SYSTOLIC BLOOD PRESSURE: 110 MMHG

## 2023-12-06 DIAGNOSIS — E11.42 TYPE 2 DIABETES MELLITUS WITH DIABETIC POLYNEUROPATHY, WITHOUT LONG-TERM CURRENT USE OF INSULIN: ICD-10-CM

## 2023-12-06 DIAGNOSIS — M17.0 PRIMARY OSTEOARTHRITIS OF BOTH KNEES: ICD-10-CM

## 2023-12-06 DIAGNOSIS — K21.9 GASTROESOPHAGEAL REFLUX DISEASE, UNSPECIFIED WHETHER ESOPHAGITIS PRESENT: ICD-10-CM

## 2023-12-06 DIAGNOSIS — D50.0 IRON DEFICIENCY ANEMIA DUE TO CHRONIC BLOOD LOSS: ICD-10-CM

## 2023-12-06 DIAGNOSIS — E66.9 OBESITY (BMI 30.0-34.9): ICD-10-CM

## 2023-12-06 DIAGNOSIS — E03.9 HYPOTHYROIDISM, UNSPECIFIED TYPE: ICD-10-CM

## 2023-12-06 DIAGNOSIS — E78.5 HYPERLIPIDEMIA ASSOCIATED WITH TYPE 2 DIABETES MELLITUS: ICD-10-CM

## 2023-12-06 DIAGNOSIS — E11.69 HYPERLIPIDEMIA ASSOCIATED WITH TYPE 2 DIABETES MELLITUS: ICD-10-CM

## 2023-12-06 DIAGNOSIS — I15.2 HYPERTENSION ASSOCIATED WITH DIABETES: Primary | ICD-10-CM

## 2023-12-06 DIAGNOSIS — E11.22 TYPE 2 DIABETES MELLITUS WITH STAGE 3B CHRONIC KIDNEY DISEASE, WITHOUT LONG-TERM CURRENT USE OF INSULIN: ICD-10-CM

## 2023-12-06 DIAGNOSIS — E11.59 HYPERTENSION ASSOCIATED WITH DIABETES: Primary | ICD-10-CM

## 2023-12-06 DIAGNOSIS — N18.32 TYPE 2 DIABETES MELLITUS WITH STAGE 3B CHRONIC KIDNEY DISEASE, WITHOUT LONG-TERM CURRENT USE OF INSULIN: ICD-10-CM

## 2023-12-06 DIAGNOSIS — E55.9 VITAMIN D DEFICIENCY: ICD-10-CM

## 2023-12-06 PROCEDURE — 99214 OFFICE O/P EST MOD 30 MIN: CPT | Mod: S$PBB,,, | Performed by: FAMILY MEDICINE

## 2023-12-06 PROCEDURE — 99214 PR OFFICE/OUTPT VISIT, EST, LEVL IV, 30-39 MIN: ICD-10-PCS | Mod: S$PBB,,, | Performed by: FAMILY MEDICINE

## 2023-12-06 PROCEDURE — 99213 OFFICE O/P EST LOW 20 MIN: CPT | Mod: PBBFAC | Performed by: FAMILY MEDICINE

## 2023-12-06 PROCEDURE — 99999 PR PBB SHADOW E&M-EST. PATIENT-LVL III: ICD-10-PCS | Mod: PBBFAC,,, | Performed by: FAMILY MEDICINE

## 2023-12-06 PROCEDURE — 99999 PR PBB SHADOW E&M-EST. PATIENT-LVL III: CPT | Mod: PBBFAC,,, | Performed by: FAMILY MEDICINE

## 2023-12-06 RX ORDER — GABAPENTIN 300 MG/1
300 CAPSULE ORAL 2 TIMES DAILY
Qty: 180 CAPSULE | Refills: 3 | Status: SHIPPED | OUTPATIENT
Start: 2023-12-06

## 2023-12-06 RX ORDER — ERGOCALCIFEROL 1.25 MG/1
50000 CAPSULE ORAL
Qty: 10 CAPSULE | Refills: 1 | Status: SHIPPED | OUTPATIENT
Start: 2023-12-06

## 2023-12-06 RX ORDER — LANCETS 30 GAUGE
EACH MISCELLANEOUS
COMMUNITY
Start: 2023-11-13

## 2023-12-06 NOTE — PROGRESS NOTES
Subjective:       Patient ID: Richelle Zaldivar is a 58 y.o. female.    Chief Complaint: Follow-up    58-year-old  female patient accompanied by her daughter with Patient Active Problem List:     Hypertension associated with diabetes     Type 2 diabetes mellitus with stage 3 chronic kidney disease, without long-term current use of insulin     Hyperlipidemia associated with type 2 diabetes mellitus     Iron deficiency anemia     Vitamin D deficiency     GERD (gastroesophageal reflux disease)     Retinitis pigmentosa     Recurrent iritis of left eye     Open-angle glaucoma, severe stage     Osteoarthritis of knee     Hypothyroidism     Obesity (BMI 30.0-34.9)  Here for follow-up on chronic medical conditions and reports that patient has been monitoring her blood glucose levels which has been running in the range of 120s to 140s, has been taking glipizide 5 mg twice daily but not 3 times a day  Denies any chest pain or difficulty breathing with palpitations but continues to have constipation since taking Trulicity weekly but has been taking MiraLax and stool softeners daily  Denies any blood in the stool  Denies any extreme fatigue  Continues to have bilateral heel pain for which patient has been followed by podiatrist and has been up-to-date with eye exams      Review of Systems   Constitutional:  Negative for fatigue.   Eyes:  Negative for visual disturbance.   Respiratory:  Negative for shortness of breath.    Cardiovascular:  Negative for chest pain and leg swelling.   Gastrointestinal:  Positive for constipation. Negative for abdominal pain, blood in stool, nausea and vomiting.   Endocrine: Negative for polydipsia and polyuria.   Musculoskeletal:  Positive for arthralgias. Negative for myalgias.   Skin:  Negative for rash.   Neurological:  Negative for light-headedness and headaches.   Psychiatric/Behavioral:  Negative for sleep disturbance.          /78 (BP Location: Left arm, Patient Position: Sitting,  "BP Method: Large (Manual))   Pulse 89   Resp 18   Ht 4' 11" (1.499 m)   Wt 70.4 kg (155 lb 3.3 oz)   SpO2 99%   BMI 31.35 kg/m²   Objective:      Physical Exam  Constitutional:       Appearance: She is well-developed.   HENT:      Head: Normocephalic and atraumatic.   Cardiovascular:      Rate and Rhythm: Normal rate and regular rhythm.      Heart sounds: Normal heart sounds. No murmur heard.  Pulmonary:      Effort: Pulmonary effort is normal.      Breath sounds: Normal breath sounds. No wheezing.   Abdominal:      General: Bowel sounds are normal.      Palpations: Abdomen is soft.      Tenderness: There is no abdominal tenderness.   Musculoskeletal:         General: Tenderness present.      Comments: Positive for tenderness to the right heel   Skin:     General: Skin is warm and dry.      Findings: No rash.   Neurological:      Mental Status: She is alert and oriented to person, place, and time.   Psychiatric:         Mood and Affect: Mood normal.         No visits with results within 1 Month(s) from this visit.   Latest known visit with results is:   Lab Visit on 10/25/2023   Component Date Value Ref Range Status    Hemoglobin A1C 10/25/2023 6.8 (H)  4.0 - 5.6 % Final    Comment: ADA Screening Guidelines:  5.7-6.4%  Consistent with prediabetes  >or=6.5%  Consistent with diabetes    High levels of fetal hemoglobin interfere with the HbA1C  assay. Heterozygous hemoglobin variants (HbS, HgC, etc)do  not significantly interfere with this assay.   However, presence of multiple variants may affect accuracy.      Estimated Avg Glucose 10/25/2023 148 (H)  68 - 131 mg/dL Final       Assessment/Plan:   1. Hypertension associated with diabetes  Blood pressure is stable currently on amlodipine 10 mg, lisinopril hydrochlorothiazide 20/25 mg    2. Hyperlipidemia associated with type 2 diabetes mellitus  Currently taking fenofibrate 134 mg Zetia 10 mg Crestor 40 mg  Encouraged to work on cholesterol levels and will " recheck labs next time    3. Type 2 diabetes mellitus with stage 3b chronic kidney disease, without long-term current use of insulin  Noted improvement in A1c currently on Trulicity 0.75 mg weekly Jardiance 25 mg daily glipizide 5 mg twice daily and metformin 1000 mg twice a day  Strict lifestyle changes recommended with 1800 ADA low-fat and low-cholesterol diet and exercise 30 minutes daily  Patient to keep appointment with eye physicians as well as podiatrist  Drink adequate fluids and avoid over-the-counter NSAIDs to avoid worsening kidney functions    4. Gastroesophageal reflux disease, unspecified whether esophagitis present  Stable    5. Hypothyroidism, unspecified type  Currently taking levothyroxine 25 mcg p.o. daily    6. Primary osteoarthritis of both knees  Graded exercise regimen recommended    7. Iron deficiency anemia due to chronic blood loss  Stable    8. Vitamin D deficiency  -     ergocalciferol (VITAMIN D2) 50,000 unit Cap; Take 1 capsule (50,000 Units total) by mouth every 7 days.  Dispense: 10 capsule; Refill: 1  Will give refill on vitamin-D to be taken weekly secondary to low vitamin-D levels in the past    9. Obesity (BMI 30.0-34.9)  Lifestyle modifications recommended to lose weight with BMI 31    10. Type 2 diabetes mellitus with diabetic polyneuropathy, without long-term current use of insulin  -     gabapentin (NEURONTIN) 300 MG capsule; Take 1 capsule (300 mg total) by mouth 2 (two) times daily.  Dispense: 180 capsule; Refill: 3  Stable on gabapentin 300 mg twice daily-refill given today

## 2023-12-28 ENCOUNTER — OFFICE VISIT (OUTPATIENT)
Dept: PODIATRY | Facility: CLINIC | Age: 58
End: 2023-12-28
Payer: MEDICARE

## 2023-12-28 VITALS — WEIGHT: 155.19 LBS | HEIGHT: 59 IN | BODY MASS INDEX: 31.28 KG/M2

## 2023-12-28 DIAGNOSIS — M79.672 LEFT FOOT PAIN: ICD-10-CM

## 2023-12-28 DIAGNOSIS — N18.30 TYPE 2 DIABETES MELLITUS WITH STAGE 3 CHRONIC KIDNEY DISEASE, WITHOUT LONG-TERM CURRENT USE OF INSULIN, UNSPECIFIED WHETHER STAGE 3A OR 3B CKD: Primary | ICD-10-CM

## 2023-12-28 DIAGNOSIS — E11.22 TYPE 2 DIABETES MELLITUS WITH STAGE 3 CHRONIC KIDNEY DISEASE, WITHOUT LONG-TERM CURRENT USE OF INSULIN, UNSPECIFIED WHETHER STAGE 3A OR 3B CKD: Primary | ICD-10-CM

## 2023-12-28 DIAGNOSIS — L84 CALLUS OF FOOT: ICD-10-CM

## 2023-12-28 PROCEDURE — 11720 DEBRIDE NAIL 1-5: CPT | Mod: S$PBB,LT,, | Performed by: PODIATRIST

## 2023-12-28 PROCEDURE — 99213 OFFICE O/P EST LOW 20 MIN: CPT | Mod: S$PBB,25,, | Performed by: PODIATRIST

## 2023-12-28 PROCEDURE — 11720 DEBRIDE NAIL 1-5: CPT | Mod: PBBFAC,LT | Performed by: PODIATRIST

## 2023-12-28 PROCEDURE — 99214 OFFICE O/P EST MOD 30 MIN: CPT | Mod: 25,PBBFAC | Performed by: PODIATRIST

## 2023-12-28 PROCEDURE — 99999 PR PBB SHADOW E&M-EST. PATIENT-LVL IV: CPT | Mod: PBBFAC,,, | Performed by: PODIATRIST

## 2023-12-28 NOTE — PROGRESS NOTES
Subjective:     Patient ID: Richelle Zaldivar is a 58 y.o. female.    Chief Complaint: Follow-up (Pt c/o left footpain 7/10, diabetic pt wears tennis shoes, PCP Dr. Bergman lat seen 12-6-23), Callouses, and Heel Pain    Richelle is a 58 y.o. female who presents to the clinic for evaluation and treatment of high risk feet. Richelle has a past medical history of Acid reflux, Cataract, Diabetes mellitus, type 2, Glaucoma, HTN (hypertension), Hyperlipidemia, Osteoarthritis of knee (3/11/2019), and Recurrent iritis of left eye (4/13/2016). The patient's chief complaint is left heel callus pain. Patient rates pain 7/10. This patient has documented high risk feet requiring routine maintenance secondary to diabetes mellitis and those secondary complications of diabetes, as mentioned. Patient is accompanied by her daughter today.     PCP: Oneyda Bergman MD    Date Last Seen by PCP: 12/06/2023    Current shoe gear:  Affected Foot: Tennis shoes     Unaffected Foot: Tennis shoes    Hemoglobin A1C   Date Value Ref Range Status   10/25/2023 6.8 (H) 4.0 - 5.6 % Final     Comment:     ADA Screening Guidelines:  5.7-6.4%  Consistent with prediabetes  >or=6.5%  Consistent with diabetes    High levels of fetal hemoglobin interfere with the HbA1C  assay. Heterozygous hemoglobin variants (HbS, HgC, etc)do  not significantly interfere with this assay.   However, presence of multiple variants may affect accuracy.     07/05/2023 7.7 (H) 4.0 - 5.6 % Final     Comment:     ADA Screening Guidelines:  5.7-6.4%  Consistent with prediabetes  >or=6.5%  Consistent with diabetes    High levels of fetal hemoglobin interfere with the HbA1C  assay. Heterozygous hemoglobin variants (HbS, HgC, etc)do  not significantly interfere with this assay.   However, presence of multiple variants may affect accuracy.     07/26/2022 7.6 (H) 4.0 - 5.6 % Final     Comment:     ADA Screening Guidelines:  5.7-6.4%  Consistent with prediabetes  >or=6.5%  Consistent with  diabetes    High levels of fetal hemoglobin interfere with the HbA1C  assay. Heterozygous hemoglobin variants (HbS, HgC, etc)do  not significantly interfere with this assay.   However, presence of multiple variants may affect accuracy.         Patient Active Problem List   Diagnosis    Hypertension associated with diabetes    Type 2 diabetes mellitus with stage 3 chronic kidney disease, without long-term current use of insulin    Hyperlipidemia associated with type 2 diabetes mellitus    Iron deficiency anemia    Vitamin D deficiency    GERD (gastroesophageal reflux disease)    Retinitis pigmentosa    Recurrent iritis of left eye    Open-angle glaucoma, severe stage    Osteoarthritis of knee    Hypothyroidism    Obesity (BMI 30.0-34.9)       Medication List with Changes/Refills   Current Medications    ALLOPURINOL (ZYLOPRIM) 100 MG TABLET    Take 2 tablets (200 mg total) by mouth once daily.    AMLODIPINE (NORVASC) 10 MG TABLET    Take 1 tablet orally once a day.    ATROPINE 1% (ISOPTO ATROPINE) 1 % DROP    Place 1 drop into the right eye 2 (two) times a day.    BLOOD SUGAR DIAGNOSTIC STRP    To check blood glucose 2 times daily    BLOOD-GLUCOSE METER KIT    To check BG 2 times daily, to use with insurance preferred meter    BRIMONIDINE 0.1% (ALPHAGAN P) 0.1 % DROP    Place 1 drop into both eyes 3 (three) times daily.    BROMFENAC (PROLENSA) 0.07 % DROP    Apply 1 drop to eye once daily.    DIFLUPREDNATE (DUREZOL) 0.05 % DROP OPHTHALMIC SOLUTION    Place 1 drop into both eyes 3 (three) times daily.    DORZOLAMIDE-TIMOLOL 2-0.5% (COSOPT) 22.3-6.8 MG/ML OPHTHALMIC SOLUTION    Place 1 drop into the left eye 2 (two) times daily.    DULAGLUTIDE (TRULICITY) 0.75 MG/0.5 ML PEN INJECTOR    Inject 0.75 mg into the skin once a week.    EMPAGLIFLOZIN (JARDIANCE) 25 MG TABLET    Take 1 tablet (25 mg total) by mouth once daily.    ERGOCALCIFEROL (VITAMIN D2) 50,000 UNIT CAP    Take 1 capsule (50,000 Units total) by mouth every 7  days.    ESTRADIOL (ESTRACE) 0.01 % (0.1 MG/GRAM) VAGINAL CREAM    Place ¼ applicatorful intravaginally nightly for 1 to 2 weeks, THEN decrease to 3 to 4 times per week.    EZETIMIBE (ZETIA) 10 MG TABLET    Take 1 tablet (10 mg total) by mouth every evening.    FENOFIBRATE MICRONIZED (LOFIBRA) 134 MG CAP    Take 1 capsule (134 mg total) by mouth daily with breakfast.    GABAPENTIN (NEURONTIN) 300 MG CAPSULE    Take 1 capsule (300 mg total) by mouth 2 (two) times daily.    GLIPIZIDE (GLUCOTROL) 5 MG TABLET    Take 1 tablet (5 mg total) by mouth 3 (three) times daily before meals.    LANCETS MISC    To check BG 2 times daily, to use with insurance preferred meter    LATANOPROST (XALATAN) 0.005 % OPHTHALMIC SOLUTION    Place 1 drop into the left eye once daily.    LEVOTHYROXINE (SYNTHROID) 25 MCG TABLET    Take 1 tablet (25 mcg total) by mouth once daily.    LISINOPRIL-HYDROCHLOROTHIAZIDE (PRINZIDE,ZESTORETIC) 20-25 MG TAB    Take 1 tablet by mouth once daily.    MELOXICAM (MOBIC) 15 MG TABLET    Take 1 tablet (15 mg total) by mouth daily as needed.    METFORMIN (GLUCOPHAGE) 1000 MG TABLET    Take 1 tablet (1,000 mg total) by mouth 2 (two) times daily with meals.    MUPIROCIN (BACTROBAN) 2 % OINTMENT    Apply topically 3 (three) times daily.    ONETOUCH ULTRA2 METER MISC    CHECK BLOOD SUGAR TWICE DAILY    OPW TEST CLAIM - DO NOT FILL    Inject 0.5 tablets into the muscle. OPW test claim. Do not fill.    PREDNISOLONE ACETATE (PRED FORTE) 1 % DRPS    Place 1 drop into the left eye 2 (two) times daily.    ROSUVASTATIN (CRESTOR) 40 MG TAB    Take 1 tablet (40 mg total) by mouth every evening.    TRAMADOL (ULTRAM) 50 MG TABLET    Take 1 tablet (50 mg total) by mouth every 12 (twelve) hours as needed for Pain.    UREA (CARMOL) 40 % CREA    Apply topically 2 (two) times daily. Apply to thickened areas of skin       Review of patient's allergies indicates:  No Known Allergies    Past Surgical History:   Procedure Laterality  "Date    CATARACT EXTRACTION      COLONOSCOPY N/A 10/18/2019    Procedure: COLONOSCOPY;  Surgeon: Aster Krause MD;  Location: [facility] ENDO;  Service: Endoscopy;  Laterality: N/A;    ESOPHAGOGASTRODUODENOSCOPY N/A 10/18/2019    Procedure: ESOPHAGOGASTRODUODENOSCOPY (EGD);  Surgeon: Aster Krause MD;  Location: [facility] ENDO;  Service: Endoscopy;  Laterality: N/A;    GLAUCOMA SURGERY Left 08/02/2018    [facility]    GONIOTOMY Left 11/17/2016    HEMORRHOID SURGERY  4/2015    Done in [facility]    PARTIAL HYSTERECTOMY      TONSILLECTOMY      WISDOM TOOTH EXTRACTION         Family History   Problem Relation Age of Onset    Diabetes Mother     Hypertension Mother     Hyperlipidemia Mother     Stroke Mother     Amblyopia Mother     Diabetes Sister     Hypertension Sister     Hyperlipidemia Sister     Arthritis Sister     Amblyopia Brother     Amblyopia Maternal Uncle     Blindness Neg Hx     Cancer Neg Hx     Cataracts Neg Hx     Glaucoma Neg Hx     Macular degeneration Neg Hx     Retinal detachment Neg Hx     Strabismus Neg Hx     Thyroid disease Neg Hx        Social History     Socioeconomic History    Marital status:     Number of children: 3   Tobacco Use    Smoking status: Never    Smokeless tobacco: Never   Substance and Sexual Activity    Alcohol use: No     Alcohol/week: 0.0 standard drinks of alcohol    Drug use: No    Sexual activity: Not Currently     Partners: Male     Birth control/protection: None   Social History Narrative    , 3 kids.       Vitals:    12/28/23 1040   Weight: 70.4 kg (155 lb 3.3 oz)   Height: 4' 11" (1.499 m)   PainSc:   6       Hemoglobin A1C   Date Value Ref Range Status   10/25/2023 6.8 (H) 4.0 - 5.6 % Final     Comment:     ADA Screening Guidelines:  5.7-6.4%  Consistent with prediabetes  >or=6.5%  Consistent with diabetes    High levels of fetal hemoglobin interfere with the HbA1C  assay. Heterozygous hemoglobin variants (HbS, HgC, etc)do  not significantly interfere with this " assay.   However, presence of multiple variants may affect accuracy.     07/05/2023 7.7 (H) 4.0 - 5.6 % Final     Comment:     ADA Screening Guidelines:  5.7-6.4%  Consistent with prediabetes  >or=6.5%  Consistent with diabetes    High levels of fetal hemoglobin interfere with the HbA1C  assay. Heterozygous hemoglobin variants (HbS, HgC, etc)do  not significantly interfere with this assay.   However, presence of multiple variants may affect accuracy.     07/26/2022 7.6 (H) 4.0 - 5.6 % Final     Comment:     ADA Screening Guidelines:  5.7-6.4%  Consistent with prediabetes  >or=6.5%  Consistent with diabetes    High levels of fetal hemoglobin interfere with the HbA1C  assay. Heterozygous hemoglobin variants (HbS, HgC, etc)do  not significantly interfere with this assay.   However, presence of multiple variants may affect accuracy.         Review of Systems   Constitutional:  Negative for chills and fever.   Respiratory:  Negative for shortness of breath.    Cardiovascular:  Negative for chest pain, palpitations, orthopnea, claudication and leg swelling.   Gastrointestinal:  Negative for diarrhea, nausea and vomiting.   Musculoskeletal:  Negative for joint pain.   Skin:  Negative for rash.   Neurological:  Negative for dizziness, tingling, sensory change, focal weakness and weakness.   Psychiatric/Behavioral: Negative.           Objective:      PHYSICAL EXAM: Apperance: Alert and orient in no distress,well developed, and with good attention to grooming and body habits  Patient presents ambulating in tennis shoes.   LOWER EXTREMITY EXAM:  VASCULAR: Dorsalis pedis pulses 2/4 bilateral and Posterior Tibial pulses 2/4 bilateral. Capillary fill time <blanched bilateral. Mild edema observed bilateral. Varicosities present bilateral. Skin temperature of the lower extremities is warm to warm, proximal to distal. Hair growth dim bilateral.  DERMATOLOGICAL: No skin rashes, subcutaneous nodules, lesions, or ulcers observed  bilateral. Nails 1,2,3,4,5 bilateral normal length. Webspaces 1,2,3,4 bilateral clean, dry and without evidence of break in skin integrity. Mild hyperkeratotic tissue noted left plantar foot.   NEUROLOGICAL: Light touch, sharp-dull, proprioception all present and equal bilaterally.  Vibratory sensation diminished at bilateral hallux. Protective sensation absent at toe sites as tested with a Amite-Octavio 5.07 monofilament.   MUSCULOSKELETAL: Muscle strength is 5/5 for foot inverters, everters, plantarflexors, and dorsiflexors. Muscle tone is normal. Pain on palpation of left foot callus.         Assessment:       ICD-10-CM ICD-9-CM   1. Type 2 diabetes mellitus with stage 3 chronic kidney disease, without long-term current use of insulin, unspecified whether stage 3a or 3b CKD  E11.22 250.40    N18.30 585.3   2. Callus of foot  L84 700   3. Left foot pain  M79.672 729.5         Plan:   Type 2 diabetes mellitus with stage 3 chronic kidney disease, without long-term current use of insulin, unspecified whether stage 3a or 3b CKD    Callus of foot    Left foot pain    I counseled the patient on her conditions, regarding findings of my examination, my impressions, and usual treatment plan.   Appointment spent on education about the diabetic foot, neuropathy, and prevention of limb loss.  Shoe inspection. Diabetic Foot Education. Patient reminded of the importance of good nutrition and blood sugar control to help prevent podiatric complications of diabetes. Patient instructed on proper foot hygeine. We discussed wearing proper shoe gear, daily foot inspections, never walking without protective shoe gear, never putting sharp instruments to feet.    Patient  will continue to monitor the areas daily, inspect feet, wear protective shoe gear when ambulatory, moisturizer to maintain skin integrity. Patient reminded of the importance of good nutrition and blood sugar control to help prevent podiatric complications of  diabetes.  Patient to return 2 months or sooner if needed.          Paty Mclean DPM  [facility]

## 2024-01-08 DIAGNOSIS — H40.1133 PRIMARY OPEN ANGLE GLAUCOMA OF BOTH EYES, SEVERE STAGE: ICD-10-CM

## 2024-01-08 RX ORDER — BRIMONIDINE TARTRATE 1 MG/ML
1 SOLUTION/ DROPS OPHTHALMIC 3 TIMES DAILY
Qty: 15 ML | Refills: 6 | Status: SHIPPED | OUTPATIENT
Start: 2024-01-08 | End: 2024-04-07

## 2024-02-05 RX ORDER — MUPIROCIN 20 MG/G
OINTMENT TOPICAL 3 TIMES DAILY
Qty: 22 G | Refills: 0 | Status: SHIPPED | OUTPATIENT
Start: 2024-02-05 | End: 2024-04-12

## 2024-02-06 DIAGNOSIS — H40.1133 PRIMARY OPEN ANGLE GLAUCOMA OF BOTH EYES, SEVERE STAGE: ICD-10-CM

## 2024-02-06 DIAGNOSIS — I15.2 HYPERTENSION ASSOCIATED WITH DIABETES: ICD-10-CM

## 2024-02-06 DIAGNOSIS — E11.59 HYPERTENSION ASSOCIATED WITH DIABETES: ICD-10-CM

## 2024-02-06 RX ORDER — PREDNISOLONE ACETATE 10 MG/ML
1 SUSPENSION/ DROPS OPHTHALMIC 2 TIMES DAILY
Qty: 5 ML | Refills: 3 | Status: SHIPPED | OUTPATIENT
Start: 2024-02-06 | End: 2024-02-07 | Stop reason: SDUPTHER

## 2024-02-06 RX ORDER — AMLODIPINE BESYLATE 10 MG/1
TABLET ORAL
Qty: 90 TABLET | Refills: 3 | Status: SHIPPED | OUTPATIENT
Start: 2024-02-06

## 2024-02-06 RX ORDER — DIFLUPREDNATE OPHTHALMIC 0.5 MG/ML
1 EMULSION OPHTHALMIC 3 TIMES DAILY
Qty: 15 ML | Refills: 4 | Status: SHIPPED | OUTPATIENT
Start: 2024-02-06 | End: 2024-07-16

## 2024-02-06 NOTE — TELEPHONE ENCOUNTER
Provider Staff:  Action required for this patient    Requires labs      Please see care gap opportunities below in Care Due Message.    Thanks!  [facility]     Appointments      Date Provider   Last Visit   12/6/2023 Oneyda Bergman MD   Next Visit   6/6/2024 Oneyda Bergman MD     Refill Decision Note   Richelle Zaldivar  is requesting a refill authorization.    Brief Assessment and Rationale for Refill:   Approve       Medication Therapy Plan:         Comments:     Note composed:3:16 PM 02/06/2024

## 2024-02-06 NOTE — TELEPHONE ENCOUNTER
Care Due:                  Date            Visit Type   Department     Provider  --------------------------------------------------------------------------------                                EP -                              PRIMARY      [facility] INTER[facility]  Oneyda Mainampati  Last Visit: 12-      CARE ([facility])   MEDICINE       Bergman                              EP -                              PRIMARY      [facility] INTER[facility]  Oneyda Mainampati  Next Visit: 06-      CARE ([facility])   MEDICINE       Bergman                                                            Last  Test          Frequency    Reason                     Performed    Due Date  --------------------------------------------------------------------------------    HBA1C.......  6 months...  empagliflozin............  10-   04-    [facility] Embedded Care Due Messages. Reference number: [id number].   2/06/2024 11:16:45 AM CST

## 2024-02-07 ENCOUNTER — OFFICE VISIT (OUTPATIENT)
Dept: OPHTHALMOLOGY | Facility: CLINIC | Age: 59
End: 2024-02-07
Payer: MEDICARE

## 2024-02-07 DIAGNOSIS — H20.022 IRITIS, RECURRENT, LEFT: ICD-10-CM

## 2024-02-07 DIAGNOSIS — Z98.890 S/P YAG CAPSULOTOMY, LEFT: ICD-10-CM

## 2024-02-07 DIAGNOSIS — H40.1133 PRIMARY OPEN ANGLE GLAUCOMA OF BOTH EYES, SEVERE STAGE: Primary | ICD-10-CM

## 2024-02-07 PROCEDURE — 99999 PR PBB SHADOW E&M-EST. PATIENT-LVL II: CPT | Mod: PBBFAC,,, | Performed by: OPHTHALMOLOGY

## 2024-02-07 PROCEDURE — 1159F MED LIST DOCD IN RCRD: CPT | Mod: CPTII,S$GLB,, | Performed by: OPHTHALMOLOGY

## 2024-02-07 PROCEDURE — 4010F ACE/ARB THERAPY RXD/TAKEN: CPT | Mod: CPTII,S$GLB,, | Performed by: OPHTHALMOLOGY

## 2024-02-07 PROCEDURE — 99214 OFFICE O/P EST MOD 30 MIN: CPT | Mod: S$GLB,,, | Performed by: OPHTHALMOLOGY

## 2024-02-07 PROCEDURE — 1160F RVW MEDS BY RX/DR IN RCRD: CPT | Mod: CPTII,S$GLB,, | Performed by: OPHTHALMOLOGY

## 2024-02-07 RX ORDER — PREDNISOLONE ACETATE 10 MG/ML
1 SUSPENSION/ DROPS OPHTHALMIC 2 TIMES DAILY
Qty: 5 ML | Refills: 3 | Status: SHIPPED | OUTPATIENT
Start: 2024-02-07

## 2024-02-07 RX ORDER — ATROPINE SULFATE 10 MG/ML
1 SOLUTION/ DROPS OPHTHALMIC 2 TIMES DAILY
Qty: 5 ML | Refills: 6 | Status: SHIPPED | OUTPATIENT
Start: 2024-02-07

## 2024-02-07 NOTE — PROGRESS NOTES
HPI     Glaucoma            Comments: 2 MO IOP Check: Pt states using eye drops as directed. States   using drops as directed. Denies pain or irritation today.           Comments    PCP: Dr. Miranda Ackerman       1. COAG (followed by Dr. Scott)   S/p Revision of Wound Left Eye 8/4/22  Ahmed Valve OS 8/2/18   CP OS 8/27/15 - low to moderate flow, TDW 1100, aggressive dilation with   GV   Gonio Puncture OS 9/25/15   Wound Revision OS 08/04/22  SLT OS 6/23/16, 6/23/15, 6-2-14   Goniotomy OS 11-17-16   2. DM   3. RP / VIT A PALMITATE 1500 (discontinued)   4. Retinitis Pigmentosa of both eyes   5. Dry Eye Syndrome bilateral   6. Iritis recurrent OS       OS: Durezol BID-TID  OS: Dorz/Timolol BID  OS: Prolensa qd - BID  OS: Latanoprost QHS   OS: Brimonidine TID   OD: Pred PRN  OD: Atropine BID             Last edited by Tianna Pryor on 2/7/2024 10:11 AM.            Assessment /Plan     For exam results, see Encounter Report.      ICD-10-CM ICD-9-CM    1. Primary open angle glaucoma of both eyes, severe stage  H40.1133 365.11 prednisoLONE acetate (PRED FORTE) 1 % DrpS     365.73 atropine 1% (ISOPTO ATROPINE) 1 % Drop    Doing well - intraocular pressure is within acceptable range relative to target pressure with no evidence of progression.   Continue current treatment.  Reviewed importance of continued compliance with treatment and follow up.         2. S/P YAG capsulotomy, left  Z98.890 V45.89 Doing well      3. Iritis, recurrent, left  H20.022 364.02 Continue Durezol          Return to clinic 3 months for IOP        OS: Durezol BID-TID  OS: Dorz/Timolol BID  OS: Prolensa qd - BID  OS: Latanoprost QHS   OS: Brimonidine TID   OD: Pred PRN  OD: Atropine BID

## 2024-02-13 DIAGNOSIS — H40.1133 PRIMARY OPEN ANGLE GLAUCOMA OF BOTH EYES, SEVERE STAGE: ICD-10-CM

## 2024-02-13 RX ORDER — BRIMONIDINE TARTRATE 1 MG/ML
1 SOLUTION/ DROPS OPHTHALMIC 3 TIMES DAILY
Qty: 15 ML | Refills: 6 | Status: SHIPPED | OUTPATIENT
Start: 2024-02-13 | End: 2024-06-22

## 2024-02-14 ENCOUNTER — PATIENT MESSAGE (OUTPATIENT)
Dept: DIABETES | Facility: CLINIC | Age: 59
End: 2024-02-14
Payer: MEDICARE

## 2024-02-14 DIAGNOSIS — E11.22 TYPE 2 DIABETES MELLITUS WITH STAGE 3 CHRONIC KIDNEY DISEASE, WITHOUT LONG-TERM CURRENT USE OF INSULIN, UNSPECIFIED WHETHER STAGE 3A OR 3B CKD: ICD-10-CM

## 2024-02-14 DIAGNOSIS — N18.30 TYPE 2 DIABETES MELLITUS WITH STAGE 3 CHRONIC KIDNEY DISEASE, WITHOUT LONG-TERM CURRENT USE OF INSULIN, UNSPECIFIED WHETHER STAGE 3A OR 3B CKD: ICD-10-CM

## 2024-02-14 RX ORDER — GLIPIZIDE 5 MG/1
5 TABLET ORAL
Qty: 90 TABLET | Refills: 1 | Status: SHIPPED | OUTPATIENT
Start: 2024-02-14 | End: 2024-04-12 | Stop reason: DRUGHIGH

## 2024-02-26 NOTE — TELEPHONE ENCOUNTER
Courtesy refill given for patient with 0 refills. Please call patient and advise an appt will need to be completed before future refills. Please schedule appt if needed.     Richi Brown PA-C  Diabetes Management     
IMPROVE-DD Application Not Available

## 2024-02-27 ENCOUNTER — PATIENT MESSAGE (OUTPATIENT)
Dept: OPHTHALMOLOGY | Facility: CLINIC | Age: 59
End: 2024-02-27
Payer: MEDICARE

## 2024-03-11 ENCOUNTER — PATIENT MESSAGE (OUTPATIENT)
Dept: DIABETES | Facility: CLINIC | Age: 59
End: 2024-03-11
Payer: MEDICARE

## 2024-03-13 ENCOUNTER — TELEPHONE (OUTPATIENT)
Dept: ENDOCRINOLOGY | Facility: CLINIC | Age: 59
End: 2024-03-13
Payer: MEDICARE

## 2024-03-13 NOTE — TELEPHONE ENCOUNTER
----- Message from Cheryl Jenkins sent at 3/13/2024  3:32 PM CDT -----  Name of Who is Calling:LENNIE SAUER [[id number]]                   What is the request in detail:PT needs a appt please assist I wasn't able to open your calendar to find her one please assist                   Can the clinic reply by [facility]: No                   [facility]: [phone]

## 2024-03-22 DIAGNOSIS — E11.59 HYPERTENSION ASSOCIATED WITH DIABETES: ICD-10-CM

## 2024-03-22 DIAGNOSIS — I15.2 HYPERTENSION ASSOCIATED WITH DIABETES: ICD-10-CM

## 2024-03-22 NOTE — TELEPHONE ENCOUNTER
No care due was identified.  [facility] Embedded Care Due Messages. Reference number: [id number].   3/22/2024 10:08:19 AM CDT

## 2024-03-23 RX ORDER — LISINOPRIL AND HYDROCHLOROTHIAZIDE 20; 25 MG/1; MG/1
1 TABLET ORAL DAILY
Qty: 90 TABLET | Refills: 1 | Status: SHIPPED | OUTPATIENT
Start: 2024-03-23

## 2024-03-23 NOTE — TELEPHONE ENCOUNTER
Refill Decision Note   Richelle Zaldivar  is requesting a refill authorization.  Brief Assessment and Rationale for Refill:  Approve     Medication Therapy Plan:         Comments:     Note composed:11:31 AM 03/23/2024

## 2024-04-10 ENCOUNTER — HOSPITAL ENCOUNTER (OUTPATIENT)
Dept: RADIOLOGY | Facility: HOSPITAL | Age: 59
Discharge: HOME | End: 2024-04-10
Attending: PHYSICIAN ASSISTANT
Payer: MEDICARE

## 2024-04-10 VITALS — HEIGHT: 59 IN | BODY MASS INDEX: 31.28 KG/M2 | WEIGHT: 155.19 LBS

## 2024-04-10 DIAGNOSIS — Z12.31 SCREENING MAMMOGRAM FOR BREAST CANCER: ICD-10-CM

## 2024-04-10 PROCEDURE — 77063 BREAST TOMOSYNTHESIS BI: CPT | Mod: TC

## 2024-04-10 PROCEDURE — 77063 BREAST TOMOSYNTHESIS BI: CPT | Mod: 26,,, | Performed by: RADIOLOGY

## 2024-04-10 PROCEDURE — 77067 SCR MAMMO BI INCL CAD: CPT | Mod: 26,,, | Performed by: RADIOLOGY

## 2024-04-12 ENCOUNTER — OFFICE VISIT (OUTPATIENT)
Dept: INTERNAL MEDICINE | Facility: CLINIC | Age: 59
End: 2024-04-12
Payer: MEDICARE

## 2024-04-12 VITALS
RESPIRATION RATE: 18 BRPM | HEIGHT: 59 IN | WEIGHT: 153.88 LBS | SYSTOLIC BLOOD PRESSURE: 120 MMHG | DIASTOLIC BLOOD PRESSURE: 78 MMHG | BODY MASS INDEX: 31.02 KG/M2 | HEART RATE: 68 BPM | OXYGEN SATURATION: 99 %

## 2024-04-12 DIAGNOSIS — E78.5 HYPERLIPIDEMIA ASSOCIATED WITH TYPE 2 DIABETES MELLITUS: ICD-10-CM

## 2024-04-12 DIAGNOSIS — D50.0 IRON DEFICIENCY ANEMIA DUE TO CHRONIC BLOOD LOSS: ICD-10-CM

## 2024-04-12 DIAGNOSIS — E11.69 HYPERLIPIDEMIA ASSOCIATED WITH TYPE 2 DIABETES MELLITUS: ICD-10-CM

## 2024-04-12 DIAGNOSIS — E11.22 TYPE 2 DIABETES MELLITUS WITH STAGE 3 CHRONIC KIDNEY DISEASE, WITHOUT LONG-TERM CURRENT USE OF INSULIN, UNSPECIFIED WHETHER STAGE 3A OR 3B CKD: ICD-10-CM

## 2024-04-12 DIAGNOSIS — E55.9 VITAMIN D DEFICIENCY: ICD-10-CM

## 2024-04-12 DIAGNOSIS — M17.0 PRIMARY OSTEOARTHRITIS OF BOTH KNEES: ICD-10-CM

## 2024-04-12 DIAGNOSIS — N18.30 TYPE 2 DIABETES MELLITUS WITH STAGE 3 CHRONIC KIDNEY DISEASE, WITHOUT LONG-TERM CURRENT USE OF INSULIN, UNSPECIFIED WHETHER STAGE 3A OR 3B CKD: ICD-10-CM

## 2024-04-12 DIAGNOSIS — E03.9 HYPOTHYROIDISM, UNSPECIFIED TYPE: ICD-10-CM

## 2024-04-12 DIAGNOSIS — Z00.00 ROUTINE GENERAL MEDICAL EXAMINATION AT A HEALTH CARE FACILITY: Primary | ICD-10-CM

## 2024-04-12 DIAGNOSIS — N18.32 TYPE 2 DIABETES MELLITUS WITH STAGE 3B CHRONIC KIDNEY DISEASE, WITHOUT LONG-TERM CURRENT USE OF INSULIN: ICD-10-CM

## 2024-04-12 DIAGNOSIS — K21.9 GASTROESOPHAGEAL REFLUX DISEASE, UNSPECIFIED WHETHER ESOPHAGITIS PRESENT: ICD-10-CM

## 2024-04-12 DIAGNOSIS — E66.9 OBESITY (BMI 30.0-34.9): ICD-10-CM

## 2024-04-12 DIAGNOSIS — E79.0 HYPERURICEMIA: ICD-10-CM

## 2024-04-12 DIAGNOSIS — E11.59 HYPERTENSION ASSOCIATED WITH DIABETES: ICD-10-CM

## 2024-04-12 DIAGNOSIS — I15.2 HYPERTENSION ASSOCIATED WITH DIABETES: ICD-10-CM

## 2024-04-12 DIAGNOSIS — E11.22 TYPE 2 DIABETES MELLITUS WITH STAGE 3B CHRONIC KIDNEY DISEASE, WITHOUT LONG-TERM CURRENT USE OF INSULIN: ICD-10-CM

## 2024-04-12 PROCEDURE — 3078F DIAST BP <80 MM HG: CPT | Mod: CPTII,S$GLB,, | Performed by: FAMILY MEDICINE

## 2024-04-12 PROCEDURE — 99999 PR PBB SHADOW E&M-EST. PATIENT-LVL V: CPT | Mod: PBBFAC,,, | Performed by: FAMILY MEDICINE

## 2024-04-12 PROCEDURE — 1159F MED LIST DOCD IN RCRD: CPT | Mod: CPTII,S$GLB,, | Performed by: FAMILY MEDICINE

## 2024-04-12 PROCEDURE — 3074F SYST BP LT 130 MM HG: CPT | Mod: CPTII,S$GLB,, | Performed by: FAMILY MEDICINE

## 2024-04-12 PROCEDURE — 1160F RVW MEDS BY RX/DR IN RCRD: CPT | Mod: CPTII,S$GLB,, | Performed by: FAMILY MEDICINE

## 2024-04-12 PROCEDURE — 4010F ACE/ARB THERAPY RXD/TAKEN: CPT | Mod: CPTII,S$GLB,, | Performed by: FAMILY MEDICINE

## 2024-04-12 PROCEDURE — 99396 PREV VISIT EST AGE 40-64: CPT | Mod: S$GLB,,, | Performed by: FAMILY MEDICINE

## 2024-04-12 PROCEDURE — 3008F BODY MASS INDEX DOCD: CPT | Mod: CPTII,S$GLB,, | Performed by: FAMILY MEDICINE

## 2024-04-12 RX ORDER — METFORMIN HYDROCHLORIDE 1000 MG/1
1000 TABLET ORAL 2 TIMES DAILY WITH MEALS
Qty: 180 TABLET | Refills: 1 | Status: SHIPPED | OUTPATIENT
Start: 2024-04-12

## 2024-04-12 RX ORDER — EZETIMIBE 10 MG/1
10 TABLET ORAL NIGHTLY
Qty: 90 TABLET | Refills: 1 | Status: SHIPPED | OUTPATIENT
Start: 2024-04-12 | End: 2025-04-12

## 2024-04-12 RX ORDER — FENOFIBRATE 134 MG/1
134 CAPSULE ORAL
Qty: 90 CAPSULE | Refills: 1 | Status: SHIPPED | OUTPATIENT
Start: 2024-04-12

## 2024-04-12 RX ORDER — ROSUVASTATIN CALCIUM 40 MG/1
40 TABLET, COATED ORAL NIGHTLY
Qty: 90 TABLET | Refills: 1 | Status: SHIPPED | OUTPATIENT
Start: 2024-04-12

## 2024-04-12 RX ORDER — LANSOPRAZOLE 30 MG/1
30 CAPSULE, DELAYED RELEASE ORAL DAILY
Qty: 90 CAPSULE | Refills: 1 | Status: SHIPPED | OUTPATIENT
Start: 2024-04-12 | End: 2025-04-12

## 2024-04-12 RX ORDER — LEVOTHYROXINE SODIUM 25 UG/1
25 TABLET ORAL DAILY
Qty: 90 TABLET | Refills: 1 | Status: SHIPPED | OUTPATIENT
Start: 2024-04-12 | End: 2025-04-12

## 2024-04-12 RX ORDER — ALLOPURINOL 100 MG/1
200 TABLET ORAL DAILY
Qty: 180 TABLET | Refills: 1 | Status: SHIPPED | OUTPATIENT
Start: 2024-04-12

## 2024-04-12 NOTE — PROGRESS NOTES
"Subjective:       Patient ID: Richelle Zaldivar is a 58 y.o. female.    Chief Complaint: Follow-up    58-year-old [place] female patient accompanied by her daughter with Patient Active Problem List:     Hypertension associated with diabetes     Type 2 diabetes mellitus with stage 3 chronic kidney disease, without long-term current use of insulin     Hyperlipidemia associated with type 2 diabetes mellitus     Iron deficiency anemia     Vitamin D deficiency     GERD (gastroesophageal reflux disease)     Retinitis pigmentosa     Recurrent iritis of left eye     Open-angle glaucoma, severe stage     Osteoarthritis of knee     Hypothyroidism     Obesity (BMI 30.0-34.9)  Here for routine annual physicals  Reports that recently she had annular fissure surgery done, and has follow-up with colorectal surgeon next month  Patient has been doing well and denies any worsening constipation  Requesting refills on her medications today and will be seeing her endocrinologist soon  Denies of any chest pain or difficulty breathing with palpitations and denies any gout exacerbations  Was taking Prevacid 30 mg from [facility] and requesting refill here      Review of Systems   Constitutional:  Negative for fatigue.   Eyes:  Negative for visual disturbance.   Respiratory:  Negative for shortness of breath.    Cardiovascular:  Negative for chest pain and leg swelling.   Gastrointestinal:  Negative for abdominal pain, blood in stool, constipation, nausea and vomiting.   Endocrine: Negative for polydipsia and polyuria.   Musculoskeletal:  Negative for myalgias.   Skin:  Negative for rash.   Neurological:  Negative for light-headedness and headaches.   Psychiatric/Behavioral:  Negative for sleep disturbance.          /78 (BP Location: Left arm, Patient Position: Sitting, BP Method: Large (Manual))   Pulse 68   Resp 18   Ht 4' 11" (1.499 m)   Wt 69.8 kg (153 lb 14.1 oz)   SpO2 99%   BMI 31.08 kg/m²   Objective:      Physical " Exam  Constitutional:       Appearance: She is well-developed.   HENT:      Head: Normocephalic and atraumatic.   Cardiovascular:      Rate and Rhythm: Normal rate and regular rhythm.      Heart sounds: Normal heart sounds. No murmur heard.  Pulmonary:      Effort: Pulmonary effort is normal.      Breath sounds: Normal breath sounds. No wheezing.   Abdominal:      General: Bowel sounds are normal.      Palpations: Abdomen is soft.      Tenderness: There is no abdominal tenderness.   Skin:     General: Skin is warm and dry.      Findings: No rash.   Neurological:      Mental Status: She is alert and oriented to person, place, and time.   Psychiatric:         Mood and Affect: Mood normal.           Assessment/Plan:   1. Routine general medical examination at a health care facility  -     Urinalysis, Reflex to Urine Culture Urine, Clean Catch; Future; Expected date: 04/12/2024  -     Hemoglobin A1C; Future; Expected date: 04/12/2024  -     TSH; Future; Expected date: 04/12/2024  -     Lipid Panel; Future; Expected date: 04/12/2024  -     Comprehensive Metabolic Panel; Future; Expected date: 04/12/2024  -     CBC Auto Differential; Future; Expected date: 04/12/2024  Vital signs stable today.  Clinical exam stable  Continue lifestyle modifications with low-fat and low-cholesterol diet and exercise 30 minutes daily  Due for shingles pneumonia and RSV vaccination    2. Hypertension associated with diabetes  -     Urinalysis, Reflex to Urine Culture Urine, Clean Catch; Future; Expected date: 04/12/2024  -     TSH; Future; Expected date: 04/12/2024  -     Lipid Panel; Future; Expected date: 04/12/2024  -     Comprehensive Metabolic Panel; Future; Expected date: 04/12/2024  Blood pressure is stable currently on amlodipine 10 mg lisinopril hydrochlorothiazide 20/25 mg    3. Hyperlipidemia associated with type 2 diabetes mellitus  -     Lipid Panel; Future; Expected date: 04/12/2024  -     ezetimibe (ZETIA) 10 mg tablet; Take 1  tablet (10 mg total) by mouth every evening.  Dispense: 90 tablet; Refill: 1  -     fenofibrate micronized (LOFIBRA) 134 MG Cap; Take 1 capsule (134 mg total) by mouth daily with breakfast.  Dispense: 90 capsule; Refill: 1  -     rosuvastatin (CRESTOR) 40 MG Tab; Take 1 tablet (40 mg total) by mouth every evening.  Dispense: 90 tablet; Refill: 1  Currently taking Zetia 10 mg fenofibrate 134 mg and Crestor 40 mg, refills given today    4. Type 2 diabetes mellitus with stage 3b chronic kidney disease, without long-term current use of insulin  -     Hemoglobin A1C; Future; Expected date: 04/12/2024  -     Microalbumin/Creatinine Ratio, Urine; Future; Expected date: 04/12/2024  -     empagliflozin (JARDIANCE) 25 mg tablet; Take 1 tablet (25 mg total) by mouth once daily.  Dispense: 90 tablet; Refill: 1  -     metFORMIN (GLUCOPHAGE) 1000 MG tablet; Take 1 tablet (1,000 mg total) by mouth 2 (two) times daily with meals.  Dispense: 180 tablet; Refill: 1  Currently taking Jardiance 25 mg metformin 1000 mg twice daily and Trulicity 0.75 mg weekly  Strict lifestyle changes recommended with 1800 ADA low-fat and low-cholesterol diet and exercise 30 minutes daily  Encouraged to drink adequate fluids to avoid worsening kidney functions    5. Vitamin D deficiency  -     CBC Auto Differential; Future; Expected date: 04/12/2024  -     Vitamin D; Future; Expected date: 04/12/2024  Stable on vitamin-D by prescription weekly    6. Gastroesophageal reflux disease, unspecified whether esophagitis present  -     lansoprazole (PREVACID) 30 MG capsule; Take 1 capsule (30 mg total) by mouth once daily.  Dispense: 90 capsule; Refill: 1  Will give refill on Prevacid 30 mg daily    7. Iron deficiency anemia due to chronic blood loss  Stable    8. Primary osteoarthritis of both knees  Graded exercise regimen recommended    9. Hypothyroidism, unspecified type  -     TSH; Future; Expected date: 04/12/2024  -     levothyroxine (SYNTHROID) 25 MCG  tablet; Take 1 tablet (25 mcg total) by mouth once daily.  Dispense: 90 tablet; Refill: 1  Currently taking levothyroxine 25 mcg p.o. daily    10. Obesity (BMI 30.0-34.9)  Lifestyle modifications recommended to lose weight with BMI 31      11. Hyperuricemia  -     Uric Acid; Future; Expected date: 04/12/2024  -     allopurinoL (ZYLOPRIM) 100 MG tablet; Take 2 tablets (200 mg total) by mouth once daily.  Dispense: 180 tablet; Refill: 1  Stable on allopurinol 200 mg daily and will check uric acid levels

## 2024-04-24 ENCOUNTER — OFFICE VISIT (OUTPATIENT)
Dept: OPHTHALMOLOGY | Facility: CLINIC | Age: 59
End: 2024-04-24
Payer: MEDICARE

## 2024-04-24 ENCOUNTER — PATIENT MESSAGE (OUTPATIENT)
Dept: INTERNAL MEDICINE | Facility: CLINIC | Age: 59
End: 2024-04-24
Payer: MEDICARE

## 2024-04-24 DIAGNOSIS — H40.1133 PRIMARY OPEN ANGLE GLAUCOMA OF BOTH EYES, SEVERE STAGE: Primary | ICD-10-CM

## 2024-04-24 DIAGNOSIS — H20.022 IRITIS, RECURRENT, LEFT: ICD-10-CM

## 2024-04-24 PROCEDURE — 99214 OFFICE O/P EST MOD 30 MIN: CPT | Mod: S$GLB,,, | Performed by: OPHTHALMOLOGY

## 2024-04-24 PROCEDURE — 1159F MED LIST DOCD IN RCRD: CPT | Mod: CPTII,S$GLB,, | Performed by: OPHTHALMOLOGY

## 2024-04-24 PROCEDURE — 4010F ACE/ARB THERAPY RXD/TAKEN: CPT | Mod: CPTII,S$GLB,, | Performed by: OPHTHALMOLOGY

## 2024-04-24 PROCEDURE — 1160F RVW MEDS BY RX/DR IN RCRD: CPT | Mod: CPTII,S$GLB,, | Performed by: OPHTHALMOLOGY

## 2024-04-24 PROCEDURE — 99999 PR PBB SHADOW E&M-EST. PATIENT-LVL II: CPT | Mod: PBBFAC,,, | Performed by: OPHTHALMOLOGY

## 2024-04-24 NOTE — PROGRESS NOTES
HPI     Glaucoma            Comments: 3 MO IOP Check: Pt's daughter states she is using eyedrops as   directed. States her eyes will pain sometimes with crying but, not   continuously.          Comments    PCP: Dr. Miranda Ackerman       1. COAG (followed by Dr. Scott)   S/p Revision of Wound Left Eye 8/4/22  Ahmed Valve OS 8/2/18   CP OS 8/27/15 - low to moderate flow, TDW 1100, aggressive dilation with   GV   Gonio Puncture OS 9/25/15   Wound Revision OS 08/04/22  SLT OS 6/23/16, 6/23/15, 6-2-14   Goniotomy OS 11-17-16   2. DM   3. RP / VIT A PALMITATE 1500 (discontinued)   4. Retinitis Pigmentosa of both eyes   5. Dry Eye Syndrome bilateral   6. Iritis recurrent OS       OS: Durezol BID-TID  OS: Dorz/Timolol BID  OS: Prolensa qd - BID  OS: Latanoprost QHS   OS: Brimonidine TID   OD: Pred PRN  OD: Atropine BID             Last edited by Tianna Pryor on 4/24/2024  9:50 AM.            Assessment /Plan     For exam results, see Encounter Report.      ICD-10-CM ICD-9-CM    1. Primary open angle glaucoma of both eyes, severe stage  H40.1133 365.11 Doing well - intraocular pressure is within acceptable range relative to target pressure with no evidence of progression.   Continue current treatment.  Reviewed importance of continued compliance with treatment and follow up.      365.73       2. Iritis, recurrent, left  H20.022 364.02 Continue Durezol for pain          Return to clinic 3 months for IOP        OS: Durezol BID-TID  OS: Dorz/Timolol BID  OS: Prolensa qd - BID  OS: Latanoprost QHS   OS: Brimonidine TID   OD: Pred PRN  OD: Atropine BID

## 2024-05-07 RX ORDER — GLIPIZIDE 10 MG/1
TABLET ORAL
Qty: 90 TABLET | Refills: 2 | Status: SHIPPED | OUTPATIENT
Start: 2024-05-07

## 2024-05-22 ENCOUNTER — PATIENT MESSAGE (OUTPATIENT)
Dept: PODIATRY | Facility: CLINIC | Age: 59
End: 2024-05-22
Payer: MEDICARE

## 2024-05-23 DIAGNOSIS — H20.022 IRITIS, RECURRENT, LEFT: ICD-10-CM

## 2024-05-23 RX ORDER — BROMFENAC SODIUM 0.81 MG/ML
1 SOLUTION/ DROPS OPHTHALMIC DAILY
Qty: 3 ML | Refills: 6 | Status: SHIPPED | OUTPATIENT
Start: 2024-05-23 | End: 2024-08-21

## 2024-05-27 ENCOUNTER — PATIENT OUTREACH (OUTPATIENT)
Dept: ADMINISTRATIVE | Facility: HOSPITAL | Age: 59
End: 2024-05-27
Payer: MEDICARE

## 2024-05-27 ENCOUNTER — LAB VISIT (OUTPATIENT)
Dept: LAB | Facility: HOSPITAL | Age: 59
End: 2024-05-27
Attending: FAMILY MEDICINE
Payer: MEDICARE

## 2024-05-27 DIAGNOSIS — E55.9 VITAMIN D DEFICIENCY: ICD-10-CM

## 2024-05-27 DIAGNOSIS — E11.22 TYPE 2 DIABETES MELLITUS WITH STAGE 3B CHRONIC KIDNEY DISEASE, WITHOUT LONG-TERM CURRENT USE OF INSULIN: ICD-10-CM

## 2024-05-27 DIAGNOSIS — E03.9 HYPOTHYROIDISM, UNSPECIFIED TYPE: ICD-10-CM

## 2024-05-27 DIAGNOSIS — Z00.00 ROUTINE GENERAL MEDICAL EXAMINATION AT A HEALTH CARE FACILITY: ICD-10-CM

## 2024-05-27 DIAGNOSIS — N18.32 TYPE 2 DIABETES MELLITUS WITH STAGE 3B CHRONIC KIDNEY DISEASE, WITHOUT LONG-TERM CURRENT USE OF INSULIN: ICD-10-CM

## 2024-05-27 DIAGNOSIS — E78.5 HYPERLIPIDEMIA ASSOCIATED WITH TYPE 2 DIABETES MELLITUS: ICD-10-CM

## 2024-05-27 DIAGNOSIS — I15.2 HYPERTENSION ASSOCIATED WITH DIABETES: ICD-10-CM

## 2024-05-27 DIAGNOSIS — E11.69 HYPERLIPIDEMIA ASSOCIATED WITH TYPE 2 DIABETES MELLITUS: ICD-10-CM

## 2024-05-27 DIAGNOSIS — E11.59 HYPERTENSION ASSOCIATED WITH DIABETES: ICD-10-CM

## 2024-05-27 LAB
25(OH)D3+25(OH)D2 SERPL-MCNC: 17 NG/ML (ref 30–96)
ALBUMIN SERPL BCP-MCNC: 3.8 G/DL (ref 3.5–5.2)
ALP SERPL-CCNC: 62 U/L (ref 55–135)
ALT SERPL W/O P-5'-P-CCNC: 12 U/L (ref 10–44)
ANION GAP SERPL CALC-SCNC: 12 MMOL/L (ref 8–16)
AST SERPL-CCNC: 16 U/L (ref 10–40)
BASOPHILS # BLD AUTO: 0.03 K/UL (ref 0–0.2)
BASOPHILS NFR BLD: 0.4 % (ref 0–1.9)
BILIRUB SERPL-MCNC: 0.3 MG/DL (ref 0.1–1)
BUN SERPL-MCNC: 16 MG/DL (ref 6–20)
CALCIUM SERPL-MCNC: 10 MG/DL (ref 8.7–10.5)
CHLORIDE SERPL-SCNC: 109 MMOL/L (ref 95–110)
CHOLEST SERPL-MCNC: 122 MG/DL (ref 120–199)
CHOLEST/HDLC SERPL: 3.4 {RATIO} (ref 2–5)
CO2 SERPL-SCNC: 20 MMOL/L (ref 23–29)
CREAT SERPL-MCNC: 1.2 MG/DL (ref 0.5–1.4)
DIFFERENTIAL METHOD BLD: ABNORMAL
EOSINOPHIL # BLD AUTO: 0.3 K/UL (ref 0–0.5)
EOSINOPHIL NFR BLD: 4.6 % (ref 0–8)
ERYTHROCYTE [DISTWIDTH] IN BLOOD BY AUTOMATED COUNT: 13.1 % (ref 11.5–14.5)
EST. GFR  (NO RACE VARIABLE): 51.8 ML/MIN/1.73 M^2
ESTIMATED AVG GLUCOSE: 174 MG/DL (ref 68–131)
GLUCOSE SERPL-MCNC: 119 MG/DL (ref 70–110)
HBA1C MFR BLD: 7.7 % (ref 4–5.6)
HCT VFR BLD AUTO: 39.4 % (ref 37–48.5)
HDLC SERPL-MCNC: 36 MG/DL (ref 40–75)
HDLC SERPL: 29.5 % (ref 20–50)
HGB BLD-MCNC: 12 G/DL (ref 12–16)
IMM GRANULOCYTES # BLD AUTO: 0.04 K/UL (ref 0–0.04)
IMM GRANULOCYTES NFR BLD AUTO: 0.5 % (ref 0–0.5)
LDLC SERPL CALC-MCNC: 44.6 MG/DL (ref 63–159)
LYMPHOCYTES # BLD AUTO: 2.9 K/UL (ref 1–4.8)
LYMPHOCYTES NFR BLD: 38.6 % (ref 18–48)
MCH RBC QN AUTO: 28 PG (ref 27–31)
MCHC RBC AUTO-ENTMCNC: 30.5 G/DL (ref 32–36)
MCV RBC AUTO: 92 FL (ref 82–98)
MONOCYTES # BLD AUTO: 0.4 K/UL (ref 0.3–1)
MONOCYTES NFR BLD: 6 % (ref 4–15)
NEUTROPHILS # BLD AUTO: 3.7 K/UL (ref 1.8–7.7)
NEUTROPHILS NFR BLD: 49.9 % (ref 38–73)
NONHDLC SERPL-MCNC: 86 MG/DL
NRBC BLD-RTO: 0 /100 WBC
PLATELET # BLD AUTO: 295 K/UL (ref 150–450)
PMV BLD AUTO: 10 FL (ref 9.2–12.9)
POTASSIUM SERPL-SCNC: 4.5 MMOL/L (ref 3.5–5.1)
PROT SERPL-MCNC: 6.9 G/DL (ref 6–8.4)
RBC # BLD AUTO: 4.29 M/UL (ref 4–5.4)
SODIUM SERPL-SCNC: 141 MMOL/L (ref 136–145)
TRIGL SERPL-MCNC: 207 MG/DL (ref 30–150)
TSH SERPL DL<=0.005 MIU/L-ACNC: 2.41 UIU/ML (ref 0.4–4)
WBC # BLD AUTO: 7.39 K/UL (ref 3.9–12.7)

## 2024-05-27 PROCEDURE — 80061 LIPID PANEL: CPT | Performed by: FAMILY MEDICINE

## 2024-05-27 PROCEDURE — 36415 COLL VENOUS BLD VENIPUNCTURE: CPT | Performed by: FAMILY MEDICINE

## 2024-05-27 PROCEDURE — 82306 VITAMIN D 25 HYDROXY: CPT | Performed by: FAMILY MEDICINE

## 2024-05-27 PROCEDURE — 83036 HEMOGLOBIN GLYCOSYLATED A1C: CPT | Performed by: FAMILY MEDICINE

## 2024-05-27 PROCEDURE — 84443 ASSAY THYROID STIM HORMONE: CPT | Performed by: FAMILY MEDICINE

## 2024-05-27 PROCEDURE — 80053 COMPREHEN METABOLIC PANEL: CPT | Performed by: FAMILY MEDICINE

## 2024-05-27 PROCEDURE — 85025 COMPLETE CBC W/AUTO DIFF WBC: CPT | Performed by: FAMILY MEDICINE

## 2024-05-28 DIAGNOSIS — E55.9 VITAMIN D DEFICIENCY: Primary | ICD-10-CM

## 2024-05-28 RX ORDER — ERGOCALCIFEROL 1.25 MG/1
50000 CAPSULE ORAL
Qty: 12 CAPSULE | Refills: 0 | Status: SHIPPED | OUTPATIENT
Start: 2024-05-28

## 2024-05-30 ENCOUNTER — OFFICE VISIT (OUTPATIENT)
Dept: DIABETES | Facility: CLINIC | Age: 59
End: 2024-05-30
Payer: MEDICARE

## 2024-05-30 VITALS
SYSTOLIC BLOOD PRESSURE: 112 MMHG | WEIGHT: 154.75 LBS | DIASTOLIC BLOOD PRESSURE: 76 MMHG | BODY MASS INDEX: 31.26 KG/M2 | HEART RATE: 86 BPM

## 2024-05-30 DIAGNOSIS — N18.30 TYPE 2 DIABETES MELLITUS WITH STAGE 3 CHRONIC KIDNEY DISEASE, WITHOUT LONG-TERM CURRENT USE OF INSULIN, UNSPECIFIED WHETHER STAGE 3A OR 3B CKD: Primary | ICD-10-CM

## 2024-05-30 DIAGNOSIS — E11.22 TYPE 2 DIABETES MELLITUS WITH STAGE 3 CHRONIC KIDNEY DISEASE, WITHOUT LONG-TERM CURRENT USE OF INSULIN, UNSPECIFIED WHETHER STAGE 3A OR 3B CKD: Primary | ICD-10-CM

## 2024-05-30 DIAGNOSIS — I15.2 HYPERTENSION ASSOCIATED WITH DIABETES: ICD-10-CM

## 2024-05-30 DIAGNOSIS — E11.69 HYPERLIPIDEMIA ASSOCIATED WITH TYPE 2 DIABETES MELLITUS: ICD-10-CM

## 2024-05-30 DIAGNOSIS — E66.9 OBESITY (BMI 30-39.9): ICD-10-CM

## 2024-05-30 DIAGNOSIS — E11.59 HYPERTENSION ASSOCIATED WITH DIABETES: ICD-10-CM

## 2024-05-30 DIAGNOSIS — E03.9 HYPOTHYROIDISM, UNSPECIFIED TYPE: ICD-10-CM

## 2024-05-30 DIAGNOSIS — E78.5 HYPERLIPIDEMIA ASSOCIATED WITH TYPE 2 DIABETES MELLITUS: ICD-10-CM

## 2024-05-30 LAB — GLUCOSE SERPL-MCNC: 219 MG/DL (ref 70–110)

## 2024-05-30 PROCEDURE — 99214 OFFICE O/P EST MOD 30 MIN: CPT | Mod: S$GLB,,, | Performed by: PHYSICIAN ASSISTANT

## 2024-05-30 PROCEDURE — 3066F NEPHROPATHY DOC TX: CPT | Mod: CPTII,S$GLB,, | Performed by: PHYSICIAN ASSISTANT

## 2024-05-30 PROCEDURE — 3061F NEG MICROALBUMINURIA REV: CPT | Mod: CPTII,S$GLB,, | Performed by: PHYSICIAN ASSISTANT

## 2024-05-30 PROCEDURE — 1159F MED LIST DOCD IN RCRD: CPT | Mod: CPTII,S$GLB,, | Performed by: PHYSICIAN ASSISTANT

## 2024-05-30 PROCEDURE — 99999 PR PBB SHADOW E&M-EST. PATIENT-LVL III: CPT | Mod: PBBFAC,,, | Performed by: PHYSICIAN ASSISTANT

## 2024-05-30 PROCEDURE — 4010F ACE/ARB THERAPY RXD/TAKEN: CPT | Mod: CPTII,S$GLB,, | Performed by: PHYSICIAN ASSISTANT

## 2024-05-30 PROCEDURE — 3008F BODY MASS INDEX DOCD: CPT | Mod: CPTII,S$GLB,, | Performed by: PHYSICIAN ASSISTANT

## 2024-05-30 PROCEDURE — 3078F DIAST BP <80 MM HG: CPT | Mod: CPTII,S$GLB,, | Performed by: PHYSICIAN ASSISTANT

## 2024-05-30 PROCEDURE — 82962 GLUCOSE BLOOD TEST: CPT | Mod: S$GLB,,, | Performed by: PHYSICIAN ASSISTANT

## 2024-05-30 PROCEDURE — 3074F SYST BP LT 130 MM HG: CPT | Mod: CPTII,S$GLB,, | Performed by: PHYSICIAN ASSISTANT

## 2024-05-30 PROCEDURE — 3051F HG A1C>EQUAL 7.0%<8.0%: CPT | Mod: CPTII,S$GLB,, | Performed by: PHYSICIAN ASSISTANT

## 2024-05-30 NOTE — PATIENT INSTRUCTIONS
CURRENT DM MEDICATIONS:   Metformin 1000 mg BID  Glipizide 10 mg TID wm   Jardiance 25 mg daily   Trulicity 1.5 mg weekly

## 2024-05-30 NOTE — PROGRESS NOTES
"PCP: Oneyda Bergman MD    Subjective:     Chief Complaint: Diabetes - Established Patient    HISTORY OF PRESENT ILLNESS: 58 y.o.   female presenting for diabetes management visit.   The patient's last visit with me was on 10/12/2023.  English is not patient's first language, however patient declines  and gives consent for daughter to translate.   Patient has had Type II diabetes since 10 or more years.  Pertinent to decision making is the following comorbidities: HTN, HLD, CKD III, Hypothyroidism and Obesity by BMI  Patient has the following Diabetes complications: with diabetic chronic kidney disease  She  has attended diabetes education in the past.     Patient's most recent A1c of 7.7% was completed 3 days ago.   Patient states since Her last A1c Her blood glucose levels have been high  after meals at times.  Patient monitors blood glucose 2 times per day with meter : Fasting and After Dinner.   Patient blood glucose monitoring device will not be uploaded into Media Section today  secondary to unable to upload successfully.   Per meter recall: Fasting blood sugars 80 - 133 and after breakfast today 219.  Patient endorses the following diabetes related symptoms:  Constipation Painful lesion on foot - possible corn . Constipation related to fissure surgery. Improving. Using stool softener and miralax PRN.   Patient is due today for the following diabetes-related health maintenance standards: COVID-19 Vaccine  and Vaccines .   She denies recent hospital visits/ emergency room visit.   She denies having hypoglycemia.  Patient's concerns today include glycemic control.    Patient medication regimen is as below.     CURRENT DM MEDICATIONS:   Metformin 1000 mg BID  Glipizide 10 mg TID wm   Jardiance 25 mg daily   Trulicity 0.75 mg weekly     Patient has failed the following Diabetes medications:   Farxiga      Labs Reviewed.       No results found for: "CPEPTIDE"  No results found for: "GLUTAMICACID"     "   //  Weight: 70.2 kg (154 lb 12.2 oz), Body mass index is 31.26 kg/m².  Her blood sugar in clinic today is:    Lab Results   Component Value Date    POCGLU 219 (A) 05/30/2024       Review of Systems   Constitutional:  Negative for activity change, appetite change, chills and fever.   HENT:  Negative for dental problem, mouth sores, nosebleeds, sore throat and trouble swallowing.    Eyes:  Negative for pain and discharge.   Respiratory:  Negative for shortness of breath, wheezing and stridor.    Cardiovascular:  Negative for chest pain, palpitations and leg swelling.   Gastrointestinal:  Negative for abdominal pain, diarrhea, nausea and vomiting.   Endocrine: Negative for polydipsia, polyphagia and polyuria.   Genitourinary:  Negative for dysuria, frequency and urgency.   Musculoskeletal:  Negative for joint swelling and myalgias.   Skin:  Negative for rash and wound.   Neurological:  Negative for dizziness, syncope, weakness and headaches.   Psychiatric/Behavioral:  Negative for behavioral problems and dysphoric mood.          Diabetes Management Status  Statin: Taking  ACE/ARB: Taking    Screening or Prevention Patient's value Goal Complete/Controlled?   HgA1C Testing and Control   Lab Results   Component Value Date    HGBA1C 7.7 (H) 05/27/2024      Annually/Less than 8% Yes   Lipid profile : 05/27/2024 Annually Yes   LDL control Lab Results   Component Value Date    LDLCALC 44.6 (L) 05/27/2024    Annually/Less than 100 mg/dl  Yes   Nephropathy screening Lab Results   Component Value Date    MICALBCREAT 11.5 05/27/2024     Lab Results   Component Value Date    PROTEINUA Negative 05/27/2024    Annually Yes   Blood pressure BP Readings from Last 1 Encounters:   05/30/24 112/76    Less than 140/90 Yes   Dilated retinal exam : 02/07/2024 Annually Yes    Foot exam   : 10/12/2023 Annually No     ACTIVITY LEVEL: Sedentary. Discussed activities, benefits, methods, and precautions.  MEAL PLANNING: Patient reports number of  meals per day to be 3 and number of snacks per day to be 2.   Patient is encouraged to carb count and consume no more than 30 - 45 grams of carbohydrates in each meal and 15 grams of carbohydrates in each snack.     Social History     Socioeconomic History    Marital status:     Number of children: 3   Tobacco Use    Smoking status: Never    Smokeless tobacco: Never   Substance and Sexual Activity    Alcohol use: No     Alcohol/week: 0.0 standard drinks of alcohol    Drug use: No    Sexual activity: Not Currently     Partners: Male     Birth control/protection: None   Social History Narrative    , 3 kids.     Past Medical History:   Diagnosis Date    Acid reflux     Cataract     Diabetes mellitus, type 2     Glaucoma     HTN (hypertension)     Hyperlipidemia     Osteoarthritis of knee 3/11/2019    Recurrent iritis of left eye 4/13/2016       Objective:        Physical Exam  Constitutional:       General: She is not in acute distress.     Appearance: She is well-developed. She is not diaphoretic.   HENT:      Head: Normocephalic and atraumatic.      Right Ear: External ear normal.      Left Ear: External ear normal.      Nose: Nose normal.   Eyes:      General:         Right eye: No discharge.         Left eye: No discharge.      Pupils: Pupils are equal, round, and reactive to light.   Cardiovascular:      Rate and Rhythm: Normal rate and regular rhythm.      Heart sounds: Normal heart sounds.   Pulmonary:      Effort: Pulmonary effort is normal.      Breath sounds: Normal breath sounds.   Abdominal:      Palpations: Abdomen is soft.   Musculoskeletal:         General: Normal range of motion.      Cervical back: Normal range of motion and neck supple.   Skin:     General: Skin is warm and dry.      Capillary Refill: Capillary refill takes less than 2 seconds.   Neurological:      Mental Status: She is alert and oriented to person, place, and time.      Motor: No abnormal muscle tone.      Coordination:  Coordination normal.   Psychiatric:         Behavior: Behavior normal.         Thought Content: Thought content normal.         Judgment: Judgment normal.           Assessment / Plan:     Type 2 diabetes mellitus with stage 3 chronic kidney disease, without long-term current use of insulin, unspecified whether stage 3a or 3b CKD  -     POCT Glucose, Hand-Held Device  -     Discontinue: dulaglutide (TRULICITY) 1.5 mg/0.5 mL pen injector; Inject 1.5 mg into the skin every 7 days.  Dispense: 4 pen ; Refill: 11  -     dulaglutide (TRULICITY) 1.5 mg/0.5 mL pen injector; Inject 1.5 mg into the skin every 7 days.  Dispense: 12 pen ; Refill: 3    Hypertension associated with diabetes    Hyperlipidemia associated with type 2 diabetes mellitus    Hypothyroidism, unspecified type    Obesity (BMI 30-39.9)      Additional Plan Details:    - POCT Glucose  - Encouraged continuation of lifestyle changes including regular exercise and limiting carbohydrates to 30-45 grams per meal threes times daily and 15 grams per snack with a limit of two daily.   - Encouraged continued monitoring of blood glucose with maintenance of 3 times daily at Fasting, Before Dinner and After Dinner.   - Current DM Medication Regimen: Continue Jardiance 25 mg daily. Continue Metformin. Continue Glipizide 10 mg TID wm. Change Trulicity 1.5 mg weekly. Monitor for improvement on GI symptoms.   - F/u podiatry - possible corn of foot  - Health Maintenance standards addressed today: COVID - 19 Vaccine - patient will schedule outside of [facility]  and Vaccines to be completed  - Nursing Visit: Patient is below goal range for nursing visit for age group and will not need nursing visit at this time .   - Follow up in 6 weeks with A1c prior.     CURRENT DM MEDICATIONS:   Metformin 1000 mg BID  Glipizide 10 mg TID wm   Jardiance 25 mg daily   Trulicity 1.5 mg weekly       Richi Brown PA-C  [facility]    A total of 30 minutes was spent in face to  face time, of which over 50 % was spent in counseling patient on disease process, complications, treatment, and side effects of medications.

## 2024-06-20 ENCOUNTER — PATIENT MESSAGE (OUTPATIENT)
Dept: INTERNAL MEDICINE | Facility: CLINIC | Age: 59
End: 2024-06-20
Payer: MEDICARE

## 2024-06-26 DIAGNOSIS — H40.1133 PRIMARY OPEN ANGLE GLAUCOMA OF BOTH EYES, SEVERE STAGE: ICD-10-CM

## 2024-06-27 RX ORDER — PREDNISOLONE ACETATE 10 MG/ML
1 SUSPENSION/ DROPS OPHTHALMIC 2 TIMES DAILY
Qty: 5 ML | Refills: 3 | Status: SHIPPED | OUTPATIENT
Start: 2024-06-27

## 2024-07-11 ENCOUNTER — OFFICE VISIT (OUTPATIENT)
Dept: DIABETES | Facility: CLINIC | Age: 59
End: 2024-07-11
Payer: MEDICARE

## 2024-07-11 VITALS
BODY MASS INDEX: 31.28 KG/M2 | HEART RATE: 92 BPM | WEIGHT: 155.19 LBS | SYSTOLIC BLOOD PRESSURE: 128 MMHG | DIASTOLIC BLOOD PRESSURE: 79 MMHG | HEIGHT: 59 IN

## 2024-07-11 DIAGNOSIS — I15.2 HYPERTENSION ASSOCIATED WITH DIABETES: ICD-10-CM

## 2024-07-11 DIAGNOSIS — E11.59 HYPERTENSION ASSOCIATED WITH DIABETES: ICD-10-CM

## 2024-07-11 DIAGNOSIS — E78.5 HYPERLIPIDEMIA ASSOCIATED WITH TYPE 2 DIABETES MELLITUS: ICD-10-CM

## 2024-07-11 DIAGNOSIS — E66.9 OBESITY (BMI 30-39.9): ICD-10-CM

## 2024-07-11 DIAGNOSIS — N18.30 TYPE 2 DIABETES MELLITUS WITH STAGE 3 CHRONIC KIDNEY DISEASE, WITHOUT LONG-TERM CURRENT USE OF INSULIN, UNSPECIFIED WHETHER STAGE 3A OR 3B CKD: Primary | ICD-10-CM

## 2024-07-11 DIAGNOSIS — E03.9 HYPOTHYROIDISM, UNSPECIFIED TYPE: ICD-10-CM

## 2024-07-11 DIAGNOSIS — E11.69 HYPERLIPIDEMIA ASSOCIATED WITH TYPE 2 DIABETES MELLITUS: ICD-10-CM

## 2024-07-11 DIAGNOSIS — E11.22 TYPE 2 DIABETES MELLITUS WITH STAGE 3 CHRONIC KIDNEY DISEASE, WITHOUT LONG-TERM CURRENT USE OF INSULIN, UNSPECIFIED WHETHER STAGE 3A OR 3B CKD: Primary | ICD-10-CM

## 2024-07-11 LAB — GLUCOSE SERPL-MCNC: 132 MG/DL (ref 70–110)

## 2024-07-11 PROCEDURE — 82962 GLUCOSE BLOOD TEST: CPT | Mod: S$GLB,,, | Performed by: PHYSICIAN ASSISTANT

## 2024-07-11 PROCEDURE — 3061F NEG MICROALBUMINURIA REV: CPT | Mod: CPTII,S$GLB,, | Performed by: PHYSICIAN ASSISTANT

## 2024-07-11 PROCEDURE — 3051F HG A1C>EQUAL 7.0%<8.0%: CPT | Mod: CPTII,S$GLB,, | Performed by: PHYSICIAN ASSISTANT

## 2024-07-11 PROCEDURE — 4010F ACE/ARB THERAPY RXD/TAKEN: CPT | Mod: CPTII,S$GLB,, | Performed by: PHYSICIAN ASSISTANT

## 2024-07-11 PROCEDURE — 3066F NEPHROPATHY DOC TX: CPT | Mod: CPTII,S$GLB,, | Performed by: PHYSICIAN ASSISTANT

## 2024-07-11 PROCEDURE — 3074F SYST BP LT 130 MM HG: CPT | Mod: CPTII,S$GLB,, | Performed by: PHYSICIAN ASSISTANT

## 2024-07-11 PROCEDURE — 1159F MED LIST DOCD IN RCRD: CPT | Mod: CPTII,S$GLB,, | Performed by: PHYSICIAN ASSISTANT

## 2024-07-11 PROCEDURE — G2211 COMPLEX E/M VISIT ADD ON: HCPCS | Mod: S$GLB,,, | Performed by: PHYSICIAN ASSISTANT

## 2024-07-11 PROCEDURE — 99214 OFFICE O/P EST MOD 30 MIN: CPT | Mod: S$GLB,,, | Performed by: PHYSICIAN ASSISTANT

## 2024-07-11 PROCEDURE — 3078F DIAST BP <80 MM HG: CPT | Mod: CPTII,S$GLB,, | Performed by: PHYSICIAN ASSISTANT

## 2024-07-11 PROCEDURE — 3008F BODY MASS INDEX DOCD: CPT | Mod: CPTII,S$GLB,, | Performed by: PHYSICIAN ASSISTANT

## 2024-07-11 PROCEDURE — 99999 PR PBB SHADOW E&M-EST. PATIENT-LVL V: CPT | Mod: PBBFAC,,, | Performed by: PHYSICIAN ASSISTANT

## 2024-07-11 NOTE — PROGRESS NOTES
"PCP: Oneyda Bergman MD    Subjective:     Chief Complaint: Diabetes - Established Patient    HISTORY OF PRESENT ILLNESS: 59 y.o.   female presenting for diabetes management visit.   The patient's last visit with me was on 5/30/2024.   English is not patient's first language, however patient declines  and gives consent for daughter to translate.   Patient has had Type II diabetes since 10 or more years.  Pertinent to decision making is the following comorbidities: HTN, HLD, CKD III, Hypothyroidism and Obesity by BMI  Patient has the following Diabetes complications: with diabetic chronic kidney disease  She  has attended diabetes education in the past.     Patient's most recent A1c of 7.7% was completed 2 months ago.   Patient states since Her last A1c Her blood glucose levels have been high  after meals at times.  Patient monitors blood glucose 2 times per day with meter : Fasting and After Dinner.   Patient blood glucose monitoring device will not be uploaded into Media Section today  secondary to unable to upload successfully.   Per meter recall: Fasting blood sugars 111 - 150. One episode of hypo before lunch but eating less than usual. Working on getting in something for meals TID with increase satiety.  Patient endorses the following diabetes related symptoms: None.  Patient is due today for the following diabetes-related health maintenance standards: COVID-19 Vaccine  and Vaccines .   She denies recent hospital visits/ emergency room visit.   She denies having hypoglycemia.  Patient's concerns today include glycemic control.    Patient medication regimen is as below.     CURRENT DM MEDICATIONS:   Metformin 1000 mg BID  Glipizide 10 mg TID wm   Jardiance 25 mg daily   Trulicity 1.5 mg weekly     Patient has failed the following Diabetes medications:   Farxiga      Labs Reviewed.       No results found for: "CPEPTIDE"  No results found for: "GLUTAMICACID"       //   , There is no height or weight " on file to calculate BMI.  Her blood sugar in clinic today is:    Lab Results   Component Value Date    POCGLU 219 (A) 05/30/2024       Review of Systems   Constitutional:  Negative for activity change, appetite change, chills and fever.   HENT:  Negative for dental problem, mouth sores, nosebleeds, sore throat and trouble swallowing.    Eyes:  Negative for pain and discharge.   Respiratory:  Negative for shortness of breath, wheezing and stridor.    Cardiovascular:  Negative for chest pain, palpitations and leg swelling.   Gastrointestinal:  Negative for abdominal pain, diarrhea, nausea and vomiting.   Endocrine: Negative for polydipsia, polyphagia and polyuria.   Genitourinary:  Negative for dysuria, frequency and urgency.   Musculoskeletal:  Negative for joint swelling and myalgias.   Skin:  Negative for rash and wound.   Neurological:  Negative for dizziness, syncope, weakness and headaches.   Psychiatric/Behavioral:  Negative for behavioral problems and dysphoric mood.          Diabetes Management Status  Statin: Taking  ACE/ARB: Taking    Screening or Prevention Patient's value Goal Complete/Controlled?   HgA1C Testing and Control   Lab Results   Component Value Date    HGBA1C 7.7 (H) 05/27/2024      Annually/Less than 8% Yes   Lipid profile : 05/27/2024 Annually Yes   LDL control Lab Results   Component Value Date    LDLCALC 44.6 (L) 05/27/2024    Annually/Less than 100 mg/dl  Yes   Nephropathy screening Lab Results   Component Value Date    MICALBCREAT 11.5 05/27/2024     Lab Results   Component Value Date    PROTEINUA Negative 05/27/2024    Annually Yes   Blood pressure BP Readings from Last 1 Encounters:   05/30/24 112/76    Less than 140/90 Yes   Dilated retinal exam : 02/07/2024 Annually Yes    Foot exam   : 10/12/2023 Annually No     ACTIVITY LEVEL: Sedentary. Discussed activities, benefits, methods, and precautions.  MEAL PLANNING: Patient reports number of meals per day to be 3 and number of snacks  per day to be 2.   Patient is encouraged to carb count and consume no more than 30 - 45 grams of carbohydrates in each meal and 15 grams of carbohydrates in each snack.     Social History     Socioeconomic History    Marital status:     Number of children: 3   Tobacco Use    Smoking status: Never    Smokeless tobacco: Never   Substance and Sexual Activity    Alcohol use: No     Alcohol/week: 0.0 standard drinks of alcohol    Drug use: No    Sexual activity: Not Currently     Partners: Male     Birth control/protection: None   Social History Narrative    , 3 kids.     Past Medical History:   Diagnosis Date    Acid reflux     Cataract     Diabetes mellitus, type 2     Glaucoma     HTN (hypertension)     Hyperlipidemia     Osteoarthritis of knee 3/11/2019    Recurrent iritis of left eye 4/13/2016       Objective:        Physical Exam  Constitutional:       General: She is not in acute distress.     Appearance: She is well-developed. She is not diaphoretic.   HENT:      Head: Normocephalic and atraumatic.      Right Ear: External ear normal.      Left Ear: External ear normal.      Nose: Nose normal.   Eyes:      General:         Right eye: No discharge.         Left eye: No discharge.      Pupils: Pupils are equal, round, and reactive to light.   Cardiovascular:      Rate and Rhythm: Normal rate and regular rhythm.      Heart sounds: Normal heart sounds.   Pulmonary:      Effort: Pulmonary effort is normal.      Breath sounds: Normal breath sounds.   Abdominal:      Palpations: Abdomen is soft.   Musculoskeletal:         General: Normal range of motion.      Cervical back: Normal range of motion and neck supple.   Skin:     General: Skin is warm and dry.      Capillary Refill: Capillary refill takes less than 2 seconds.   Neurological:      Mental Status: She is alert and oriented to person, place, and time.      Motor: No abnormal muscle tone.      Coordination: Coordination normal.   Psychiatric:          Behavior: Behavior normal.         Thought Content: Thought content normal.         Judgment: Judgment normal.           Assessment / Plan:     Type 2 diabetes mellitus with stage 3 chronic kidney disease, without long-term current use of insulin, unspecified whether stage 3a or 3b CKD  -     POCT Glucose, Hand-Held Device  -     Hemoglobin A1C; Standing    Hypertension associated with diabetes    Hyperlipidemia associated with type 2 diabetes mellitus    Hypothyroidism, unspecified type    Obesity (BMI 30-39.9)      Additional Plan Details:    - POCT Glucose  - Encouraged continuation of lifestyle changes including regular exercise and limiting carbohydrates to 30-45 grams per meal threes times daily and 15 grams per snack with a limit of two daily.   - Encouraged continued monitoring of blood glucose with maintenance of 3 times daily at Fasting, Before Dinner and After Dinner.   - Current DM Medication Regimen: Continue Jardiance 25 mg daily. Continue Metformin. Continue Glipizide 10 mg TID wm. Continue Trulicity 1.5 mg weekly.   - Health Maintenance standards addressed today: COVID - 19 Vaccine - patient will schedule outside of [facility]  and Vaccines to be completed  - Nursing Visit: Patient is below goal range for nursing visit for age group and will not need nursing visit at this time .   - Follow up in 8 weeks with A1c prior.     CURRENT DM MEDICATIONS:   Metformin 1000 mg BID  Glipizide 10 mg TID wm   Jardiance 25 mg daily   Trulicity 1.5 mg weekly       Blakeney McKnight, PA-C [facility]    A total of 30 minutes was spent in face to face time, of which over 50 % was spent in counseling patient on disease process, complications, treatment, and side effects of medications.

## 2024-07-24 ENCOUNTER — OFFICE VISIT (OUTPATIENT)
Dept: OPHTHALMOLOGY | Facility: CLINIC | Age: 59
End: 2024-07-24
Payer: MEDICARE

## 2024-07-24 ENCOUNTER — PATIENT MESSAGE (OUTPATIENT)
Dept: INTERNAL MEDICINE | Facility: CLINIC | Age: 59
End: 2024-07-24
Payer: MEDICARE

## 2024-07-24 DIAGNOSIS — H20.022 IRITIS, RECURRENT, LEFT: ICD-10-CM

## 2024-07-24 DIAGNOSIS — I15.2 HYPERTENSION ASSOCIATED WITH DIABETES: ICD-10-CM

## 2024-07-24 DIAGNOSIS — K21.9 GASTROESOPHAGEAL REFLUX DISEASE, UNSPECIFIED WHETHER ESOPHAGITIS PRESENT: ICD-10-CM

## 2024-07-24 DIAGNOSIS — H40.1133 PRIMARY OPEN ANGLE GLAUCOMA OF BOTH EYES, SEVERE STAGE: ICD-10-CM

## 2024-07-24 DIAGNOSIS — E55.9 VITAMIN D DEFICIENCY: ICD-10-CM

## 2024-07-24 DIAGNOSIS — H40.1133 PRIMARY OPEN ANGLE GLAUCOMA OF BOTH EYES, SEVERE STAGE: Primary | ICD-10-CM

## 2024-07-24 DIAGNOSIS — E11.59 HYPERTENSION ASSOCIATED WITH DIABETES: ICD-10-CM

## 2024-07-24 PROCEDURE — 4010F ACE/ARB THERAPY RXD/TAKEN: CPT | Mod: CPTII,S$GLB,, | Performed by: OPHTHALMOLOGY

## 2024-07-24 PROCEDURE — 3051F HG A1C>EQUAL 7.0%<8.0%: CPT | Mod: CPTII,S$GLB,, | Performed by: OPHTHALMOLOGY

## 2024-07-24 PROCEDURE — 1159F MED LIST DOCD IN RCRD: CPT | Mod: CPTII,S$GLB,, | Performed by: OPHTHALMOLOGY

## 2024-07-24 PROCEDURE — 3066F NEPHROPATHY DOC TX: CPT | Mod: CPTII,S$GLB,, | Performed by: OPHTHALMOLOGY

## 2024-07-24 PROCEDURE — 99999 PR PBB SHADOW E&M-EST. PATIENT-LVL III: CPT | Mod: PBBFAC,,, | Performed by: OPHTHALMOLOGY

## 2024-07-24 PROCEDURE — 99214 OFFICE O/P EST MOD 30 MIN: CPT | Mod: S$GLB,,, | Performed by: OPHTHALMOLOGY

## 2024-07-24 PROCEDURE — 3061F NEG MICROALBUMINURIA REV: CPT | Mod: CPTII,S$GLB,, | Performed by: OPHTHALMOLOGY

## 2024-07-24 RX ORDER — DIFLUPREDNATE OPHTHALMIC 0.5 MG/ML
1 EMULSION OPHTHALMIC 3 TIMES DAILY
Qty: 15 ML | Refills: 4 | Status: SHIPPED | OUTPATIENT
Start: 2024-07-24 | End: 2024-10-22

## 2024-07-24 RX ORDER — ERGOCALCIFEROL 1.25 MG/1
50000 CAPSULE ORAL
Qty: 12 CAPSULE | Refills: 0 | Status: SHIPPED | OUTPATIENT
Start: 2024-07-24

## 2024-07-24 RX ORDER — LISINOPRIL AND HYDROCHLOROTHIAZIDE 20; 25 MG/1; MG/1
1 TABLET ORAL DAILY
Qty: 90 TABLET | Refills: 2 | Status: SHIPPED | OUTPATIENT
Start: 2024-07-24

## 2024-07-24 RX ORDER — GLIPIZIDE 10 MG/1
TABLET ORAL
Qty: 90 TABLET | Refills: 0 | Status: SHIPPED | OUTPATIENT
Start: 2024-07-24

## 2024-07-24 RX ORDER — LANSOPRAZOLE 30 MG/1
30 CAPSULE, DELAYED RELEASE ORAL DAILY
Qty: 90 CAPSULE | Refills: 2 | Status: SHIPPED | OUTPATIENT
Start: 2024-07-24 | End: 2025-04-20

## 2024-07-24 NOTE — TELEPHONE ENCOUNTER
Care Due:                  Date            Visit Type   Department     Provider  --------------------------------------------------------------------------------                                EP -                              PRIMARY      [facility] INTER[facility]  Oneyda Mainampati  Last Visit: 04-      CARE ([facility])   MEDICINE       Bergman                              EP -                              PRIMARY      [facility]  Oneyda Mainampati  Next Visit: 08-      CARE ([facility])   MEDICINE       Bergman                                                            Last  Test          Frequency    Reason                     Performed    Due Date  --------------------------------------------------------------------------------    Uric Acid...  12 months..  allopurinoL..............  07- 06-    [facility] Embedded Care Due Messages. Reference number: [id number].   7/24/2024 7:51:15 AM CDT

## 2024-07-24 NOTE — PROGRESS NOTES
HPI     Glaucoma            Comments: Pt here for 3m IOP chk. No pain or discomfort. VA stable. 100%   compliant with gtts.           Comments    1. COAG (followed by Dr. Scott)   S/p Revision of Wound Left Eye 8/4/22  Ahmed Valve OS 8/2/18   CP OS 8/27/15 - low to moderate flow, TDW 1100, aggressive dilation with   GV   Gonio Puncture OS 9/25/15   Wound Revision OS 08/04/22  SLT OS 6/23/16, 6/23/15, 6-2-14   Goniotomy OS 11-17-16   2. DM   3. RP / VIT A PALMITATE 1500 (discontinued)   4. Retinitis Pigmentosa of both eyes   5. Dry Eye Syndrome bilateral   6. Iritis recurrent OS       OS: Durezol BID-TID  OS: Dorz/Timolol BID  OS: Prolensa qd - BID  OS: Latanoprost QHS   OS: Brimonidine TID   OD: Pred PRN  OD: Atropine BID             Last edited by Calixto Pascual MA on 7/24/2024  9:58 AM.            Assessment /Plan     For exam results, see Encounter Report.      ICD-10-CM ICD-9-CM    1. Primary open angle glaucoma of both eyes, severe stage  H40.1133 365.11 OS  Doing well - intraocular pressure is within acceptable range relative to target pressure with no evidence of progression.   Continue current treatment.  Reviewed importance of continued compliance with treatment and follow up.       365.73       2. Iritis, recurrent, left  H20.022 364.02 Follow               OS: Durezol BID-TID  OS: Dorz/Timolol BID  OS: Prolensa qd - BID  OS: Latanoprost QHS   OS: Brimonidine TID   OD: Pred PRN  OD: Atropine BID     RETURN TO CLINIC 2 months - try to taper Durezol to as little as possible

## 2024-07-24 NOTE — TELEPHONE ENCOUNTER
Provider Staff:  Action required for this patient    Requires labs      Please see care gap opportunities below in Care Due Message.    Thanks!  [facility]     Appointments      Date Provider   Last Visit   4/12/2024 Oneyda Bergman MD   Next Visit   7/24/2024 Oneyda Bergman MD     Refill Decision Note   Richelle Zaldivar  is requesting a refill authorization.  Brief Assessment and Rationale for Refill:  Approve     Medication Therapy Plan:        Comments:     Note composed:11:18 AM 07/24/2024

## 2024-08-07 ENCOUNTER — HOSPITAL ENCOUNTER (OUTPATIENT)
Dept: RADIOLOGY | Facility: HOSPITAL | Age: 59
Discharge: HOME | End: 2024-08-07
Attending: FAMILY MEDICINE
Payer: MEDICARE

## 2024-08-07 ENCOUNTER — OFFICE VISIT (OUTPATIENT)
Dept: INTERNAL MEDICINE | Facility: CLINIC | Age: 59
End: 2024-08-07
Payer: MEDICARE

## 2024-08-07 VITALS
WEIGHT: 154.13 LBS | RESPIRATION RATE: 18 BRPM | SYSTOLIC BLOOD PRESSURE: 120 MMHG | HEART RATE: 108 BPM | TEMPERATURE: 97 F | HEIGHT: 59 IN | BODY MASS INDEX: 31.07 KG/M2 | OXYGEN SATURATION: 98 % | DIASTOLIC BLOOD PRESSURE: 76 MMHG

## 2024-08-07 DIAGNOSIS — M25.512 CHRONIC PAIN OF BOTH SHOULDERS: ICD-10-CM

## 2024-08-07 DIAGNOSIS — E53.8 B12 DEFICIENCY: ICD-10-CM

## 2024-08-07 DIAGNOSIS — I15.2 HYPERTENSION ASSOCIATED WITH DIABETES: Primary | ICD-10-CM

## 2024-08-07 DIAGNOSIS — E78.5 HYPERLIPIDEMIA ASSOCIATED WITH TYPE 2 DIABETES MELLITUS: ICD-10-CM

## 2024-08-07 DIAGNOSIS — M17.0 PRIMARY OSTEOARTHRITIS OF BOTH KNEES: ICD-10-CM

## 2024-08-07 DIAGNOSIS — E11.59 HYPERTENSION ASSOCIATED WITH DIABETES: Primary | ICD-10-CM

## 2024-08-07 DIAGNOSIS — E66.9 OBESITY (BMI 30.0-34.9): ICD-10-CM

## 2024-08-07 DIAGNOSIS — E03.9 HYPOTHYROIDISM, UNSPECIFIED TYPE: ICD-10-CM

## 2024-08-07 DIAGNOSIS — E11.69 HYPERLIPIDEMIA ASSOCIATED WITH TYPE 2 DIABETES MELLITUS: ICD-10-CM

## 2024-08-07 DIAGNOSIS — E11.22 TYPE 2 DIABETES MELLITUS WITH STAGE 3B CHRONIC KIDNEY DISEASE, WITHOUT LONG-TERM CURRENT USE OF INSULIN: ICD-10-CM

## 2024-08-07 DIAGNOSIS — K21.9 GASTROESOPHAGEAL REFLUX DISEASE, UNSPECIFIED WHETHER ESOPHAGITIS PRESENT: ICD-10-CM

## 2024-08-07 DIAGNOSIS — G89.29 CHRONIC PAIN OF BOTH SHOULDERS: ICD-10-CM

## 2024-08-07 DIAGNOSIS — N18.32 TYPE 2 DIABETES MELLITUS WITH STAGE 3B CHRONIC KIDNEY DISEASE, WITHOUT LONG-TERM CURRENT USE OF INSULIN: ICD-10-CM

## 2024-08-07 DIAGNOSIS — D50.0 IRON DEFICIENCY ANEMIA DUE TO CHRONIC BLOOD LOSS: ICD-10-CM

## 2024-08-07 DIAGNOSIS — M25.511 CHRONIC PAIN OF BOTH SHOULDERS: ICD-10-CM

## 2024-08-07 DIAGNOSIS — E55.9 VITAMIN D DEFICIENCY: ICD-10-CM

## 2024-08-07 PROCEDURE — 99999 PR PBB SHADOW E&M-EST. PATIENT-LVL V: CPT | Mod: PBBFAC,,, | Performed by: FAMILY MEDICINE

## 2024-08-07 PROCEDURE — 73030 X-RAY EXAM OF SHOULDER: CPT | Mod: 26,50,, | Performed by: RADIOLOGY

## 2024-08-07 PROCEDURE — 3074F SYST BP LT 130 MM HG: CPT | Mod: CPTII,S$GLB,, | Performed by: FAMILY MEDICINE

## 2024-08-07 PROCEDURE — 73560 X-RAY EXAM OF KNEE 1 OR 2: CPT | Mod: TC,RT

## 2024-08-07 PROCEDURE — 73562 X-RAY EXAM OF KNEE 3: CPT | Mod: 26,LT,, | Performed by: RADIOLOGY

## 2024-08-07 PROCEDURE — 3061F NEG MICROALBUMINURIA REV: CPT | Mod: CPTII,S$GLB,, | Performed by: FAMILY MEDICINE

## 2024-08-07 PROCEDURE — 3066F NEPHROPATHY DOC TX: CPT | Mod: CPTII,S$GLB,, | Performed by: FAMILY MEDICINE

## 2024-08-07 PROCEDURE — 3008F BODY MASS INDEX DOCD: CPT | Mod: CPTII,S$GLB,, | Performed by: FAMILY MEDICINE

## 2024-08-07 PROCEDURE — 1159F MED LIST DOCD IN RCRD: CPT | Mod: CPTII,S$GLB,, | Performed by: FAMILY MEDICINE

## 2024-08-07 PROCEDURE — 72050 X-RAY EXAM NECK SPINE 4/5VWS: CPT | Mod: 26,,, | Performed by: RADIOLOGY

## 2024-08-07 PROCEDURE — 73030 X-RAY EXAM OF SHOULDER: CPT | Mod: TC,50

## 2024-08-07 PROCEDURE — 72050 X-RAY EXAM NECK SPINE 4/5VWS: CPT | Mod: TC

## 2024-08-07 PROCEDURE — G2211 COMPLEX E/M VISIT ADD ON: HCPCS | Mod: S$GLB,,, | Performed by: FAMILY MEDICINE

## 2024-08-07 PROCEDURE — 3078F DIAST BP <80 MM HG: CPT | Mod: CPTII,S$GLB,, | Performed by: FAMILY MEDICINE

## 2024-08-07 PROCEDURE — 1160F RVW MEDS BY RX/DR IN RCRD: CPT | Mod: CPTII,S$GLB,, | Performed by: FAMILY MEDICINE

## 2024-08-07 PROCEDURE — 99214 OFFICE O/P EST MOD 30 MIN: CPT | Mod: S$GLB,,, | Performed by: FAMILY MEDICINE

## 2024-08-07 PROCEDURE — 3051F HG A1C>EQUAL 7.0%<8.0%: CPT | Mod: CPTII,S$GLB,, | Performed by: FAMILY MEDICINE

## 2024-08-07 PROCEDURE — 4010F ACE/ARB THERAPY RXD/TAKEN: CPT | Mod: CPTII,S$GLB,, | Performed by: FAMILY MEDICINE

## 2024-08-07 RX ORDER — MELOXICAM 15 MG/1
15 TABLET ORAL DAILY PRN
Qty: 30 TABLET | Refills: 1 | Status: SHIPPED | OUTPATIENT
Start: 2024-08-07

## 2024-08-11 ENCOUNTER — PATIENT MESSAGE (OUTPATIENT)
Dept: INTERNAL MEDICINE | Facility: CLINIC | Age: 59
End: 2024-08-11
Payer: MEDICARE

## 2024-08-11 DIAGNOSIS — M47.812 CS (CERVICAL SPONDYLOSIS): ICD-10-CM

## 2024-08-11 DIAGNOSIS — M25.562 LEFT KNEE PAIN, UNSPECIFIED CHRONICITY: Primary | ICD-10-CM

## 2024-08-21 DIAGNOSIS — E55.9 VITAMIN D DEFICIENCY: ICD-10-CM

## 2024-08-22 RX ORDER — GLIPIZIDE 10 MG/1
TABLET ORAL
Qty: 90 TABLET | Refills: 3 | Status: SHIPPED | OUTPATIENT
Start: 2024-08-22

## 2024-08-22 RX ORDER — ERGOCALCIFEROL 1.25 MG/1
50000 CAPSULE ORAL
Qty: 12 CAPSULE | Refills: 0 | Status: SHIPPED | OUTPATIENT
Start: 2024-08-22

## 2024-09-06 ENCOUNTER — TELEPHONE (OUTPATIENT)
Dept: SPORTS MEDICINE | Facility: CLINIC | Age: 59
End: 2024-09-06
Payer: MEDICARE

## 2024-09-10 ENCOUNTER — TELEPHONE (OUTPATIENT)
Dept: SPORTS MEDICINE | Facility: CLINIC | Age: 59
End: 2024-09-10
Payer: MEDICARE

## 2024-09-17 ENCOUNTER — OFFICE VISIT (OUTPATIENT)
Dept: OPHTHALMOLOGY | Facility: CLINIC | Age: 59
End: 2024-09-17
Payer: MEDICARE

## 2024-09-17 DIAGNOSIS — H40.1133 PRIMARY OPEN ANGLE GLAUCOMA OF BOTH EYES, SEVERE STAGE: ICD-10-CM

## 2024-09-17 DIAGNOSIS — H20.022 IRITIS, RECURRENT, LEFT: Primary | ICD-10-CM

## 2024-09-17 PROCEDURE — 1160F RVW MEDS BY RX/DR IN RCRD: CPT | Mod: CPTII,S$GLB,, | Performed by: OPHTHALMOLOGY

## 2024-09-17 PROCEDURE — 3061F NEG MICROALBUMINURIA REV: CPT | Mod: CPTII,S$GLB,, | Performed by: OPHTHALMOLOGY

## 2024-09-17 PROCEDURE — 4010F ACE/ARB THERAPY RXD/TAKEN: CPT | Mod: CPTII,S$GLB,, | Performed by: OPHTHALMOLOGY

## 2024-09-17 PROCEDURE — 3051F HG A1C>EQUAL 7.0%<8.0%: CPT | Mod: CPTII,S$GLB,, | Performed by: OPHTHALMOLOGY

## 2024-09-17 PROCEDURE — 3066F NEPHROPATHY DOC TX: CPT | Mod: CPTII,S$GLB,, | Performed by: OPHTHALMOLOGY

## 2024-09-17 PROCEDURE — 99999 PR PBB SHADOW E&M-EST. PATIENT-LVL III: CPT | Mod: PBBFAC,,, | Performed by: OPHTHALMOLOGY

## 2024-09-17 PROCEDURE — 1159F MED LIST DOCD IN RCRD: CPT | Mod: CPTII,S$GLB,, | Performed by: OPHTHALMOLOGY

## 2024-09-17 PROCEDURE — 99214 OFFICE O/P EST MOD 30 MIN: CPT | Mod: S$GLB,,, | Performed by: OPHTHALMOLOGY

## 2024-09-17 RX ORDER — ERYTHROMYCIN 5 MG/G
OINTMENT OPHTHALMIC DAILY
Qty: 3.5 G | Refills: 1 | Status: SHIPPED | OUTPATIENT
Start: 2024-09-17

## 2024-09-17 NOTE — PROGRESS NOTES
HPI     Eye Problem            Comments: last 10+ days OS has been paining & blurry vision. she has also   been having headaches, running nose & sneezing. Increased Durezol to three   times a day and stated that this did help.   100% compliant with gtts.   pain scale 7/10          Comments    1. COAG (followed by Dr. Scott)   S/p Revision of Wound Left Eye 8/4/22  Ahmed Valve OS 8/2/18   CP OS 8/27/15 - low to moderate flow, TDW 1100, aggressive dilation with   GV   Gonio Puncture OS 9/25/15   Wound Revision OS 08/04/22  SLT OS 6/23/16, 6/23/15, 6-2-14   Goniotomy OS 11-17-16   2. DM   3. RP / VIT A PALMITATE 1500 (discontinued)   4. Retinitis Pigmentosa of both eyes   5. Dry Eye Syndrome bilateral   6. Iritis recurrent OS     OS: Durezol BID-TID  OS: Dorz/Timolol BID  OS: Prolensa qd - BID  OS: Latanoprost QHS   OS: Brimonidine TID   OD: Pred PRN  OD: Atropine BID           Last edited by Robert Scott MD on 9/17/2024  8:09 AM.            Assessment /Plan     For exam results, see Encounter Report.      ICD-10-CM ICD-9-CM    1. Iritis, recurrent, left  H20.022 364.02 Pt complaints not consistent with iritis  R/o itching, sneezing, coughing  Advised she should reach out to PCP for appropriate treatment  Will start emycin PRN for any irritation       2. Primary open angle glaucoma of both eyes, severe stage  H40.1133 365.11 Doing well - intraocular pressure is within acceptable range relative to target pressure with no evidence of progression.   Continue current treatment.  Reviewed importance of continued compliance with treatment and follow up.        365.73           Return to clinic as scheduled           OS: Durezol BID-TID  OS: Dorz/Timolol BID  OS: Prolensa qd - BID  OS: Latanoprost QHS   OS: Brimonidine TID   OD: Pred PRN  OD: Atropine BID

## 2024-09-19 ENCOUNTER — OFFICE VISIT (OUTPATIENT)
Dept: SPORTS MEDICINE | Facility: CLINIC | Age: 59
End: 2024-09-19
Payer: MEDICARE

## 2024-09-19 ENCOUNTER — HOSPITAL ENCOUNTER (OUTPATIENT)
Dept: RADIOLOGY | Facility: HOSPITAL | Age: 59
Discharge: HOME | End: 2024-09-19
Attending: PHYSICIAN ASSISTANT
Payer: MEDICARE

## 2024-09-19 DIAGNOSIS — M25.562 LEFT KNEE PAIN, UNSPECIFIED CHRONICITY: ICD-10-CM

## 2024-09-19 DIAGNOSIS — E11.22 TYPE 2 DIABETES MELLITUS WITH STAGE 3B CHRONIC KIDNEY DISEASE, WITHOUT LONG-TERM CURRENT USE OF INSULIN: ICD-10-CM

## 2024-09-19 DIAGNOSIS — M17.12 PRIMARY OSTEOARTHRITIS OF LEFT KNEE: ICD-10-CM

## 2024-09-19 DIAGNOSIS — Z96.651 HISTORY OF TOTAL RIGHT KNEE REPLACEMENT: ICD-10-CM

## 2024-09-19 DIAGNOSIS — E66.9 OBESITY (BMI 30.0-34.9): ICD-10-CM

## 2024-09-19 DIAGNOSIS — E11.69 HYPERLIPIDEMIA ASSOCIATED WITH TYPE 2 DIABETES MELLITUS: ICD-10-CM

## 2024-09-19 DIAGNOSIS — I15.2 HYPERTENSION ASSOCIATED WITH DIABETES: ICD-10-CM

## 2024-09-19 DIAGNOSIS — E11.59 HYPERTENSION ASSOCIATED WITH DIABETES: ICD-10-CM

## 2024-09-19 DIAGNOSIS — N18.32 TYPE 2 DIABETES MELLITUS WITH STAGE 3B CHRONIC KIDNEY DISEASE, WITHOUT LONG-TERM CURRENT USE OF INSULIN: ICD-10-CM

## 2024-09-19 DIAGNOSIS — E78.5 HYPERLIPIDEMIA ASSOCIATED WITH TYPE 2 DIABETES MELLITUS: ICD-10-CM

## 2024-09-19 DIAGNOSIS — M25.562 LEFT KNEE PAIN, UNSPECIFIED CHRONICITY: Primary | ICD-10-CM

## 2024-09-19 DIAGNOSIS — K21.9 GASTROESOPHAGEAL REFLUX DISEASE, UNSPECIFIED WHETHER ESOPHAGITIS PRESENT: ICD-10-CM

## 2024-09-19 PROCEDURE — 99999 PR PBB SHADOW E&M-EST. PATIENT-LVL IV: CPT | Mod: PBBFAC,,, | Performed by: PHYSICIAN ASSISTANT

## 2024-09-19 PROCEDURE — 73560 X-RAY EXAM OF KNEE 1 OR 2: CPT | Mod: TC,PN,LT

## 2024-09-19 RX ORDER — DICLOFENAC SODIUM 10 MG/G
2 GEL TOPICAL 3 TIMES DAILY
Qty: 100 G | Refills: 1 | Status: SHIPPED | OUTPATIENT
Start: 2024-09-19

## 2024-09-19 NOTE — PATIENT INSTRUCTIONS
Assessment:  Richelle Zaldivar is a  59 y.o. female   homemaker with a chief complaint of Pain of the Left Knee  NP, referred by Dr. Bergman for left knee pain with a hx of right knee TKA  Left knee OA  History of right knee replacement with outside physician Dr. Mehta in 2019    Encounter Diagnoses   Name Primary?    Left knee pain, unspecified chronicity Yes    Primary osteoarthritis of left knee     Hypertension associated with diabetes     Hyperlipidemia associated with type 2 diabetes mellitus     Type 2 diabetes mellitus with stage 3b chronic kidney disease, without long-term current use of insulin     Obesity (BMI 30.0-34.9)     Gastroesophageal reflux disease, unspecified whether esophagitis present     History of total right knee replacement       Plan:  *We discussed CSI, but are avoiding them due to DMII with A1c 7.7*  I had a long discussion with the patient about the causes, treatments, and prognosis for knee arthritis. We discussed that although there is not a cure for arthritis, there are effective ways to improve symptoms. I recommended low impact activities such as elliptical and bicycle. I talked about the fact that aquatic and pool therapy is often helpful. I advised the patient to consider good quality stability and cushioned shoes. We talked about the importance of knee motion in the health of the knee. The patient can also use a compression knee sleeve to help limit swelling and provide proprioceptive feedback. We recommend formal physical therapy or at minimum a home exercise program, which we discussed with the patient. I advised that over the counter solutions such as glucosamine and chondroitin may help with symptoms.    Continue mobic, avoid any other oral anti-inflammatories  Can take Tylenol   Volatren gel  Visco left knee in 4 weeks   Knee brace  Home exercise program        STRETCHING EXERCISES            STRENGTHENING EXERCISES                  If you have any difficulties reading this  information, you may visit the online version using the following link: Knee Conditioning Program (https://orthoinfo.aaos.org/globalassets/pdfs/2017-rehab_knee.pdf)        Follow-up: 4 weeks or sooner if there are any problems between now and then.    Leave Review:   Google: Leave Google Review  Healthgrades: Leave Healthgrades Review    After Hours Number: [phone]

## 2024-09-19 NOTE — PROGRESS NOTES
Patient ID: Richelle Zaldivar  YOB: 1965  MRN: [id number]    Chief Complaint: Pain of the Left Knee    Referred By: Dr. 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 female presents today for evaluation and management of bilateral knee pain.  She also has a past medical history diabetes type 2 with stage 3 chronic kidney disease, hypertension, last A1c was on 05/27/2024 which was 7.7.  Patient has a past medical history of a right knee replacement that was done with Dr. Mehta in 2019.  Patient presents today with complaints of left knee pain states that most the pain is on the inside of her left knee at times she can feel it radiate up her leg and down her leg.  States that the pain is worse when standing for prolonged periods of time.  States that she has been using Mobic as needed.  At time she notices swelling in her knee after standing for prolonged periods of time.  She denies any fevers, chills, night sweats, numbness and tingling.    Pain  Associated symptoms include joint swelling. Pertinent negatives include no abdominal pain, chest pain, chills, coughing, fever, nausea, numbness, rash, sore throat or vomiting.       Past Medical History:   Past Medical History:   Diagnosis Date    Acid reflux     Cataract     Diabetes mellitus, type 2     Glaucoma     HTN (hypertension)     Hyperlipidemia     Osteoarthritis of knee 3/11/2019    Recurrent iritis of left eye 4/13/2016     Past Surgical History:   Procedure Laterality Date    CATARACT EXTRACTION      COLONOSCOPY N/A 10/18/2019    Procedure: COLONOSCOPY;  Surgeon: Aster Krause MD;  Location: [facility];  Service: Endoscopy;  Laterality: N/A;    ESOPHAGOGASTRODUODENOSCOPY N/A 10/18/2019    Procedure: ESOPHAGOGASTRODUODENOSCOPY (EGD);  Surgeon: Aster Krause MD;  Location: [facility];  Service: Endoscopy;   Laterality: N/A;    GLAUCOMA SURGERY Left 08/02/2018    Ahmed    GONIOTOMY Left 11/17/2016    HEMORRHOID SURGERY  4/2015    Done in [place]    PARTIAL HYSTERECTOMY      TONSILLECTOMY      WISDOM TOOTH EXTRACTION       Family History   Problem Relation Name Age of Onset    Diabetes Mother      Hypertension Mother      Hyperlipidemia Mother      Stroke Mother      Amblyopia Mother      Diabetes Sister      Hypertension Sister      Hyperlipidemia Sister      Arthritis Sister      Amblyopia Brother      Amblyopia Maternal Uncle      Blindness Neg Hx      Cancer Neg Hx      Cataracts Neg Hx      Glaucoma Neg Hx      Macular degeneration Neg Hx      Retinal detachment Neg Hx      Strabismus Neg Hx      Thyroid disease Neg Hx       Social History     Socioeconomic History    Marital status:     Number of children: 3   Tobacco Use    Smoking status: Never     Passive exposure: Never    Smokeless tobacco: Never   Substance and Sexual Activity    Alcohol use: No     Alcohol/week: 0.0 standard drinks of alcohol    Drug use: No    Sexual activity: Not Currently     Partners: Male     Birth control/protection: None   Social History Narrative    , 3 kids.     Medication List with Changes/Refills   New Medications    DICLOFENAC SODIUM (VOLTAREN) 1 % GEL    Apply 2 g topically 3 (three) times daily.   Current Medications    ALLOPURINOL (ZYLOPRIM) 100 MG TABLET    Take 2 tablets (200 mg total) by mouth once daily.    AMLODIPINE (NORVASC) 10 MG TABLET    Take 1 tablet orally once a day.    ATROPINE 1% (ISOPTO ATROPINE) 1 % DROP    Place 1 drop into the right eye 2 (two) times a day.    BLOOD SUGAR DIAGNOSTIC STRP    To check blood glucose 2 times daily    BLOOD-GLUCOSE METER KIT    To check BG 2 times daily, to use with insurance preferred meter    BRIMONIDINE 0.1% (ALPHAGAN P) 0.1 % DROP    Place 1 drop into both eyes 3 (three) times daily.    BRIMONIDINE 0.1% (ALPHAGAN P) 0.1 % DROP    Place 1 drop into both eyes 3  (three) times daily.    BROMFENAC (PROLENSA) 0.07 % DROP    Place 1 drop into both eyes once daily.    DIFLUPREDNATE (DUREZOL) 0.05 % DROP OPHTHALMIC SOLUTION    Place 1 drop into both eyes 3 (three) times daily    DORZOLAMIDE-TIMOLOL 2-0.5% (COSOPT) 22.3-6.8 MG/ML OPHTHALMIC SOLUTION    Place 1 drop into the left eye 2 (two) times daily.    DULAGLUTIDE (TRULICITY) 1.5 MG/0.5 ML PEN INJECTOR    Inject 1.5 mg (one pen) into the skin every 7 days.    EMPAGLIFLOZIN (JARDIANCE) 25 MG TABLET    Take 1 tablet (25 mg total) by mouth once daily.    ERGOCALCIFEROL (VITAMIN D2) 50,000 UNIT CAP    Take 1 capsule (50,000 Units total) by mouth every 7 days.    ERYTHROMYCIN (ROMYCIN) OPHTHALMIC OINTMENT    Place into both eyes once daily.    ESTRADIOL (ESTRACE) 0.01 % (0.1 MG/GRAM) VAGINAL CREAM    Place ¼ applicatorful intravaginally nightly for 1 to 2 weeks, THEN decrease to 3 to 4 times per week.    EZETIMIBE (ZETIA) 10 MG TABLET    Take 1 tablet (10 mg total) by mouth every evening.    FENOFIBRATE MICRONIZED (LOFIBRA) 134 MG CAP    Take 1 capsule (134 mg total) by mouth daily with breakfast.    GABAPENTIN (NEURONTIN) 300 MG CAPSULE    Take 1 capsule (300 mg total) by mouth 2 (two) times daily.    GLIPIZIDE (GLUCOTROL) 10 MG TABLET    Take 1 tablet by mouth three times daily    LANCETS MISC    To check BG 2 times daily, to use with insurance preferred meter    LANSOPRAZOLE (PREVACID) 30 MG CAPSULE    Take 1 capsule (30 mg total) by mouth once daily    LATANOPROST (XALATAN) 0.005 % OPHTHALMIC SOLUTION    Place 1 drop into the left eye once daily.    LEVOTHYROXINE (SYNTHROID) 25 MCG TABLET    Take 1 tablet (25 mcg total) by mouth once daily.    LISINOPRIL-HYDROCHLOROTHIAZIDE (PRINZIDE,ZESTORETIC) 20-25 MG TAB    Take 1 tablet by mouth once daily    MELOXICAM (MOBIC) 15 MG TABLET    Take 1 tablet (15 mg total) by mouth daily as needed for Pain.    METFORMIN (GLUCOPHAGE) 1000 MG TABLET    Take 1 tablet (1,000 mg total) by mouth 2  (two) times daily with meals.    ONETOUCH ULTRA2 METER [facility]    CHECK BLOOD SUGAR TWICE DAILY    OPW TEST CLAIM - DO NOT FILL    Inject 0.5 tablets into the muscle. OPW test claim. Do not fill.    PREDNISOLONE ACETATE (PRED FORTE) 1 % DRPS    Place 1 drop into the right eye 2 (two) times daily.    ROSUVASTATIN (CRESTOR) 40 MG TAB    Take 1 tablet (40 mg total) by mouth every evening.    TRAMADOL (ULTRAM) 50 MG TABLET    Take 1 tablet (50 mg total) by mouth every 12 (twelve) hours as needed for Pain.    UREA (CARMOL) 40 % CREA    Apply topically 2 (two) times daily. Apply to thickened areas of skin     Review of patient's allergies indicates:  No Known Allergies  Review of Systems   Constitutional: Negative for chills and fever.   HENT:  Negative for sore throat.    Eyes:  Negative for pain.   Cardiovascular:  Negative for chest pain and leg swelling.   Respiratory:  Negative for cough and shortness of breath.    Skin:  Negative for itching and rash.   Musculoskeletal:  Positive for joint pain and joint swelling.   Gastrointestinal:  Negative for abdominal pain, nausea and vomiting.   Genitourinary:  Negative for dysuria.   Neurological:  Negative for dizziness, numbness and paresthesias.       Physical Exam:   There is no height or weight on file to calculate BMI.  There were no vitals filed for this visit.   GENERAL: Well appearing, appropriate for stated age, no acute distress.  CARDIOVASCULAR: Pulses regular by peripheral palpation.  PULMONARY: Respirations are even and non-labored.  NEURO: Awake, alert, and oriented x 3.  PSYCH: Mood & affect are appropriate.  HEENT: Head is normocephalic and atraumatic.  General    Nursing note and vitals reviewed.          Right Knee Exam   Right knee exam is normal.    Inspection   Scars: present (Postsurgical changes)  Effusion: absent    Tenderness   The patient is experiencing no tenderness.     Range of Motion   Extension:  0   Flexion:  120     Tests   Ligament  Examination   Lachman: normal (-1 to 2mm)   PCL-Posterior Drawer: normal (0 to 2mm)     MCL - Valgus: normal (0 to 2mm)  LCL - Varus: normal    Other   Sensation: normal    Left Knee Exam     Inspection   Effusion: present    Tenderness   The patient tender to palpation of the medial joint line.    Range of Motion   Extension:  0   Flexion:  120     Tests   Stability   Lachman: normal (-1 to 2mm)   PCL-Posterior Drawer: normal (0 to 2mm)  MCL - Valgus: abnormal  LCL - Varus: normal (0 to 2mm)    Other   Sensation: normal    Muscle Strength   Right Lower Extremity   Hip Abduction: 5/5   Quadriceps:  4/5   Hamstrin/5   Left Lower Extremity   Hip Abduction: 5/5   Quadriceps:  4/5   Hamstrin/5     Vascular Exam     Right Pulses  Dorsalis Pedis:      2+  Posterior Tibial:      2+        Left Pulses  Dorsalis Pedis:      2+  Posterior Tibial:      2+        All compartments are soft and compressible. Calf soft non-tender. Intact EHL, FHL, gastroc soleus, and tibialis anterior. Sensation intact to light touch in superficial peroneal, deep peroneal, tibial, sural, and saphenous nerve distributions. Foot warm and well perfused with capillary refill of less than 2 seconds and palpable pedal pulses.     Imaging:  X-ray Knee Ortho Left  Order: [id number]  Status: Final result       Visible to patient: Yes (seen)       Next appt: Today at 11:30 AM in Lab (LABORATORY, [facility])       Dx: Primary osteoarthritis of both knees    0 Result Notes       1 Patient Communication  Details    Reading Physician Reading Date Result Priority   Medardo Coleman MD  [phone] 2024      Narrative & Impression  EXAM:  XR KNEE ORTHO LEFT     INDICATIONS:  Osteoarthritis     TECHNIQUE:  3 views of the left knee     COMPARISONS:  None available.     FINDINGS:  No fractures, no alignment abnormality, no joint effusion and no foreign bodies.  Tricompartmental osteophytes with medial joint space loss is present.  Calcifications laterally  are suggestive of old lateral collateral ligamentous injury.        Impression:    Tricompartmental osteoarthritis with early medial joint space loss and findings suggestive of old LCL injury     Finalized on: 8/7/2024 10:26 AM By:  Medardo Coleman MD  [facility]# [id number]      2024-08-07 10:28:28.748    [facility]   Review of patient's x-rays today show that she has tricompartmental osteoarthritis worse in the medial compartment space on the left knee.       Relevant imaging results reviewed and interpreted by me, discussed with the patient and / or family today.     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 Bergman for left knee pain with a hx of right knee TKA  Left knee OA  History of right knee replacement with outside physician Dr. Mehta in 2019    Encounter Diagnoses   Name Primary?    Left knee pain, unspecified chronicity Yes    Primary osteoarthritis of left knee     Hypertension associated with diabetes     Hyperlipidemia associated with type 2 diabetes mellitus     Type 2 diabetes mellitus with stage 3b chronic kidney disease, without long-term current use of insulin     Obesity (BMI 30.0-34.9)     Gastroesophageal reflux disease, unspecified whether esophagitis present     History of total right knee replacement       Plan:  *We discussed CSI, but are avoiding them due to DMII with A1c 7.7*  I had a long discussion with the patient about the causes, treatments, and prognosis for knee arthritis. We discussed that although there is not a cure for arthritis, there are effective ways to improve symptoms. I recommended low impact activities such as elliptical and bicycle. I talked about the fact that aquatic and pool therapy is often helpful. I advised the patient to consider good quality stability and cushioned shoes. We talked about the importance of knee motion in the health of the knee. The  patient can also use a compression knee sleeve to help limit swelling and provide proprioceptive feedback. We recommend formal physical therapy or at minimum a home exercise program, which we discussed with the patient. I advised that over the counter solutions such as glucosamine and chondroitin may help with symptoms.    Continue mobic, avoid any other oral anti-inflammatories  Can take Tylenol   Volatren gel  Visco left knee in 4 weeks   Knee brace  Home exercise program        STRETCHING EXERCISES            STRENGTHENING EXERCISES                  If you have any difficulties reading this information, you may visit the online version using the following link: Knee Conditioning Program (https://orthoinfo.aaos.org/globalassets/pdfs/2017-rehab_knee.pdf)        Follow-up: 4 weeks or sooner if there are any problems between now and then.    Leave Review:   Google: Leave Google Review  Healthgrades: Leave Healthgrades Review    After Hours Number: [phone]      Provider Note/Medical Decision Making:   Under my direction and guidance, 10 minutes were spent sizing, fitting, and educating regarding durable medical equipment by a brace specialist. CPT 36922  At least 15 minutes were spent developing, teaching, and performing a home exercise program.  A written summary was provided and all questions were answered. CPT 76877-ZX    Today's visit is associated with current or anticipated ongoing medical care related to this patient's diagnosis of osteoarthritis. Currently there is no cure of osteoarthritis and the patient will require regular follow up to manage symptoms and progression. The patient is to return to the office within a minimum of 3-6 months to review symptoms and function at that time. CPT code      I discussed worrisome and red flag signs and symptoms with the patient. The patient expressed understanding and agreed to alert me immediately or to go to the emergency room if they experience any of  these.   Treatment plan was developed with input from the patient/family, and they expressed understanding and agreement with the plan. All questions were answered today.      Laya Kam PA-C  Sports Medicine Physician Assistant       Disclaimer: This note was prepared using a voice recognition system and is likely to have sound alike errors within the text.

## 2024-09-20 DIAGNOSIS — E53.8 B12 DEFICIENCY: Primary | ICD-10-CM

## 2024-09-20 RX ORDER — CYANOCOBALAMIN 1000 UG/ML
1000 INJECTION, SOLUTION INTRAMUSCULAR; SUBCUTANEOUS
Status: SHIPPED | OUTPATIENT
Start: 2024-09-20 | End: 2025-03-19

## 2024-09-25 ENCOUNTER — OFFICE VISIT (OUTPATIENT)
Dept: DIABETES | Facility: CLINIC | Age: 59
End: 2024-09-25
Payer: MEDICARE

## 2024-09-25 VITALS
HEIGHT: 59 IN | DIASTOLIC BLOOD PRESSURE: 74 MMHG | SYSTOLIC BLOOD PRESSURE: 125 MMHG | HEART RATE: 94 BPM | WEIGHT: 153.44 LBS | BODY MASS INDEX: 30.93 KG/M2

## 2024-09-25 DIAGNOSIS — E66.9 OBESITY (BMI 30-39.9): ICD-10-CM

## 2024-09-25 DIAGNOSIS — N18.30 TYPE 2 DIABETES MELLITUS WITH STAGE 3 CHRONIC KIDNEY DISEASE, WITHOUT LONG-TERM CURRENT USE OF INSULIN, UNSPECIFIED WHETHER STAGE 3A OR 3B CKD: Primary | ICD-10-CM

## 2024-09-25 DIAGNOSIS — L29.9 ITCHING: ICD-10-CM

## 2024-09-25 DIAGNOSIS — E78.5 HYPERLIPIDEMIA ASSOCIATED WITH TYPE 2 DIABETES MELLITUS: ICD-10-CM

## 2024-09-25 DIAGNOSIS — E03.9 HYPOTHYROIDISM, UNSPECIFIED TYPE: ICD-10-CM

## 2024-09-25 DIAGNOSIS — E11.22 TYPE 2 DIABETES MELLITUS WITH STAGE 3 CHRONIC KIDNEY DISEASE, WITHOUT LONG-TERM CURRENT USE OF INSULIN, UNSPECIFIED WHETHER STAGE 3A OR 3B CKD: Primary | ICD-10-CM

## 2024-09-25 DIAGNOSIS — E11.69 HYPERLIPIDEMIA ASSOCIATED WITH TYPE 2 DIABETES MELLITUS: ICD-10-CM

## 2024-09-25 DIAGNOSIS — E11.59 HYPERTENSION ASSOCIATED WITH DIABETES: ICD-10-CM

## 2024-09-25 DIAGNOSIS — I15.2 HYPERTENSION ASSOCIATED WITH DIABETES: ICD-10-CM

## 2024-09-25 LAB — GLUCOSE SERPL-MCNC: 233 MG/DL (ref 70–110)

## 2024-09-25 PROCEDURE — 3078F DIAST BP <80 MM HG: CPT | Mod: CPTII,S$GLB,, | Performed by: PHYSICIAN ASSISTANT

## 2024-09-25 PROCEDURE — 3051F HG A1C>EQUAL 7.0%<8.0%: CPT | Mod: CPTII,S$GLB,, | Performed by: PHYSICIAN ASSISTANT

## 2024-09-25 PROCEDURE — 3061F NEG MICROALBUMINURIA REV: CPT | Mod: CPTII,S$GLB,, | Performed by: PHYSICIAN ASSISTANT

## 2024-09-25 PROCEDURE — 1159F MED LIST DOCD IN RCRD: CPT | Mod: CPTII,S$GLB,, | Performed by: PHYSICIAN ASSISTANT

## 2024-09-25 PROCEDURE — G2211 COMPLEX E/M VISIT ADD ON: HCPCS | Mod: S$GLB,,, | Performed by: PHYSICIAN ASSISTANT

## 2024-09-25 PROCEDURE — 3008F BODY MASS INDEX DOCD: CPT | Mod: CPTII,S$GLB,, | Performed by: PHYSICIAN ASSISTANT

## 2024-09-25 PROCEDURE — 3074F SYST BP LT 130 MM HG: CPT | Mod: CPTII,S$GLB,, | Performed by: PHYSICIAN ASSISTANT

## 2024-09-25 PROCEDURE — 99999 PR PBB SHADOW E&M-EST. PATIENT-LVL IV: CPT | Mod: PBBFAC,,, | Performed by: PHYSICIAN ASSISTANT

## 2024-09-25 PROCEDURE — 99214 OFFICE O/P EST MOD 30 MIN: CPT | Mod: S$GLB,,, | Performed by: PHYSICIAN ASSISTANT

## 2024-09-25 PROCEDURE — 4010F ACE/ARB THERAPY RXD/TAKEN: CPT | Mod: CPTII,S$GLB,, | Performed by: PHYSICIAN ASSISTANT

## 2024-09-25 PROCEDURE — 3066F NEPHROPATHY DOC TX: CPT | Mod: CPTII,S$GLB,, | Performed by: PHYSICIAN ASSISTANT

## 2024-09-25 PROCEDURE — 82962 GLUCOSE BLOOD TEST: CPT | Mod: S$GLB,,, | Performed by: PHYSICIAN ASSISTANT

## 2024-09-25 NOTE — PROGRESS NOTES
PCP: Oneyda Bergman MD    Subjective:     Chief Complaint: Diabetes - Established Patient    HISTORY OF PRESENT ILLNESS: 59 y.o.   female presenting for diabetes management visit.   The patient's last visit with me was on 5/30/2024.   English is not patient's first language, however patient declines  and gives consent for daughter to translate.   Patient has had Type II diabetes since 10 or more years.  Pertinent to decision making is the following comorbidities: HTN, HLD, CKD III, Hypothyroidism and Obesity by BMI  Patient has the following Diabetes complications: with diabetic chronic kidney disease  She  has attended diabetes education in the past.     Patient's most recent A1c of 7.7% was completed 2 months ago.   Patient states since Her last A1c Her blood glucose levels have been high  after meals at times.  Patient monitors blood glucose 2 times per day with meter : Fasting and After Dinner.   Patient blood glucose monitoring device will not be uploaded into Media Section today  secondary to unable to upload successfully.   Per meter recall: Fasting blood sugars 103 - 130 and in evening 140 - 160s. Bg in clinic 233 after bfast - on review took glipizide 3 hours ago and not before bfast.   Patient endorses the following diabetes related symptoms:  Itching of the breast . Some lesions related to scratching. No rash.   Patient is due today for the following diabetes-related health maintenance standards: Foot Exam , Influenza Vaccine, COVID-19 Vaccine , and Vaccines .   She denies recent hospital visits/ emergency room visit.   She denies having hypoglycemia.  Patient's concerns today include glycemic control.    Patient medication regimen is as below.       CURRENT DM MEDICATIONS:   Metformin 1000 mg BID  Glipizide 10 mg TID wm - taking in am and not 15 mins before meal  Jardiance 25 mg daily   Trulicity 1.5 mg weekly      Patient has failed the following Diabetes medications:   Farxiga      Labs  "Reviewed.       No results found for: "CPEPTIDE"  No results found for: "GLUTAMICACID"    Height: 4' 11" (149.9 cm)  //  Weight: 69.6 kg (153 lb 7 oz), Body mass index is 30.99 kg/m².  Her blood sugar in clinic today is:    Lab Results   Component Value Date    POCGLU 233 (A) 09/25/2024       Review of Systems   Constitutional:  Negative for activity change, appetite change, chills and fever.   HENT:  Negative for dental problem, mouth sores, nosebleeds, sore throat and trouble swallowing.    Eyes:  Negative for pain and discharge.   Respiratory:  Negative for shortness of breath, wheezing and stridor.    Cardiovascular:  Negative for chest pain, palpitations and leg swelling.   Gastrointestinal:  Negative for abdominal pain, diarrhea, nausea and vomiting.   Endocrine: Negative for polydipsia, polyphagia and polyuria.   Genitourinary:  Negative for dysuria, frequency and urgency.   Musculoskeletal:  Negative for joint swelling and myalgias.   Skin:  Negative for rash and wound.   Neurological:  Negative for dizziness, syncope, weakness and headaches.   Psychiatric/Behavioral:  Negative for behavioral problems and dysphoric mood.          Diabetes Management Status  Statin: Taking  ACE/ARB: Taking    Screening or Prevention Patient's value Goal Complete/Controlled?   HgA1C Testing and Control   Lab Results   Component Value Date    HGBA1C 7.2 (H) 09/19/2024      Annually/Less than 8% Yes   Lipid profile : 05/27/2024 Annually Yes   LDL control Lab Results   Component Value Date    LDLCALC 44.6 (L) 05/27/2024    Annually/Less than 100 mg/dl  Yes   Nephropathy screening Lab Results   Component Value Date    MICALBCREAT 11.5 05/27/2024     Lab Results   Component Value Date    PROTEINUA Negative 05/27/2024    Annually Yes   Blood pressure BP Readings from Last 1 Encounters:   09/25/24 125/74    Less than 140/90 Yes   Dilated retinal exam : 02/07/2024 Annually Yes    Foot exam   : 10/12/2023 Annually No     ACTIVITY " LEVEL: Sedentary. Discussed activities, benefits, methods, and precautions.  MEAL PLANNING: Patient reports number of meals per day to be 3 and number of snacks per day to be 2.   Patient is encouraged to carb count and consume no more than 30 - 45 grams of carbohydrates in each meal and 15 grams of carbohydrates in each snack.     Social History     Socioeconomic History    Marital status:     Number of children: 3   Tobacco Use    Smoking status: Never     Passive exposure: Never    Smokeless tobacco: Never   Substance and Sexual Activity    Alcohol use: No     Alcohol/week: 0.0 standard drinks of alcohol    Drug use: No    Sexual activity: Not Currently     Partners: Male     Birth control/protection: None   Social History Narrative    , 3 kids.     Past Medical History:   Diagnosis Date    Acid reflux     Cataract     Diabetes mellitus, type 2     Glaucoma     HTN (hypertension)     Hyperlipidemia     Osteoarthritis of knee 3/11/2019    Recurrent iritis of left eye 4/13/2016       Objective:        Physical Exam  Constitutional:       General: She is not in acute distress.     Appearance: She is well-developed. She is not diaphoretic.   HENT:      Head: Normocephalic and atraumatic.      Right Ear: External ear normal.      Left Ear: External ear normal.      Nose: Nose normal.   Eyes:      General:         Right eye: No discharge.         Left eye: No discharge.      Pupils: Pupils are equal, round, and reactive to light.   Cardiovascular:      Rate and Rhythm: Normal rate and regular rhythm.      Pulses:           Dorsalis pedis pulses are 2+ on the right side and 2+ on the left side.        Posterior tibial pulses are 2+ on the right side and 2+ on the left side.      Heart sounds: Normal heart sounds.   Pulmonary:      Effort: Pulmonary effort is normal.      Breath sounds: Normal breath sounds.   Chest:          Comments: Healing lesion present to L breast; no erythema or swelling  Healing  lesion present to R breast; no erythema or swelling  Abdominal:      Palpations: Abdomen is soft.   Musculoskeletal:         General: Normal range of motion.      Cervical back: Normal range of motion and neck supple.      Right foot: Normal range of motion. No deformity.      Left foot: Normal range of motion. No deformity.   Feet:      Right foot:      Protective Sensation: 6 sites tested.  6 sites sensed.      Skin integrity: No ulcer, blister, skin breakdown, erythema, warmth, callus or dry skin.      Left foot:      Protective Sensation: 6 sites tested.  6 sites sensed.      Skin integrity: No ulcer, blister, skin breakdown, erythema, warmth, callus or dry skin.   Skin:     General: Skin is warm and dry.      Capillary Refill: Capillary refill takes less than 2 seconds.   Neurological:      Mental Status: She is alert and oriented to person, place, and time.      Motor: No abnormal muscle tone.      Coordination: Coordination normal.   Psychiatric:         Behavior: Behavior normal.         Thought Content: Thought content normal.         Judgment: Judgment normal.           Assessment / Plan:     Type 2 diabetes mellitus with stage 3 chronic kidney disease, without long-term current use of insulin, unspecified whether stage 3a or 3b CKD  -     POCT Glucose, Hand-Held Device  -     Hemoglobin A1C; Standing    Hypertension associated with diabetes    Hyperlipidemia associated with type 2 diabetes mellitus    Hypothyroidism, unspecified type    Obesity (BMI 30-39.9)    Itching  -     Ambulatory referral/consult to Dermatology; Future; Expected date: 10/02/2024      Additional Plan Details:    - POCT Glucose  - Encouraged continuation of lifestyle changes including regular exercise and limiting carbohydrates to 30-45 grams per meal threes times daily and 15 grams per snack with a limit of two daily.   - Encouraged continued monitoring of blood glucose with maintenance of 3 times daily at Fasting, Before Dinner and  After Dinner.   - Current DM Medication Regimen: Continue Jardiance 25 mg daily. Continue Metformin. Change Glipizide 10 mg TID wm to 15 mins prior to meal. Continue Trulicity 1.5 mg weekly.   - Referral to Derm - itching of breast; lesions related to scratching  - Health Maintenance standards addressed today: Foot Exam - completed today and documented in physical exam with feedback to patient about proper diabetic foot care and findings, Flu Shot - patient would like to complete outside of [facility], COVID - 19 Vaccine - patient will schedule outside of [facility] , and Vaccines to be completed  - Nursing Visit: Patient is below goal range for nursing visit for age group and will not need nursing visit at this time .   - Follow up in 12 weeks with A1c prior.     CURRENT DM MEDICATIONS:   Metformin 1000 mg BID  Glipizide 10 mg TID wm   Jardiance 25 mg daily   Trulicity 1.5 mg weekly       Blakeney McKnight, PA-C [facility]    A total of 30 minutes was spent in face to face time, of which over 50 % was spent in counseling patient on disease process, complications, treatment, and side effects of medications.

## 2024-10-07 ENCOUNTER — PATIENT MESSAGE (OUTPATIENT)
Dept: OPHTHALMOLOGY | Facility: CLINIC | Age: 59
End: 2024-10-07
Payer: MEDICARE

## 2024-10-07 DIAGNOSIS — E55.9 VITAMIN D DEFICIENCY: ICD-10-CM

## 2024-10-07 DIAGNOSIS — H40.1133 PRIMARY OPEN ANGLE GLAUCOMA OF BOTH EYES, SEVERE STAGE: ICD-10-CM

## 2024-10-08 RX ORDER — ATROPINE SULFATE 10 MG/ML
1 SOLUTION/ DROPS OPHTHALMIC 2 TIMES DAILY
Qty: 5 ML | Refills: 6 | Status: SHIPPED | OUTPATIENT
Start: 2024-10-08

## 2024-10-08 RX ORDER — ERGOCALCIFEROL 1.25 MG/1
50000 CAPSULE ORAL
Qty: 12 CAPSULE | Refills: 0 | Status: SHIPPED | OUTPATIENT
Start: 2024-10-08

## 2024-10-08 NOTE — TELEPHONE ENCOUNTER
Forward the message already to [facility] scheduling .    ----- Message from Majo sent at 10/8/2024  9:57 AM CDT -----  Pt would like to speak with nurse about getting reschedule she states she would like to be worked in the first week on November pt sent message to portal please advise  [phone]

## 2024-10-09 ENCOUNTER — PATIENT MESSAGE (OUTPATIENT)
Dept: OPHTHALMOLOGY | Facility: CLINIC | Age: 59
End: 2024-10-09
Payer: MEDICARE

## 2024-10-15 ENCOUNTER — OFFICE VISIT (OUTPATIENT)
Dept: INTERNAL MEDICINE | Facility: CLINIC | Age: 59
End: 2024-10-15
Payer: MEDICARE

## 2024-10-15 VITALS
WEIGHT: 147.5 LBS | BODY MASS INDEX: 29.79 KG/M2 | TEMPERATURE: 98 F | SYSTOLIC BLOOD PRESSURE: 110 MMHG | DIASTOLIC BLOOD PRESSURE: 64 MMHG | OXYGEN SATURATION: 99 % | HEART RATE: 98 BPM

## 2024-10-15 DIAGNOSIS — E03.9 HYPOTHYROIDISM, UNSPECIFIED TYPE: ICD-10-CM

## 2024-10-15 DIAGNOSIS — I15.2 HYPERTENSION ASSOCIATED WITH DIABETES: Primary | ICD-10-CM

## 2024-10-15 DIAGNOSIS — M17.0 PRIMARY OSTEOARTHRITIS OF BOTH KNEES: ICD-10-CM

## 2024-10-15 DIAGNOSIS — E78.5 HYPERLIPIDEMIA ASSOCIATED WITH TYPE 2 DIABETES MELLITUS: ICD-10-CM

## 2024-10-15 DIAGNOSIS — E11.22 TYPE 2 DIABETES MELLITUS WITH STAGE 3B CHRONIC KIDNEY DISEASE, WITHOUT LONG-TERM CURRENT USE OF INSULIN: ICD-10-CM

## 2024-10-15 DIAGNOSIS — N18.32 TYPE 2 DIABETES MELLITUS WITH STAGE 3B CHRONIC KIDNEY DISEASE, WITHOUT LONG-TERM CURRENT USE OF INSULIN: ICD-10-CM

## 2024-10-15 DIAGNOSIS — E11.69 HYPERLIPIDEMIA ASSOCIATED WITH TYPE 2 DIABETES MELLITUS: ICD-10-CM

## 2024-10-15 DIAGNOSIS — D51.9 ANEMIA DUE TO VITAMIN B12 DEFICIENCY, UNSPECIFIED B12 DEFICIENCY TYPE: ICD-10-CM

## 2024-10-15 DIAGNOSIS — E11.59 HYPERTENSION ASSOCIATED WITH DIABETES: Primary | ICD-10-CM

## 2024-10-15 DIAGNOSIS — E55.9 VITAMIN D DEFICIENCY: ICD-10-CM

## 2024-10-15 PROBLEM — E66.811 OBESITY (BMI 30.0-34.9): Status: RESOLVED | Noted: 2020-11-04 | Resolved: 2024-10-15

## 2024-10-15 PROCEDURE — 3078F DIAST BP <80 MM HG: CPT | Mod: CPTII,S$GLB,, | Performed by: FAMILY MEDICINE

## 2024-10-15 PROCEDURE — 99999 PR PBB SHADOW E&M-EST. PATIENT-LVL V: CPT | Mod: PBBFAC,,, | Performed by: FAMILY MEDICINE

## 2024-10-15 PROCEDURE — 96372 THER/PROPH/DIAG INJ SC/IM: CPT | Mod: S$GLB,,, | Performed by: FAMILY MEDICINE

## 2024-10-15 PROCEDURE — 3051F HG A1C>EQUAL 7.0%<8.0%: CPT | Mod: CPTII,S$GLB,, | Performed by: FAMILY MEDICINE

## 2024-10-15 PROCEDURE — 1159F MED LIST DOCD IN RCRD: CPT | Mod: CPTII,S$GLB,, | Performed by: FAMILY MEDICINE

## 2024-10-15 PROCEDURE — 3074F SYST BP LT 130 MM HG: CPT | Mod: CPTII,S$GLB,, | Performed by: FAMILY MEDICINE

## 2024-10-15 PROCEDURE — 3061F NEG MICROALBUMINURIA REV: CPT | Mod: CPTII,S$GLB,, | Performed by: FAMILY MEDICINE

## 2024-10-15 PROCEDURE — 3066F NEPHROPATHY DOC TX: CPT | Mod: CPTII,S$GLB,, | Performed by: FAMILY MEDICINE

## 2024-10-15 PROCEDURE — 99213 OFFICE O/P EST LOW 20 MIN: CPT | Mod: 25,S$GLB,, | Performed by: FAMILY MEDICINE

## 2024-10-15 PROCEDURE — 4010F ACE/ARB THERAPY RXD/TAKEN: CPT | Mod: CPTII,S$GLB,, | Performed by: FAMILY MEDICINE

## 2024-10-15 PROCEDURE — 3008F BODY MASS INDEX DOCD: CPT | Mod: CPTII,S$GLB,, | Performed by: FAMILY MEDICINE

## 2024-10-15 RX ORDER — CYANOCOBALAMIN 1000 UG/ML
1000 INJECTION, SOLUTION INTRAMUSCULAR; SUBCUTANEOUS
Status: SHIPPED | OUTPATIENT
Start: 2024-10-15 | End: 2025-04-13

## 2024-10-15 RX ADMIN — CYANOCOBALAMIN 1000 MCG: 1000 INJECTION, SOLUTION INTRAMUSCULAR; SUBCUTANEOUS at 01:10

## 2024-10-15 NOTE — PROGRESS NOTES
Subjective:       Patient ID: Richelle Zaldivar is a 59 y.o. female presents with   Patient Active Problem List   Diagnosis    Hypertension associated with diabetes    Type 2 diabetes mellitus with stage 3 chronic kidney disease, without long-term current use of insulin    Hyperlipidemia associated with type 2 diabetes mellitus    Iron deficiency anemia    Vitamin D deficiency    GERD (gastroesophageal reflux disease)    Retinitis pigmentosa    Recurrent iritis of left eye    Open-angle glaucoma, severe stage    Osteoarthritis of knee    Hypothyroidism        Chief Complaint: Follow-up (6 month f/u, denies any c/o pain at this time. )    59-year-old  female patient with Patient Active Problem List:     Hypertension associated with diabetes     Type 2 diabetes mellitus with stage 3 chronic kidney disease, without long-term current use of insulin     Hyperlipidemia associated with type 2 diabetes mellitus     Iron deficiency anemia     Vitamin D deficiency     GERD (gastroesophageal reflux disease)     Retinitis pigmentosa     Recurrent iritis of left eye     Open-angle glaucoma, severe stage     Osteoarthritis of knee     Hypothyroidism  Here for follow-up  Patient has been taking her medications regularly and reports fatigue, would like to get started on B12 injections  Denies of any chest tightness or difficulty breathing with palpitations  Has been taking her medications regularly      Review of Systems   Constitutional:  Positive for fatigue.   Eyes:  Negative for visual disturbance.   Respiratory:  Negative for shortness of breath.    Cardiovascular:  Negative for chest pain and leg swelling.   Gastrointestinal:  Negative for abdominal pain, nausea and vomiting.   Musculoskeletal:  Negative for myalgias.   Skin:  Negative for rash.   Neurological:  Negative for light-headedness and headaches.   Psychiatric/Behavioral:  Negative for sleep disturbance.          /64   Pulse 98   Temp 97.5 °F (36.4 °C)  (Oral)   Wt 66.9 kg (147 lb 7.8 oz)   SpO2 99%   BMI 29.79 kg/m²   Objective:      Physical Exam  Constitutional:       Appearance: She is well-developed.   HENT:      Head: Normocephalic and atraumatic.   Cardiovascular:      Rate and Rhythm: Normal rate and regular rhythm.      Heart sounds: Normal heart sounds. No murmur heard.  Pulmonary:      Effort: Pulmonary effort is normal.      Breath sounds: Normal breath sounds. No wheezing.   Abdominal:      General: Bowel sounds are normal.      Palpations: Abdomen is soft.      Tenderness: There is no abdominal tenderness.   Skin:     General: Skin is warm and dry.      Findings: No rash.   Neurological:      Mental Status: She is alert and oriented to person, place, and time.   Psychiatric:         Mood and Affect: Mood normal.         Office Visit on 09/25/2024   Component Date Value Ref Range Status    POC Glucose 09/25/2024 233 (A)  70 - 110 MG/DL Final   Lab Visit on 09/19/2024   Component Date Value Ref Range Status    Hemoglobin A1C 09/19/2024 7.2 (H)  4.0 - 5.6 % Final    Comment: ADA Screening Guidelines:  5.7-6.4%  Consistent with prediabetes  >or=6.5%  Consistent with diabetes    High levels of fetal hemoglobin interfere with the HbA1C  assay. Heterozygous hemoglobin variants (HbS, HgC, etc)do  not significantly interfere with this assay.   However, presence of multiple variants may affect accuracy.      Estimated Avg Glucose 09/19/2024 160 (H)  68 - 131 mg/dL Final    Sodium 09/19/2024 138  136 - 145 mmol/L Final    Potassium 09/19/2024 4.6  3.5 - 5.1 mmol/L Final    Chloride 09/19/2024 106  95 - 110 mmol/L Final    CO2 09/19/2024 21 (L)  23 - 29 mmol/L Final    Glucose 09/19/2024 125 (H)  70 - 110 mg/dL Final    BUN 09/19/2024 14  8 - 23 mg/dL Final    Creatinine 09/19/2024 1.5 (H)  0.5 - 1.4 mg/dL Final    Calcium 09/19/2024 9.8  8.7 - 10.5 mg/dL Final    Total Protein 09/19/2024 7.2  6.0 - 8.4 g/dL Final    Albumin 09/19/2024 3.9  3.5 - 5.2 g/dL  Final    Total Bilirubin 09/19/2024 0.2  0.1 - 1.0 mg/dL Final    Comment: For infants and newborns, interpretation of results should be based  on gestational age, weight and in agreement with clinical  observations.    Premature Infant recommended reference ranges:  Up to 24 hours.............<8.0 mg/dL  Up to 48 hours............<12.0 mg/dL  3-5 days..................<15.0 mg/dL  6-29 days.................<15.0 mg/dL      Alkaline Phosphatase 09/19/2024 49 (L)  55 - 135 U/L Final    AST 09/19/2024 13  10 - 40 U/L Final    ALT 09/19/2024 11  10 - 44 U/L Final    eGFR 09/19/2024 39.4 (A)  >60 mL/min/1.73 m^2 Final    Anion Gap 09/19/2024 11  8 - 16 mmol/L Final    TSH 09/19/2024 2.428  0.400 - 4.000 uIU/mL Final    Vit D, 25-Hydroxy 09/19/2024 31  30 - 96 ng/mL Final    Comment: Vitamin D deficiency.........<10 ng/mL                              Vitamin D insufficiency......10-29 ng/mL       Vitamin D sufficiency........> or equal to 30 ng/mL  Vitamin D toxicity............>100 ng/mL      Vitamin B-12 09/19/2024 <148 (L)  210 - 950 pg/mL Final         Assessment/Plan:   1. Hypertension associated with diabetes  Blood pressure is stable currently on amlodipine 10 mg, lisinopril hydrochlorothiazide 20/25 mg    2. Hyperlipidemia associated with type 2 diabetes mellitus  Currently taking Crestor 40 mg and Zetia 10 mg, fenofibrate 134 mg, stable cholesterol levels    3. Type 2 diabetes mellitus with stage 3b chronic kidney disease, without long-term current use of insulin  Stable on Trulicity 1.5 mg weekly, Jardiance 25 mg, glipizide 10 mg, metformin 1000 mg twice daily, noted improvement in A1c    4. Hypothyroidism, unspecified type  Stable thyroid labs on levothyroxine 25 mcg p.o. daily    5. Primary osteoarthritis of both knees  Graded exercise regimen recommended    6. Vitamin D deficiency  Noted improvement in vitamin-D levels, after finishing prescription vitamin-D okay to take over-the-counter vitamin D3 1000  units daily    7. Anemia due to vitamin B12 deficiency, unspecified B12 deficiency type  -     cyanocobalamin injection 1,000 mcg  Noted extremely low vitamin B12 levels, will get started on monthly B12 injections starting today    Encouraged to consider getting flu shot at the pharmacy downstairs  Patient is also due for shingles pneumonia RSV and COVID booster

## 2024-10-24 ENCOUNTER — PATIENT MESSAGE (OUTPATIENT)
Dept: OPHTHALMOLOGY | Facility: CLINIC | Age: 59
End: 2024-10-24
Payer: MEDICARE

## 2024-10-30 ENCOUNTER — TELEPHONE (OUTPATIENT)
Dept: SPORTS MEDICINE | Facility: CLINIC | Age: 59
End: 2024-10-30
Payer: MEDICARE

## 2024-10-30 ENCOUNTER — TELEPHONE (OUTPATIENT)
Dept: PAIN MEDICINE | Facility: CLINIC | Age: 59
End: 2024-10-30
Payer: MEDICARE

## 2024-10-30 DIAGNOSIS — H20.022 IRITIS, RECURRENT, LEFT: ICD-10-CM

## 2024-10-30 DIAGNOSIS — H40.1133 PRIMARY OPEN ANGLE GLAUCOMA OF BOTH EYES, SEVERE STAGE: ICD-10-CM

## 2024-10-30 RX ORDER — ERYTHROMYCIN 5 MG/G
OINTMENT OPHTHALMIC DAILY
Qty: 3.5 G | Refills: 4 | Status: SHIPPED | OUTPATIENT
Start: 2024-10-30

## 2024-10-30 RX ORDER — PREDNISOLONE ACETATE 10 MG/ML
1 SUSPENSION/ DROPS OPHTHALMIC 2 TIMES DAILY
Qty: 5 ML | Refills: 3 | Status: SHIPPED | OUTPATIENT
Start: 2024-10-30

## 2024-11-04 ENCOUNTER — PATIENT MESSAGE (OUTPATIENT)
Dept: SPORTS MEDICINE | Facility: CLINIC | Age: 59
End: 2024-11-04
Payer: MEDICARE

## 2024-11-04 ENCOUNTER — PATIENT MESSAGE (OUTPATIENT)
Dept: INTERNAL MEDICINE | Facility: CLINIC | Age: 59
End: 2024-11-04
Payer: MEDICARE

## 2024-11-07 ENCOUNTER — PATIENT MESSAGE (OUTPATIENT)
Dept: SPORTS MEDICINE | Facility: CLINIC | Age: 59
End: 2024-11-07
Payer: MEDICARE

## 2024-11-07 ENCOUNTER — PATIENT MESSAGE (OUTPATIENT)
Dept: DIABETES | Facility: CLINIC | Age: 59
End: 2024-11-07
Payer: MEDICARE

## 2024-11-14 ENCOUNTER — PATIENT MESSAGE (OUTPATIENT)
Dept: SPORTS MEDICINE | Facility: CLINIC | Age: 59
End: 2024-11-14
Payer: MEDICARE

## 2024-11-20 ENCOUNTER — OFFICE VISIT (OUTPATIENT)
Dept: OPHTHALMOLOGY | Facility: CLINIC | Age: 59
End: 2024-11-20
Payer: MEDICARE

## 2024-11-20 DIAGNOSIS — H44.421 HYPOTONY OF RIGHT EYE DUE TO OCULAR FISTULA: ICD-10-CM

## 2024-11-20 DIAGNOSIS — H40.1133 PRIMARY OPEN ANGLE GLAUCOMA OF BOTH EYES, SEVERE STAGE: Primary | ICD-10-CM

## 2024-11-20 DIAGNOSIS — H20.022 IRITIS, RECURRENT, LEFT: ICD-10-CM

## 2024-11-20 PROCEDURE — 1159F MED LIST DOCD IN RCRD: CPT | Mod: CPTII,S$GLB,, | Performed by: OPHTHALMOLOGY

## 2024-11-20 PROCEDURE — 3066F NEPHROPATHY DOC TX: CPT | Mod: CPTII,S$GLB,, | Performed by: OPHTHALMOLOGY

## 2024-11-20 PROCEDURE — 99999 PR PBB SHADOW E&M-EST. PATIENT-LVL III: CPT | Mod: PBBFAC,,, | Performed by: OPHTHALMOLOGY

## 2024-11-20 PROCEDURE — 3061F NEG MICROALBUMINURIA REV: CPT | Mod: CPTII,S$GLB,, | Performed by: OPHTHALMOLOGY

## 2024-11-20 PROCEDURE — 4010F ACE/ARB THERAPY RXD/TAKEN: CPT | Mod: CPTII,S$GLB,, | Performed by: OPHTHALMOLOGY

## 2024-11-20 PROCEDURE — 3051F HG A1C>EQUAL 7.0%<8.0%: CPT | Mod: CPTII,S$GLB,, | Performed by: OPHTHALMOLOGY

## 2024-11-20 PROCEDURE — G2211 COMPLEX E/M VISIT ADD ON: HCPCS | Mod: S$GLB,,, | Performed by: OPHTHALMOLOGY

## 2024-11-20 PROCEDURE — 99214 OFFICE O/P EST MOD 30 MIN: CPT | Mod: S$GLB,,, | Performed by: OPHTHALMOLOGY

## 2024-11-20 PROCEDURE — 1160F RVW MEDS BY RX/DR IN RCRD: CPT | Mod: CPTII,S$GLB,, | Performed by: OPHTHALMOLOGY

## 2024-11-20 NOTE — PROGRESS NOTES
HPI     Glaucoma            Comments: Pt reports for 3m IOP check. Denies any pain or irritation. Va   stable. Using drops as directed.           Comments    1. COAG (followed by Dr. Scott)   S/p Revision of Wound Left Eye 8/4/22  Ahmed Valve OS 8/2/18   CP OS 8/27/15 - low to moderate flow, TDW 1100, aggressive dilation with   GV   Gonio Puncture OS 9/25/15   Wound Revision OS 08/04/22  SLT OS 6/23/16, 6/23/15, 6-2-14   Goniotomy OS 11-17-16   2. DM   3. RP / VIT A PALMITATE 1500 (discontinued)   4. Retinitis Pigmentosa of both eyes   5. Dry Eye Syndrome bilateral   6. Iritis recurrent OS     Left Eye:  Durezol 2-3 times a day  Dorz/Timolol 2 times a day  Prolensa once a day  Latanoprost once a day  Brimonidine 3 times a day  Right eye:  Pred as needed  Atropine 2 times a day             Last edited by Robert Craig on 11/20/2024  7:47 AM.            Assessment /Plan     For exam results, see Encounter Report.      ICD-10-CM ICD-9-CM    1. Primary open angle glaucoma of both eyes, severe stage  H40.1133 365.11 Iop controlled  Visit today included increased complexity associated with the care and management of glaucoma and hypotony right eye. Patient aware that management of medical condition requires long term follow up.  Written instructions were placed in the portal for the patient upon closure of the encounter regarding specific use of the required medications.         365.73       2. Iritis, recurrent, left  H20.022 364.02 essentially quiet    3.       Hypotony right eye                 Left Eye:  Durezol 2-3 times a day  Dorz/Timolol 2 times a day  Prolensa once a day  Latanoprost once a day  Brimonidine 3 times a day  Right eye:  Pred as needed  Atropine 2 times a day     Return to clinic 3 months with gOCT

## 2024-12-06 DIAGNOSIS — E78.5 HYPERLIPIDEMIA ASSOCIATED WITH TYPE 2 DIABETES MELLITUS: ICD-10-CM

## 2024-12-06 DIAGNOSIS — E11.69 HYPERLIPIDEMIA ASSOCIATED WITH TYPE 2 DIABETES MELLITUS: ICD-10-CM

## 2024-12-06 NOTE — TELEPHONE ENCOUNTER
Care Due:                  Date            Visit Type   Department     Provider  --------------------------------------------------------------------------------                                EP -                              PRIMARY      [facility] INTERNAL  Oneyda Mainampati  Last Visit: 10-      CARE ([facility])   MEDICINE       P[place]co  Next Visit: None Scheduled  None         None Found                                                            Last  Test          Frequency    Reason                     Performed    Due Date  --------------------------------------------------------------------------------    Uric Acid...  12 months..  allopurinoL..............  07- 06-    [facility] Embedded Care Due Messages. Reference number: [id number].   12/06/2024 10:00:57 AM CST

## 2024-12-07 RX ORDER — FENOFIBRATE 134 MG/1
134 CAPSULE ORAL
Qty: 90 CAPSULE | Refills: 1 | Status: SHIPPED | OUTPATIENT
Start: 2024-12-07

## 2024-12-07 RX ORDER — ROSUVASTATIN CALCIUM 40 MG/1
40 TABLET, COATED ORAL NIGHTLY
Qty: 90 TABLET | Refills: 1 | Status: SHIPPED | OUTPATIENT
Start: 2024-12-07

## 2024-12-07 NOTE — TELEPHONE ENCOUNTER
Refill Decision Note   Richelle Zaldivar  is requesting a refill authorization.  Brief Assessment and Rationale for Refill:  Approve     Medication Therapy Plan:        Comments:     Note composed:11:00 AM 12/07/2024

## 2024-12-11 ENCOUNTER — PATIENT MESSAGE (OUTPATIENT)
Dept: SPORTS MEDICINE | Facility: CLINIC | Age: 59
End: 2024-12-11
Payer: MEDICARE

## 2024-12-12 ENCOUNTER — TELEPHONE (OUTPATIENT)
Dept: ORTHOPEDICS | Facility: CLINIC | Age: 59
End: 2024-12-12
Payer: MEDICARE

## 2024-12-20 DIAGNOSIS — E03.9 HYPOTHYROIDISM, UNSPECIFIED TYPE: ICD-10-CM

## 2024-12-20 DIAGNOSIS — E11.22 TYPE 2 DIABETES MELLITUS WITH STAGE 3B CHRONIC KIDNEY DISEASE, WITHOUT LONG-TERM CURRENT USE OF INSULIN: ICD-10-CM

## 2024-12-20 DIAGNOSIS — N18.32 TYPE 2 DIABETES MELLITUS WITH STAGE 3B CHRONIC KIDNEY DISEASE, WITHOUT LONG-TERM CURRENT USE OF INSULIN: ICD-10-CM

## 2024-12-20 RX ORDER — LEVOTHYROXINE SODIUM 25 UG/1
25 TABLET ORAL DAILY
Qty: 90 TABLET | Refills: 2 | Status: SHIPPED | OUTPATIENT
Start: 2024-12-20 | End: 2025-12-20

## 2024-12-20 NOTE — TELEPHONE ENCOUNTER
Care Due:                  Date            Visit Type   Department     Provider  --------------------------------------------------------------------------------                                EP -                              PRIMARY      [facility] INTERNAL  Oneyda Mainampati  Last Visit: 10-      CARE ([facility])   MEDICINE       [place]  Next Visit: None Scheduled  None         None Found                                                            Last  Test          Frequency    Reason                     Performed    Due Date  --------------------------------------------------------------------------------    HBA1C.......  6 months...  empagliflozin, metFORMIN.  09- 03-    [facility] Embedded Care Due Messages. Reference number: [id number].   12/20/2024 4:19:17 PM CST

## 2024-12-21 DIAGNOSIS — G89.29 CHRONIC PAIN OF BOTH SHOULDERS: ICD-10-CM

## 2024-12-21 DIAGNOSIS — E55.9 VITAMIN D DEFICIENCY: ICD-10-CM

## 2024-12-21 DIAGNOSIS — H20.022 IRITIS, RECURRENT, LEFT: ICD-10-CM

## 2024-12-21 DIAGNOSIS — M25.512 CHRONIC PAIN OF BOTH SHOULDERS: ICD-10-CM

## 2024-12-21 DIAGNOSIS — M17.0 PRIMARY OSTEOARTHRITIS OF BOTH KNEES: ICD-10-CM

## 2024-12-21 DIAGNOSIS — M25.511 CHRONIC PAIN OF BOTH SHOULDERS: ICD-10-CM

## 2024-12-21 DIAGNOSIS — H40.1133 PRIMARY OPEN ANGLE GLAUCOMA OF BOTH EYES, SEVERE STAGE: ICD-10-CM

## 2024-12-21 NOTE — TELEPHONE ENCOUNTER
No care due was identified.  [facility] Embedded Care Due Messages. Reference number: [id number].   12/21/2024 5:41:18 PM CST

## 2024-12-21 NOTE — TELEPHONE ENCOUNTER
Refill Routing Note   Medication(s) are not appropriate for processing by [facility] for the following reason(s):        Required labs abnormal    [facility] action(s):  Approve  Defer     Requires labs : Yes             Appointments  past 12m or future 3m with PCP    Date Provider   Last Visit   10/15/2024 Oneyda Bergman MD   Next Visit   Visit date not found Oneyda Bergman MD   ED visits in past 90 days: 0        Note composed:6:09 PM 12/20/2024

## 2024-12-22 ENCOUNTER — PATIENT MESSAGE (OUTPATIENT)
Dept: INTERNAL MEDICINE | Facility: CLINIC | Age: 59
End: 2024-12-22
Payer: MEDICARE

## 2024-12-23 RX ORDER — ERGOCALCIFEROL 1.25 MG/1
50000 CAPSULE ORAL
Qty: 12 CAPSULE | Refills: 0 | Status: SHIPPED | OUTPATIENT
Start: 2024-12-23

## 2024-12-23 RX ORDER — MELOXICAM 15 MG/1
15 TABLET ORAL DAILY PRN
Qty: 30 TABLET | Refills: 1 | Status: SHIPPED | OUTPATIENT
Start: 2024-12-23

## 2024-12-23 RX ORDER — BROMFENAC SODIUM 0.7 MG/ML
1 SOLUTION/ DROPS OPHTHALMIC DAILY
Qty: 3 ML | Refills: 6 | Status: SHIPPED | OUTPATIENT
Start: 2024-12-23 | End: 2025-03-23

## 2024-12-23 RX ORDER — GLIPIZIDE 10 MG/1
TABLET ORAL
Qty: 90 TABLET | Refills: 3 | Status: SHIPPED | OUTPATIENT
Start: 2024-12-23

## 2024-12-23 NOTE — TELEPHONE ENCOUNTER
Refill Routing Note   Medication(s) are not appropriate for processing by [facility] for the following reason(s):        Outside of protocol    [facility] action(s):  Route               Appointments  past 12m or future 3m with PCP    Date Provider   Last Visit   10/15/2024 Oneyda eBrgman MD   Next Visit   Visit date not found Oneyda Bergman MD   ED visits in past 90 days: 0        Note composed:9:42 AM 12/23/2024

## 2024-12-26 RX ORDER — LATANOPROST 50 UG/ML
1 SOLUTION/ DROPS OPHTHALMIC DAILY
Qty: 7.5 ML | Refills: 6 | Status: SHIPPED | OUTPATIENT
Start: 2024-12-26 | End: 2025-12-26

## 2024-12-27 ENCOUNTER — CLINICAL SUPPORT (OUTPATIENT)
Dept: INTERNAL MEDICINE | Facility: CLINIC | Age: 59
End: 2024-12-27
Payer: MEDICARE

## 2024-12-27 DIAGNOSIS — Z23 NEEDS FLU SHOT: Primary | ICD-10-CM

## 2024-12-27 RX ADMIN — CYANOCOBALAMIN 1000 MCG: 1000 INJECTION, SOLUTION INTRAMUSCULAR; SUBCUTANEOUS at 09:12

## 2024-12-27 NOTE — PROGRESS NOTES
Flu shot to (L) deltoid and Vit B-12 inj to (R) deltoid administered as ordered, pt tolerated procedures well. NADN./TF

## 2025-01-03 DIAGNOSIS — M17.0 PRIMARY OSTEOARTHRITIS OF BOTH KNEES: Primary | ICD-10-CM

## 2025-01-03 NOTE — TELEPHONE ENCOUNTER
No care due was identified.  [facility] Embedded Care Due Messages. Reference number: [id number].   1/03/2025 10:16:15 AM CST

## 2025-01-05 RX ORDER — TRAMADOL HYDROCHLORIDE 50 MG/1
50 TABLET ORAL EVERY 12 HOURS PRN
Qty: 30 TABLET | Refills: 0 | Status: SHIPPED | OUTPATIENT
Start: 2025-01-05

## 2025-01-13 DIAGNOSIS — N18.32 TYPE 2 DIABETES MELLITUS WITH STAGE 3B CHRONIC KIDNEY DISEASE, WITHOUT LONG-TERM CURRENT USE OF INSULIN: ICD-10-CM

## 2025-01-13 DIAGNOSIS — E11.22 TYPE 2 DIABETES MELLITUS WITH STAGE 3B CHRONIC KIDNEY DISEASE, WITHOUT LONG-TERM CURRENT USE OF INSULIN: ICD-10-CM

## 2025-01-13 DIAGNOSIS — H40.1133 PRIMARY OPEN ANGLE GLAUCOMA OF BOTH EYES, SEVERE STAGE: ICD-10-CM

## 2025-01-13 RX ORDER — METFORMIN HYDROCHLORIDE 1000 MG/1
1000 TABLET ORAL 2 TIMES DAILY WITH MEALS
Qty: 180 TABLET | Refills: 1 | Status: SHIPPED | OUTPATIENT
Start: 2025-01-13

## 2025-01-13 RX ORDER — DIFLUPREDNATE OPHTHALMIC 0.5 MG/ML
1 EMULSION OPHTHALMIC 3 TIMES DAILY
Qty: 15 ML | Refills: 4 | Status: SHIPPED | OUTPATIENT
Start: 2025-01-13 | End: 2025-04-13

## 2025-01-13 RX ORDER — BRIMONIDINE TARTRATE 1 MG/ML
1 SOLUTION/ DROPS OPHTHALMIC 3 TIMES DAILY
Qty: 15 ML | Refills: 6 | Status: SHIPPED | OUTPATIENT
Start: 2025-01-13 | End: 2025-03-12

## 2025-01-13 NOTE — TELEPHONE ENCOUNTER
No care due was identified.  [facility] Embedded Care Due Messages. Reference number: [id number].   1/13/2025 6:38:56 AM CST

## 2025-01-13 NOTE — TELEPHONE ENCOUNTER
Refill Routing Note   Medication(s) are not appropriate for processing by [facility] for the following reason(s):        Required labs abnormal    [facility] action(s):  Defer        Medication Therapy Plan: eGFR is 39; Recommended dose with eGFR between 30 - 45 is 500 mg twice daily    Extended chart review required: Yes     Appointments  past 12m or future 3m with PCP    Date Provider   Last Visit   10/15/2024 Oneyda Bergman MD   Next Visit   Visit date not found Oneyda Bergman MD   ED visits in past 90 days: 0        Note composed:7:09 AM 01/13/2025

## 2025-01-17 ENCOUNTER — PROCEDURE VISIT (OUTPATIENT)
Dept: SPORTS MEDICINE | Facility: CLINIC | Age: 60
End: 2025-01-17
Payer: MEDICARE

## 2025-01-17 DIAGNOSIS — N18.30 TYPE 2 DIABETES MELLITUS WITH STAGE 3 CHRONIC KIDNEY DISEASE, WITHOUT LONG-TERM CURRENT USE OF INSULIN, UNSPECIFIED WHETHER STAGE 3A OR 3B CKD: ICD-10-CM

## 2025-01-17 DIAGNOSIS — E11.22 TYPE 2 DIABETES MELLITUS WITH STAGE 3 CHRONIC KIDNEY DISEASE, WITHOUT LONG-TERM CURRENT USE OF INSULIN, UNSPECIFIED WHETHER STAGE 3A OR 3B CKD: ICD-10-CM

## 2025-01-17 DIAGNOSIS — M17.12 PRIMARY OSTEOARTHRITIS OF LEFT KNEE: Primary | ICD-10-CM

## 2025-01-17 PROCEDURE — 20610 DRAIN/INJ JOINT/BURSA W/O US: CPT | Mod: LT,S$GLB,, | Performed by: PHYSICIAN ASSISTANT

## 2025-01-17 PROCEDURE — 99499 UNLISTED E&M SERVICE: CPT | Mod: S$GLB,,, | Performed by: PHYSICIAN ASSISTANT

## 2025-01-17 NOTE — PROCEDURES
Large Joint Aspiration/Injection: L knee    Date/Time: 1/17/2025 10:00 AM    Performed by: Laya Hagan PA-C  Authorized by: Laya Hagan PA-C    Consent Done?:  Yes (Verbal)  Indications:  Joint swelling and pain  Site marked: the procedure site was marked    Timeout: prior to procedure the correct patient, procedure, and site was verified    Prep: patient was prepped and draped in usual sterile fashion      Local anesthesia used?: Yes    Local anesthetic:  Topical anesthetic    Details:  Needle Size:  22 G  Ultrasonic Guidance for needle placement?: No    Approach:  Superior  Location:  Knee  Site:  L knee  Medications:  20 mg sodium hyaluronate (EUFLEXXA) 10 mg/mL(mw 2.4 -3.6 million)  Patient tolerance:  Patient tolerated the procedure well with no immediate complications     1/3

## 2025-01-24 ENCOUNTER — PROCEDURE VISIT (OUTPATIENT)
Dept: SPORTS MEDICINE | Facility: CLINIC | Age: 60
End: 2025-01-24
Payer: MEDICARE

## 2025-01-24 VITALS — WEIGHT: 147.5 LBS | BODY MASS INDEX: 29.73 KG/M2 | HEIGHT: 59 IN

## 2025-01-24 DIAGNOSIS — M17.12 PRIMARY OSTEOARTHRITIS OF LEFT KNEE: Primary | ICD-10-CM

## 2025-01-24 PROCEDURE — 99499 UNLISTED E&M SERVICE: CPT | Mod: S$GLB,,, | Performed by: PHYSICIAN ASSISTANT

## 2025-01-24 PROCEDURE — 20610 DRAIN/INJ JOINT/BURSA W/O US: CPT | Mod: LT,S$GLB,, | Performed by: PHYSICIAN ASSISTANT

## 2025-01-24 NOTE — PROCEDURES
Large Joint Aspiration/Injection: L knee    Date/Time: 1/24/2025 10:00 AM    Performed by: Laya Hagan PA-C  Authorized by: Laya Hagan PA-C    Consent Done?:  Yes (Verbal)  Indications:  Joint swelling and pain  Site marked: the procedure site was marked    Timeout: prior to procedure the correct patient, procedure, and site was verified    Prep: patient was prepped and draped in usual sterile fashion      Local anesthesia used?: Yes    Local anesthetic:  Topical anesthetic    Details:  Needle Size:  22 G  Ultrasonic Guidance for needle placement?: No    Approach:  Superior  Location:  Knee  Site:  L knee  Medications:  20 mg sodium hyaluronate (EUFLEXXA) 10 mg/mL(mw 2.4 -3.6 million)  Patient tolerance:  Patient tolerated the procedure well with no immediate complications     2/3

## 2025-01-28 ENCOUNTER — PATIENT MESSAGE (OUTPATIENT)
Dept: INTERNAL MEDICINE | Facility: CLINIC | Age: 60
End: 2025-01-28
Payer: MEDICARE

## 2025-01-31 ENCOUNTER — PROCEDURE VISIT (OUTPATIENT)
Dept: SPORTS MEDICINE | Facility: CLINIC | Age: 60
End: 2025-01-31
Payer: MEDICARE

## 2025-01-31 VITALS — HEIGHT: 59 IN | WEIGHT: 147.5 LBS | BODY MASS INDEX: 29.73 KG/M2

## 2025-01-31 DIAGNOSIS — M17.12 PRIMARY OSTEOARTHRITIS OF LEFT KNEE: Primary | ICD-10-CM

## 2025-01-31 PROCEDURE — 20610 DRAIN/INJ JOINT/BURSA W/O US: CPT | Mod: LT,S$GLB,, | Performed by: PHYSICIAN ASSISTANT

## 2025-01-31 PROCEDURE — 99499 UNLISTED E&M SERVICE: CPT | Mod: S$GLB,,, | Performed by: PHYSICIAN ASSISTANT

## 2025-01-31 NOTE — PROCEDURES
Large Joint Aspiration/Injection: L knee    Date/Time: 1/31/2025 10:00 AM    Performed by: Laya Hagan PA-C  Authorized by: Laya Hagan PA-C    Consent Done?:  Yes (Verbal)  Indications:  Joint swelling and pain  Site marked: the procedure site was marked    Timeout: prior to procedure the correct patient, procedure, and site was verified    Prep: patient was prepped and draped in usual sterile fashion      Local anesthesia used?: Yes    Local anesthetic:  Topical anesthetic    Details:  Needle Size:  22 G  Ultrasonic Guidance for needle placement?: No    Approach:  Superior  Location:  Knee  Site:  L knee  Medications:  20 mg sodium hyaluronate (EUFLEXXA) 10 mg/mL(mw 2.4 -3.6 million)  Patient tolerance:  Patient tolerated the procedure well with no immediate complications     3/3

## 2025-02-11 ENCOUNTER — PATIENT MESSAGE (OUTPATIENT)
Dept: INTERNAL MEDICINE | Facility: CLINIC | Age: 60
End: 2025-02-11
Payer: MEDICARE

## 2025-02-11 DIAGNOSIS — N18.32 TYPE 2 DIABETES MELLITUS WITH STAGE 3B CHRONIC KIDNEY DISEASE, WITHOUT LONG-TERM CURRENT USE OF INSULIN: Primary | ICD-10-CM

## 2025-02-11 DIAGNOSIS — E11.22 TYPE 2 DIABETES MELLITUS WITH STAGE 3B CHRONIC KIDNEY DISEASE, WITHOUT LONG-TERM CURRENT USE OF INSULIN: Primary | ICD-10-CM

## 2025-02-11 NOTE — TELEPHONE ENCOUNTER
See pended referral (if applicable).//ddw    -Referral can be found under [facility] tab.    Please send referral to: DR. Elfego Carreon MD  Fax to: [phone]

## 2025-02-20 DIAGNOSIS — E11.59 HYPERTENSION ASSOCIATED WITH DIABETES: ICD-10-CM

## 2025-02-20 DIAGNOSIS — I15.2 HYPERTENSION ASSOCIATED WITH DIABETES: ICD-10-CM

## 2025-02-20 DIAGNOSIS — K21.9 GASTROESOPHAGEAL REFLUX DISEASE, UNSPECIFIED WHETHER ESOPHAGITIS PRESENT: ICD-10-CM

## 2025-02-20 RX ORDER — LANSOPRAZOLE 30 MG/1
30 CAPSULE, DELAYED RELEASE ORAL DAILY
Qty: 90 CAPSULE | Refills: 1 | Status: SHIPPED | OUTPATIENT
Start: 2025-02-20

## 2025-02-20 RX ORDER — LISINOPRIL AND HYDROCHLOROTHIAZIDE 20; 25 MG/1; MG/1
1 TABLET ORAL DAILY
Qty: 90 TABLET | Refills: 1 | Status: SHIPPED | OUTPATIENT
Start: 2025-02-20

## 2025-02-20 NOTE — TELEPHONE ENCOUNTER
Refill Routing Note   Medication(s) are not appropriate for processing by [facility] for the following reason(s):        Outside of protocol: Prevacid  Required labs abnormal: Prinzide  CREATININE 1.5 (H) 09/19/2024       [facility] action(s):  Route  Defer     Requires labs : Yes      Medication Therapy Plan: PPI dose outside of [facility] protocol    Pharmacist review requested: Yes     Appointments  past 12m or future 3m with PCP    Date Provider   Last Visit   10/15/2024 Oneyda Bergman MD   Next Visit   Visit date not found Oneyda Bergman MD   ED visits in past 90 days: 0        Note composed:1:46 PM 02/20/2025

## 2025-02-20 NOTE — TELEPHONE ENCOUNTER
Care Due:                  Date            Visit Type   Department     Provider  --------------------------------------------------------------------------------                                EP -                              PRIMARY      [facility]  Oneyda Mainampati  Last Visit: 10-      CARE ([facility])   MEDICINE       P[place]co  Next Visit: None Scheduled  None         None Found                                                            Last  Test          Frequency    Reason                     Performed    Due Date  --------------------------------------------------------------------------------    HBA1C.......  6 months...  empagliflozin, metFORMIN.  09- 03-    Uric Acid...  12 months..  allopurinoL..............  07- 06-    [employer] Embedded Care Due Messages. Reference number: [id number].   2/20/2025 10:17:07 AM CST

## 2025-02-20 NOTE — TELEPHONE ENCOUNTER
Refill Routing Note   Medication(s) are not appropriate for processing by [facility] for the following reason(s):        Outside of protocol: Prevacid total daily dose outside of [facility] protocol (15 mg daily max)   Required labs abnormal:     [facility] action(s):  Route  Defer Care Due Message:  Requires labs : Yes -   Uric acid outdated  A1C due 3/19/25          Pharmacist review requested: Yes     Appointments  past 12m or future 3m with PCP    Date Provider   Last Visit   10/15/2024 Oneyda Bergman MD   Next Visit   Visit date not found Oneyda Bergman MD   ED visits in past 90 days: 0        Note composed:2:17 PM 02/20/2025

## 2025-02-25 ENCOUNTER — OFFICE VISIT (OUTPATIENT)
Dept: OPHTHALMOLOGY | Facility: CLINIC | Age: 60
End: 2025-02-25
Payer: MEDICARE

## 2025-02-25 DIAGNOSIS — H40.1133 PRIMARY OPEN ANGLE GLAUCOMA OF BOTH EYES, SEVERE STAGE: Primary | ICD-10-CM

## 2025-02-25 DIAGNOSIS — H20.022 IRITIS, RECURRENT, LEFT: ICD-10-CM

## 2025-02-25 DIAGNOSIS — H44.421 HYPOTONY OF RIGHT EYE DUE TO OCULAR FISTULA: ICD-10-CM

## 2025-02-25 PROCEDURE — G2211 COMPLEX E/M VISIT ADD ON: HCPCS | Mod: S$GLB,,, | Performed by: OPHTHALMOLOGY

## 2025-02-25 PROCEDURE — 1159F MED LIST DOCD IN RCRD: CPT | Mod: CPTII,S$GLB,, | Performed by: OPHTHALMOLOGY

## 2025-02-25 PROCEDURE — 4010F ACE/ARB THERAPY RXD/TAKEN: CPT | Mod: CPTII,S$GLB,, | Performed by: OPHTHALMOLOGY

## 2025-02-25 PROCEDURE — 1160F RVW MEDS BY RX/DR IN RCRD: CPT | Mod: CPTII,S$GLB,, | Performed by: OPHTHALMOLOGY

## 2025-02-25 PROCEDURE — 99214 OFFICE O/P EST MOD 30 MIN: CPT | Mod: S$GLB,,, | Performed by: OPHTHALMOLOGY

## 2025-02-25 PROCEDURE — 99999 PR PBB SHADOW E&M-EST. PATIENT-LVL II: CPT | Mod: PBBFAC,,, | Performed by: OPHTHALMOLOGY

## 2025-02-25 RX ORDER — DIFLUPREDNATE OPHTHALMIC 0.5 MG/ML
1 EMULSION OPHTHALMIC 3 TIMES DAILY
Qty: 15 ML | Refills: 4 | Status: SHIPPED | OUTPATIENT
Start: 2025-02-25 | End: 2025-05-26

## 2025-02-25 RX ORDER — BROMFENAC SODIUM 0.7 MG/ML
1 SOLUTION/ DROPS OPHTHALMIC DAILY
Qty: 3 ML | Refills: 6 | Status: SHIPPED | OUTPATIENT
Start: 2025-02-25

## 2025-02-25 RX ORDER — BRIMONIDINE TARTRATE 1 MG/ML
1 SOLUTION/ DROPS OPHTHALMIC 3 TIMES DAILY
Qty: 15 ML | Refills: 6 | Status: SHIPPED | OUTPATIENT
Start: 2025-02-25 | End: 2025-05-29

## 2025-02-25 NOTE — PROGRESS NOTES
HPI     Follow-up            Comments: Pt here for 3 mo Iritis check and IOP   Pt states she is using Pred PRN OD, Atropine BID OD     OS: durezol TID OS   Cosopt BID   Prolensa BID   Latanoprost qhs   Brimonidine TID OS     Pt co sharp pain OS on and off           Comments      1. COAG (followed by Dr. Scott)  S/p Revision of Wound Left Eye 8/4/22  Ahmed Valve OS 8/2/18  CP OS 8/27/15 - low to moderate flow, TDW 1100, aggressive dilation with   GV  Gonio Puncture OS 9/25/15  Wound Revision OS 08/04/22  SLT OS 6/23/16, 6/23/15, 6-2-14  Goniotomy OS 11-17-16  2. DM  3. RP / VIT A PALMITATE 1500 (discontinued)  4. Retinitis Pigmentosa of both eyes  5. Dry Eye Syndrome bilateral  6. Iritis recurrent OS      Left Eye:  Durezol 2-3 times a day  Dorz/Timolol 2 times a day  Prolensa once a day  Latanoprost once a day  Brimonidine 3 times a day    Right eye:  Pred as needed  Atropine 2 times a day               Last edited by Aleksey Soto on 2/25/2025  8:05 AM.            Assessment /Plan     For exam results, see Encounter Report.      ICD-10-CM ICD-9-CM    1. Primary open angle glaucoma of both eyes, severe stage  H40.1133 365.11 Doing well - intraocular pressure is within acceptable range relative to target pressure with no evidence of progression.   Continue current treatment.  Reviewed importance of continued compliance with treatment and follow up.   Visit today included increased complexity associated with the care and management of glaucoma, macular disease , and iritis . Patient aware that management of medical condition requires long term follow up.  Written instructions were placed in the portal for the patient upon closure of the encounter regarding specific use of the required medications.      Posterior Segment OCT Optic Nerve- Both eyes     365.73 brimonidine 0.1% (ALPHAGAN P) 0.1 % Drop      difluprednate (DUREZOL) 0.05 % Drop ophthalmic solution      2. Iritis, recurrent, left  H20.022 364.02  stable  bromfenac (PROLENSA) 0.07 % Drop      3. 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

## 2025-02-28 ENCOUNTER — TELEPHONE (OUTPATIENT)
Dept: OPHTHALMOLOGY | Facility: CLINIC | Age: 60
End: 2025-02-28
Payer: MEDICARE

## 2025-02-28 ENCOUNTER — TELEPHONE (OUTPATIENT)
Dept: INTERNAL MEDICINE | Facility: CLINIC | Age: 60
End: 2025-02-28
Payer: MEDICARE

## 2025-02-28 NOTE — TELEPHONE ENCOUNTER
----- Message from Sabrina sent at 2/28/2025  9:28 AM CST -----  .Type:  Pharmacy Calling to Clarify an RXName of Caller:Reema [facility]:erythromycin (ROMYCIN) ophthalmic ointment and atropine 1% (ISOPTO ATROPINE) 1 % DropWhat do they need to clarify?active:refillBest Call Back Number:[phone] Additional Information:

## 2025-02-28 NOTE — TELEPHONE ENCOUNTER
----- Message from Quinn sent at 2/28/2025  9:19 AM CST -----  Contact: caitlyn/[facility]  Caitlyn is calling to find out if the office received a fax to get the patient's prescriptions sent over to their pharmacyPlease call him at  to clarify

## 2025-03-07 ENCOUNTER — TELEPHONE (OUTPATIENT)
Dept: INTERNAL MEDICINE | Facility: CLINIC | Age: 60
End: 2025-03-07
Payer: MEDICARE

## 2025-03-07 ENCOUNTER — PATIENT MESSAGE (OUTPATIENT)
Dept: INTERNAL MEDICINE | Facility: CLINIC | Age: 60
End: 2025-03-07
Payer: MEDICARE

## 2025-03-15 DIAGNOSIS — E55.9 VITAMIN D DEFICIENCY: ICD-10-CM

## 2025-03-17 RX ORDER — ERGOCALCIFEROL 1.25 MG/1
50000 CAPSULE ORAL
Qty: 12 CAPSULE | Refills: 0 | Status: SHIPPED | OUTPATIENT
Start: 2025-03-17

## 2025-03-17 NOTE — TELEPHONE ENCOUNTER
Refill Routing Note   Medication(s) are not appropriate for processing by [facility] for the following reason(s):        Outside of protocol    [facility] action(s):  Route             Appointments  past 12m or future 3m with PCP    Date Provider   Last Visit   10/15/2024 Oneyda Bergman MD   Next Visit   Visit date not found Oneyda Bergman MD   ED visits in past 90 days: 0        Note composed:2:42 PM 03/17/2025

## 2025-03-21 ENCOUNTER — PATIENT MESSAGE (OUTPATIENT)
Dept: INTERNAL MEDICINE | Facility: CLINIC | Age: 60
End: 2025-03-21
Payer: MEDICARE

## 2025-04-04 RX ORDER — GLIPIZIDE 10 MG/1
TABLET ORAL
Qty: 90 TABLET | Refills: 3 | Status: SHIPPED | OUTPATIENT
Start: 2025-04-04

## 2025-04-07 ENCOUNTER — HOSPITAL ENCOUNTER (OUTPATIENT)
Dept: RADIOLOGY | Facility: HOSPITAL | Age: 60
Discharge: HOME | End: 2025-04-07
Attending: FAMILY MEDICINE
Payer: MEDICARE

## 2025-04-07 ENCOUNTER — HOSPITAL ENCOUNTER (OUTPATIENT)
Dept: CARDIOLOGY | Facility: HOSPITAL | Age: 60
Discharge: HOME | End: 2025-04-07
Payer: MEDICARE

## 2025-04-07 ENCOUNTER — OFFICE VISIT (OUTPATIENT)
Dept: INTERNAL MEDICINE | Facility: CLINIC | Age: 60
End: 2025-04-07
Payer: MEDICARE

## 2025-04-07 VITALS
DIASTOLIC BLOOD PRESSURE: 78 MMHG | HEART RATE: 93 BPM | OXYGEN SATURATION: 99 % | HEIGHT: 59 IN | BODY MASS INDEX: 29.95 KG/M2 | RESPIRATION RATE: 18 BRPM | WEIGHT: 148.56 LBS | SYSTOLIC BLOOD PRESSURE: 124 MMHG

## 2025-04-07 DIAGNOSIS — E79.0 HYPERURICEMIA: ICD-10-CM

## 2025-04-07 DIAGNOSIS — E11.42 TYPE 2 DIABETES MELLITUS WITH DIABETIC POLYNEUROPATHY, WITHOUT LONG-TERM CURRENT USE OF INSULIN: ICD-10-CM

## 2025-04-07 DIAGNOSIS — E11.69 HYPERLIPIDEMIA ASSOCIATED WITH TYPE 2 DIABETES MELLITUS: ICD-10-CM

## 2025-04-07 DIAGNOSIS — E11.59 HYPERTENSION ASSOCIATED WITH DIABETES: ICD-10-CM

## 2025-04-07 DIAGNOSIS — I15.2 HYPERTENSION ASSOCIATED WITH DIABETES: ICD-10-CM

## 2025-04-07 DIAGNOSIS — G56.03 BILATERAL CARPAL TUNNEL SYNDROME: ICD-10-CM

## 2025-04-07 DIAGNOSIS — E55.9 VITAMIN D DEFICIENCY: ICD-10-CM

## 2025-04-07 DIAGNOSIS — Z12.31 BREAST CANCER SCREENING BY MAMMOGRAM: ICD-10-CM

## 2025-04-07 DIAGNOSIS — Z01.818 PRE-OPERATIVE CLEARANCE: ICD-10-CM

## 2025-04-07 DIAGNOSIS — G56.03 BILATERAL CARPAL TUNNEL SYNDROME: Primary | ICD-10-CM

## 2025-04-07 DIAGNOSIS — E03.9 HYPOTHYROIDISM, UNSPECIFIED TYPE: ICD-10-CM

## 2025-04-07 DIAGNOSIS — E78.5 HYPERLIPIDEMIA ASSOCIATED WITH TYPE 2 DIABETES MELLITUS: ICD-10-CM

## 2025-04-07 DIAGNOSIS — N18.32 TYPE 2 DIABETES MELLITUS WITH STAGE 3B CHRONIC KIDNEY DISEASE, WITHOUT LONG-TERM CURRENT USE OF INSULIN: ICD-10-CM

## 2025-04-07 DIAGNOSIS — E11.22 TYPE 2 DIABETES MELLITUS WITH STAGE 3B CHRONIC KIDNEY DISEASE, WITHOUT LONG-TERM CURRENT USE OF INSULIN: ICD-10-CM

## 2025-04-07 DIAGNOSIS — Z00.00 ROUTINE GENERAL MEDICAL EXAMINATION AT A HEALTH CARE FACILITY: ICD-10-CM

## 2025-04-07 PROBLEM — M19.011 ARTHRITIS OF RIGHT ACROMIOCLAVICULAR JOINT: Status: ACTIVE | Noted: 2025-03-11

## 2025-04-07 LAB
OHS QRS DURATION: 58 MS
OHS QTC CALCULATION: 399 MS

## 2025-04-07 PROCEDURE — 71046 X-RAY EXAM CHEST 2 VIEWS: CPT | Mod: TC

## 2025-04-07 PROCEDURE — 93010 ELECTROCARDIOGRAM REPORT: CPT | Mod: ,,, | Performed by: INTERNAL MEDICINE

## 2025-04-07 PROCEDURE — 99214 OFFICE O/P EST MOD 30 MIN: CPT | Mod: PBBFAC,25 | Performed by: FAMILY MEDICINE

## 2025-04-07 PROCEDURE — 71046 X-RAY EXAM CHEST 2 VIEWS: CPT | Mod: 26,,, | Performed by: STUDENT IN AN ORGANIZED HEALTH CARE EDUCATION/TRAINING PROGRAM

## 2025-04-07 PROCEDURE — 93005 ELECTROCARDIOGRAM TRACING: CPT

## 2025-04-07 PROCEDURE — 99999 PR PBB SHADOW E&M-EST. PATIENT-LVL IV: CPT | Mod: PBBFAC,,, | Performed by: FAMILY MEDICINE

## 2025-04-07 RX ORDER — GABAPENTIN 300 MG/1
300 CAPSULE ORAL 2 TIMES DAILY
Qty: 180 CAPSULE | Refills: 1 | Status: SHIPPED | OUTPATIENT
Start: 2025-04-07

## 2025-04-07 RX ORDER — EZETIMIBE 10 MG/1
10 TABLET ORAL NIGHTLY
Qty: 90 TABLET | Refills: 1 | Status: SHIPPED | OUTPATIENT
Start: 2025-04-07 | End: 2026-04-07

## 2025-04-07 RX ORDER — FENOFIBRATE 134 MG/1
134 CAPSULE ORAL
Qty: 90 CAPSULE | Refills: 1 | Status: SHIPPED | OUTPATIENT
Start: 2025-04-07

## 2025-04-07 RX ORDER — AMLODIPINE BESYLATE 10 MG/1
TABLET ORAL
Qty: 90 TABLET | Refills: 1 | Status: SHIPPED | OUTPATIENT
Start: 2025-04-07

## 2025-04-07 RX ORDER — ROSUVASTATIN CALCIUM 40 MG/1
40 TABLET, COATED ORAL NIGHTLY
Qty: 90 TABLET | Refills: 1 | Status: SHIPPED | OUTPATIENT
Start: 2025-04-07

## 2025-04-07 RX ORDER — LEVOTHYROXINE SODIUM 25 UG/1
25 TABLET ORAL
Qty: 90 TABLET | Refills: 1 | Status: SHIPPED | OUTPATIENT
Start: 2025-04-07 | End: 2026-04-07

## 2025-04-07 RX ORDER — ALLOPURINOL 100 MG/1
200 TABLET ORAL DAILY
Qty: 180 TABLET | Refills: 1 | Status: SHIPPED | OUTPATIENT
Start: 2025-04-07

## 2025-04-07 NOTE — PROGRESS NOTES
"Subjective:       Patient ID: Segunxadanna Zaldivar is a 59 y.o. female presents with Problem List[1]     Chief Complaint: Pre-op Exam    59-year-old female patient with Patient Active Problem List:     Hypertension associated with diabetes     Type 2 diabetes mellitus with stage 3 chronic kidney disease, without long-term current use of insulin     Hyperlipidemia associated with type 2 diabetes mellitus     Iron deficiency anemia     Vitamin D deficiency     GERD (gastroesophageal reflux disease)     Retinitis pigmentosa     Recurrent iritis of left eye     Open-angle glaucoma, severe stage     Osteoarthritis of knee     Hypothyroidism     Arthritis of right acromioclavicular joint  Here for routine annual physicals and preop clearance for bilateral carpal tunnel, patient has been having symptoms more in the right side, and would like to consider surgery by Dr. Antonio, date has not been decided, would like to have preop clearance ready  Denies of any chest tightness or difficulty breathing with palpitations and blood glucose levels has been stable  Does not need any refills at this time      Review of Systems   Constitutional:  Negative for fatigue.   Eyes:  Negative for visual disturbance.   Respiratory:  Negative for chest tightness and shortness of breath.    Cardiovascular:  Negative for chest pain and leg swelling.   Gastrointestinal:  Negative for abdominal pain, nausea and vomiting.   Endocrine: Negative for polydipsia and polyuria.   Musculoskeletal:  Positive for arthralgias. Negative for myalgias.   Skin:  Negative for rash.   Neurological:  Positive for numbness. Negative for weakness, light-headedness and headaches.   Psychiatric/Behavioral:  Negative for sleep disturbance.          /78 (BP Location: Right arm, Patient Position: Sitting)   Pulse 93   Resp 18   Ht 4' 11" (1.499 m)   Wt 67.4 kg (148 lb 9.4 oz)   SpO2 99%   BMI 30.01 kg/m²   Objective:      Physical Exam  Constitutional:       " Appearance: She is well-developed.   HENT:      Head: Normocephalic and atraumatic.   Cardiovascular:      Rate and Rhythm: Normal rate and regular rhythm.      Heart sounds: Normal heart sounds. No murmur heard.  Pulmonary:      Effort: Pulmonary effort is normal.      Breath sounds: Normal breath sounds. No wheezing.   Abdominal:      General: Bowel sounds are normal.      Palpations: Abdomen is soft.      Tenderness: There is no abdominal tenderness.   Musculoskeletal:         General: Tenderness present.      Comments: Positive for bilateral wrist tenderness with gentle sign  Positive for tenderness to the right shoulder   Skin:     General: Skin is warm and dry.      Findings: No rash.   Neurological:      Mental Status: She is alert and oriented to person, place, and time.   Psychiatric:         Mood and Affect: Mood normal.           Assessment/Plan:   1. Routine general medical examination at a health care facility  -     Urinalysis, Reflex to Urine Culture; Future; Expected date: 04/07/2025  -     Hemoglobin A1C; Future; Expected date: 04/07/2025  -     TSH; Future; Expected date: 04/07/2025  -     Lipid Panel; Future; Expected date: 04/07/2025  -     Comprehensive Metabolic Panel; Future; Expected date: 04/07/2025  -     CBC Auto Differential; Future; Expected date: 04/07/2025  -     Vitamin D; Future; Expected date: 04/07/2025  -     Vitamin B12; Future; Expected date: 04/07/2025  Vital signs stable today.  Clinical exam stable  Continue lifestyle modifications with low-fat and low-cholesterol diet and exercise 30 minutes daily  Patient due for pneumonia shingles and RSV vaccination    2. Bilateral carpal tunnel syndrome  -     X-Ray Chest PA And Lateral; Future; Expected date: 04/07/2025  -     EKG 12-lead; Future  3. Pre-operative clearance  -     X-Ray Chest PA And Lateral; Future; Expected date: 04/07/2025  -     EKG 12-lead; Future  Will check preop labs and chest x-ray and EKG  Will complete preop  clearance and will fax progress note to Dr. Antonio at [phone]      4. Hypertension associated with diabetes  -     amLODIPine (NORVASC) 10 MG tablet; Take 1 tablet orally once a day.  Dispense: 90 tablet; Refill: 1  -     Urinalysis, Reflex to Urine Culture; Future; Expected date: 04/07/2025  -     TSH; Future; Expected date: 04/07/2025  -     Lipid Panel; Future; Expected date: 04/07/2025  -     Comprehensive Metabolic Panel; Future; Expected date: 04/07/2025  Blood pressure is stable currently on amlodipine 10 mg and lisinopril hydrochlorothiazide 20/25 mg    5. Hyperlipidemia associated with type 2 diabetes mellitus  -     ezetimibe (ZETIA) 10 mg tablet; Take 1 tablet (10 mg total) by mouth every evening.  Dispense: 90 tablet; Refill: 1  -     fenofibrate micronized (LOFIBRA) 134 MG Cap; Take 1 capsule (134 mg total) by mouth daily with breakfast.  Dispense: 90 capsule; Refill: 1  -     rosuvastatin (CRESTOR) 40 MG Tab; Take 1 tablet (40 mg total) by mouth every evening.  Dispense: 90 tablet; Refill: 1  -     Lipid Panel; Future; Expected date: 04/07/2025  Currently taking Zetia 10 mg fenofibrate 134 mg and Crestor 40 mg  Refills given today    6. Hypothyroidism, unspecified type  -     levothyroxine (SYNTHROID) 25 MCG tablet; Take 1 tablet (25 mcg total) by mouth before breakfast.  Dispense: 90 tablet; Refill: 1  -     TSH; Future; Expected date: 04/07/2025  Stable on levothyroxine 25 mcg p.o. daily    7. Vitamin D deficiency  -     CBC Auto Differential; Future; Expected date: 04/07/2025  -     Vitamin D; Future; Expected date: 04/07/2025  Taking vitamin-D by prescription weekly    8. Hyperuricemia  -     allopurinoL (ZYLOPRIM) 100 MG tablet; Take 2 tablets (200 mg total) by mouth once daily.  Dispense: 180 tablet; Refill: 1  Refill given on allopurinol 200 mg daily    9. Type 2 diabetes mellitus with stage 3b chronic kidney disease, without long-term current use of insulin  -     empagliflozin (JARDIANCE)  25 mg tablet; Take 1 tablet (25 mg total) by mouth once daily.  Dispense: 90 tablet; Refill: 1  -     Hemoglobin A1C; Future; Expected date: 04/07/2025  -     Microalbumin/Creatinine Ratio, Urine; Future; Expected date: 04/07/2025  Refill given on Jardiance 25 mg  Encouraged to drink adequate fluids and avoid over-the-counter NSAIDs to avoid worsening kidney functions  Strict lifestyle changes recommended with 1800 ADA low-fat and low-cholesterol diet and exercise 30 minutes daily    10. Type 2 diabetes mellitus with diabetic polyneuropathy, without long-term current use of insulin  -     gabapentin (NEURONTIN) 300 MG capsule; Take 1 capsule (300 mg total) by mouth 2 (two) times daily.  Dispense: 180 capsule; Refill: 1  Stable on gabapentin 300 mg twice daily for neuropathy    11. Breast cancer screening by mammogram  -     Mammo Digital Screening Bilat w/ Niraj (XPD); Future; Expected date: 04/11/2025  Due for mammogram                  [1]   Patient Active Problem List  Diagnosis    Hypertension associated with diabetes    Type 2 diabetes mellitus with stage 3 chronic kidney disease, without long-term current use of insulin    Hyperlipidemia associated with type 2 diabetes mellitus    Iron deficiency anemia    Vitamin D deficiency    GERD (gastroesophageal reflux disease)    Retinitis pigmentosa    Recurrent iritis of left eye    Open-angle glaucoma, severe stage    Osteoarthritis of knee    Hypothyroidism    Arthritis of right acromioclavicular joint

## 2025-04-09 ENCOUNTER — PATIENT MESSAGE (OUTPATIENT)
Dept: INTERNAL MEDICINE | Facility: CLINIC | Age: 60
End: 2025-04-09
Payer: MEDICARE

## 2025-04-09 ENCOUNTER — PATIENT MESSAGE (OUTPATIENT)
Dept: OPHTHALMOLOGY | Facility: CLINIC | Age: 60
End: 2025-04-09
Payer: MEDICARE

## 2025-04-10 ENCOUNTER — OFFICE VISIT (OUTPATIENT)
Dept: OPHTHALMOLOGY | Facility: CLINIC | Age: 60
End: 2025-04-10
Payer: MEDICARE

## 2025-04-10 DIAGNOSIS — Z96.1 PSEUDOPHAKIA OF LEFT EYE: ICD-10-CM

## 2025-04-10 DIAGNOSIS — H11.32 SUBCONJUNCTIVAL HEMORRHAGE OF LEFT EYE: Primary | ICD-10-CM

## 2025-04-10 DIAGNOSIS — H20.022 IRITIS, RECURRENT, LEFT: ICD-10-CM

## 2025-04-10 DIAGNOSIS — H40.1133 PRIMARY OPEN ANGLE GLAUCOMA OF BOTH EYES, SEVERE STAGE: ICD-10-CM

## 2025-04-10 PROCEDURE — 1160F RVW MEDS BY RX/DR IN RCRD: CPT | Mod: CPTII,S$GLB,, | Performed by: OPTOMETRIST

## 2025-04-10 PROCEDURE — 99999 PR PBB SHADOW E&M-EST. PATIENT-LVL III: CPT | Mod: PBBFAC,,, | Performed by: OPTOMETRIST

## 2025-04-10 PROCEDURE — 99204 OFFICE O/P NEW MOD 45 MIN: CPT | Mod: S$GLB,,, | Performed by: OPTOMETRIST

## 2025-04-10 PROCEDURE — 4010F ACE/ARB THERAPY RXD/TAKEN: CPT | Mod: CPTII,S$GLB,, | Performed by: OPTOMETRIST

## 2025-04-10 PROCEDURE — 1159F MED LIST DOCD IN RCRD: CPT | Mod: CPTII,S$GLB,, | Performed by: OPTOMETRIST

## 2025-04-10 NOTE — PROGRESS NOTES
HPI     Eye Problem            Comments: Pts daughter said yesterday am the pt woke up with some red in   the corner of OS, but pain came last night. So they want to make sure OS   is fine because she is a glaucoma pt.  Pain Scale:  5  Onset:   yesterday  OD, OS, OU:   OS  Discharge:   no  A.M. Matting:  no  Itch:   no  Redness:   yes  Photophobia:   yes  Foreign body sensation:   yes  Deep pain:   no  Previous occurrence:   no  Drops:   no           Last edited by Maricruz Lino on 4/10/2025 10:28 AM.            Assessment /Plan     For exam results, see Encounter Report.    Subconjunctival hemorrhage of left eye  Benign nature of subconjunctival hemorrhage was discussed with patient and/or patient's family. Recommended starting artificial tears for comfort.   Symptoms should improve or resolve in the next 7-10 days.     Primary open angle glaucoma of both eyes, severe stage  Iritis, recurrent, left  IOP stable today and within acceptable range relative to target OS  Continue OS:   Durezol 2-3 times a day  Dorz/Timolol 2 times a day  Prolensa once a day  Latanoprost once a day  Brimonidine 3 times a day       RTC PRN if symptoms worsen or persist x 1-2 weeks.  Otherwise, RTC as scheduled with [facility] for dilation  Discussed above and answered questions.

## 2025-04-16 ENCOUNTER — PATIENT MESSAGE (OUTPATIENT)
Dept: INTERNAL MEDICINE | Facility: CLINIC | Age: 60
End: 2025-04-16
Payer: MEDICARE

## 2025-04-17 ENCOUNTER — HOSPITAL ENCOUNTER (OUTPATIENT)
Dept: RADIOLOGY | Facility: HOSPITAL | Age: 60
Discharge: HOME | End: 2025-04-17
Attending: FAMILY MEDICINE
Payer: MEDICARE

## 2025-04-17 VITALS — HEIGHT: 59 IN | BODY MASS INDEX: 29.95 KG/M2 | WEIGHT: 148.56 LBS

## 2025-04-17 DIAGNOSIS — Z12.31 BREAST CANCER SCREENING BY MAMMOGRAM: ICD-10-CM

## 2025-04-17 PROCEDURE — 77063 BREAST TOMOSYNTHESIS BI: CPT | Mod: TC

## 2025-04-21 ENCOUNTER — PATIENT MESSAGE (OUTPATIENT)
Dept: INTERNAL MEDICINE | Facility: CLINIC | Age: 60
End: 2025-04-21
Payer: MEDICARE

## 2025-04-21 ENCOUNTER — RESULTS FOLLOW-UP (OUTPATIENT)
Dept: INTERNAL MEDICINE | Facility: CLINIC | Age: 60
End: 2025-04-21

## 2025-04-28 DIAGNOSIS — Z00.00 ENCOUNTER FOR MEDICARE ANNUAL WELLNESS EXAM: ICD-10-CM

## 2025-05-06 DIAGNOSIS — E11.22 TYPE 2 DIABETES MELLITUS WITH STAGE 3 CHRONIC KIDNEY DISEASE, WITHOUT LONG-TERM CURRENT USE OF INSULIN, UNSPECIFIED WHETHER STAGE 3A OR 3B CKD: ICD-10-CM

## 2025-05-06 DIAGNOSIS — N18.30 TYPE 2 DIABETES MELLITUS WITH STAGE 3 CHRONIC KIDNEY DISEASE, WITHOUT LONG-TERM CURRENT USE OF INSULIN, UNSPECIFIED WHETHER STAGE 3A OR 3B CKD: ICD-10-CM

## 2025-05-06 NOTE — TELEPHONE ENCOUNTER
Courtesy refill given for patient with 0 refills. Please call patient and advise an appt will need to be completed before future refills. Please schedule appt if needed.     Richi Brown PA-C  Diabetes Management

## 2025-05-13 ENCOUNTER — OFFICE VISIT (OUTPATIENT)
Dept: OPHTHALMOLOGY | Facility: CLINIC | Age: 60
End: 2025-05-13
Payer: MEDICARE

## 2025-05-13 DIAGNOSIS — Z96.1 PSEUDOPHAKIA OF LEFT EYE: ICD-10-CM

## 2025-05-13 DIAGNOSIS — H20.022 IRITIS, RECURRENT, LEFT: ICD-10-CM

## 2025-05-13 DIAGNOSIS — H40.1133 PRIMARY OPEN ANGLE GLAUCOMA OF BOTH EYES, SEVERE STAGE: Primary | ICD-10-CM

## 2025-05-13 PROCEDURE — 1159F MED LIST DOCD IN RCRD: CPT | Mod: CPTII,S$GLB,, | Performed by: OPHTHALMOLOGY

## 2025-05-13 PROCEDURE — 3061F NEG MICROALBUMINURIA REV: CPT | Mod: CPTII,S$GLB,, | Performed by: OPHTHALMOLOGY

## 2025-05-13 PROCEDURE — 3051F HG A1C>EQUAL 7.0%<8.0%: CPT | Mod: CPTII,S$GLB,, | Performed by: OPHTHALMOLOGY

## 2025-05-13 PROCEDURE — 4010F ACE/ARB THERAPY RXD/TAKEN: CPT | Mod: CPTII,S$GLB,, | Performed by: OPHTHALMOLOGY

## 2025-05-13 PROCEDURE — 99999 PR PBB SHADOW E&M-EST. PATIENT-LVL III: CPT | Mod: PBBFAC,,, | Performed by: OPHTHALMOLOGY

## 2025-05-13 PROCEDURE — 1160F RVW MEDS BY RX/DR IN RCRD: CPT | Mod: CPTII,S$GLB,, | Performed by: OPHTHALMOLOGY

## 2025-05-13 PROCEDURE — 3066F NEPHROPATHY DOC TX: CPT | Mod: CPTII,S$GLB,, | Performed by: OPHTHALMOLOGY

## 2025-05-13 PROCEDURE — 99214 OFFICE O/P EST MOD 30 MIN: CPT | Mod: S$GLB,,, | Performed by: OPHTHALMOLOGY

## 2025-05-13 NOTE — PROGRESS NOTES
HPI     Glaucoma            Comments: Patient presents today for a 2 month dilation. Patient denies   vision changes, pain, and irritation.           Comments    1. COAG (followed by Dr. Scott)   S/p Revision of Wound Left Eye 8/4/22  Ahmed Valve OS 8/2/18   CP OS 8/27/15 - low to moderate flow, TDW 1100, aggressive dilation with   GV   Gonio Puncture OS 9/25/15   Wound Revision OS 08/04/22  SLT OS 6/23/16, 6/23/15, 6-2-14   Goniotomy OS 11-17-16   2. DM   3. RP / VIT A PALMITATE 1500 (discontinued)   4. Retinitis Pigmentosa of both eyes   5. Dry Eye Syndrome bilateral   6. Iritis recurrent OS     Gcl: 23 - irregular due to macular disease    Left Eye:  Durezol 2-3 times a day  Dorz/Timolol 2 times a day  Prolensa once a day  Latanoprost once a day  Brimonidine 3 times a day    Right eye:  Pred as needed  Atropine 2 times a day             Last edited by Nicolasa Sandoval on 5/13/2025  7:46 AM.            Assessment /Plan     For exam results, see Encounter Report.      ICD-10-CM ICD-9-CM    1. Primary open angle glaucoma of both eyes, severe stage  H40.1133 365.11 Doing well - intraocular pressure is within acceptable range relative to target pressure with no evidence of progression.   Continue current treatment.  Reviewed importance of continued compliance with treatment and follow up.      365.73       2. Iritis, recurrent, left  H20.022 364.02 Continue Durezol      3. Pseudophakia of left eye  Z96.1 V43.1           Return to clinic 3 months for IOP      Left Eye:  Durezol 2-3 times a day  Dorz/Timolol 2 times a day  Prolensa once a day  Latanoprost once a day  Brimonidine 3 times a day    Right eye:  Pred as needed  Atropine 2 times a day

## 2025-05-20 ENCOUNTER — TELEPHONE (OUTPATIENT)
Dept: DIABETES | Facility: CLINIC | Age: 60
End: 2025-05-20
Payer: MEDICARE

## 2025-05-20 NOTE — TELEPHONE ENCOUNTER
Refill request received from [facility] via fax. Request inappropriate 2/2 pt having an active script written in 4/2025 for a 90 day supply and 3 refills.

## 2025-05-22 ENCOUNTER — PATIENT MESSAGE (OUTPATIENT)
Dept: SPORTS MEDICINE | Facility: CLINIC | Age: 60
End: 2025-05-22
Payer: MEDICARE

## 2025-05-22 DIAGNOSIS — H40.1133 PRIMARY OPEN ANGLE GLAUCOMA OF BOTH EYES, SEVERE STAGE: ICD-10-CM

## 2025-05-22 DIAGNOSIS — H20.022 IRITIS, RECURRENT, LEFT: ICD-10-CM

## 2025-05-22 RX ORDER — BROMFENAC SODIUM 0.7 MG/ML
1 SOLUTION/ DROPS OPHTHALMIC DAILY
Qty: 3 ML | Refills: 6 | Status: SHIPPED | OUTPATIENT
Start: 2025-05-22

## 2025-05-22 RX ORDER — BRIMONIDINE TARTRATE 1 MG/ML
1 SOLUTION/ DROPS OPHTHALMIC 3 TIMES DAILY
Qty: 15 ML | Refills: 6 | Status: SHIPPED | OUTPATIENT
Start: 2025-05-22 | End: 2025-06-21

## 2025-05-22 RX ORDER — DIFLUPREDNATE OPHTHALMIC 0.5 MG/ML
1 EMULSION OPHTHALMIC 3 TIMES DAILY
Qty: 15 ML | Refills: 4 | Status: SHIPPED | OUTPATIENT
Start: 2025-05-22 | End: 2025-08-20

## 2025-05-23 ENCOUNTER — TELEPHONE (OUTPATIENT)
Dept: OPHTHALMOLOGY | Facility: CLINIC | Age: 60
End: 2025-05-23
Payer: MEDICARE

## 2025-05-23 NOTE — TELEPHONE ENCOUNTER
----- Message from Arlette sent at 5/23/2025  9:34 AM CDT -----  Contact: [facility]  [facility] is calling in regards to the length of therapy difluprednate (DUREZOL) 0.05 % Drop ophthalmic solution. Ref# [id number].please call back at [phone] and fax [phone]

## 2025-05-23 NOTE — TELEPHONE ENCOUNTER
Returned call to patient, left message for her to call the office        ----- Message from Arlette sent at 5/23/2025  9:34 AM CDT -----  Contact: [facility]  [facility] is calling in regards to the length of therapy difluprednate (DUREZOL) 0.05 % Drop ophthalmic solution. Ref# [id number].please call back at [phone] and fax [phone]

## 2025-05-26 DIAGNOSIS — H20.022 IRITIS, RECURRENT, LEFT: ICD-10-CM

## 2025-05-26 RX ORDER — NEPAFENAC 3 MG/ML
1 SUSPENSION/ DROPS OPHTHALMIC DAILY
Qty: 3 ML | Refills: 5 | Status: SHIPPED | OUTPATIENT
Start: 2025-05-26

## 2025-05-26 RX ORDER — BROMFENAC SODIUM 0.7 MG/ML
1 SOLUTION/ DROPS OPHTHALMIC DAILY
Qty: 3 ML | Refills: 6 | Status: CANCELLED | OUTPATIENT
Start: 2025-05-26

## 2025-05-30 RX ORDER — BROMFENAC SODIUM 0.7 MG/ML
1 SOLUTION/ DROPS OPHTHALMIC DAILY
Qty: 3 ML | Refills: 1 | Status: SHIPPED | OUTPATIENT
Start: 2025-05-30

## 2025-06-19 ENCOUNTER — PATIENT MESSAGE (OUTPATIENT)
Dept: INTERNAL MEDICINE | Facility: CLINIC | Age: 60
End: 2025-06-19
Payer: MEDICARE

## 2025-06-20 ENCOUNTER — TELEPHONE (OUTPATIENT)
Dept: INTERNAL MEDICINE | Facility: CLINIC | Age: 60
End: 2025-06-20
Payer: MEDICARE

## 2025-06-20 DIAGNOSIS — E11.22 TYPE 2 DIABETES MELLITUS WITH STAGE 3 CHRONIC KIDNEY DISEASE, WITHOUT LONG-TERM CURRENT USE OF INSULIN, UNSPECIFIED WHETHER STAGE 3A OR 3B CKD: ICD-10-CM

## 2025-06-20 DIAGNOSIS — E53.8 B12 DEFICIENCY: Primary | ICD-10-CM

## 2025-06-20 DIAGNOSIS — N18.30 TYPE 2 DIABETES MELLITUS WITH STAGE 3 CHRONIC KIDNEY DISEASE, WITHOUT LONG-TERM CURRENT USE OF INSULIN, UNSPECIFIED WHETHER STAGE 3A OR 3B CKD: ICD-10-CM

## 2025-06-20 RX ORDER — CYANOCOBALAMIN 1000 UG/ML
1000 INJECTION, SOLUTION INTRAMUSCULAR; SUBCUTANEOUS
Status: SHIPPED | OUTPATIENT
Start: 2025-06-20 | End: 2025-12-17

## 2025-06-20 RX ORDER — GLIPIZIDE 10 MG/1
TABLET ORAL
Qty: 90 TABLET | Refills: 3 | Status: SHIPPED | OUTPATIENT
Start: 2025-06-20

## 2025-06-20 RX ORDER — HYDROCODONE BITARTRATE AND ACETAMINOPHEN 10; 325 MG/1; MG/1
TABLET ORAL EVERY 6 HOURS PRN
COMMUNITY
Start: 2025-05-01

## 2025-06-20 NOTE — TELEPHONE ENCOUNTER
Start: 05/06/2025 - dulaglutide (TRULICITY)   Start: 04/04/2025- glipiZIDE (GLUCOTROL)  LOV:5/30/2024  NOV: 718/2025

## 2025-06-23 ENCOUNTER — CLINICAL SUPPORT (OUTPATIENT)
Dept: INTERNAL MEDICINE | Facility: CLINIC | Age: 60
End: 2025-06-23
Payer: MEDICARE

## 2025-06-23 DIAGNOSIS — H40.1133 PRIMARY OPEN ANGLE GLAUCOMA OF BOTH EYES, SEVERE STAGE: ICD-10-CM

## 2025-06-23 DIAGNOSIS — E53.8 B12 DEFICIENCY: Primary | ICD-10-CM

## 2025-06-23 DIAGNOSIS — E55.9 VITAMIN D DEFICIENCY: ICD-10-CM

## 2025-06-23 PROCEDURE — 99999 PR PBB SHADOW E&M-EST. PATIENT-LVL III: CPT | Mod: PBBFAC,,,

## 2025-06-23 PROCEDURE — 96372 THER/PROPH/DIAG INJ SC/IM: CPT | Mod: S$GLB,,, | Performed by: FAMILY MEDICINE

## 2025-06-23 RX ORDER — BROMFENAC SODIUM 0.7 MG/ML
1 SOLUTION/ DROPS OPHTHALMIC DAILY
Qty: 3 ML | Refills: 6 | Status: SHIPPED | OUTPATIENT
Start: 2025-06-23

## 2025-06-23 RX ORDER — DIFLUPREDNATE OPHTHALMIC 0.5 MG/ML
1 EMULSION OPHTHALMIC 3 TIMES DAILY
Qty: 15 ML | Refills: 4 | Status: SHIPPED | OUTPATIENT
Start: 2025-06-23 | End: 2025-09-21

## 2025-06-23 RX ORDER — BRIMONIDINE TARTRATE 1 MG/ML
1 SOLUTION/ DROPS OPHTHALMIC 3 TIMES DAILY
Qty: 15 ML | Refills: 6 | Status: SHIPPED | OUTPATIENT
Start: 2025-06-23 | End: 2025-07-23

## 2025-06-23 RX ORDER — PREDNISOLONE ACETATE 10 MG/ML
1 SUSPENSION/ DROPS OPHTHALMIC 2 TIMES DAILY
Qty: 5 ML | Refills: 0 | Status: SHIPPED | OUTPATIENT
Start: 2025-06-23

## 2025-06-23 RX ADMIN — CYANOCOBALAMIN 1000 MCG: 1000 INJECTION, SOLUTION INTRAMUSCULAR; SUBCUTANEOUS at 10:06

## 2025-06-23 NOTE — NURSING
Current note  Brenda Aguillon LPN 06/23/25 1035 - Vitamin B-12 admin as ordered, given in (R) deltoid, pt tolerated procedure well. Advised to wait 15 min, no s/s adverse/allergic reactions noted, ok to leave. NADN.//TF

## 2025-06-24 RX ORDER — ERGOCALCIFEROL 1.25 MG/1
50000 CAPSULE ORAL
Qty: 12 CAPSULE | Refills: 0 | Status: SHIPPED | OUTPATIENT
Start: 2025-06-24

## 2025-06-24 NOTE — TELEPHONE ENCOUNTER
Refill Routing Note   Medication(s) are not appropriate for processing by [facility] for the following reason(s):        Outside of protocol    [facility] action(s):  Route             Appointments  past 12m or future 3m with PCP    Date Provider   Last Visit   4/7/2025 Oneyda Bergman MD   Next Visit   10/6/2025 Oneyda Bergman MD   ED visits in past 90 days: 0        Note composed:11:08 AM 06/24/2025

## 2025-07-07 ENCOUNTER — PATIENT MESSAGE (OUTPATIENT)
Dept: OPHTHALMOLOGY | Facility: CLINIC | Age: 60
End: 2025-07-07
Payer: MEDICARE

## 2025-07-09 DIAGNOSIS — E55.9 VITAMIN D DEFICIENCY: ICD-10-CM

## 2025-07-09 DIAGNOSIS — H40.1133 PRIMARY OPEN ANGLE GLAUCOMA OF BOTH EYES, SEVERE STAGE: ICD-10-CM

## 2025-07-09 RX ORDER — PREDNISOLONE ACETATE 10 MG/ML
1 SUSPENSION/ DROPS OPHTHALMIC 2 TIMES DAILY
Qty: 5 ML | Refills: 0 | Status: SHIPPED | OUTPATIENT
Start: 2025-07-09

## 2025-07-09 RX ORDER — ERGOCALCIFEROL 1.25 MG/1
50000 CAPSULE ORAL
Qty: 12 CAPSULE | Refills: 1 | Status: SHIPPED | OUTPATIENT
Start: 2025-07-09

## 2025-07-09 NOTE — TELEPHONE ENCOUNTER
Refill Routing Note   Medication(s) are not appropriate for processing by [facility] for the following reason(s):        Outside of protocol    [facility] action(s):  Route               Appointments  past 12m or future 3m with PCP    Date Provider   Last Visit   4/7/2025 Oneyda Bergman MD   Next Visit   10/6/2025 Oneyda Bergman MD   ED visits in past 90 days: 0        Note composed:2:46 PM 07/09/2025

## 2025-07-20 ENCOUNTER — PATIENT MESSAGE (OUTPATIENT)
Dept: INTERNAL MEDICINE | Facility: CLINIC | Age: 60
End: 2025-07-20
Payer: MEDICARE

## 2025-07-21 ENCOUNTER — TELEPHONE (OUTPATIENT)
Dept: OPHTHALMOLOGY | Facility: CLINIC | Age: 60
End: 2025-07-21
Payer: MEDICARE

## 2025-07-21 DIAGNOSIS — I15.2 HYPERTENSION ASSOCIATED WITH DIABETES: ICD-10-CM

## 2025-07-21 DIAGNOSIS — E11.59 HYPERTENSION ASSOCIATED WITH DIABETES: ICD-10-CM

## 2025-07-21 NOTE — TELEPHONE ENCOUNTER
Returned call to patient, left a very detailed message stating that MGTEODORO is out of the office all next week and she is scheduled with the soonest appt with Dr Scott,offered another physician for her to see this week, waiting for her to call back.       ----- Message from Majo sent at 7/21/2025 11:10 AM CDT -----  Pt is schedule for August state she is having blurry vision and pressure in eye states she only want to see [facility] pt was offered to see another Dr masters refused want to speak with [facility] staff  [phone]

## 2025-07-21 NOTE — TELEPHONE ENCOUNTER
Care Due:                  Date            Visit Type   Department     Provider  --------------------------------------------------------------------------------                                             [facility]  Last Visit: 04-      PRE-OP       MEDICINE       [facility]                              EP -                              PRIMARY      [facility]  Next Visit: 10-      CARE ([facility])   MEDICINE       [facility]                                                            Last  Test          Frequency    Reason                     Performed    Due Date  --------------------------------------------------------------------------------    HBA1C.......  6 months...  empagliflozin, metFORMIN.  04-   10-    Uric Acid...  12 months..  allopurinoL..............  Not Found    Overdue    [employer] Embedded Care Due Messages. Reference number: [id number].   7/21/2025 5:38:22 PM CDT

## 2025-07-22 RX ORDER — AMLODIPINE BESYLATE 10 MG/1
10 TABLET ORAL DAILY
Qty: 90 TABLET | Refills: 2 | Status: SHIPPED | OUTPATIENT
Start: 2025-07-22

## 2025-07-22 RX ORDER — LISINOPRIL AND HYDROCHLOROTHIAZIDE 20; 25 MG/1; MG/1
1 TABLET ORAL DAILY
Qty: 90 TABLET | Refills: 1 | Status: SHIPPED | OUTPATIENT
Start: 2025-07-22

## 2025-07-22 NOTE — TELEPHONE ENCOUNTER
Refill Routing Note   Medication(s) are not appropriate for processing by [facility] for the following reason(s):        Required labs abnormal    [facility] action(s):  Defer  Approve     Requires labs : Yes             Appointments  past 12m or future 3m with PCP    Date Provider   Last Visit   4/7/2025 Oneyda Bergman MD   Next Visit   10/6/2025 Oneyda Bergman MD   ED visits in past 90 days: 0        Note composed:6:35 AM 07/22/2025

## 2025-07-24 DIAGNOSIS — N18.32 TYPE 2 DIABETES MELLITUS WITH STAGE 3B CHRONIC KIDNEY DISEASE, WITHOUT LONG-TERM CURRENT USE OF INSULIN: ICD-10-CM

## 2025-07-24 DIAGNOSIS — E11.22 TYPE 2 DIABETES MELLITUS WITH STAGE 3B CHRONIC KIDNEY DISEASE, WITHOUT LONG-TERM CURRENT USE OF INSULIN: ICD-10-CM

## 2025-07-24 NOTE — TELEPHONE ENCOUNTER
No care due was identified.  [facility] Embedded Care Due Messages. Reference number: [id number].   7/24/2025 10:47:44 AM CDT

## 2025-07-25 ENCOUNTER — CLINICAL SUPPORT (OUTPATIENT)
Dept: INTERNAL MEDICINE | Facility: CLINIC | Age: 60
End: 2025-07-25
Payer: MEDICARE

## 2025-07-25 DIAGNOSIS — E53.8 B12 DEFICIENCY: Primary | ICD-10-CM

## 2025-07-25 PROCEDURE — 99999 PR PBB SHADOW E&M-EST. PATIENT-LVL III: CPT | Mod: PBBFAC,,,

## 2025-07-25 RX ORDER — METFORMIN HYDROCHLORIDE 1000 MG/1
1000 TABLET ORAL 2 TIMES DAILY WITH MEALS
Qty: 180 TABLET | Refills: 0 | Status: SHIPPED | OUTPATIENT
Start: 2025-07-25

## 2025-07-25 RX ADMIN — CYANOCOBALAMIN 1000 MCG: 1000 INJECTION, SOLUTION INTRAMUSCULAR; SUBCUTANEOUS at 10:07

## 2025-07-25 NOTE — TELEPHONE ENCOUNTER
Refill Routing Note   Medication(s) are not appropriate for processing by [facility] for the following reason(s):        Required labs abnormal    [facility] action(s):  Defer      Medication Therapy Plan: Cr, eGFR    Pharmacist review requested: Yes     Appointments  past 12m or future 3m with PCP    Date Provider   Last Visit   4/7/2025 Oneyda Bergman MD   Next Visit   10/6/2025 Oneyda Bergman MD   ED visits in past 90 days: 0        Note composed:7:22 PM 07/24/2025

## 2025-07-25 NOTE — TELEPHONE ENCOUNTER
Refill Decision Note   Richelle Zaldivar  is requesting a refill authorization.  Brief Assessment and Rationale for Refill:  Approve     Medication Therapy Plan:         Pharmacist review requested: Yes   Extended chart review required: Yes   Comments:     Note composed:11:15 AM 07/25/2025

## 2025-07-25 NOTE — NURSING
Vitamin B-12 admin as ordered, pt tolerated procedure well. Advised to wait 15 min, no s/s adverse/allergic reactions noted, ok to leave. NADN.//TF

## 2025-08-08 ENCOUNTER — TELEPHONE (OUTPATIENT)
Dept: DERMATOLOGY | Facility: CLINIC | Age: 60
End: 2025-08-08
Payer: MEDICARE

## 2025-08-08 DIAGNOSIS — K21.9 GASTROESOPHAGEAL REFLUX DISEASE, UNSPECIFIED WHETHER ESOPHAGITIS PRESENT: ICD-10-CM

## 2025-08-08 DIAGNOSIS — E03.9 HYPOTHYROIDISM, UNSPECIFIED TYPE: ICD-10-CM

## 2025-08-08 RX ORDER — LEVOTHYROXINE SODIUM 25 UG/1
25 TABLET ORAL
Qty: 90 TABLET | Refills: 2 | Status: SHIPPED | OUTPATIENT
Start: 2025-08-08 | End: 2026-05-05

## 2025-08-08 RX ORDER — LANSOPRAZOLE 30 MG/1
30 CAPSULE, DELAYED RELEASE ORAL DAILY
Qty: 90 CAPSULE | Refills: 2 | Status: SHIPPED | OUTPATIENT
Start: 2025-08-08

## 2025-08-09 NOTE — TELEPHONE ENCOUNTER
Refill Decision Note   Richelle Zaldivar  is requesting a refill authorization.  Brief Assessment and Rationale for Refill:  Approve     Medication Therapy Plan:        Comments:     Note composed:7:11 PM 08/08/2025

## 2025-08-14 ENCOUNTER — OFFICE VISIT (OUTPATIENT)
Dept: OPHTHALMOLOGY | Facility: CLINIC | Age: 60
End: 2025-08-14
Payer: MEDICARE

## 2025-08-14 DIAGNOSIS — Z96.1 PSEUDOPHAKIA OF LEFT EYE: ICD-10-CM

## 2025-08-14 DIAGNOSIS — H20.022 IRITIS, RECURRENT, LEFT: ICD-10-CM

## 2025-08-14 DIAGNOSIS — H40.1133 PRIMARY OPEN ANGLE GLAUCOMA OF BOTH EYES, SEVERE STAGE: Primary | ICD-10-CM

## 2025-08-14 PROCEDURE — 3061F NEG MICROALBUMINURIA REV: CPT | Mod: CPTII,S$GLB,, | Performed by: OPHTHALMOLOGY

## 2025-08-14 PROCEDURE — 99999 PR PBB SHADOW E&M-EST. PATIENT-LVL IV: CPT | Mod: PBBFAC,,, | Performed by: OPHTHALMOLOGY

## 2025-08-14 PROCEDURE — 1160F RVW MEDS BY RX/DR IN RCRD: CPT | Mod: CPTII,S$GLB,, | Performed by: OPHTHALMOLOGY

## 2025-08-14 PROCEDURE — 3066F NEPHROPATHY DOC TX: CPT | Mod: CPTII,S$GLB,, | Performed by: OPHTHALMOLOGY

## 2025-08-14 PROCEDURE — 99214 OFFICE O/P EST MOD 30 MIN: CPT | Mod: S$GLB,,, | Performed by: OPHTHALMOLOGY

## 2025-08-14 PROCEDURE — G2211 COMPLEX E/M VISIT ADD ON: HCPCS | Mod: S$GLB,,, | Performed by: OPHTHALMOLOGY

## 2025-08-14 PROCEDURE — 4010F ACE/ARB THERAPY RXD/TAKEN: CPT | Mod: CPTII,S$GLB,, | Performed by: OPHTHALMOLOGY

## 2025-08-14 PROCEDURE — 3051F HG A1C>EQUAL 7.0%<8.0%: CPT | Mod: CPTII,S$GLB,, | Performed by: OPHTHALMOLOGY

## 2025-08-14 PROCEDURE — 1159F MED LIST DOCD IN RCRD: CPT | Mod: CPTII,S$GLB,, | Performed by: OPHTHALMOLOGY

## 2025-08-25 ENCOUNTER — PATIENT MESSAGE (OUTPATIENT)
Dept: INTERNAL MEDICINE | Facility: CLINIC | Age: 60
End: 2025-08-25
Payer: MEDICARE

## 2025-08-25 DIAGNOSIS — E11.22 TYPE 2 DIABETES MELLITUS WITH STAGE 3B CHRONIC KIDNEY DISEASE, WITHOUT LONG-TERM CURRENT USE OF INSULIN: ICD-10-CM

## 2025-08-25 DIAGNOSIS — N18.32 TYPE 2 DIABETES MELLITUS WITH STAGE 3B CHRONIC KIDNEY DISEASE, WITHOUT LONG-TERM CURRENT USE OF INSULIN: ICD-10-CM

## 2025-09-02 ENCOUNTER — CLINICAL SUPPORT (OUTPATIENT)
Dept: INTERNAL MEDICINE | Facility: CLINIC | Age: 60
End: 2025-09-02
Payer: MEDICARE

## 2025-09-02 DIAGNOSIS — E53.8 B12 DEFICIENCY: Primary | ICD-10-CM

## 2025-09-02 PROCEDURE — 99999 PR PBB SHADOW E&M-EST. PATIENT-LVL III: CPT | Mod: PBBFAC,,,

## 2025-09-02 PROCEDURE — 96372 THER/PROPH/DIAG INJ SC/IM: CPT | Mod: S$GLB,,, | Performed by: FAMILY MEDICINE

## 2025-09-02 RX ADMIN — CYANOCOBALAMIN 1000 MCG: 1000 INJECTION, SOLUTION INTRAMUSCULAR; SUBCUTANEOUS at 09:09
